# Patient Record
Sex: FEMALE | Race: WHITE | NOT HISPANIC OR LATINO | Employment: FULL TIME | URBAN - METROPOLITAN AREA
[De-identification: names, ages, dates, MRNs, and addresses within clinical notes are randomized per-mention and may not be internally consistent; named-entity substitution may affect disease eponyms.]

---

## 2017-01-03 ENCOUNTER — OFFICE VISIT (OUTPATIENT)
Dept: MEDICAL GROUP | Facility: MEDICAL CENTER | Age: 27
End: 2017-01-03
Payer: COMMERCIAL

## 2017-01-03 VITALS
WEIGHT: 156.31 LBS | DIASTOLIC BLOOD PRESSURE: 60 MMHG | SYSTOLIC BLOOD PRESSURE: 96 MMHG | RESPIRATION RATE: 12 BRPM | OXYGEN SATURATION: 100 % | HEIGHT: 69 IN | TEMPERATURE: 98 F | BODY MASS INDEX: 23.15 KG/M2 | HEART RATE: 97 BPM

## 2017-01-03 DIAGNOSIS — L70.0 ACNE VULGARIS: ICD-10-CM

## 2017-01-03 DIAGNOSIS — F90.9 ATTENTION DEFICIT HYPERACTIVITY DISORDER (ADHD), UNSPECIFIED ADHD TYPE: ICD-10-CM

## 2017-01-03 DIAGNOSIS — G35 MULTIPLE SCLEROSIS (HCC): ICD-10-CM

## 2017-01-03 PROCEDURE — 99214 OFFICE O/P EST MOD 30 MIN: CPT | Performed by: FAMILY MEDICINE

## 2017-01-03 RX ORDER — METHYLPHENIDATE HYDROCHLORIDE 54 MG/1
54 TABLET ORAL DAILY
Qty: 30 TAB | Refills: 0 | Status: SHIPPED | OUTPATIENT
Start: 2017-01-03 | End: 2017-01-03 | Stop reason: SDUPTHER

## 2017-01-03 RX ORDER — METHYLPHENIDATE HYDROCHLORIDE 54 MG/1
54 TABLET ORAL DAILY
Qty: 30 TAB | Refills: 2 | Status: SHIPPED | OUTPATIENT
Start: 2017-01-03 | End: 2017-01-03 | Stop reason: SDUPTHER

## 2017-01-03 RX ORDER — METHYLPHENIDATE HYDROCHLORIDE 54 MG/1
54 TABLET ORAL DAILY
Qty: 30 TAB | Refills: 0 | Status: SHIPPED | OUTPATIENT
Start: 2017-03-01 | End: 2017-01-03 | Stop reason: SDUPTHER

## 2017-01-03 RX ORDER — METHYLPHENIDATE HYDROCHLORIDE 54 MG/1
54 TABLET ORAL DAILY
Qty: 30 TAB | Refills: 0 | Status: SHIPPED | OUTPATIENT
Start: 2017-02-01 | End: 2017-03-17 | Stop reason: SDUPTHER

## 2017-01-03 NOTE — MR AVS SNAPSHOT
"        Rosita Bowles   1/3/2017 3:00 PM   Office Visit   MRN: [id number]    Department:  [place] [address]   Dept Phone:  [phone]    Description:  Female : 1990   Provider:  Manuela Green M.D.           Reason for Visit     Follow-Up MS,ADHA      Allergies as of 1/3/2017     Allergen Noted Reactions    Nkda [No Known Drug Allergy] 2012         You were diagnosed with     Attention deficit hyperactivity disorder (ADHD), unspecified ADHD type   [0234391]       Multiple sclerosis (HCC)   [340.ICD-9-CM]       Acne vulgaris   [362538]         Vital Signs     Blood Pressure Pulse Temperature Respirations Height Weight    96/60 mmHg 97 36.7 °C (98 °F) 12 1.753 m (5' 9.02\") 70.9 kg (156 lb 4.9 oz)    Body Mass Index Oxygen Saturation Smoking Status             23.07 kg/m2 100% Never Smoker          Basic Information     Date Of Birth Sex Race Ethnicity Preferred Language    1990 Female Unknown Unknown English      Your appointments     2017  8:20 AM   New Patient with Manjeet Garrett M.D.   [facility] (--)    [address], [address]  [place] [address] [phone]           Please bring Photo ID, Insurance Cards, All Medication Bottles and copies of any legal documents (such as Living Will, Power of ) If speaking a language besides English please bring an adult . Please arrive 30 minutes prior for check in and registration. You will be receiving a confirmation call a few days before your appointment from our automated call confirmation system.            2017  3:40 PM   Established Patient with Manuela Green M.D.   [facility]    [address]  [address]  [place] [address] [phone]           You will be receiving a confirmation call a few days before your appointment from our automated call confirmation system.              Problem List             " ICD-10-CM Priority Class Noted - Resolved    Depression F32.9   8/7/2012 - Present    ADHD (attention deficit hyperactivity disorder) F90.9   8/7/2012 - Present    Visit for birth control pills maintenance Z30.41   8/7/2012 - Present    Acne L70.9   8/7/2012 - Present    Seasonal allergies J30.2   5/31/2013 - Present    Multiple sclerosis (HCC) G35   11/2/2016 - Present      Health Maintenance        Date Due Completion Dates    PAP SMEAR 12/23/2016 12/23/2013    IMM DTaP/Tdap/Td Vaccine (7 - Td) 5/31/2023 5/31/2013, 3/16/2004, 6/3/1993, 5/28/1992, 4/2/1992, 2/6/1992            Current Immunizations     Dtap Vaccine 6/3/1993, 5/28/1992, 4/2/1992, 2/6/1992    HIB Vaccine (ACTHIB/HIBERIX) 5/28/1992, 4/2/1992, 2/6/1992    HPV Quadrivalent Vaccine (GARDASIL) 5/28/2010, 1/19/2010, 6/17/2009    Hepatitis A Vaccine, Ped/Adol 8/18/2006, 3/16/2004    Hepatitis B Vaccine Non-Recombivax (Ped/Adol) 12/30/1998, 6/25/1998, 5/22/1998    IPV 6/3/1993, 5/28/1992, 4/2/1992, 2/6/1992    Influenza Vaccine Quad Inj (Pf) 10/16/2016    MMR Vaccine 2/13/1997, 3/3/1993    Meningococcal Conjugate Vaccine MCV4 (Menactra) 6/17/2009    Tdap Vaccine 5/31/2013 10:48 AM, 3/16/2004    Varicella Vaccine Live 9/26/2016, 7/28/2016      Below and/or attached are the medications your provider expects you to take. Review all of your home medications and newly ordered medications with your provider and/or pharmacist. Follow medication instructions as directed by your provider and/or pharmacist. Please keep your medication list with you and share with your provider. Update the information when medications are discontinued, doses are changed, or new medications (including over-the-counter products) are added; and carry medication information at all times in the event of emergency situations     Allergies:  NKDA - (reactions not documented)               Medications  Valid as of: January 03, 2017 -  3:23 PM    Generic Name Brand Name Tablet Size Instructions  for use    Adapalene-Benzoyl Peroxide (Gel) Adapalene-Benzoyl Peroxide 0.1-2.5 % 1 Application by Apply externally route every day.        BuPROPion HCl (TABLET SR 24 HR) WELLBUTRIN  MG Take 1 Tab by mouth every morning.        Dimethyl Fumarate (CAPSULE DELAYED RELEASE) Dimethyl Fumarate 240 MG Take 240 mg by mouth 2 Times a Day.        Methylphenidate HCl (Tab CR) CONCERTA 54 MG Take 1 Tab by mouth every day.        Norethindrone-Mestranol (Tab) NECON 1/50 (28) 1-50 MG-MCG Take 1 tab PO qday as directed        .                 Medicines prescribed today were sent to:     [facility]/PHARMACY #[id number] - [place], [place] - [address]    [address] [place] [address]    Phone: [phone] Fax: [phone]    Open 24 Hours?: No      Medication refill instructions:       If your prescription bottle indicates you have medication refills left, it is not necessary to call your provider’s office. Please contact your pharmacy and they will refill your medication.    If your prescription bottle indicates you do not have any refills left, you may request refills at any time through one of the following ways: The online NewCell system (except Urgent Care), by calling your provider’s office, or by asking your pharmacy to contact your provider’s office with a refill request. Medication refills are processed only during regular business hours and may not be available until the next business day. Your provider may request additional information or to have a follow-up visit with you prior to refilling your medication.   *Please Note: Medication refills are assigned a new Rx number when refilled electronically. Your pharmacy may indicate that no refills were authorized even though a new prescription for the same medication is available at the pharmacy. Please request the medicine by name with the pharmacy before contacting your provider for a refill.           NewCell Access Code: Activation code not generated  Current NewCell  Status: Active

## 2017-01-04 NOTE — PROGRESS NOTES
Subjective:   Rosita Bowles is a 26 y.o. female here today for   Chief Complaint   Patient presents with   • Follow-Up     MS,ADHA       1. Attention deficit hyperactivity disorder (ADHD), unspecified ADHD type  This is chronic. Patient has been on Concerta 54 mg daily for the last several years. She denies any side effects other than very nonspecific phenomenon from the Concerta. She denies any weight loss, difficulty sleeping, poor appetite. She uses this medication every day and does not take holidays from it. She is in school and is on winter break currently.    2. Multiple sclerosis (HCC)  This is chronic. Patient is on Tecfidera for this which controls her symptoms. She has an appointment with Dr. Garrett with neurology in 2 weeks. She has not had any flares. She has not needed steroids recently.    3. Acne vulgaris  This is chronic. Patient started using daily moisturizer. She does have Epiduo for acne, however, when she states that her face gets dry. She only uses this as needed. She states that when she does use her face does feel smoother.        Current medicines (including changes today)  Current Outpatient Prescriptions   Medication Sig Dispense Refill   • [START ON 2/1/2017] methylphenidate (CONCERTA) 54 MG CR tablet Take 1 Tab by mouth every day. 30 Tab 0   • NECON 1/50, 28, 1-50 MG-MCG Tab Take 1 tab PO qday as directed 84 Tab 3   • Dimethyl Fumarate (TECFIDERA) 240 MG CAPSULE DELAYED RELEASE Take 240 mg by mouth 2 Times a Day. 60 Cap 3   • Adapalene-Benzoyl Peroxide (EPIDUO) 0.1-2.5 % Gel 1 Application by Apply externally route every day. 1 Tube 5   • buPROPion (WELLBUTRIN XL) 300 MG XL tablet Take 1 Tab by mouth every morning. 90 Tab 3     No current facility-administered medications for this visit.     She  has a past medical history of Depression (8/7/2012); Acne (8/7/2012); and ADHD (attention deficit hyperactivity disorder) (8/7/2012).    ROS   No chest pain, no shortness of breath,  "no abdominal pain       Objective:     Blood pressure 96/60, pulse 97, temperature 36.7 °C (98 °F), resp. rate 12, height 1.753 m (5' 9.02\"), weight 70.9 kg (156 lb 4.9 oz), SpO2 100 %. Body mass index is 23.07 kg/(m^2).   Physical Exam:  Constitutional: Alert, no distress.  Skin: Warm, dry, good turgor, mild acne on the cheeks.  Eye: Equal, round and reactive, conjunctiva clear, lids normal.  ENMT: Lips without lesions, good dentition, oropharynx clear.  Neck: Trachea midline, no masses, no thyromegaly. No cervical or supraclavicular lymphadenopathy  Respiratory: Unlabored respiratory effort, lungs clear to auscultation, no wheezes, no ronchi.  Cardiovascular: Normal S1, S2, no murmur, no edema.  Abdomen: Soft, non-tender, no masses, no hepatosplenomegaly.  Psych: Alert and oriented x3, normal affect and mood.        Assessment and Plan:   The following treatment plan was discussed    1. Attention deficit hyperactivity disorder (ADHD), unspecified ADHD type  This is chronic and stable  Concerta 54 mg daily refilled, 3 months given   aware checked today, patient states that she has filled this monthly, last fill date on the  was 11/1/16  I do not have any concerns for the patient is taking this medication, but I will have her bring in her bottles at the next visit  - methylphenidate (CONCERTA) 54 MG CR tablet; Take 1 Tab by mouth every day.  Dispense: 30 Tab; Refill: 0    2. Multiple sclerosis (HCC)  This is chronic and stable  Follow-up with neurology scheduled in 2 weeks  Continue TecFidera     3. Acne vulgaris  This is chronic and stable  Continue facial care regimen and Epiduo  Patient counseled on dry skin that may occur with Epiduo but generally subsides after chronic use      Followup: Return for PAP, Long.           "

## 2017-01-16 ENCOUNTER — OFFICE VISIT (OUTPATIENT)
Dept: NEUROLOGY | Facility: MEDICAL CENTER | Age: 27
End: 2017-01-16
Payer: COMMERCIAL

## 2017-01-16 VITALS
TEMPERATURE: 97.7 F | BODY MASS INDEX: 22.36 KG/M2 | HEIGHT: 69 IN | HEART RATE: 107 BPM | WEIGHT: 151 LBS | DIASTOLIC BLOOD PRESSURE: 62 MMHG | SYSTOLIC BLOOD PRESSURE: 108 MMHG | OXYGEN SATURATION: 96 %

## 2017-01-16 DIAGNOSIS — G35 MULTIPLE SCLEROSIS (HCC): Primary | ICD-10-CM

## 2017-01-16 PROCEDURE — 99244 OFF/OP CNSLTJ NEW/EST MOD 40: CPT | Performed by: PSYCHIATRY & NEUROLOGY

## 2017-01-16 RX ORDER — DIMETHYL FUMARATE 240 MG/1
1 CAPSULE ORAL 2 TIMES DAILY
Qty: 60 CAP | Refills: 11 | Status: SHIPPED | OUTPATIENT
Start: 2017-01-16 | End: 2017-05-05 | Stop reason: SDUPTHER

## 2017-01-16 ASSESSMENT — ENCOUNTER SYMPTOMS
FOCAL WEAKNESS: 0
BLURRED VISION: 1
DOUBLE VISION: 0
SPEECH CHANGE: 0
NECK PAIN: 0
TINGLING: 0
CONSTIPATION: 0
MEMORY LOSS: 0

## 2017-01-16 NOTE — MR AVS SNAPSHOT
"        Rosita Bowles   2017 8:20 AM   Office Visit   MRN: [id number]    Department:  [facility]   Dept Phone:  [phone]    Description:  Female : 1990   Provider:  Manjeet Garrett M.D.           Reason for Visit     Establish Care MS      Allergies as of 2017     Allergen Noted Reactions    Nkda [No Known Drug Allergy] 2012         You were diagnosed with     Multiple sclerosis (CMS-HCC)   [340.ICD-9-CM]  -  Primary       Vital Signs     Blood Pressure Pulse Temperature Height Weight Body Mass Index    108/62 mmHg 107 36.5 °C (97.7 °F) 1.753 m (5' 9.02\") 68.493 kg (151 lb) 22.29 kg/m2    Oxygen Saturation Smoking Status                96% Never Smoker           Basic Information     Date Of Birth Sex Race Ethnicity Preferred Language    1990 Female White Non- English      Your appointments     2017  3:40 PM   Established Patient with Manuela Green M.D.   [facility])    [address]  [address]  [facility] [address] [phone]           You will be receiving a confirmation call a few days before your appointment from our automated call confirmation system.              Problem List              ICD-10-CM Priority Class Noted - Resolved    Depression F32.9   2012 - Present    ADHD (attention deficit hyperactivity disorder) F90.9   2012 - Present    Visit for birth control pills maintenance Z30.41   2012 - Present    Acne L70.9   2012 - Present    Seasonal allergies J30.2   2013 - Present    Multiple sclerosis (CMS-HCC) G35   2016 - Present      Health Maintenance        Date Due Completion Dates    PAP SMEAR 2016    IMM DTaP/Tdap/Td Vaccine (7 - Td) 2023, 3/16/2004, 6/3/1993, 1992, 1992, 1992            Current Immunizations     Dtap Vaccine 6/3/1993, 1992, 1992, 1992    HIB Vaccine (ACTHIB/HIBERIX) " 5/28/1992, 4/2/1992, 2/6/1992    HPV Quadrivalent Vaccine (GARDASIL) 5/28/2010, 1/19/2010, 6/17/2009    Hepatitis A Vaccine, Ped/Adol 8/18/2006, 3/16/2004    Hepatitis B Vaccine Non-Recombivax (Ped/Adol) 12/30/1998, 6/25/1998, 5/22/1998    IPV 6/3/1993, 5/28/1992, 4/2/1992, 2/6/1992    Influenza Vaccine Quad Inj (Pf) 10/16/2016    MMR Vaccine 2/13/1997, 3/3/1993    Meningococcal Conjugate Vaccine MCV4 (Menactra) 6/17/2009    Tdap Vaccine 5/31/2013 10:48 AM, 3/16/2004    Varicella Vaccine Live 9/26/2016, 7/28/2016      Below and/or attached are the medications your provider expects you to take. Review all of your home medications and newly ordered medications with your provider and/or pharmacist. Follow medication instructions as directed by your provider and/or pharmacist. Please keep your medication list with you and share with your provider. Update the information when medications are discontinued, doses are changed, or new medications (including over-the-counter products) are added; and carry medication information at all times in the event of emergency situations     Allergies:  NKDA - (reactions not documented)               Medications  Valid as of: January 16, 2017 -  9:27 AM    Generic Name Brand Name Tablet Size Instructions for use    Adapalene-Benzoyl Peroxide (Gel) Adapalene-Benzoyl Peroxide 0.1-2.5 % 1 Application by Apply externally route every day.        BuPROPion HCl (TABLET SR 24 HR) WELLBUTRIN  MG Take 1 Tab by mouth every morning.        Dimethyl Fumarate (CAPSULE DELAYED RELEASE) Dimethyl Fumarate 240 MG Take 240 mg by mouth 2 Times a Day.        Methylphenidate HCl (Tab CR) CONCERTA 54 MG Take 1 Tab by mouth every day.        Norethindrone-Mestranol (Tab) NECON 1/50 (28) 1-50 MG-MCG Take 1 tab PO qday as directed        .                 Medicines prescribed today were sent to:     [facility]/PHARMACY #[id number] - [place], [place] - [address]    [address] [place] [address]    Phone:  [phone] Fax: [phone]    Open 24 Hours?: No      Medication refill instructions:       If your prescription bottle indicates you have medication refills left, it is not necessary to call your provider’s office. Please contact your pharmacy and they will refill your medication.    If your prescription bottle indicates you do not have any refills left, you may request refills at any time through one of the following ways: The online LXSN system (except Urgent Care), by calling your provider’s office, or by asking your pharmacy to contact your provider’s office with a refill request. Medication refills are processed only during regular business hours and may not be available until the next business day. Your provider may request additional information or to have a follow-up visit with you prior to refilling your medication.   *Please Note: Medication refills are assigned a new Rx number when refilled electronically. Your pharmacy may indicate that no refills were authorized even though a new prescription for the same medication is available at the pharmacy. Please request the medicine by name with the pharmacy before contacting your provider for a refill.        Your To Do List     Future Labs/Procedures Complete By Expires    CBC WITH DIFFERENTIAL  As directed 1/16/2018    COMP METABOLIC PANEL  As directed 1/16/2018    MR-BRAIN-WITH & W/O  As directed 1/16/2018         Origin Digitalt Access Code: Activation code not generated  Current LXSN Status: Active

## 2017-01-16 NOTE — PROGRESS NOTES
Subjective:      Rosita Bowles is a 26 y.o. female who presents with her mother Larisa, who provides some additional history, from the office of Dr. Fraire, for consultation with a history of MS.    DEYSI Becker is a pleasant 26-year-old right-handed  female whose diagnosis was established back in approximately June of last year, after she suffered from an initial bout of left-sided optic neuritis in May, 2016, and 2 subsequent MRI scan of the brain revealed findings consistent with demyelinating disease. At that point she was treated with IV methylprednisolone for 5 days, but the visual distortions really never improved though they were stable. She suffered from another bout in September, hospitalized at that time, she again received IV methylprednisolone for total of 5 doses. 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 The workup was rather thorough, including CSF study, blood work and repeat imaging of the brain, also of the cervical and thoracic spines. I reviewed all these records. MRI the brain revealed a dynamic process with continuing enhancement of multiple lesions, involvement of the supratentorial white matter, no mention of posterior fossa involvement, and the spinal axis was uninvolved. CSF studies were positive only for 2 oligoclonal bands, but other inflammatory markers were negative, protein, cell count and glucose were all unremarkable as were cytologies. Her MICHELLE viral titers were positive, she has never been able to generate an antibody response to chickenpox, she did receive the immunization, and yet had no detectable antibody titers following that.    With her initial dosing of steroids, it sounds like she had some significant myelopathic symptomatology, the second round simply made her very fatigued. She was placed on Tecfidera, she has had some real problems with facial flushing and some malaise, though ibuprofen seems to  "mitigate the side effects to a degree. Because of insurance issues and her move to the area locally, she has not been on a therapeutic dose until the last month, though she was originally prescribed the medication after her first relapse and diagnosis established in June, 2016. Clinically, she has residual problems with the optic neuritis, there have been some memory problems, she notes she has to make lists, her mental processing speed is a little bit slowed. Multitasking seems to be unimpaired. She has a history of ADHD and depression which predated her diagnosis. She denies any other cranial nerve symptoms. Motor, cerebellar, sensory, bowel and bladder, sexual and fatigue systems have been unaffected.    Her medical and surgical histories are otherwise completely unremarkable. No one in the family has a history of demyelinating disease, stroke, malignancy, diabetes, autoimmune disease or significant psychiatric disease. Her mother has hypothyroidism. She's been on the pill for quite some time, menses occur during the placebo week. She does not smoke or drink. 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 She has a significant other, they're both doing well.    She is on Wellbutrin  mg daily, Concerta 54 mg daily, Tecfidera 240 mg, twice a day, Necon birth control, multivitamins.    Review of Systems   Constitutional: Negative for malaise/fatigue.   Eyes: Positive for blurred vision. Negative for double vision.   Gastrointestinal: Negative for constipation.   Genitourinary: Negative for dysuria and frequency.   Musculoskeletal: Negative for neck pain.   Neurological: Negative for tingling, speech change and focal weakness.   Psychiatric/Behavioral: Negative for memory loss.   All other systems reviewed and are negative.       Objective:     /62 mmHg  Pulse 107  Temp(Src) 36.5 °C (97.7 °F)  Ht 1.753 m (5' 9.02\")  Wt " 68.493 kg (151 lb)  BMI 22.29 kg/m2  SpO2 96%     Physical Exam    She appears in no acute distress. Vital signs are stable. Wrist no malar rash or jaw claudication. The neck is supple, range of motion is full, Lhermitte phenomenon is absent. Cardiac evaluation is unremarkable. There is no evidence of rash, diffuse joint swelling, muscle tenderness, or lower extremity edema.    She is fully oriented, there is no evidence of focal cognitive or language deficits.    PERRLA/EOMI, funduscopic exam does reveal some disc pallor and no evidence of atrophy on the left, funduscopic exam on the right is grossly intact. Visual field testing involves a temporal hemianoptic and altitudinal deficit to finger counting with the left eye only, the right eyes visual fields are full. There is no facial asymmetry or sensory distortion on either side, facial movements are symmetric, jaw jerk is absent, the tongue and uvula are midline. Shoulder shrug is symmetric.    There is no tremor, asterixis, or drift. Tone is normal, strength is 5/5 throughout. Reflexes are brisk but symmetric, both toes are downgoing. There is no ankle clonus.    She walks with normal station, heel, toe, and tandem walking are intact. Arm swing is symmetric. There is no appendicular dystaxia. Repetitive movements with her hands, fingers and feet are intact and symmetric with normal amplitude and frequencies.    Sensory exam is intact to vibration, temperature and pinprick. Romberg is absent.     Assessment/Plan:     1. Multiple sclerosis (CMS-HCC)  The diagnosis is not in question, repeat imaging of the brain with and without contrast as well as follow-up CBC and CMP are required. She is only been on Tecfidera for one month at therapeutic dosing, we will need to maintain her little longer to see its effect on both clinical and subclinical disease. We do have other treatment options available to us, even in light of a positive MICHELLE virus titer. I will repeat this  as well as her VZV titers. I will follow-up with her in a couple of months after imaging and serologies are obtained. Even though she has some flushing, she knows how to use the ibuprofen appropriately. Face face time was spent reviewing all the above.    - MR-BRAIN-WITH & W/O; Future  - CBC WITH DIFFERENTIAL; Future  - COMP METABOLIC PANEL; Future  - MICHELLE/BK VIRUS DNA PCR  - VZV REAL TIME PCR  - Dimethyl Fumarate (TECFIDERA) 240 MG CAPSULE DELAYED RELEASE; Take 240 mg by mouth 2 Times a Day.  Dispense: 60 Cap; Refill: 11    Time: Evaluation of 60 minutes for exam, review, discussion, and education, high complexity  Discussion: As mentioned in the assessment, over 50% of the time spent face-to-face with patient and her mother counseling and coordinating care.

## 2017-01-18 ENCOUNTER — OFFICE VISIT (OUTPATIENT)
Dept: MEDICAL GROUP | Facility: MEDICAL CENTER | Age: 27
End: 2017-01-18
Payer: COMMERCIAL

## 2017-01-18 VITALS
HEIGHT: 69 IN | TEMPERATURE: 98.4 F | OXYGEN SATURATION: 96 % | BODY MASS INDEX: 21.62 KG/M2 | HEART RATE: 98 BPM | DIASTOLIC BLOOD PRESSURE: 70 MMHG | SYSTOLIC BLOOD PRESSURE: 94 MMHG | WEIGHT: 146 LBS

## 2017-01-18 DIAGNOSIS — J06.9 ACUTE URI: ICD-10-CM

## 2017-01-18 PROCEDURE — 99213 OFFICE O/P EST LOW 20 MIN: CPT | Performed by: FAMILY MEDICINE

## 2017-01-18 RX ORDER — NORETHINDRONE 0.35 MG/1
1 TABLET ORAL
Refills: 3 | COMMUNITY
Start: 2016-11-16 | End: 2017-03-20

## 2017-01-18 RX ORDER — CETIRIZINE HYDROCHLORIDE 10 MG/1
10 TABLET ORAL DAILY
Qty: 30 TAB | COMMUNITY
Start: 2017-01-18 | End: 2017-03-20

## 2017-01-18 RX ORDER — FLUTICASONE PROPIONATE 50 MCG
1 SPRAY, SUSPENSION (ML) NASAL DAILY
Qty: 16 G | COMMUNITY
Start: 2017-01-18 | End: 2017-03-20

## 2017-01-18 NOTE — PATIENT INSTRUCTIONS
Tips to help with symptoms of your upper respiratory infection   1) Ibuprofen 600mg by mouth up to every 6 hours as needed as needed for inflammation and fevers.  This will help your body to relax, will help reduce fevers (if any), and will decrease pain and achiness.  Alternatively, feel free to take Acetaminophen (Tylenol), to help with pain and fevers, as well.   2) Please drink plenty of fluids, including chicken soup with garlic and red pepper flakes, you can also drink hot water or hot tea--either way, add in honey and lemon.  I personally prefer this over cough syrups in most cases.   3) I recommend Ricola cough drops with Honey and Echinacea.  This will help soothe your throat.

## 2017-01-18 NOTE — MR AVS SNAPSHOT
"        Rosita Bowles   2017 2:20 PM   Office Visit   MRN: [id number]    Department:  [place] [address]   Dept Phone:  [phone]    Description:  Female : 1990   Provider:  Uche Almaguer M.D.           Reason for Visit     URI dry cough with sore throat x2 days      Allergies as of 2017     Allergen Noted Reactions    Nkda [No Known Drug Allergy] 2012         You were diagnosed with     Acute URI   [234819]         Vital Signs     Blood Pressure Pulse Temperature Height Weight Body Mass Index    94/70 mmHg 98 36.9 °C (98.4 °F) 1.753 m (5' 9.02\") 66.225 kg (146 lb) 21.55 kg/m2    Oxygen Saturation Last Menstrual Period Breastfeeding? Smoking Status          96% 2017 No Never Smoker         Basic Information     Date Of Birth Sex Race Ethnicity Preferred Language    1990 Female White Non- English      Your appointments     2017  3:40 PM   Established Patient with Manuela Green M.D.   [facility])    [address]  [address]  [place] [address] [phone]           You will be receiving a confirmation call a few days before your appointment from our automated call confirmation system.            2017  2:00 PM   MR HEAD 61 with [facility] MRI 1   [facility] - MRI - [facility])    [address]  [place] [id number] [phone]            Mar 24, 2017  4:00 PM   Follow Up Visit with Manjeet Garrett M.D.   [facility] (--)    [address], [address]  [place] [address] [phone]           You will be receiving a confirmation call a few days before your appointment from our automated call confirmation system.              Problem List              ICD-10-CM Priority Class Noted - Resolved    Depression F32.9   2012 - Present    ADHD (attention deficit hyperactivity disorder) F90.9   2012 - Present    Visit for birth control " pills maintenance Z30.41   8/7/2012 - Present    Acne L70.9   8/7/2012 - Present    Seasonal allergies J30.2   5/31/2013 - Present    Multiple sclerosis (CMS-HCC) G35   11/2/2016 - Present    Acute URI J06.9   1/18/2017 - Present      Health Maintenance        Date Due Completion Dates    PAP SMEAR 12/23/2016 12/23/2013    IMM DTaP/Tdap/Td Vaccine (7 - Td) 5/31/2023 5/31/2013, 3/16/2004, 6/3/1993, 5/28/1992, 4/2/1992, 2/6/1992            Current Immunizations     Dtap Vaccine 6/3/1993, 5/28/1992, 4/2/1992, 2/6/1992    HIB Vaccine (ACTHIB/HIBERIX) 5/28/1992, 4/2/1992, 2/6/1992    HPV Quadrivalent Vaccine (GARDASIL) 5/28/2010, 1/19/2010, 6/17/2009    Hepatitis A Vaccine, Ped/Adol 8/18/2006, 3/16/2004    Hepatitis B Vaccine Non-Recombivax (Ped/Adol) 12/30/1998, 6/25/1998, 5/22/1998    IPV 6/3/1993, 5/28/1992, 4/2/1992, 2/6/1992    Influenza Vaccine Quad Inj (Pf) 10/16/2016    MMR Vaccine 2/13/1997, 3/3/1993    Meningococcal Conjugate Vaccine MCV4 (Menactra) 6/17/2009    Tdap Vaccine 5/31/2013 10:48 AM, 3/16/2004    Varicella Vaccine Live 9/26/2016, 7/28/2016      Below and/or attached are the medications your provider expects you to take. Review all of your home medications and newly ordered medications with your provider and/or pharmacist. Follow medication instructions as directed by your provider and/or pharmacist. Please keep your medication list with you and share with your provider. Update the information when medications are discontinued, doses are changed, or new medications (including over-the-counter products) are added; and carry medication information at all times in the event of emergency situations     Allergies:  NKDA - (reactions not documented)               Medications  Valid as of: January 18, 2017 -  2:46 PM    Generic Name Brand Name Tablet Size Instructions for use    Adapalene-Benzoyl Peroxide (Gel) Adapalene-Benzoyl Peroxide 0.1-2.5 % 1 Application by Apply externally route every day.         BuPROPion HCl (TABLET SR 24 HR) WELLBUTRIN  MG Take 1 Tab by mouth every morning.        Cetirizine HCl (Tab) ZYRTEC 10 MG Take 1 Tab by mouth every day.        Dimethyl Fumarate (CAPSULE DELAYED RELEASE) Dimethyl Fumarate 240 MG Take 240 mg by mouth 2 Times a Day.        Fluticasone Propionate (Suspension) FLONASE 50 MCG/ACT Spray 1 Spray in nose every day.        Methylphenidate HCl (Tab CR) CONCERTA 54 MG Take 1 Tab by mouth every day.        Norethindrone (Tab) PAULO 0.35 MG Take 1 Tab by mouth.        Norethindrone-Mestranol (Tab) NECON 1/50 (28) 1-50 MG-MCG Take 1 tab PO qday as directed        .                 Medicines prescribed today were sent to:     [facility]/PHARMACY #[id number] - [place] - [address]    [address] [place] [address]    Phone: [phone] Fax: [phone]    Open 24 Hours?: No      Medication refill instructions:       If your prescription bottle indicates you have medication refills left, it is not necessary to call your provider’s office. Please contact your pharmacy and they will refill your medication.    If your prescription bottle indicates you do not have any refills left, you may request refills at any time through one of the following ways: The online [employer] system (except Urgent Care), by calling your provider’s office, or by asking your pharmacy to contact your provider’s office with a refill request. Medication refills are processed only during regular business hours and may not be available until the next business day. Your provider may request additional information or to have a follow-up visit with you prior to refilling your medication.   *Please Note: Medication refills are assigned a new Rx number when refilled electronically. Your pharmacy may indicate that no refills were authorized even though a new prescription for the same medication is available at the pharmacy. Please request the medicine by name with the pharmacy before contacting your provider  for a refill.        Instructions    Tips to help with symptoms of your upper respiratory infection   1) Ibuprofen 600mg by mouth up to every 6 hours as needed as needed for inflammation and fevers.  This will help your body to relax, will help reduce fevers (if any), and will decrease pain and achiness.  Alternatively, feel free to take Acetaminophen (Tylenol), to help with pain and fevers, as well.   2) Please drink plenty of fluids, including chicken soup with garlic and red pepper flakes, you can also drink hot water or hot tea--either way, add in honey and lemon.  I personally prefer this over cough syrups in most cases.   3) I recommend Ricola cough drops with Honey and Echinacea.  This will help soothe your throat.          [employer] Access Code: Activation code not generated  Current [employer] Status: Active

## 2017-01-19 ENCOUNTER — PATIENT MESSAGE (OUTPATIENT)
Dept: NEUROLOGY | Facility: MEDICAL CENTER | Age: 27
End: 2017-01-19

## 2017-01-19 ENCOUNTER — TELEPHONE (OUTPATIENT)
Dept: NEUROLOGY | Facility: MEDICAL CENTER | Age: 27
End: 2017-01-19

## 2017-01-19 NOTE — TELEPHONE ENCOUNTER
Pt is sending this message via [employer] e-mail. Please advise. Thank you. JOVAN Garrett,   I just experienced some tingling in my quadriceps today both of them a few minutes ago, around 12:40 and I'm not sure what, if anything, it relates to, but I thought I'd let you know..frankly it scares the heck out of me. I'm thinking that perhaps because I'm sick and my white blood cells are active, perhaps lesions are developing more quickly, as the cells recognize multiple threats to the body and are currently in an amplified state. My next appointment is March 24 see you then

## 2017-01-19 NOTE — ASSESSMENT & PLAN NOTE
"Patient with 2day h/o sore throat associated with non-productive cough, bilateral frontal sinus pain/pressure (L>R), \"feeling like I have to sneeze, but can't,\" nausea, GI upset, mild myalgias.  Patient hasn't been taking OTC medications to treat her symptoms.  Patient states that she presented today due to concerns of how an acute illness \"could go down wrong\" in light of her multiple sclerosis.    Of note, patient states that she has sick contacts (mother, brother).    ROS is NEGATIVE for fevers, chills, diarrhea/loose stools, constipation, palpitations, wheezing/rhonchi/rales, chest congestion, dysuria, polyuria, increased frequency of urination.  "

## 2017-01-19 NOTE — PROGRESS NOTES
"Subjective:     Chief Complaint   Patient presents with   • URI     dry cough with sore throat x2 days       History of Present Illness:  Rosita Bowles is a 26 y.o. female established patient who presents today to have evaluation of acute illness:    Acute URI  Patient with 2day h/o sore throat associated with non-productive cough, bilateral frontal sinus pain/pressure (L>R), \"feeling like I have to sneeze, but can't,\" nausea, GI upset, mild myalgias.  Patient hasn't been taking OTC medications to treat her symptoms.  Patient states that she presented today due to concerns of how an acute illness \"could go down wrong\" in light of her multiple sclerosis.    Of note, patient states that she has sick contacts (mother, brother).    ROS is NEGATIVE for fevers, chills, diarrhea/loose stools, constipation, palpitations, wheezing/rhonchi/rales, chest congestion, dysuria, polyuria, increased frequency of urination.      Patient Active Problem List    Diagnosis Date Noted   • Acute URI 01/18/2017   • Multiple sclerosis (CMS-[facility]) 11/02/2016   • Seasonal allergies 05/31/2013   • Depression 08/07/2012   • ADHD (attention deficit hyperactivity disorder) 08/07/2012   • Visit for birth control pills maintenance 08/07/2012   • Acne 08/07/2012       Additional History:   Allergies:    Nkda     Current Medications:     Current Outpatient Prescriptions   Medication Sig Dispense Refill   • fluticasone (FLONASE) 50 MCG/ACT nasal spray Spray 1 Spray in nose every day. 16 g    • cetirizine (ZYRTEC) 10 MG Tab Take 1 Tab by mouth every day. 30 Tab    • PAULO 0.35 MG tablet Take 1 Tab by mouth.  3   • Dimethyl Fumarate (TECFIDERA) 240 MG CAPSULE DELAYED RELEASE Take 240 mg by mouth 2 Times a Day. 60 Cap 11   • [START ON 2/1/2017] methylphenidate (CONCERTA) 54 MG CR tablet Take 1 Tab by mouth every day. 30 Tab 0   • NECON 1/50, 28, 1-50 MG-MCG Tab Take 1 tab PO qday as directed 84 Tab 3   • Adapalene-Benzoyl Peroxide (EPIDUO) " "0.1-2.5 % Gel 1 Application by Apply externally route every day. 1 Tube 5   • buPROPion (WELLBUTRIN XL) 300 MG XL tablet Take 1 Tab by mouth every morning. 90 Tab 3     No current facility-administered medications for this visit.        Social History:     Social History   Substance Use Topics   • Smoking status: Never Smoker    • Smokeless tobacco: Never Used   • Alcohol Use: 0.0 oz/week     0 Standard drinks or equivalent per week      Comment: occasional       ROS:   Please see ROS as in HPI above.     Objective:   Physical Exam:    Vitals: Blood pressure 94/70, pulse 98, temperature 36.9 °C (98.4 °F), height 1.753 m (5' 9.02\"), weight 66.225 kg (146 lb), last menstrual period 01/11/2017, SpO2 96 %, not currently breastfeeding.   BMI: Body mass index is 21.55 kg/(m^2).   General/Constitutional: Vitals as above, Well nourished, well developed female in no acute distress   Head/Eyes: Head is grossly normal & atraumatic, bilateral conjunctivae not injected, bilateral EOMI, bilateral PERRL   ENT: Bilateral external ears grossly normal in appearance, Hearing grossly intact, bilateral external canals without cerumen, bilateral TMs are able to the visualized with mild dullness but no erythema/exudates, External nares normal in appearance and without discharge/bleeding, bilateral frontal/maxillary sinuses are tender to palpation (frontal>maxillary, left>right), posterior oropharynx with minimal erythema but no exudates without airway obstruction   Respiratory: No respiratory distress, bilateral lungs are clear to ausculation in all lung fields (anterior/lateral/posterior), no wheezing/rhonchi/rales   Cardiovascular: Regular rate and rhythm without murmur/gallops/rubs, distal pulses are intact and equal bilaterally (radial, posterior tibial), no bilateral lower extremity edema   MSK: Gait grossly normal & not antalgic   Integumentary: No apparent rashes   Psych: Judgment grossly appropriate, no apparent " depression/anxiety    Assessment and Plan:   1. Acute URI  Acute URI, likely viral in etiology given concurrent gastritis.  OTC medications to address symptoms.  Patient given conservative supportive care instructions.  She verbalizes understanding.   - fluticasone (FLONASE) 50 MCG/ACT nasal spray; Spray 1 Spray in nose every day.  Dispense: 16 g   - cetirizine (ZYRTEC) 10 MG Tab; Take 1 Tab by mouth every day.  Dispense: 30 Tab      PLEASE NOTE: This dictation was created using voice recognition software. I have made every reasonable attempt to correct obvious errors, but I expect that there are errors of grammar and possibly content that I did not discover before finalizing the note.

## 2017-01-20 ENCOUNTER — HOSPITAL ENCOUNTER (OUTPATIENT)
Facility: MEDICAL CENTER | Age: 27
End: 2017-01-20
Attending: FAMILY MEDICINE
Payer: COMMERCIAL

## 2017-01-20 ENCOUNTER — OFFICE VISIT (OUTPATIENT)
Dept: MEDICAL GROUP | Facility: MEDICAL CENTER | Age: 27
End: 2017-01-20
Payer: COMMERCIAL

## 2017-01-20 VITALS
HEIGHT: 69 IN | TEMPERATURE: 98.6 F | DIASTOLIC BLOOD PRESSURE: 72 MMHG | HEART RATE: 90 BPM | WEIGHT: 146 LBS | OXYGEN SATURATION: 96 % | BODY MASS INDEX: 21.62 KG/M2 | SYSTOLIC BLOOD PRESSURE: 100 MMHG | RESPIRATION RATE: 16 BRPM

## 2017-01-20 DIAGNOSIS — Z12.4 SCREENING FOR MALIGNANT NEOPLASM OF CERVIX: ICD-10-CM

## 2017-01-20 DIAGNOSIS — Z11.3 SCREENING FOR STD (SEXUALLY TRANSMITTED DISEASE): ICD-10-CM

## 2017-01-20 DIAGNOSIS — Z01.419 WELL WOMAN EXAM WITH ROUTINE GYNECOLOGICAL EXAM: ICD-10-CM

## 2017-01-20 PROCEDURE — 87591 N.GONORRHOEAE DNA AMP PROB: CPT

## 2017-01-20 PROCEDURE — 87491 CHLMYD TRACH DNA AMP PROBE: CPT

## 2017-01-20 PROCEDURE — 87480 CANDIDA DNA DIR PROBE: CPT

## 2017-01-20 PROCEDURE — 99395 PREV VISIT EST AGE 18-39: CPT | Performed by: FAMILY MEDICINE

## 2017-01-20 PROCEDURE — 87624 HPV HI-RISK TYP POOLED RSLT: CPT

## 2017-01-20 PROCEDURE — 87510 GARDNER VAG DNA DIR PROBE: CPT

## 2017-01-20 PROCEDURE — 87660 TRICHOMONAS VAGIN DIR PROBE: CPT

## 2017-01-20 PROCEDURE — 88175 CYTOPATH C/V AUTO FLUID REDO: CPT

## 2017-01-20 NOTE — MR AVS SNAPSHOT
"        Rosita Bowles   2017 3:40 PM   Office Visit   MRN: [id number]    Department:  [place] [address]   Dept Phone:  [phone]    Description:  Female : 1990   Provider:  Manuela Green M.D.           Reason for Visit     Gynecologic Exam           Allergies as of 2017     Allergen Noted Reactions    Nkda [No Known Drug Allergy] 2012         You were diagnosed with     Well woman exam with routine gynecological exam   [764124]       Screening for malignant neoplasm of cervix   [122886]       Screening for STD (sexually transmitted disease)   [272547]         Vital Signs     Blood Pressure Pulse Temperature Respirations Height Weight    100/72 mmHg 90 37 °C (98.6 °F) 16 1.753 m (5' 9.02\") 66.225 kg (146 lb)    Body Mass Index Oxygen Saturation Last Menstrual Period Breastfeeding? Smoking Status       21.55 kg/m2 96% 2017 No Never Smoker        Basic Information     Date Of Birth Sex Race Ethnicity Preferred Language    1990 Female White Non- English      Your appointments     2017  2:00 PM   MR HEAD 61 with [facility] MRI 1   [facility] - [facility] - [facility]    [address]  [place] [id number] [phone]            Mar 17, 2017  3:40 PM   Established Patient with Manuela Green M.D.   [facility])    [address]  [address]  [place] [address] [phone]           You will be receiving a confirmation call a few days before your appointment from our automated call confirmation system.            Mar 24, 2017  4:00 PM   Follow Up Visit with Manjeet Garrett M.D.   [facility] (--)    [address], [address]  [place] [address] [phone]           You will be receiving a confirmation call a few days before your appointment from our automated call confirmation system.              Problem List              ICD-10-CM Priority Class " Noted - Resolved    Depression F32.9   8/7/2012 - Present    ADHD (attention deficit hyperactivity disorder) F90.9   8/7/2012 - Present    Visit for birth control pills maintenance Z30.41   8/7/2012 - Present    Acne L70.9   8/7/2012 - Present    Seasonal allergies J30.2   5/31/2013 - Present    Multiple sclerosis (CMS-[facility]) G35   11/2/2016 - Present    Acute URI J06.9   1/18/2017 - Present    Screening for STD (sexually transmitted disease) Z11.3   1/20/2017 - Present      Health Maintenance        Date Due Completion Dates    PAP SMEAR 12/23/2016 12/23/2013    IMM DTaP/Tdap/Td Vaccine (7 - Td) 5/31/2023 5/31/2013, 3/16/2004, 6/3/1993, 5/28/1992, 4/2/1992, 2/6/1992            Current Immunizations     Dtap Vaccine 6/3/1993, 5/28/1992, 4/2/1992, 2/6/1992    HIB Vaccine (ACTHIB/HIBERIX) 5/28/1992, 4/2/1992, 2/6/1992    HPV Quadrivalent Vaccine (GARDASIL) 5/28/2010, 1/19/2010, 6/17/2009    Hepatitis A Vaccine, Ped/Adol 8/18/2006, 3/16/2004    Hepatitis B Vaccine Non-Recombivax (Ped/Adol) 12/30/1998, 6/25/1998, 5/22/1998    IPV 6/3/1993, 5/28/1992, 4/2/1992, 2/6/1992    Influenza Vaccine Quad Inj (Pf) 10/16/2016    MMR Vaccine 2/13/1997, 3/3/1993    Meningococcal Conjugate Vaccine MCV4 (Menactra) 6/17/2009    Tdap Vaccine 5/31/2013 10:48 AM, 3/16/2004    Varicella Vaccine Live 9/26/2016, 7/28/2016      Below and/or attached are the medications your provider expects you to take. Review all of your home medications and newly ordered medications with your provider and/or pharmacist. Follow medication instructions as directed by your provider and/or pharmacist. Please keep your medication list with you and share with your provider. Update the information when medications are discontinued, doses are changed, or new medications (including over-the-counter products) are added; and carry medication information at all times in the event of emergency situations     Allergies:  NKDA - (reactions not documented)                  Medications  Valid as of: January 20, 2017 -  4:11 PM    Generic Name Brand Name Tablet Size Instructions for use    Adapalene-Benzoyl Peroxide (Gel) Adapalene-Benzoyl Peroxide 0.1-2.5 % 1 Application by Apply externally route every day.        BuPROPion HCl (TABLET SR 24 HR) WELLBUTRIN  MG Take 1 Tab by mouth every morning.        Cetirizine HCl (Tab) ZYRTEC 10 MG Take 1 Tab by mouth every day.        Dimethyl Fumarate (CAPSULE DELAYED RELEASE) Dimethyl Fumarate 240 MG Take 240 mg by mouth 2 Times a Day.        Fluticasone Propionate (Suspension) FLONASE 50 MCG/ACT Spray 1 Spray in nose every day.        Methylphenidate HCl (Tab CR) CONCERTA 54 MG Take 1 Tab by mouth every day.        Norethindrone (Tab) PAULO 0.35 MG Take 1 Tab by mouth.        Norethindrone-Mestranol (Tab) NECON 1/50 (28) 1-50 MG-MCG Take 1 tab PO qday as directed        .                 Medicines prescribed today were sent to:     [facility]/PHARMACY #[id number] - [place], [place] - [address] [facility]    [address] [facility] [place] [address]    Phone: [phone] Fax: [phone]    Open 24 Hours?: No      Medication refill instructions:       If your prescription bottle indicates you have medication refills left, it is not necessary to call your provider’s office. Please contact your pharmacy and they will refill your medication.    If your prescription bottle indicates you do not have any refills left, you may request refills at any time through one of the following ways: The online [employer] system (except Urgent Care), by calling your provider’s office, or by asking your pharmacy to contact your provider’s office with a refill request. Medication refills are processed only during regular business hours and may not be available until the next business day. Your provider may request additional information or to have a follow-up visit with you prior to refilling your medication.   *Please Note: Medication refills are assigned a new Rx number when refilled  electronically. Your pharmacy may indicate that no refills were authorized even though a new prescription for the same medication is available at the pharmacy. Please request the medicine by name with the pharmacy before contacting your provider for a refill.        Your To Do List     Future Labs/Procedures Complete By Expires    HSV I SPECIFIC IGG AB  As directed 1/21/2018    HSV II SPECIFIC IGG AB  As directed 1/21/2018    THINPREP RFLX HPV ASCUS W/CTNG  As directed 1/21/2018    VAGINAL PATHOGENS DNA PANEL  As directed 1/21/2018         [employer] Access Code: Activation code not generated  Current [employer] Status: Active

## 2017-01-20 NOTE — PROGRESS NOTES
Subjective:     CC:   Chief Complaint   Patient presents with   • Gynecologic Exam       HPI:   Rosita Bowles is a 26 y.o.  female who presents for annual exam    She is having complaints of a right labial mass/burning    Last pap: , no hx of abnormals, no hx of STD  Last Tdap:   Gardiasil: yes,      Menses every month with 5 days moderate bleeding.  Cramping is mild.   No significant bloating/fluid retention, pelvic pain, or dyspareunia. No vaginal discharge   No breast tenderness, mass, nipple discharge, changes in size or contour, or abnormal cyclic discomfort.  She do not perform regular self breast exams.  Regular exercise: yes   Diet: healthy    She  has a past medical history of Depression (2012); Acne (2012); and ADHD (attention deficit hyperactivity disorder) (2012).  She  has past surgical history that includes ankle orif and dental extraction(s).    Family History   Problem Relation Age of Onset   • Cancer Neg Hx    • Diabetes Neg Hx    • Thyroid Mother    • Thyroid Brother        Social History     Social History   • Marital Status: Single     Spouse Name: N/A   • Number of Children: N/A   • Years of Education: N/A     Occupational History   • Not on file.     Social History Main Topics   • Smoking status: Never Smoker    • Smokeless tobacco: Never Used   • Alcohol Use: 0.0 oz/week     0 Standard drinks or equivalent per week      Comment: occasional   • Drug Use: No   • Sexual Activity:     Partners: Male     Birth Control/ Protection: Pill      Comment: OCP     Other Topics Concern   • Not on file     Social History Narrative    : BF in [place]. Attends [employer].    : was living in [place]. Now back in [place].     : working in [employer]. BF broke up with her Sept. No friends in [place].     2015: working 2 jobs. Has BF.        Patient Active Problem List    Diagnosis Date Noted   • Screening for STD (sexually transmitted disease) 2017   • Acute URI  "01/18/2017   • Multiple sclerosis (CMS-[facility]) 11/02/2016   • Seasonal allergies 05/31/2013   • Depression 08/07/2012   • ADHD (attention deficit hyperactivity disorder) 08/07/2012   • Visit for birth control pills maintenance 08/07/2012   • Acne 08/07/2012         Current Outpatient Prescriptions   Medication Sig Dispense Refill   • PAULO 0.35 MG tablet Take 1 Tab by mouth.  3   • fluticasone (FLONASE) 50 MCG/ACT nasal spray Spray 1 Spray in nose every day. 16 g    • cetirizine (ZYRTEC) 10 MG Tab Take 1 Tab by mouth every day. 30 Tab    • Dimethyl Fumarate (TECFIDERA) 240 MG CAPSULE DELAYED RELEASE Take 240 mg by mouth 2 Times a Day. 60 Cap 11   • [START ON 2/1/2017] methylphenidate (CONCERTA) 54 MG CR tablet Take 1 Tab by mouth every day. 30 Tab 0   • NECON 1/50, 28, 1-50 MG-MCG Tab Take 1 tab PO qday as directed 84 Tab 3   • Adapalene-Benzoyl Peroxide (EPIDUO) 0.1-2.5 % Gel 1 Application by Apply externally route every day. 1 Tube 5   • buPROPion (WELLBUTRIN XL) 300 MG XL tablet Take 1 Tab by mouth every morning. 90 Tab 3     No current facility-administered medications for this visit.     Allergies   Allergen Reactions   • Nkda [No Known Drug Allergy]        Review of Systems   Constitutional: Negative for fever, chills and malaise/fatigue.   HENT: Negative for congestion.    Eyes: Negative for pain.   Respiratory: Negative for cough and shortness of breath.    Cardiovascular: Negative for leg swelling.   Gastrointestinal: Negative for nausea, vomiting, abdominal pain and diarrhea.   Genitourinary: Negative for dysuria and hematuria.   Skin: Negative for rash.   Neurological: Negative for dizziness, focal weakness and headaches.   Endo/Heme/Allergies: Does not bruise/bleed easily.   Psychiatric/Behavioral: Negative for depression.  The patient is not nervous/anxious.      Objective:     /72 mmHg  Pulse 90  Temp(Src) 37 °C (98.6 °F)  Resp 16  Ht 1.753 m (5' 9.02\")  Wt 66.225 kg (146 lb)  BMI 21.55 " kg/m2  SpO2 96%  LMP 01/20/2017  Breastfeeding? No  Body mass index is 21.55 kg/(m^2).  Wt Readings from Last 4 Encounters:   01/20/17 66.225 kg (146 lb)   01/18/17 66.225 kg (146 lb)   01/16/17 68.493 kg (151 lb)   01/03/17 70.9 kg (156 lb 4.9 oz)       Physical Exam:  Constitutional: Well-developed and well-nourished. Not diaphoretic. No distress.   Skin: Skin is warm and dry. No rash noted.  Head: Atraumatic without lesions.  Eyes: Conjunctivae and extraocular motions are normal. Pupils are equal, round, and reactive to light. No scleral icterus.   Ears:  External ears unremarkable.  Nose: Nares patent. Septum midline. Turbinates without erythema nor edema. No discharge.   Mouth/Throat: Dentition is good. Tongue normal. Oropharynx is clear and moist. Posterior pharynx without erythema or exudates.  Neck: Supple, trachea midline. Normal range of motion. No thyromegaly present. No lymphadenopathy--cervical or supraclavicular  Cardiovascular: Regular rate and rhythm, S1 and S2 without murmur, rubs, or gallops.    Chest: Effort normal. Clear to auscultation throughout. No adventitious sounds. No CVA tenderness.  Abdomen: Soft, non tender, and without distention. Active bowel sounds in all four quadrants. No rebound, guarding, masses or HSM.  PELVIC:Perineum and external genitalia normal without rash. Vagina with bloody discharge. Cervix without visible lesions or discharge. Bimanual exam without adnexal masses or cervical motion tenderness. Right labia majora with a small abrasion, collected sample  BREAST EXAM: Breasts examined supine. No skin changes, peau d'orange or nipple retraction. No discharge. No axillary or supraclavicular adenopathy. No masses or nodularity palpable.   Extremities: No cyanosis, clubbing, erythema, nor edema. Distal pulses intact and symmetric.   Musculoskeletal: All major joints AROM full in all directions without pain.  Neurological: Alert and oriented x 3. DTRs 2+/3 and symmetric. No  cranial nerve deficit. 5/5 myotomes. Sensation intact. Negative Rhomberg.  Psychiatric:  Behavior, mood, and affect are appropriate.    Assessment and Plan:     1. Well woman exam with routine gynecological exam  THINPREP RFLX HPV ASCUS W/CTNG   2. Screening for malignant neoplasm of cervix  THINPREP RFLX HPV ASCUS W/CTNG   3. Screening for STD (sexually transmitted disease)  PANEL [id number]    HSV I SPECIFIC IGG AB    HSV II SPECIFIC IGG AB    VAGINAL PATHOGENS DNA PANEL    RPR       HCM:  Pap, GC, CT, STD screen   Labs per orders  Immunizations per orders  Pt counseled about skin care, diet, supplements, prenatal vitamins and exercise.  Discussed  STD prevention, use and side effects of OCP's, family planning choices, adequate intake of calcium and vitamin D, diet and exercise     Follow-up: Return in about 2 months (around 3/20/2017) for adhd .

## 2017-01-21 LAB
C TRACH DNA GENITAL QL NAA+PROBE: NEGATIVE
CANDIDA DNA VAG QL PROBE+SIG AMP: NEGATIVE
CYTOLOGY REG CYTOL: NORMAL
G VAGINALIS DNA VAG QL PROBE+SIG AMP: NEGATIVE
N GONORRHOEA DNA GENITAL QL NAA+PROBE: NEGATIVE
SPECIMEN SOURCE: NORMAL
T VAGINALIS DNA VAG QL PROBE+SIG AMP: NEGATIVE

## 2017-01-24 LAB
HPV HR 12 DNA CVX QL NAA+PROBE: NEGATIVE
HPV16 DNA SPEC QL NAA+PROBE: NEGATIVE
HPV18 DNA SPEC QL NAA+PROBE: NEGATIVE
SPECIMEN SOURCE: NORMAL

## 2017-01-24 NOTE — TELEPHONE ENCOUNTER
Please inform the patient that paresthesias that are brief in duration are not likely related to her disease. Have her keep herself as, she can, otherwise she will become her own worst enemy, and elevated levels of anxiety and of themselves will make her symptoms feel worse than what they are as well. DILAN    E-mail sent to loree ALDANA

## 2017-02-04 ENCOUNTER — HOSPITAL ENCOUNTER (OUTPATIENT)
Dept: RADIOLOGY | Facility: MEDICAL CENTER | Age: 27
End: 2017-02-04
Attending: PSYCHIATRY & NEUROLOGY
Payer: COMMERCIAL

## 2017-02-04 DIAGNOSIS — G35 MULTIPLE SCLEROSIS (HCC): ICD-10-CM

## 2017-02-04 PROCEDURE — A9579 GAD-BASE MR CONTRAST NOS,1ML: HCPCS | Performed by: PSYCHIATRY & NEUROLOGY

## 2017-02-04 PROCEDURE — 70553 MRI BRAIN STEM W/O & W/DYE: CPT

## 2017-02-04 PROCEDURE — 700117 HCHG RX CONTRAST REV CODE 255: Performed by: PSYCHIATRY & NEUROLOGY

## 2017-02-04 RX ADMIN — GADODIAMIDE 15 ML: 287 INJECTION INTRAVENOUS at 13:57

## 2017-02-18 ENCOUNTER — PATIENT MESSAGE (OUTPATIENT)
Dept: NEUROLOGY | Facility: MEDICAL CENTER | Age: 27
End: 2017-02-18

## 2017-02-21 ENCOUNTER — TELEPHONE (OUTPATIENT)
Dept: NEUROLOGY | Facility: MEDICAL CENTER | Age: 27
End: 2017-02-21

## 2017-02-21 NOTE — TELEPHONE ENCOUNTER
Pt is sending this message via [employer] e-mail. Please advise. Thank you. JOVAN Garrett,     My MRI results state that I have 18 active lesions... which honestly was a bit scary because they are -active- lesions... does this mean that I should go in pretty soon to get infusions?  Not that I'm super excited about steroids, I'm not a hopeful olympic weight  or anything, but I was just wondering if I should because again, they are -active- and the steroids are supposed to help to shut the activity down.       Best regards,     Rosita Bowles

## 2017-02-28 NOTE — TELEPHONE ENCOUNTER
The patient I'm going to set her up for some IV steroids, this can help the acute phase and inflammation. We can do this as an outpatient, 1 g a day for 5 days, and then an oral titration. This is a standard that can be done, she needs to remember she was just started on Tecfidera only a short time ago. Repeat imaging will need to be done in several months to see if there is resolution of the enhancement/active lesions, if not, we will need to try a different disease modifying treatment.  JS    E-mail was sent to pt. Order faxed to Infusion center. JOVAN

## 2017-03-02 ENCOUNTER — TELEPHONE (OUTPATIENT)
Dept: NEUROLOGY | Facility: MEDICAL CENTER | Age: 27
End: 2017-03-02

## 2017-03-02 DIAGNOSIS — G35 MULTIPLE SCLEROSIS (HCC): ICD-10-CM

## 2017-03-02 RX ORDER — METHYLPREDNISOLONE 4 MG/1
TABLET ORAL
Qty: 1 KIT | Refills: 0 | Status: SHIPPED | OUTPATIENT
Start: 2017-03-02 | End: 2017-03-20

## 2017-03-02 NOTE — TELEPHONE ENCOUNTER
Pt has scheduled for her 5 day out patient infusions for the Solumedrol. Her last infusion day is on 3/10/17. She is calling and asking if you could send the Rx for the oral steroids that she has to take post infusion to her pharmacy so she can pick them up. Please advise. Thank you. JOVAN

## 2017-03-06 ENCOUNTER — OUTPATIENT INFUSION SERVICES (OUTPATIENT)
Dept: ONCOLOGY | Facility: MEDICAL CENTER | Age: 27
End: 2017-03-06
Attending: PSYCHIATRY & NEUROLOGY
Payer: COMMERCIAL

## 2017-03-06 VITALS
HEART RATE: 90 BPM | WEIGHT: 151.01 LBS | BODY MASS INDEX: 21.62 KG/M2 | TEMPERATURE: 98.3 F | HEIGHT: 70 IN | RESPIRATION RATE: 18 BRPM | DIASTOLIC BLOOD PRESSURE: 68 MMHG | OXYGEN SATURATION: 98 % | SYSTOLIC BLOOD PRESSURE: 124 MMHG

## 2017-03-06 PROCEDURE — 96365 THER/PROPH/DIAG IV INF INIT: CPT

## 2017-03-06 PROCEDURE — 700105 HCHG RX REV CODE 258: Performed by: PSYCHIATRY & NEUROLOGY

## 2017-03-06 PROCEDURE — 700111 HCHG RX REV CODE 636 W/ 250 OVERRIDE (IP): Performed by: PSYCHIATRY & NEUROLOGY

## 2017-03-06 RX ADMIN — SODIUM CHLORIDE 1000 MG: 9 INJECTION, SOLUTION INTRAVENOUS at 15:53

## 2017-03-06 ASSESSMENT — PAIN SCALES - GENERAL: PAINLEVEL: 3=SLIGHT PAIN

## 2017-03-07 ENCOUNTER — OUTPATIENT INFUSION SERVICES (OUTPATIENT)
Dept: ONCOLOGY | Facility: MEDICAL CENTER | Age: 27
End: 2017-03-07
Attending: PSYCHIATRY & NEUROLOGY
Payer: COMMERCIAL

## 2017-03-07 VITALS
HEART RATE: 99 BPM | DIASTOLIC BLOOD PRESSURE: 80 MMHG | BODY MASS INDEX: 22.89 KG/M2 | TEMPERATURE: 98.4 F | SYSTOLIC BLOOD PRESSURE: 115 MMHG | WEIGHT: 151.01 LBS | OXYGEN SATURATION: 100 % | RESPIRATION RATE: 18 BRPM | HEIGHT: 68 IN

## 2017-03-07 DIAGNOSIS — G35 MULTIPLE SCLEROSIS (HCC): ICD-10-CM

## 2017-03-07 PROCEDURE — 96365 THER/PROPH/DIAG IV INF INIT: CPT

## 2017-03-07 PROCEDURE — 700102 HCHG RX REV CODE 250 W/ 637 OVERRIDE(OP): Performed by: PSYCHIATRY & NEUROLOGY

## 2017-03-07 PROCEDURE — 700111 HCHG RX REV CODE 636 W/ 250 OVERRIDE (IP): Performed by: PSYCHIATRY & NEUROLOGY

## 2017-03-07 PROCEDURE — 700105 HCHG RX REV CODE 258: Performed by: PSYCHIATRY & NEUROLOGY

## 2017-03-07 PROCEDURE — A9270 NON-COVERED ITEM OR SERVICE: HCPCS | Performed by: PSYCHIATRY & NEUROLOGY

## 2017-03-07 RX ORDER — ACETAMINOPHEN 325 MG/1
650 TABLET ORAL ONCE
Status: COMPLETED | OUTPATIENT
Start: 2017-03-07 | End: 2017-03-07

## 2017-03-07 RX ADMIN — ACETAMINOPHEN 650 MG: 325 TABLET, FILM COATED ORAL at 14:09

## 2017-03-07 RX ADMIN — SODIUM CHLORIDE 1000 MG: 9 INJECTION, SOLUTION INTRAVENOUS at 13:56

## 2017-03-07 NOTE — PROGRESS NOTES
Patient presents for day 1 / 5 Solu-Medrol infusion. Patient very anxious and tearful about IV start. Discussed starting IV and leaving one in for all infusions but patient states that will increase her anxiety. IV started, flushes well with brisk blood return. Solu-Medrol infused over one hour with no new patient complaints, line flushed clear. IV discontinued, catheter intact, compression dressing to site. Patient returns tomorrow and released in no acute distress with her mother.

## 2017-03-07 NOTE — PROGRESS NOTES
Patient presents for day 2 / 5 Solu-Medrol infusion. Reviewed plan of care, patient verbalizes understanding. Patient states she has a headache today. Talked to Dr. Garrett, Tylenol order obtained. Patient states relief with Tylenol. Infusion completed with no new patient complaints, line flushed clear. IV discontinued, per patient request, compression dressing to site. Patient returns tomorrow and released in no acute distress.

## 2017-03-08 ENCOUNTER — OUTPATIENT INFUSION SERVICES (OUTPATIENT)
Dept: ONCOLOGY | Facility: MEDICAL CENTER | Age: 27
End: 2017-03-08
Attending: PSYCHIATRY & NEUROLOGY
Payer: COMMERCIAL

## 2017-03-08 VITALS
SYSTOLIC BLOOD PRESSURE: 114 MMHG | BODY MASS INDEX: 22.82 KG/M2 | HEIGHT: 68 IN | TEMPERATURE: 98.6 F | RESPIRATION RATE: 18 BRPM | OXYGEN SATURATION: 99 % | WEIGHT: 150.57 LBS | DIASTOLIC BLOOD PRESSURE: 83 MMHG | HEART RATE: 85 BPM

## 2017-03-08 DIAGNOSIS — G35 MULTIPLE SCLEROSIS (HCC): ICD-10-CM

## 2017-03-08 DIAGNOSIS — Z11.3 SCREENING FOR STD (SEXUALLY TRANSMITTED DISEASE): ICD-10-CM

## 2017-03-08 LAB
ALBUMIN SERPL BCP-MCNC: 4 G/DL (ref 3.2–4.9)
ALBUMIN/GLOB SERPL: 1.4 G/DL
ALP SERPL-CCNC: 38 U/L (ref 30–99)
ALT SERPL-CCNC: 14 U/L (ref 2–50)
ANION GAP SERPL CALC-SCNC: 7 MMOL/L (ref 0–11.9)
AST SERPL-CCNC: 13 U/L (ref 12–45)
BASOPHILS # BLD AUTO: 0.1 % (ref 0–1.8)
BASOPHILS # BLD: 0.01 K/UL (ref 0–0.12)
BILIRUB SERPL-MCNC: 0.4 MG/DL (ref 0.1–1.5)
BUN SERPL-MCNC: 20 MG/DL (ref 8–22)
CALCIUM SERPL-MCNC: 9.3 MG/DL (ref 8.5–10.5)
CHLORIDE SERPL-SCNC: 105 MMOL/L (ref 96–112)
CO2 SERPL-SCNC: 27 MMOL/L (ref 20–33)
CREAT SERPL-MCNC: 0.85 MG/DL (ref 0.5–1.4)
EOSINOPHIL # BLD AUTO: 0 K/UL (ref 0–0.51)
EOSINOPHIL NFR BLD: 0 % (ref 0–6.9)
ERYTHROCYTE [DISTWIDTH] IN BLOOD BY AUTOMATED COUNT: 45.8 FL (ref 35.9–50)
GFR SERPL CREATININE-BSD FRML MDRD: >60 ML/MIN/1.73 M 2
GLOBULIN SER CALC-MCNC: 2.9 G/DL (ref 1.9–3.5)
GLUCOSE SERPL-MCNC: 85 MG/DL (ref 65–99)
HCT VFR BLD AUTO: 40.7 % (ref 37–47)
HGB BLD-MCNC: 13.4 G/DL (ref 12–16)
IMM GRANULOCYTES # BLD AUTO: 0.07 K/UL (ref 0–0.11)
IMM GRANULOCYTES NFR BLD AUTO: 0.4 % (ref 0–0.9)
LYMPHOCYTES # BLD AUTO: 2.89 K/UL (ref 1–4.8)
LYMPHOCYTES NFR BLD: 17.7 % (ref 22–41)
MCH RBC QN AUTO: 28.3 PG (ref 27–33)
MCHC RBC AUTO-ENTMCNC: 32.9 G/DL (ref 33.6–35)
MCV RBC AUTO: 85.9 FL (ref 81.4–97.8)
MONOCYTES # BLD AUTO: 0.92 K/UL (ref 0–0.85)
MONOCYTES NFR BLD AUTO: 5.6 % (ref 0–13.4)
NEUTROPHILS # BLD AUTO: 12.45 K/UL (ref 2–7.15)
NEUTROPHILS NFR BLD: 76.2 % (ref 44–72)
NRBC # BLD AUTO: 0 K/UL
NRBC BLD AUTO-RTO: 0 /100 WBC
PLATELET # BLD AUTO: 289 K/UL (ref 164–446)
PMV BLD AUTO: 9.6 FL (ref 9–12.9)
POTASSIUM SERPL-SCNC: 3.5 MMOL/L (ref 3.6–5.5)
PROT SERPL-MCNC: 6.9 G/DL (ref 6–8.2)
RBC # BLD AUTO: 4.74 M/UL (ref 4.2–5.4)
SODIUM SERPL-SCNC: 139 MMOL/L (ref 135–145)
WBC # BLD AUTO: 16.3 K/UL (ref 4.8–10.8)

## 2017-03-08 PROCEDURE — 86695 HERPES SIMPLEX TYPE 1 TEST: CPT

## 2017-03-08 PROCEDURE — 80053 COMPREHEN METABOLIC PANEL: CPT

## 2017-03-08 PROCEDURE — 700105 HCHG RX REV CODE 258: Performed by: PSYCHIATRY & NEUROLOGY

## 2017-03-08 PROCEDURE — 86696 HERPES SIMPLEX TYPE 2 TEST: CPT

## 2017-03-08 PROCEDURE — 96365 THER/PROPH/DIAG IV INF INIT: CPT

## 2017-03-08 PROCEDURE — 700111 HCHG RX REV CODE 636 W/ 250 OVERRIDE (IP): Performed by: PSYCHIATRY & NEUROLOGY

## 2017-03-08 PROCEDURE — 85025 COMPLETE CBC W/AUTO DIFF WBC: CPT

## 2017-03-08 RX ADMIN — SODIUM CHLORIDE 1000 MG: 9 INJECTION, SOLUTION INTRAVENOUS at 15:14

## 2017-03-08 ASSESSMENT — PAIN SCALES - GENERAL: PAINLEVEL: NO PAIN

## 2017-03-09 ENCOUNTER — OUTPATIENT INFUSION SERVICES (OUTPATIENT)
Dept: ONCOLOGY | Facility: MEDICAL CENTER | Age: 27
End: 2017-03-09
Attending: PSYCHIATRY & NEUROLOGY
Payer: COMMERCIAL

## 2017-03-09 VITALS
RESPIRATION RATE: 18 BRPM | WEIGHT: 149.03 LBS | BODY MASS INDEX: 21.34 KG/M2 | HEIGHT: 70 IN | TEMPERATURE: 99 F | OXYGEN SATURATION: 100 % | SYSTOLIC BLOOD PRESSURE: 130 MMHG | HEART RATE: 90 BPM | DIASTOLIC BLOOD PRESSURE: 79 MMHG

## 2017-03-09 PROCEDURE — 700105 HCHG RX REV CODE 258: Performed by: PSYCHIATRY & NEUROLOGY

## 2017-03-09 PROCEDURE — 700111 HCHG RX REV CODE 636 W/ 250 OVERRIDE (IP): Performed by: PSYCHIATRY & NEUROLOGY

## 2017-03-09 PROCEDURE — 96365 THER/PROPH/DIAG IV INF INIT: CPT

## 2017-03-09 PROCEDURE — 96375 TX/PRO/DX INJ NEW DRUG ADDON: CPT

## 2017-03-09 RX ORDER — ONDANSETRON 2 MG/ML
4 INJECTION INTRAMUSCULAR; INTRAVENOUS ONCE
Status: COMPLETED | OUTPATIENT
Start: 2017-03-09 | End: 2017-03-09

## 2017-03-09 RX ADMIN — ONDANSETRON 4 MG: 2 INJECTION, SOLUTION INTRAMUSCULAR; INTRAVENOUS at 14:08

## 2017-03-09 RX ADMIN — SODIUM CHLORIDE 1000 MG: 9 INJECTION, SOLUTION INTRAVENOUS at 14:14

## 2017-03-09 ASSESSMENT — PAIN SCALES - GENERAL: PAINLEVEL: 3=SLIGHT PAIN

## 2017-03-09 NOTE — PROGRESS NOTES
Pt arrived ambulatory, here with mother for D3 of 5 Solumedrol for MS. According to previous RN, pt with significant anxiety surrounding IV start. Discussed with pt coping strategies as well as methods to reduce anxiety. Pt has been declining offers to leave IV in for treatment due to severe anxiety. IV access established, L AC per pt request. Pt initially hyperventilated at beginning of IV insertion and crying despite IV insertion being complete. Pt's mother at chairside attempting to console pt - informed pt IV insertion was complete. Emotional support provided. Solumedrol infused over 1 hour. Pt c/o pain at IV site near end of infusion. Both heat and ice offered. Pt stated neither alleviated the pain. Blood return observed. Offered for pt to have new IV site placed and pt declined. Pt also having nausea near end of infusion - no emesis observed, pt retching into emesis bag. Solumedrol completed. Line flushed clear. IV flushed and removed - pt tearful with IV removal as well. Pressure dressing applied. Pt knows to return tomorrow. Discharged home under care of mother in no apparent distress.

## 2017-03-09 NOTE — PROGRESS NOTES
"Pt presents ambulatory with mother for day 4 of 5 Solumedrol. Pt reports sore throat and temperature, 100 F today. Pt reports nausea with previous infusions. RN notified Dr. Ko at 1345 and MD baumann with proceeding with remainder of infusions for order, Zofran order received to given pt prior to infusion. Pt and mother informed of POC, and both verbalized understanding. Pt expressed \"anxiety\" about IV. Pt given emotional support and distraction technique used. Pt tearful during and after IV being established, continued support provided. Pt given IV zofran prior to starting treatment. Pt calm and resting in chair. Pt reported having very \"busy schedule and always feeling stressed\" \"I know it is not good for me and my MS\". RN discussed coping mechanisms and self care ideas with pt, pt verbalized understanding and will implement increased self care interventions. Pt given resource for local stress based reduction course.  Solumedrol infused per orders without complications or adverse reactions. Pt reported relief with receiving zofran today. IV flushed and d/c'd with gauze and coband applied to site. Pt to return tomorrow, appt confirmed with pt. Pt left ambulatory in no distress.   "

## 2017-03-10 ENCOUNTER — OUTPATIENT INFUSION SERVICES (OUTPATIENT)
Dept: ONCOLOGY | Facility: MEDICAL CENTER | Age: 27
End: 2017-03-10
Attending: PSYCHIATRY & NEUROLOGY
Payer: COMMERCIAL

## 2017-03-10 VITALS
DIASTOLIC BLOOD PRESSURE: 82 MMHG | BODY MASS INDEX: 22.01 KG/M2 | HEART RATE: 96 BPM | TEMPERATURE: 99 F | RESPIRATION RATE: 18 BRPM | WEIGHT: 148.59 LBS | OXYGEN SATURATION: 100 % | HEIGHT: 69 IN | SYSTOLIC BLOOD PRESSURE: 118 MMHG

## 2017-03-10 LAB
HSV1 GG IGG SER-ACNC: 24.4 IV
HSV2 GG IGG SER-ACNC: 0.07 IV

## 2017-03-10 PROCEDURE — 700111 HCHG RX REV CODE 636 W/ 250 OVERRIDE (IP): Performed by: PSYCHIATRY & NEUROLOGY

## 2017-03-10 PROCEDURE — 96375 TX/PRO/DX INJ NEW DRUG ADDON: CPT

## 2017-03-10 PROCEDURE — 700105 HCHG RX REV CODE 258: Performed by: PSYCHIATRY & NEUROLOGY

## 2017-03-10 PROCEDURE — 96365 THER/PROPH/DIAG IV INF INIT: CPT

## 2017-03-10 RX ORDER — ONDANSETRON 2 MG/ML
4 INJECTION INTRAMUSCULAR; INTRAVENOUS ONCE
Status: COMPLETED | OUTPATIENT
Start: 2017-03-10 | End: 2017-03-10

## 2017-03-10 RX ADMIN — SODIUM CHLORIDE 1000 MG: 9 INJECTION, SOLUTION INTRAVENOUS at 16:46

## 2017-03-10 RX ADMIN — ONDANSETRON 4 MG: 2 INJECTION, SOLUTION INTRAMUSCULAR; INTRAVENOUS at 16:32

## 2017-03-10 ASSESSMENT — PAIN SCALES - GENERAL: PAINLEVEL: 1=MINIMAL PAIN

## 2017-03-11 NOTE — PROGRESS NOTES
Returns for Solumedrol.  Dry cough remains, also stating having issues with heartburn.  Nausea with relief with zofran.  IV start anxiety and discomfort producing.  Warm pack placed, but pt wants ice.  Discussed principle of heat, but ice placed at pt request.  Tx infused without rx.  MD notified of heartburn, pepcid approved for home use.  Pt to  en route.  DC to care of s.o.

## 2017-03-12 ENCOUNTER — PATIENT MESSAGE (OUTPATIENT)
Dept: NEUROLOGY | Facility: MEDICAL CENTER | Age: 27
End: 2017-03-12

## 2017-03-13 ENCOUNTER — TELEPHONE (OUTPATIENT)
Dept: NEUROLOGY | Facility: MEDICAL CENTER | Age: 27
End: 2017-03-13

## 2017-03-13 DIAGNOSIS — G35 MULTIPLE SCLEROSIS (HCC): ICD-10-CM

## 2017-03-13 NOTE — TELEPHONE ENCOUNTER
Pt is sending this message via [employer] e-mail. Please advise. Thank you. JOVAN Garrett,     Just finished up the infusuon the other day, woohoo! Finishing the steroid pills soom, too! I was wondering if you wanted me to get another MRI prior to our appointment on the 24th so we might be able to see if the steroids worked, and if need be, re-assess the situation.     Hope all is well! I'm looking forward to our appointment!     -Rosita Bowles

## 2017-03-13 NOTE — TELEPHONE ENCOUNTER
Call the patient and tell her MRI and renal function blood tests are set up, she can schedule these prior to her office visit later this month.

## 2017-03-16 ENCOUNTER — HOSPITAL ENCOUNTER (OUTPATIENT)
Dept: RADIOLOGY | Facility: MEDICAL CENTER | Age: 27
End: 2017-03-16
Attending: PSYCHIATRY & NEUROLOGY
Payer: COMMERCIAL

## 2017-03-16 DIAGNOSIS — G35 MULTIPLE SCLEROSIS (HCC): ICD-10-CM

## 2017-03-16 PROCEDURE — 70553 MRI BRAIN STEM W/O & W/DYE: CPT

## 2017-03-16 PROCEDURE — A9579 GAD-BASE MR CONTRAST NOS,1ML: HCPCS | Performed by: PSYCHIATRY & NEUROLOGY

## 2017-03-16 PROCEDURE — 700117 HCHG RX CONTRAST REV CODE 255: Performed by: PSYCHIATRY & NEUROLOGY

## 2017-03-16 RX ADMIN — GADODIAMIDE 15 ML: 287 INJECTION INTRAVENOUS at 15:14

## 2017-03-17 ENCOUNTER — HOSPITAL ENCOUNTER (OUTPATIENT)
Facility: MEDICAL CENTER | Age: 27
End: 2017-03-17
Attending: FAMILY MEDICINE
Payer: COMMERCIAL

## 2017-03-17 ENCOUNTER — OFFICE VISIT (OUTPATIENT)
Dept: MEDICAL GROUP | Facility: LAB | Age: 27
End: 2017-03-17
Payer: COMMERCIAL

## 2017-03-17 ENCOUNTER — TELEPHONE (OUTPATIENT)
Dept: NEUROLOGY | Facility: MEDICAL CENTER | Age: 27
End: 2017-03-17

## 2017-03-17 VITALS
DIASTOLIC BLOOD PRESSURE: 62 MMHG | RESPIRATION RATE: 12 BRPM | SYSTOLIC BLOOD PRESSURE: 114 MMHG | WEIGHT: 148.59 LBS | OXYGEN SATURATION: 98 % | BODY MASS INDEX: 22.01 KG/M2 | HEIGHT: 69 IN | TEMPERATURE: 98.5 F | HEART RATE: 100 BPM

## 2017-03-17 DIAGNOSIS — F90.9 ATTENTION DEFICIT HYPERACTIVITY DISORDER (ADHD), UNSPECIFIED ADHD TYPE: ICD-10-CM

## 2017-03-17 DIAGNOSIS — K62.9 ANAL LESION: ICD-10-CM

## 2017-03-17 DIAGNOSIS — R07.81 RIB PAIN: ICD-10-CM

## 2017-03-17 PROBLEM — J06.9 ACUTE URI: Status: RESOLVED | Noted: 2017-01-18 | Resolved: 2017-03-17

## 2017-03-17 PROCEDURE — 99214 OFFICE O/P EST MOD 30 MIN: CPT | Performed by: FAMILY MEDICINE

## 2017-03-17 PROCEDURE — 87529 HSV DNA AMP PROBE: CPT | Mod: 91

## 2017-03-17 RX ORDER — METHYLPHENIDATE HYDROCHLORIDE 54 MG/1
54 TABLET ORAL DAILY
Qty: 30 TAB | Refills: 0 | Status: SHIPPED | OUTPATIENT
Start: 2017-03-17 | End: 2017-03-17 | Stop reason: SDUPTHER

## 2017-03-17 RX ORDER — METHYLPHENIDATE HYDROCHLORIDE 54 MG/1
54 TABLET ORAL DAILY
Qty: 30 TAB | Refills: 0 | Status: SHIPPED | OUTPATIENT
Start: 2017-05-08 | End: 2017-03-17 | Stop reason: SDUPTHER

## 2017-03-17 RX ORDER — METHYLPHENIDATE HYDROCHLORIDE 54 MG/1
54 TABLET ORAL DAILY
Qty: 30 TAB | Refills: 0 | Status: SHIPPED | OUTPATIENT
Start: 2017-06-06 | End: 2017-06-23 | Stop reason: SDUPTHER

## 2017-03-17 RX ORDER — METHYLPHENIDATE HYDROCHLORIDE 54 MG/1
54 TABLET ORAL DAILY
Qty: 30 TAB | Refills: 0 | Status: SHIPPED | OUTPATIENT
Start: 2017-04-10 | End: 2017-03-17 | Stop reason: SDUPTHER

## 2017-03-17 NOTE — TELEPHONE ENCOUNTER
Please call the patient and tell her the MRI scan of her brain is improved, the number of lesions has reduced by more than 40%, there is less evidence of active disease as well. I'm pleased with this result. I suspect if we were to repeat another scan in a couple of months, they would be further reduction in the amount of activity and number of lesions that are active.  JS    Called and LM for pt to return my call to discuss al information given. KA

## 2017-03-17 NOTE — MR AVS SNAPSHOT
"        Rosita Bowles   3/17/2017 3:40 PM   Office Visit   MRN: [id number]    Department:  [facility]   Dept Phone:  [phone]    Description:  Female : 1990   Provider:  Manuela Green M.D.           Reason for Visit     Follow-Up ADHD      Allergies as of 3/17/2017     Allergen Noted Reactions    Nkda [No Known Drug Allergy] 2012         You were diagnosed with     Anal lesion   [969177]       Attention deficit hyperactivity disorder (ADHD), unspecified ADHD type   [1217519]         Vital Signs     Blood Pressure Pulse Temperature Respirations Height Weight    114/62 mmHg 100 36.9 °C (98.5 °F) 12 1.753 m (5' 9.02\") 67.4 kg (148 lb 9.4 oz)    Body Mass Index Oxygen Saturation Smoking Status             21.93 kg/m2 98% Never Smoker          Basic Information     Date Of Birth Sex Race Ethnicity Preferred Language    1990 Female White Non- English      Your appointments     Mar 24, 2017  4:00 PM   Follow Up Visit with Manjeet Garrett M.D.   [facility] (--)    [address], [address]  [facility] [address] [phone]           You will be receiving a confirmation call a few days before your appointment from our automated call confirmation system.            2017  3:40 PM   Established Patient with Manuela Green M.D.   [facility] (--)    [address] [facility] [address] [place] [address] [phone]           You will be receiving a confirmation call a few days before your appointment from our automated call confirmation system.              Problem List              ICD-10-CM Priority Class Noted - Resolved    Depression F32.9   2012 - Present    ADHD (attention deficit hyperactivity disorder) F90.9   2012 - Present    Visit for birth control pills maintenance Z30.41   2012 - Present    Acne L70.9   2012 - Present    Seasonal allergies J30.2   2013 - Present    Multiple " sclerosis (CMS-HCC) G35   11/2/2016 - Present    Screening for STD (sexually transmitted disease) Z11.3   1/20/2017 - Present      Health Maintenance        Date Due Completion Dates    PAP SMEAR 1/20/2020 1/20/2017, 12/23/2013    IMM DTaP/Tdap/Td Vaccine (7 - Td) 5/31/2023 5/31/2013, 3/16/2004, 6/3/1993, 5/28/1992, 4/2/1992, 2/6/1992            Current Immunizations     Dtap Vaccine 6/3/1993, 5/28/1992, 4/2/1992, 2/6/1992    HIB Vaccine (ACTHIB/HIBERIX) 5/28/1992, 4/2/1992, 2/6/1992    HPV Quadrivalent Vaccine (GARDASIL) 5/28/2010, 1/19/2010, 6/17/2009    Hepatitis A Vaccine, Ped/Adol 8/18/2006, 3/16/2004    Hepatitis B Vaccine Non-Recombivax (Ped/Adol) 12/30/1998, 6/25/1998, 5/22/1998    IPV 6/3/1993, 5/28/1992, 4/2/1992, 2/6/1992    Influenza Vaccine Quad Inj (Pf) 10/16/2016    MMR Vaccine 2/13/1997, 3/3/1993    Meningococcal Conjugate Vaccine MCV4 (Menactra) 6/17/2009    Tdap Vaccine 5/31/2013 10:48 AM, 3/16/2004    Varicella Vaccine Live 9/26/2016, 7/28/2016      Below and/or attached are the medications your provider expects you to take. Review all of your home medications and newly ordered medications with your provider and/or pharmacist. Follow medication instructions as directed by your provider and/or pharmacist. Please keep your medication list with you and share with your provider. Update the information when medications are discontinued, doses are changed, or new medications (including over-the-counter products) are added; and carry medication information at all times in the event of emergency situations     Allergies:  NKDA - (reactions not documented)               Medications  Valid as of: March 17, 2017 -  4:09 PM    Generic Name Brand Name Tablet Size Instructions for use    Adapalene-Benzoyl Peroxide (Gel) Adapalene-Benzoyl Peroxide 0.1-2.5 % 1 Application by Apply externally route every day.        BuPROPion HCl (TABLET SR 24 HR) WELLBUTRIN  MG Take 1 Tab by mouth every morning.         Cetirizine HCl (Tab) ZYRTEC 10 MG Take 1 Tab by mouth every day.        Dimethyl Fumarate (CAPSULE DELAYED RELEASE) Dimethyl Fumarate 240 MG Take 240 mg by mouth 2 Times a Day.        Fluticasone Propionate (Suspension) FLONASE 50 MCG/ACT Spray 1 Spray in nose every day.        Methylphenidate HCl (Tab CR) CONCERTA 54 MG Take 1 Tab by mouth every day.        MethylPREDNISolone (Tablet Therapy Pack) MEDROL DOSEPAK 4 MG Take as directed in kit        Norethindrone (Tab) PAULO 0.35 MG Take 1 Tab by mouth.        Norethindrone-Mestranol (Tab) NECON 1/50 (28) 1-50 MG-MCG Take 1 tab PO qday as directed        .                 Medicines prescribed today were sent to:     [facility][id number] - [place] [facility] [address] [facility] [facility]    [address] [facility] [address] [facility] [address] [address]    Phone: [phone] Fax: [phone]    Open 24 Hours?: No      Medication refill instructions:       If your prescription bottle indicates you have medication refills left, it is not necessary to call your provider’s office. Please contact your pharmacy and they will refill your medication.    If your prescription bottle indicates you do not have any refills left, you may request refills at any time through one of the following ways: The online [employer] system (except Urgent Care), by calling your provider’s office, or by asking your pharmacy to contact your provider’s office with a refill request. Medication refills are processed only during regular business hours and may not be available until the next business day. Your provider may request additional information or to have a follow-up visit with you prior to refilling your medication.   *Please Note: Medication refills are assigned a new Rx number when refilled electronically. Your pharmacy may indicate that no refills were authorized even though a new prescription for the same medication is available at the pharmacy. Please request the medicine by name with the pharmacy before contacting your provider for a  refill.           MyChart Access Code: Activation code not generated  Current [employer] Status: Active

## 2017-03-19 LAB
HSV1+2 DNA SPEC QL NAA+PROBE: ABNORMAL
SIGNIFICANT IND 70042: ABNORMAL
SOURCE SOURCE: ABNORMAL

## 2017-03-20 ENCOUNTER — TELEPHONE (OUTPATIENT)
Dept: MEDICAL GROUP | Facility: LAB | Age: 27
End: 2017-03-20

## 2017-03-20 ENCOUNTER — HOSPITAL ENCOUNTER (EMERGENCY)
Facility: MEDICAL CENTER | Age: 27
End: 2017-03-20
Attending: EMERGENCY MEDICINE
Payer: COMMERCIAL

## 2017-03-20 ENCOUNTER — APPOINTMENT (OUTPATIENT)
Dept: RADIOLOGY | Facility: MEDICAL CENTER | Age: 27
End: 2017-03-20
Attending: EMERGENCY MEDICINE
Payer: COMMERCIAL

## 2017-03-20 VITALS
DIASTOLIC BLOOD PRESSURE: 72 MMHG | HEIGHT: 70 IN | HEART RATE: 85 BPM | OXYGEN SATURATION: 98 % | SYSTOLIC BLOOD PRESSURE: 113 MMHG | WEIGHT: 150.13 LBS | RESPIRATION RATE: 16 BRPM | TEMPERATURE: 99 F | BODY MASS INDEX: 21.49 KG/M2

## 2017-03-20 DIAGNOSIS — M79.671 PAIN OF RIGHT HEEL: ICD-10-CM

## 2017-03-20 DIAGNOSIS — G89.29 CHRONIC HEEL PAIN, RIGHT: ICD-10-CM

## 2017-03-20 DIAGNOSIS — M79.671 CHRONIC HEEL PAIN, RIGHT: ICD-10-CM

## 2017-03-20 PROCEDURE — 73630 X-RAY EXAM OF FOOT: CPT | Mod: RT

## 2017-03-20 PROCEDURE — 99284 EMERGENCY DEPT VISIT MOD MDM: CPT

## 2017-03-20 RX ORDER — IBUPROFEN 600 MG/1
600 TABLET ORAL EVERY 6 HOURS PRN
Qty: 24 TAB | Refills: 1 | Status: SHIPPED | OUTPATIENT
Start: 2017-03-20 | End: 2017-05-03

## 2017-03-20 RX ORDER — HYDROCODONE BITARTRATE AND ACETAMINOPHEN 5; 325 MG/1; MG/1
1 TABLET ORAL EVERY 4 HOURS PRN
Qty: 12 TAB | Refills: 0 | Status: SHIPPED | OUTPATIENT
Start: 2017-03-20 | End: 2017-05-03

## 2017-03-20 ASSESSMENT — PAIN SCALES - GENERAL: PAINLEVEL_OUTOF10: 6

## 2017-03-20 NOTE — TELEPHONE ENCOUNTER
1. Caller Name: Jessica (Main Lab)                                         Call Back Number: [phone]      Patient approves a detailed voicemail message: N\A    Lab called this morning regarding patient's lab results. Positive for HSV Type 1, negative for type 2. Thank you.

## 2017-03-20 NOTE — ED PROVIDER NOTES
ED Provider Note    CHIEF COMPLAINT  Chief Complaint   Patient presents with   • Foot Pain       HPI  Rosita Bowles is a 26 y.o. female who presents to the emergency department for evaluation of pain to her right foot. Over the last 4 days her heel has caused her discomfort. History of MS and ADHD.    Disposition notes history of current pain to this heel. Notes that she stands for 8 hours a day 2 days a week and this aggravates her pain. Pain is within increased over the last 4 days. Pain is isolated to the calcaneal region. No aggravating or relieving maneuvers. No constitutional symptoms. No history of trauma.    REVIEW OF SYSTEMS  See HPI for further details. All other systems are negative.     PAST MEDICAL HISTORY  Past Medical History   Diagnosis Date   • Depression 8/7/2012   • Acne 8/7/2012   • ADHD (attention deficit hyperactivity disorder) 8/7/2012   • [place]        FAMILY HISTORY  No significant family history    SOCIAL HISTORY  Social History     Social History   • Marital Status: Single     Spouse Name: N/A   • Number of Children: N/A   • Years of Education: N/A     Social History Main Topics   • Smoking status: Never Smoker    • Smokeless tobacco: Never Used   • Alcohol Use: 0.0 oz/week     0 Standard drinks or equivalent per week      Comment: occasional   • Drug Use: No   • Sexual Activity:     Partners: Male     Birth Control/ Protection: Pill      Comment: OCP     Other Topics Concern   • Not on file     Social History Narrative    2013: BF in [place]. Attends [employer].    2014: was living in [place]. Now back in [place].     2014: working in [employer]. BF broke up with her Sept. No friends in [employer].     2015: working 2 jobs. Has BF.        SURGICAL HISTORY  Past Surgical History   Procedure Laterality Date   • Ankle orif     • Dental extraction(s)         CURRENT MEDICATIONS  Home Medications     **Home medications have not yet been reviewed for this encounter**     "      ALLERGIES  Allergies   Allergen Reactions   • Nkda [No Known Drug Allergy]        PHYSICAL EXAM  VITAL SIGNS: /72 mmHg  Pulse 92  Temp(Src) 37.2 °C (99 °F)  Resp 16  Ht 1.778 m (5' 10\")  Wt 68.1 kg (150 lb 2.1 oz)  BMI 21.54 kg/m2  SpO2 98%  LMP 03/20/2017  Constitutional: Well developed, Well nourished, No acute distress, Non-toxic appearance.   HENT: Normocephalic, Atraumatic, Bilateral external ears normal, Oropharynx moist, no evidence of dehydration, No oral exudates, Nose normal.   Eyes: PERRLA, EOMI, Conjunctiva normal, No discharge.   Neck: Normal range of motion, No tenderness, Supple, No stridor. No masses. No evidence of meningitis or meningismus.   Lymphatic: No lymphadenopathy noted. .   Thorax & Lungs:  Nonlabored respirations   Extremities:  No edema, No tenderness, No cyanosis, No clubbing.   Musculoskeletal: Good range of motion in all major joints. No major deformities noted. Moderate tenderness no external evidence of trauma or ecchymosis no erythema. No pain about the joint. Pain isolated to the calcaneal area and volar foot.   Neurologic: Alert & oriented x 3, Normal motor function, No focal deficits noted.   Psychiatric: Affect normal, mood normal.                        RADIOLOGY/PROCEDURES  DX-FOOT-COMPLETE 3+ RIGHT   Final Result      Negative RIGHT foot series.            COURSE & MEDICAL DECISION MAKING  Pertinent Labs & Imaging studies reviewed. (See chart for details)    Reviewed radiology results with patient. Likely patient will benefit from podiatry follow-up for possible orthotics. No evidence of fracture.    Patient discharged with ibuprofen and Vicodin. Instruction sheet on musculoskeletal pain.    FINAL IMPRESSION  1. Pain of right heel    2. Chronic heel pain, right        Electronically signed by: Everett Nevarez, 3/20/2017 4:47 PM      "

## 2017-03-20 NOTE — ED AVS SNAPSHOT
Home Care Instructions                                                                                                                Rosita Bowles   MRN: [id number]    Department:  [facility], Emergency Dept   Date of Visit:  3/20/2017            [facility], Emergency Dept    [address]    [place] [address]    Phone:  [phone]      You were seen by     Everett Nevarez M.D.      Your Diagnosis Was     Pain of right heel     M79.671       Medication Information     Review all of your home medications and newly ordered medications with your primary doctor and/or pharmacist as soon as possible. Follow medication instructions as directed by your doctor and/or pharmacist.     Please keep your complete medication list with you and share with your physician. Update the information when medications are discontinued, doses are changed, or new medications (including over-the-counter products) are added; and carry medication information at all times in the event of emergency situations.               Medication List      START taking these medications        Instructions    Morning Afternoon Evening Bedtime    hydrocodone-acetaminophen 5-325 MG Tabs per tablet   Commonly known as:  NORCO        Take 1 Tab by mouth every four hours as needed.   Dose:  1 Tab                        ibuprofen 600 MG Tabs   Commonly known as:  MOTRIN        Take 1 Tab by mouth every 6 hours as needed.   Dose:  600 mg                          ASK your doctor about these medications        Instructions    Morning Afternoon Evening Bedtime    Adapalene-Benzoyl Peroxide 0.1-2.5 % Gel   Commonly known as:  EPIDUO        1 Application by Apply externally route every day.   Dose:  1 Application                        buPROPion 300 MG XL tablet   Commonly known as:  WELLBUTRIN XL        Take 1 Tab by mouth every morning.   Dose:  300 mg                        Dimethyl Fumarate 240  MG Cpdr   Commonly known as:  TECFIDERA        Take 240 mg by mouth 2 Times a Day.   Dose:  1 Capsule                        methylphenidate 54 MG CR tablet   Start taking on:  6/6/2017   Commonly known as:  CONCERTA        Doctor's comments:  Ok to fill on or after 6/6/17   Take 1 Tab by mouth every day.   Dose:  54 mg                        NECON 1/50 (28) 1-50 MG-MCG Tabs   Generic drug:  Norethindrone-Mestranol        Doctor's comments:  Necon brand 1/50, 28 per patient's request. Thank you.   Take 1 tab PO qday as directed                             Where to Get Your Medications      These medications were sent to [id number] - [place] - [address]  [address] [place] [address]     Phone:  [phone]    - ibuprofen 600 MG Tabs      You can get these medications from any pharmacy     Bring a paper prescription for each of these medications    - hydrocodone-acetaminophen 5-325 MG Tabs per tablet            Procedures and tests performed during your visit     DX-FOOT-COMPLETE 3+ RIGHT        Discharge Instructions       Pain Medicine Instructions  HOW CAN PAIN MEDICINE AFFECT ME?  You were prescribed pain medicine. This medicine may:  · Make you tired or sleepy.  · Affect how well you can:  ¨ Drive  ¨ Do certain activities.  Pain medicine may not make all of your pain go away. You should be comfortable enough to:  · Move.  · Breathe.  · Take care of yourself.  HOW OFTEN SHOULD I TAKE PAIN MEDICINE AND HOW MUCH SHOULD I TAKE?  · Take pain medicine only as told by your doctor and only as needed for pain.  · You do not need to take pain medicine if you are not having pain, unless your doctor tells you to do that.  · You can take less than the prescribed dose if you find that less medicine helps your pain.  WHAT SHOULD I AVOID WHILE I AM TAKING PAIN MEDICINE?  Follow these instructions after you start taking pain medicine, while you are taking the medicine, and for 8 hours after you stop  taking the medicine:  · Do not drive.  · Do not use machinery.  · Do not use power tools.  · Do not sign legal documents.  · Do not drink alcohol.  · Do not take sleeping pills.  · Do not take care of children by yourself.  · Do not do any activities that involve climbing or being in high places.  · Do not go into any body of water unless there is an adult nearby who can watch and help you. This includes:  ¨ Lakes.  ¨ Rivers.  ¨ Oceans.  ¨ Spas.  ¨ Swimming pools.  HOW CAN I KEEP OTHERS SAFE WHILE I AM TAKING PAIN MEDICINE?  · Store your pain medicine as told by your doctor. Make sure that you keep it where children and pets cannot reach it.  · Do not share your pain medicine with anyone.  · Do not save any leftover pills. If you have any leftover pain medicine, get rid of it or destroy it as told by your doctor.  WHAT ELSE DO I NEED TO KNOW ABOUT TAKING PAIN MEDICINE?  · Use a poop (stool) softener if you have trouble pooping (constipation) because of your pain medicine. Eating more fruits and vegetables also helps with constipation.  · Write down the times when you take your pain medicine. Look at the times before you take your next dose of medicine.  · If your pain is very bad, do not take more pills than told by your doctor. Call your doctor for help.  · Your pain medicine might have acetaminophen in it. Do not take any other acetaminophen while you are taking this medicine. An overdose of acetaminophen can do very bad damage to your liver. If you are taking any medicines in addition to your pain medicine, check the active ingredients on those medicines to see if acetaminophen is listed.  WHEN SHOULD I CALL MY DOCTOR?  · Your medicine is not helping the pain.  · You do either of these soon after you take the medicine:  ¨ Throw up (vomit).  ¨ Have watery poop (diarrhea).  · You have new pain in areas that did not hurt before.  · You have an allergic reaction to your medicine. This may include:  ¨ Feeling  itchy.  ¨ Swelling.  ¨ Feeling dizzy.  ¨ Getting a new rash.  WHEN SHOULD I CALL 911 OR GO TO THE EMERGENCY ROOM?  · You feel dizzy or you faint.  · You feel very confused.  · You throw up again and again.  · Your skin or lips turn pale or bluish in color.  · You are:  ¨ Short of breath.  ¨ Breathing much more slowly than usual.  · You have a very bad allergic reaction to your medicine. This includes:  ¨ Developing a swollen tongue.  ¨ Having trouble breathing.     This information is not intended to replace advice given to you by your health care provider. Make sure you discuss any questions you have with your health care provider.     Document Released: 06/05/2009 Document Revised: 05/03/2016 Document Reviewed: 10/22/2015  [employer] Interactive Patient Education ©2016 [employer]    Musculoskeletal Pain  Musculoskeletal pain is muscle and kavon aches and pains. These pains can occur in any part of the body. Your caregiver may treat you without knowing the cause of the pain. They may treat you if blood or urine tests, X-rays, and other tests were normal.   CAUSES  There is often not a definite cause or reason for these pains. These pains may be caused by a type of germ (virus). The discomfort may also come from overuse. Overuse includes working out too hard when your body is not fit. Kavon aches also come from weather changes. Bone is sensitive to atmospheric pressure changes.  HOME CARE INSTRUCTIONS   · Ask when your test results will be ready. Make sure you get your test results.  · Only take over-the-counter or prescription medicines for pain, discomfort, or fever as directed by your caregiver. If you were given medications for your condition, do not drive, operate machinery or power tools, or sign legal documents for 24 hours. Do not drink alcohol. Do not take sleeping pills or other medications that may interfere with treatment.  · Continue all activities unless the activities cause more pain. When the pain  lessens, slowly resume normal activities. Gradually increase the intensity and duration of the activities or exercise.  · During periods of severe pain, bed rest may be helpful. Lay or sit in any position that is comfortable.  · Putting ice on the injured area.  ¨ Put ice in a bag.  ¨ Place a towel between your skin and the bag.  ¨ Leave the ice on for 15 to 20 minutes, 3 to 4 times a day.  · Follow up with your caregiver for continued problems and no reason can be found for the pain. If the pain becomes worse or does not go away, it may be necessary to repeat tests or do additional testing. Your caregiver may need to look further for a possible cause.  SEEK IMMEDIATE MEDICAL CARE IF:  · You have pain that is getting worse and is not relieved by medications.  · You develop chest pain that is associated with shortness or breath, sweating, feeling sick to your stomach (nauseous), or throw up (vomit).  · Your pain becomes localized to the abdomen.  · You develop any new symptoms that seem different or that concern you.  MAKE SURE YOU:   · Understand these instructions.  · Will watch your condition.  · Will get help right away if you are not doing well or get worse.     This information is not intended to replace advice given to you by your health care provider. Make sure you discuss any questions you have with your health care provider.    Follow-up with a podiatrist. Call for appointment time in the morning.     Document Released: 12/18/2006 Document Revised: 03/11/2013 Document Reviewed: 08/22/2014  Elsevier Interactive Patient Education ©2016 [employer] Inc.            Patient Information     Patient Information    Following emergency treatment: all patient requiring follow-up care must return either to a private physician or a clinic if your condition worsens before you are able to obtain further medical attention, please return to the emergency room.     Billing Information    At [facility], we work to make the  billing process streamlined for our patients.  Our Representatives are here to answer any questions you may have regarding your hospital bill.  If you have insurance coverage and have supplied your insurance information to us, we will submit a claim to your insurer on your behalf.  Should you have any questions regarding your bill, we can be reached online or by phone as follows:  Online: You are able pay your bills online or live chat with our representatives about any billing questions you may have. We are here to help Monday - Friday from 8:00am to 7:30pm and 9:00am - 12:00pm on Saturdays.  Please visit [email or web address]  for more information.   Phone:  [phone] or [phone]    Please note that your emergency physician, surgeon, pathologist, radiologist, anesthesiologist, and other specialists are not employed by [facility] and will therefore bill separately for their services.  Please contact them directly for any questions concerning their bills at the numbers below:     Emergency Physician Services:  [phone]  [facility]:  [phone]  [facility]:  [phone]  [facility]:  [phone]    1. Your final bill may vary from the amount quoted upon discharge if all procedures are not complete at that time, or if your doctor has additional procedures of which we are not aware. You will receive an additional bill if you return to the Emergency Department at [facility] for suture removal regardless of the facility of which the sutures were placed.     2. Please arrange for settlement of this account at the emergency registration.    3. All self-pay accounts are due in full at the time of treatment.  If you are unable to meet this obligation then payment is expected within 4-5 days.     4. If you have had radiology studies (CT, X-ray, Ultrasound, MRI), you have received a preliminary result during your emergency  department visit. Please contact the radiology department [phone] to receive a copy of your final result. Please discuss the Final result with your primary physician or with the follow up physician provided.     Crisis Hotline:  [place] Crisis Hotline:  8-896-IWYPZNB or 1-461.615.2786  [place] Crisis Hotline:    1-244.630.6624 or 569-289-3626         ED Discharge Follow Up Questions    1. In order to provide you with very good care, we would like to follow up with a phone call in the next few days.  May we have your permission to contact you?     YES /  NO    2. What is the best phone number to call you? (       )_____-__________    3. What is the best time to call you?      Morning  /  Afternoon  /  Evening                   Patient Signature:  ____________________________________________________________    Date:  ____________________________________________________________      Your appointments     Mar 24, 2017  4:00 PM   Follow Up Visit with Manjeet Garrett M.D.   [facility] (--)    [address], [address]  [place] [address] [phone]           You will be receiving a confirmation call a few days before your appointment from our automated call confirmation system.            Jun 23, 2017  3:40 PM   Established Patient with Manuela Green M.D.   [facility] (--)    [address]  [place] [address] [phone]           You will be receiving a confirmation call a few days before your appointment from our automated call confirmation system.

## 2017-03-20 NOTE — ED AVS SNAPSHOT
3/20/2017          Rosita Bowles  [address]  [place] [address]    Dear Rosita:    [facility] wants to ensure your discharge home is safe and you or your loved ones have had all your questions answered regarding your care after you leave the hospital.    You may receive a telephone call within two days of your discharge.  This call is to make certain you understand your discharge instructions as well as ensure we provided you with the best care possible during your stay with us.     The call will only last approximately 3-5 minutes and will be done by a nurse.    Once again, we want to ensure your discharge home is safe and that you have a clear understanding of any next steps in your care.  If you have any questions or concerns, please do not hesitate to contact us, we are here for you.  Thank you for choosing [facility] for your healthcare needs.    Sincerely,    Tommy Vernon    [facility]

## 2017-03-20 NOTE — ED AVS SNAPSHOT
Balanced Access Code: Activation code not generated  Current Balanced Status: Active    [employer] Gluehart  A secure, online tool to manage your health information     [employer]’s Balanced® is a secure, online tool that connects you to your personalized health information from the privacy of your home -- day or night - making it very easy for you to manage your healthcare. Once the activation process is completed, you can even access your medical information using the Balanced era, which is available for free in the Apple Era store or Google Play store.     Balanced provides the following levels of access (as shown below):   My Chart Features   [facility] Primary Care Doctor [facility]  Specialists [facility]  Urgent  Care Non-[facility]  Primary Care  Doctor   Email your healthcare team securely and privately 24/7 X X X X   Manage appointments: schedule your next appointment; view details of past/upcoming appointments X      Request prescription refills. X      View recent personal medical records, including lab and immunizations X X X X   View health record, including health history, allergies, medications X X X X   Read reports about your outpatient visits, procedures, consult and ER notes X X X X   See your discharge summary, which is a recap of your hospital and/or ER visit that includes your diagnosis, lab results, and care plan. X X       How to register for Balanced:  1. Go to  [email or web address].  2. Click on the Sign Up Now box, which takes you to the New Member Sign Up page. You will need to provide the following information:  a. Enter your Balanced Access Code exactly as it appears at the top of this page. (You will not need to use this code after you’ve completed the sign-up process. If you do not sign up before the expiration date, you must request a new code.)   b. Enter your date of birth.   c. Enter your home email address.   d. Click Submit, and follow the next screen’s instructions.  3. Create a Balanced ID. This will  be your [employer] login ID and cannot be changed, so think of one that is secure and easy to remember.  4. Create a [employer] password. You can change your password at any time.  5. Enter your Password Reset Question and Answer. This can be used at a later time if you forget your password.   6. Enter your e-mail address. This allows you to receive e-mail notifications when new information is available in [employer].  7. Click Sign Up. You can now view your health information.    For assistance activating your [employer] account, call [phone]

## 2017-03-21 ENCOUNTER — TELEPHONE (OUTPATIENT)
Dept: MEDICAL GROUP | Facility: LAB | Age: 27
End: 2017-03-21

## 2017-03-21 RX ORDER — VALACYCLOVIR HYDROCHLORIDE 1 G/1
1000 TABLET, FILM COATED ORAL 2 TIMES DAILY
Qty: 20 TAB | Refills: 0 | Status: SHIPPED | OUTPATIENT
Start: 2017-03-21 | End: 2017-08-08 | Stop reason: SDUPTHER

## 2017-03-21 RX ORDER — CLINDAMYCIN PHOSPHATE 10 MG/G
GEL TOPICAL
Qty: 2 TUBE | Refills: 5 | Status: SHIPPED | OUTPATIENT
Start: 2017-03-21 | End: 2017-10-04

## 2017-03-21 NOTE — ED NOTES
Pt rounding-vs as charted, friend at bedside. Results noted-placed up for re eval. Continues w/o distress, denies needs

## 2017-03-21 NOTE — DISCHARGE INSTRUCTIONS
Pain Medicine Instructions  HOW CAN PAIN MEDICINE AFFECT ME?  You were prescribed pain medicine. This medicine may:  · Make you tired or sleepy.  · Affect how well you can:  ¨ Drive  ¨ Do certain activities.  Pain medicine may not make all of your pain go away. You should be comfortable enough to:  · Move.  · Breathe.  · Take care of yourself.  HOW OFTEN SHOULD I TAKE PAIN MEDICINE AND HOW MUCH SHOULD I TAKE?  · Take pain medicine only as told by your doctor and only as needed for pain.  · You do not need to take pain medicine if you are not having pain, unless your doctor tells you to do that.  · You can take less than the prescribed dose if you find that less medicine helps your pain.  WHAT SHOULD I AVOID WHILE I AM TAKING PAIN MEDICINE?  Follow these instructions after you start taking pain medicine, while you are taking the medicine, and for 8 hours after you stop taking the medicine:  · Do not drive.  · Do not use machinery.  · Do not use power tools.  · Do not sign legal documents.  · Do not drink alcohol.  · Do not take sleeping pills.  · Do not take care of children by yourself.  · Do not do any activities that involve climbing or being in high places.  · Do not go into any body of water unless there is an adult nearby who can watch and help you. This includes:  ¨ Lakes.  ¨ Rivers.  ¨ Oceans.  ¨ Spas.  ¨ Swimming pools.  HOW CAN I KEEP OTHERS SAFE WHILE I AM TAKING PAIN MEDICINE?  · Store your pain medicine as told by your doctor. Make sure that you keep it where children and pets cannot reach it.  · Do not share your pain medicine with anyone.  · Do not save any leftover pills. If you have any leftover pain medicine, get rid of it or destroy it as told by your doctor.  WHAT ELSE DO I NEED TO KNOW ABOUT TAKING PAIN MEDICINE?  · Use a poop (stool) softener if you have trouble pooping (constipation) because of your pain medicine. Eating more fruits and vegetables also helps with constipation.  · Write down the  times when you take your pain medicine. Look at the times before you take your next dose of medicine.  · If your pain is very bad, do not take more pills than told by your doctor. Call your doctor for help.  · Your pain medicine might have acetaminophen in it. Do not take any other acetaminophen while you are taking this medicine. An overdose of acetaminophen can do very bad damage to your liver. If you are taking any medicines in addition to your pain medicine, check the active ingredients on those medicines to see if acetaminophen is listed.  WHEN SHOULD I CALL MY DOCTOR?  · Your medicine is not helping the pain.  · You do either of these soon after you take the medicine:  ¨ Throw up (vomit).  ¨ Have watery poop (diarrhea).  · You have new pain in areas that did not hurt before.  · You have an allergic reaction to your medicine. This may include:  ¨ Feeling itchy.  ¨ Swelling.  ¨ Feeling dizzy.  ¨ Getting a new rash.  WHEN SHOULD I CALL 911 OR GO TO THE EMERGENCY ROOM?  · You feel dizzy or you faint.  · You feel very confused.  · You throw up again and again.  · Your skin or lips turn pale or bluish in color.  · You are:  ¨ Short of breath.  ¨ Breathing much more slowly than usual.  · You have a very bad allergic reaction to your medicine. This includes:  ¨ Developing a swollen tongue.  ¨ Having trouble breathing.     This information is not intended to replace advice given to you by your health care provider. Make sure you discuss any questions you have with your health care provider.     Document Released: 06/05/2009 Document Revised: 05/03/2016 Document Reviewed: 10/22/2015  [employer] Interactive Patient Education ©2016 [employer]    Musculoskeletal Pain  Musculoskeletal pain is muscle and maurice aches and pains. These pains can occur in any part of the body. Your caregiver may treat you without knowing the cause of the pain. They may treat you if blood or urine tests, X-rays, and other tests were normal.    CAUSES  There is often not a definite cause or reason for these pains. These pains may be caused by a type of germ (virus). The discomfort may also come from overuse. Overuse includes working out too hard when your body is not fit. Kavon aches also come from weather changes. Bone is sensitive to atmospheric pressure changes.  HOME CARE INSTRUCTIONS   · Ask when your test results will be ready. Make sure you get your test results.  · Only take over-the-counter or prescription medicines for pain, discomfort, or fever as directed by your caregiver. If you were given medications for your condition, do not drive, operate machinery or power tools, or sign legal documents for 24 hours. Do not drink alcohol. Do not take sleeping pills or other medications that may interfere with treatment.  · Continue all activities unless the activities cause more pain. When the pain lessens, slowly resume normal activities. Gradually increase the intensity and duration of the activities or exercise.  · During periods of severe pain, bed rest may be helpful. Lay or sit in any position that is comfortable.  · Putting ice on the injured area.  ¨ Put ice in a bag.  ¨ Place a towel between your skin and the bag.  ¨ Leave the ice on for 15 to 20 minutes, 3 to 4 times a day.  · Follow up with your caregiver for continued problems and no reason can be found for the pain. If the pain becomes worse or does not go away, it may be necessary to repeat tests or do additional testing. Your caregiver may need to look further for a possible cause.  SEEK IMMEDIATE MEDICAL CARE IF:  · You have pain that is getting worse and is not relieved by medications.  · You develop chest pain that is associated with shortness or breath, sweating, feeling sick to your stomach (nauseous), or throw up (vomit).  · Your pain becomes localized to the abdomen.  · You develop any new symptoms that seem different or that concern you.  MAKE SURE YOU:   · Understand these  instructions.  · Will watch your condition.  · Will get help right away if you are not doing well or get worse.     This information is not intended to replace advice given to you by your health care provider. Make sure you discuss any questions you have with your health care provider.    Follow-up with a podiatrist. Call for appointment time in the morning.     Document Released: 12/18/2006 Document Revised: 03/11/2013 Document Reviewed: 08/22/2014  [employer] Interactive Patient Education ©2016 [employer]

## 2017-03-21 NOTE — TELEPHONE ENCOUNTER
Please let her know that I have sent in a prescription for another gel use that will hopefully be cheaper. She should use this with benzoyl peroxide twice a day.     Thanks!  Manuela Green M.D.

## 2017-03-21 NOTE — ED NOTES
Pt rounding, aware of pending x-ray to foot. In no apparent distress, call light in reach, denies needs

## 2017-03-21 NOTE — TELEPHONE ENCOUNTER
1. Caller Name: Rosita                                         Call Back Number: [phone]      Patient approves a detailed voicemail message: yes    Our office received a prior authorization request for Epiduo.  I called her insurance company and they advised me that this is considered an excluded medication and even with an approval the patient would have a copay of $406.    I notified Rosita of this and she is not sure if there is an alternative to this medication since this has been the only thing to help her clean up her acne.  She states she has not had clear since since before puberty and she does not want to start breaking out again.   Please advise.

## 2017-03-24 ENCOUNTER — OFFICE VISIT (OUTPATIENT)
Dept: NEUROLOGY | Facility: MEDICAL CENTER | Age: 27
End: 2017-03-24
Payer: COMMERCIAL

## 2017-03-24 ENCOUNTER — HOSPITAL ENCOUNTER (OUTPATIENT)
Dept: LAB | Facility: MEDICAL CENTER | Age: 27
End: 2017-03-24
Attending: PSYCHIATRY & NEUROLOGY
Payer: COMMERCIAL

## 2017-03-24 VITALS
WEIGHT: 147 LBS | SYSTOLIC BLOOD PRESSURE: 114 MMHG | OXYGEN SATURATION: 99 % | DIASTOLIC BLOOD PRESSURE: 70 MMHG | HEIGHT: 69 IN | BODY MASS INDEX: 21.77 KG/M2 | TEMPERATURE: 98.4 F | HEART RATE: 93 BPM

## 2017-03-24 DIAGNOSIS — G35 MULTIPLE SCLEROSIS (HCC): ICD-10-CM

## 2017-03-24 DIAGNOSIS — G35 MULTIPLE SCLEROSIS (HCC): Primary | ICD-10-CM

## 2017-03-24 LAB
ALBUMIN SERPL BCP-MCNC: 4.2 G/DL (ref 3.2–4.9)
BUN SERPL-MCNC: 14 MG/DL (ref 8–22)
CALCIUM SERPL-MCNC: 9.5 MG/DL (ref 8.5–10.5)
CHLORIDE SERPL-SCNC: 104 MMOL/L (ref 96–112)
CO2 SERPL-SCNC: 28 MMOL/L (ref 20–33)
CREAT SERPL-MCNC: 0.85 MG/DL (ref 0.5–1.4)
GLUCOSE SERPL-MCNC: 101 MG/DL (ref 65–99)
PHOSPHATE SERPL-MCNC: 3.5 MG/DL (ref 2.5–4.5)
POTASSIUM SERPL-SCNC: 4 MMOL/L (ref 3.6–5.5)
SODIUM SERPL-SCNC: 137 MMOL/L (ref 135–145)

## 2017-03-24 PROCEDURE — 87798 DETECT AGENT NOS DNA AMP: CPT

## 2017-03-24 PROCEDURE — 99214 OFFICE O/P EST MOD 30 MIN: CPT | Performed by: PSYCHIATRY & NEUROLOGY

## 2017-03-24 PROCEDURE — 36415 COLL VENOUS BLD VENIPUNCTURE: CPT

## 2017-03-24 PROCEDURE — 80069 RENAL FUNCTION PANEL: CPT

## 2017-03-24 ASSESSMENT — ENCOUNTER SYMPTOMS
SENSORY CHANGE: 0
HEADACHES: 0
DEPRESSION: 0
BLURRED VISION: 1
FOCAL WEAKNESS: 0
TREMORS: 0
INSOMNIA: 0
TINGLING: 0
MEMORY LOSS: 0
DOUBLE VISION: 1

## 2017-03-24 NOTE — PROGRESS NOTES
"Subjective:      Rosita Bowles is a 26 y.o. female who presents for follow-up with her boyfriend, Semaj, and her father, with a history of MS.    HPI    Seen 2 months ago for the first time, we did repeat MRI scan of the brain which revealed an atypical, almost pure localization of left hemispheric lesions, though she had upwards of 18 that were enhancing. Placed on IV methylprednisolone, 1 g/day for 5 days followed by prednisone titration, she had significant insomnia, but follow-up MRI scan one month later did show a reduction of 50% in the number of lesions. Clinically throughout this time, she has had no symptoms. She has the chronic issues with mild JULIO CÉSAR and visual distortions of the left eye. She remains on Tecfidera, the flushing sensations actually improved spontaneously, unfortunately, she did not get a MICHELLE virus and varicella-zoster PCR titer checked as I had requested. CBC checked during her steroid infusions showed an elevated WBC and a left shift with a neutrophil predominance. Otherwise she is doing well.    Medical, surgical and family histories are reviewed, there are no known drug allergies. She is on Tecfidera 240 mg, twice a day, Concerta 54 mg daily, Valtrex 1 g tablets as needed, Motrin 600 mg and Vicodin 5/325, the latter 2 being used for a right heel fasciitis.    Review of Systems   Constitutional: Negative for malaise/fatigue.   Eyes: Positive for blurred vision and double vision.   Neurological: Negative for tingling, tremors, sensory change, focal weakness and headaches.   Psychiatric/Behavioral: Negative for depression and memory loss. The patient does not have insomnia.    All other systems reviewed and are negative.       Objective:     /70 mmHg  Pulse 93  Temp(Src) 36.9 °C (98.4 °F)  Ht 1.753 m (5' 9.02\")  Wt 66.679 kg (147 lb)  BMI 21.70 kg/m2  SpO2 99%  LMP 03/20/2017     Physical Exam    She appears in no acute distress. Vital signs are stable. There is no " malar rash. The neck is supple. Lhermitte phenomena is absent. Cardiac evaluation is unremarkable. Neurologic exam was deferred for today, though by observation, visual fields were not assessed, there is still some mild sluggish movement with the left eye, but no facial asymmetry or bulbar dysfunction. She moved all extremities symmetrically, there was no appendicular dystaxia, she walks with normal station, arm swing is symmetric. Fine motor control with the hands and feet were also intact.     Assessment/Plan:     1. Multiple sclerosis (CMS-HCC)  We will continue her present treatment regimen, I reviewed the imaging studies myself, discussed these results as it manifests a product of her steroids and over the long-term hopefully Tecfidera maintaining good control with reduction in inflammation and active disease. I urged her, in fact got into her face about, getting the Varicella and MICHELLE viral PCR titers drawn. We talked about the risk of PML in patients on Tecfidera, much lower than that seen with Tysabri, but still possible. I also talked with her father about where to obtain reliable information about the disease, and where not to go on the web especially. She was told that her disease is still fairly mild, that she does have remitting relapsing disease, but her prognosis still can be quite good. She was also reassured we have other medications to use were Tecfidera to prove ineffective. Repeat MRI does need to be done about 3 months from now to make sure we are still seeing good disease suppression, by that time we should also begin to see the effect of the Tecfidera itself.    - MR-BRAIN-WITH & W/O; Future  - MICHELLE VIRUS PCR; Future  - VZV REAL TIME PCR  - RENAL FUNCTION PANEL; Future    Time: Evaluation of 25 minutes for exam come review, discussion, and education, high complexity  Discussion: As mentioned in the assessment, over 70% of the time was spent face-to-face counseling and coordinating care.

## 2017-03-24 NOTE — MR AVS SNAPSHOT
"        Rosita Bowles   3/24/2017 4:00 PM   Office Visit   MRN: [id number]    Department:  [facility]   Dept Phone:  [phone]    Description:  Female : 1990   Provider:  Manjeet Garrett M.D.           Reason for Visit     Follow-Up Multiple sclerosis      Allergies as of 3/24/2017     Allergen Noted Reactions    Nkda [No Known Drug Allergy] 2012         You were diagnosed with     Multiple sclerosis (CMS-HCC)   [340.ICD-9-CM]  -  Primary       Vital Signs     Blood Pressure Pulse Temperature Height Weight Body Mass Index    114/70 mmHg 93 36.9 °C (98.4 °F) 1.753 m (5' 9.02\") 66.679 kg (147 lb) 21.70 kg/m2    Oxygen Saturation Last Menstrual Period Smoking Status             99% 2017 Never Smoker          Basic Information     Date Of Birth Sex Race Ethnicity Preferred Language    1990 Female White Non- English      Your appointments     2017  3:40 PM   Established Patient with Manuela Green M.D.   [facility] (--)    [address] [address] [id number] [phone]           You will be receiving a confirmation call a few days before your appointment from our automated call confirmation system.              Problem List              ICD-10-CM Priority Class Noted - Resolved    Depression F32.9   2012 - Present    ADHD (attention deficit hyperactivity disorder) F90.9   2012 - Present    Visit for birth control pills maintenance Z30.41   2012 - Present    Acne L70.9   2012 - Present    Seasonal allergies J30.2   2013 - Present    Multiple sclerosis (CMS-[facility]) G35   2016 - Present    Screening for STD (sexually transmitted disease) Z11.3   2017 - Present      Health Maintenance        Date Due Completion Dates    PAP SMEAR 2020, 2013    IMM DTaP/Tdap/Td Vaccine (7 - Td) 2023, 3/16/2004, 6/3/1993, 1992, 1992, 1992            "   Current Immunizations     Dtap Vaccine 6/3/1993, 5/28/1992, 4/2/1992, 2/6/1992    HIB Vaccine (ACTHIB/HIBERIX) 5/28/1992, 4/2/1992, 2/6/1992    HPV Quadrivalent Vaccine (GARDASIL) 5/28/2010, 1/19/2010, 6/17/2009    Hepatitis A Vaccine, Ped/Adol 8/18/2006, 3/16/2004    Hepatitis B Vaccine Non-Recombivax (Ped/Adol) 12/30/1998, 6/25/1998, 5/22/1998    IPV 6/3/1993, 5/28/1992, 4/2/1992, 2/6/1992    Influenza Vaccine Quad Inj (Pf) 10/16/2016    MMR Vaccine 2/13/1997, 3/3/1993    Meningococcal Conjugate Vaccine MCV4 (Menactra) 6/17/2009    Tdap Vaccine 5/31/2013 10:48 AM, 3/16/2004    Varicella Vaccine Live 9/26/2016, 7/28/2016      Below and/or attached are the medications your provider expects you to take. Review all of your home medications and newly ordered medications with your provider and/or pharmacist. Follow medication instructions as directed by your provider and/or pharmacist. Please keep your medication list with you and share with your provider. Update the information when medications are discontinued, doses are changed, or new medications (including over-the-counter products) are added; and carry medication information at all times in the event of emergency situations     Allergies:  NKDA - (reactions not documented)               Medications  Valid as of: March 24, 2017 -  4:34 PM    Generic Name Brand Name Tablet Size Instructions for use    Adapalene-Benzoyl Peroxide (Gel) Adapalene-Benzoyl Peroxide 0.1-2.5 % 1 Application by Apply externally route every day.        BuPROPion HCl (TABLET SR 24 HR) WELLBUTRIN  MG Take 1 Tab by mouth every morning.        Clindamycin Phosphate (Gel) CLEOCIN T 1 % Apply to acne BID        Dimethyl Fumarate (CAPSULE DELAYED RELEASE) Dimethyl Fumarate 240 MG Take 240 mg by mouth 2 Times a Day.        Hydrocodone-Acetaminophen (Tab) NORCO 5-325 MG Take 1 Tab by mouth every four hours as needed.        Ibuprofen (Tab) MOTRIN 600 MG Take 1 Tab by mouth every 6 hours as  needed.        Methylphenidate HCl (Tab CR) CONCERTA 54 MG Take 1 Tab by mouth every day.        Norethindrone-Mestranol (Tab) NECON 1/50 (28) 1-50 MG-MCG Take 1 tab PO qday as directed        ValACYclovir HCl (Tab) VALTREX 1 GM Take 1 Tab by mouth 2 times a day for 10 days.        .                 Medicines prescribed today were sent to:     [id number] - [place], [place] - [address]    [address] [place] [address]    Phone: [phone] Fax: [phone]    Open 24 Hours?: No      Medication refill instructions:       If your prescription bottle indicates you have medication refills left, it is not necessary to call your provider’s office. Please contact your pharmacy and they will refill your medication.    If your prescription bottle indicates you do not have any refills left, you may request refills at any time through one of the following ways: The online Ravenflow system (except Urgent Care), by calling your provider’s office, or by asking your pharmacy to contact your provider’s office with a refill request. Medication refills are processed only during regular business hours and may not be available until the next business day. Your provider may request additional information or to have a follow-up visit with you prior to refilling your medication.   *Please Note: Medication refills are assigned a new Rx number when refilled electronically. Your pharmacy may indicate that no refills were authorized even though a new prescription for the same medication is available at the pharmacy. Please request the medicine by name with the pharmacy before contacting your provider for a refill.        Your To Do List     Future Labs/Procedures Complete By Expires    MR-BRAIN-WITH & W/O  6/24/2017 (Approximate) 3/24/2018    RENAL FUNCTION PANEL  6/24/2017 (Approximate) 3/24/2018    MICHELLE VIRUS PCR  As directed 3/24/2018         Ravenflow Access Code: Activation code not generated  Current Ravenflow Status: Active

## 2017-03-27 LAB — MISCELLANEOUS LAB RESULT MISCLAB: NORMAL

## 2017-03-28 LAB
JC VIRUS SOURCE  Q4278: NORMAL
JCPYV DNA SERPL QL NAA+PROBE: NOT DETECTED

## 2017-04-24 ENCOUNTER — TELEPHONE (OUTPATIENT)
Dept: NEUROLOGY | Facility: MEDICAL CENTER | Age: 27
End: 2017-04-24

## 2017-04-24 NOTE — TELEPHONE ENCOUNTER
Pt is sending this message via [employer] e-mail. Please advise. Thank you.    Non-Urgent Medical Question  Message [id number]     From   Rosita Bowles    To   Manjeet Garrett M.D.    Sent   4/20/2017  2:46 PM        Hi Dr. Garrett,     I have been experiencing some pretty intense vertigo for the last 18+ hours; I have been sitting up while I've been awake and not moving a whole lot, and I took some anti motion-sickness medication about 6 hours ago, however it has not helped.  Do you have any advice on what I should do?     Also, this is embarrassing, but I have been drooling... it is awkward and I don't realize I'm doing it. Is this just dumb, or...? I can't figure it out :(   Best regards,   Rosita Bowles

## 2017-04-26 NOTE — TELEPHONE ENCOUNTER
Please tell the patient she should first talk with her PCP about the vertigo, it does not necessarily imply her MS is a cause; more often than not, it is a peripheral labyrinthine or inner ear issue. I'm not sure about her drooling as well.  DILAN    E-mail sent to loree ALDANA

## 2017-04-27 DIAGNOSIS — N93.9 ABNORMAL UTERINE BLEEDING: ICD-10-CM

## 2017-05-03 ENCOUNTER — OFFICE VISIT (OUTPATIENT)
Dept: MEDICAL GROUP | Facility: MEDICAL CENTER | Age: 27
End: 2017-05-03
Payer: COMMERCIAL

## 2017-05-03 VITALS
TEMPERATURE: 98.2 F | WEIGHT: 147 LBS | BODY MASS INDEX: 21.77 KG/M2 | SYSTOLIC BLOOD PRESSURE: 110 MMHG | RESPIRATION RATE: 14 BRPM | HEART RATE: 100 BPM | DIASTOLIC BLOOD PRESSURE: 70 MMHG | HEIGHT: 69 IN | OXYGEN SATURATION: 98 %

## 2017-05-03 DIAGNOSIS — J34.89 SINUS PRESSURE: ICD-10-CM

## 2017-05-03 PROCEDURE — 99214 OFFICE O/P EST MOD 30 MIN: CPT | Performed by: PHYSICIAN ASSISTANT

## 2017-05-03 RX ORDER — PSEUDOEPHEDRINE HCL 30 MG
60 TABLET ORAL EVERY 4 HOURS PRN
Qty: 120 TAB | Refills: 6 | Status: SHIPPED | OUTPATIENT
Start: 2017-05-03 | End: 2017-06-23

## 2017-05-03 RX ORDER — LORATADINE 10 MG/1
10 TABLET ORAL DAILY
Qty: 30 TAB | Refills: 2 | Status: SHIPPED | OUTPATIENT
Start: 2017-05-03 | End: 2017-10-04

## 2017-05-03 RX ORDER — CODEINE PHOSPHATE AND GUAIFENESIN 10; 100 MG/5ML; MG/5ML
5 SOLUTION ORAL EVERY 4 HOURS PRN
Qty: 240 ML | Refills: 0 | Status: SHIPPED | OUTPATIENT
Start: 2017-05-03 | End: 2017-06-23

## 2017-05-03 RX ORDER — FLUTICASONE PROPIONATE 50 MCG
1 SPRAY, SUSPENSION (ML) NASAL 2 TIMES DAILY
Qty: 16 G | Refills: 2 | Status: SHIPPED | OUTPATIENT
Start: 2017-05-03 | End: 2017-10-04

## 2017-05-03 NOTE — PROGRESS NOTES
Chief Complaint   Patient presents with   • Sinus Problem       HISTORY OF PRESENT ILLNESS: Patient is a 26 y.o. female established patient who presents today to the sinus pressure    Sinus pressure  Patient states that she's had sinus pressure both in the frontal and maxillary region since Sunday, April 30, 2017. Patient states that she's also had nasal congestion, sore throat, coughing with green/yellow sputum production. Denies fever or chills. Patient tried over-the-counter cough/sore throat medication as well as decongestant.       Patient Active Problem List    Diagnosis Date Noted   • Sinus pressure 05/03/2017   • Abnormal uterine bleeding 04/27/2017   • Screening for STD (sexually transmitted disease) 01/20/2017   • Multiple sclerosis (CMS-[facility]) 11/02/2016   • Seasonal allergies 05/31/2013   • Depression 08/07/2012   • ADHD (attention deficit hyperactivity disorder) 08/07/2012   • Visit for birth control pills maintenance 08/07/2012   • Acne 08/07/2012       Allergies:Nkda    Current Outpatient Prescriptions   Medication Sig Dispense Refill   • fluticasone (FLONASE) 50 MCG/ACT nasal spray Spray 1 Spray in nose 2 times a day. 16 g 2   • loratadine (CLARITIN) 10 MG Tab Take 1 Tab by mouth every day. 30 Tab 2   • pseudoephedrine (SUDAFED) 30 MG Tab Take 2 Tabs by mouth every four hours as needed for Congestion. 120 Tab 6   • guaifenesin-codeine (ROBITUSSIN AC) Solution oral solution Take 5 mL by mouth every four hours as needed for Cough. 240 mL 0   • clindamycin (CLEOCIN T) 1 % Gel Apply to acne BID 2 Tube 5   • [START ON 6/6/2017] methylphenidate (CONCERTA) 54 MG CR tablet Take 1 Tab by mouth every day. 30 Tab 0   • Dimethyl Fumarate (TECFIDERA) 240 MG CAPSULE DELAYED RELEASE Take 240 mg by mouth 2 Times a Day. 60 Cap 11   • NECON 1/50, 28, 1-50 MG-MCG Tab Take 1 tab PO qday as directed 84 Tab 3   • buPROPion (WELLBUTRIN XL) 300 MG XL tablet Take 1 Tab by mouth every morning. 90 Tab 3     No current  "facility-administered medications for this visit.       Social History   Substance Use Topics   • Smoking status: Never Smoker    • Smokeless tobacco: Never Used   • Alcohol Use: 0.0 oz/week     0 Standard drinks or equivalent per week      Comment: occasional       Family Status   Relation Status Death Age   • Mother Alive    • Father Alive    • Brother Alive      Family History   Problem Relation Age of Onset   • Cancer Neg Hx    • Diabetes Neg Hx    • Thyroid Mother    • Thyroid Brother        Review of Systems:   Constitutional: Negative for fever, chills, weight loss and malaise/fatigue.   HENT: Negative for ear pain, nosebleeds.   Positive nasal congestion.  Negative sore throat and neck pain.    Eyes: Negative for blurred vision.   Respiratory: Positive for cough, sputum production, shortness of breath and wheezing.    Cardiovascular: Negative for chest pain, palpitations, orthopnea and leg swelling.   Gastrointestinal: Negative for heartburn, nausea, vomiting and abdominal pain.   Genitourinary: Negative for dysuria, urgency and frequency.   Musculoskeletal: Negative for myalgias, back pain and joint pain.   Skin: Negative for rash and itching.   Neurological: Negative for dizziness, tingling, tremors, sensory change, focal weakness and positive headaches.   Endo/Heme/Allergies: Does not bruise/bleed easily.   Psychiatric/Behavioral: Negative for depression, suicidal ideas and memory loss.  The patient is not nervous/anxious and does not have insomnia.    All other systems reviewed and are negative except as in HPI.    Exam:  Blood pressure 110/70, pulse 100, temperature 36.8 °C (98.2 °F), resp. rate 14, height 1.753 m (5' 9.02\"), weight 66.679 kg (147 lb), SpO2 98 %, not currently breastfeeding.  General:  Well nourished, well developed female in NAD  Head: is grossly normal.  HEENT: Eyes conjunctiva is clear, lids without ptosis, pupils equal round and reactive to light and accommodation.  Ears normal " shape and contour, canals are clear bilaterally, TMs with good light reflex and appear normal.  Nasal mucosa hypertrophic, boggy and edematous with positive rhinorrhea.  Oropharynx positive postnasal drip.  Sinuses (frontal and maxillary) mild tenderness to palpation.   Neck: Supple without JVD or bruit. Thyroid is not enlarged.  Pulmonary: Clear to ausculation. Normal effort. No rales, ronchi, or wheezing.  Cardiovascular: Regular rate and rhythm without murmur. Carotid and radial pulses are intact and equal bilaterally.  Extremities: no clubbing, cyanosis, or edema.    Medical decision-making and discussion: I do not believe it to be bacterial given the fact the patient is afebrile and at that she's only had symptoms since Sunday. At such time we're to treat symptoms, and continue to observe.    Please note that this dictation was created using voice recognition software. I have made every reasonable attempt to correct obvious errors, but I expect that there are errors of grammar and possibly content that I did not discover before finalizing the note.    Assessment/Plan:  1. Sinus pressure  fluticasone (FLONASE) 50 MCG/ACT nasal spray    loratadine (CLARITIN) 10 MG Tab    pseudoephedrine (SUDAFED) 30 MG Tab    guaifenesin-codeine (ROBITUSSIN AC) Solution oral solution

## 2017-05-03 NOTE — Clinical Note
May 3, 2017         Patient: Rosita Bowles   YOB: 1990   Date of Visit: 5/3/2017           To Whom it May Concern:    Rosita Bowles was seen in my clinic on 5/3/2017. She may return to school and work on May 6, 2017.    If you have any questions or concerns, please don't hesitate to call.        Sincerely,           Vipul Renae PA-C  Electronically Signed

## 2017-05-05 DIAGNOSIS — G35 MULTIPLE SCLEROSIS (HCC): ICD-10-CM

## 2017-05-08 ENCOUNTER — HOSPITAL ENCOUNTER (OUTPATIENT)
Dept: LAB | Facility: MEDICAL CENTER | Age: 27
End: 2017-05-08
Attending: NURSE PRACTITIONER
Payer: COMMERCIAL

## 2017-05-08 DIAGNOSIS — Z11.1 SCREENING FOR TUBERCULOSIS: ICD-10-CM

## 2017-05-08 PROCEDURE — 86480 TB TEST CELL IMMUN MEASURE: CPT

## 2017-05-08 PROCEDURE — 36415 COLL VENOUS BLD VENIPUNCTURE: CPT

## 2017-05-09 RX ORDER — DIMETHYL FUMARATE 240 MG/1
1 CAPSULE ORAL 2 TIMES DAILY
Qty: 60 CAP | Refills: 11 | Status: SHIPPED | OUTPATIENT
Start: 2017-05-09 | End: 2017-10-04

## 2017-05-10 LAB
M TB TUBERC IFN-G BLD QL: NEGATIVE
M TB TUBERC IFN-G/MITOGEN IGNF BLD: -0.01
M TB TUBERC IGNF/MITOGEN IGNF CONTROL: 63.02 [IU]/ML
MITOGEN IGNF BCKGRD COR BLD-ACNC: 0.03 [IU]/ML

## 2017-05-20 ENCOUNTER — HOSPITAL ENCOUNTER (OUTPATIENT)
Dept: RADIOLOGY | Facility: MEDICAL CENTER | Age: 27
End: 2017-05-20
Attending: PSYCHIATRY & NEUROLOGY
Payer: COMMERCIAL

## 2017-05-20 DIAGNOSIS — G35 MULTIPLE SCLEROSIS (HCC): ICD-10-CM

## 2017-05-20 PROCEDURE — A9579 GAD-BASE MR CONTRAST NOS,1ML: HCPCS | Performed by: PSYCHIATRY & NEUROLOGY

## 2017-05-20 PROCEDURE — 70553 MRI BRAIN STEM W/O & W/DYE: CPT

## 2017-05-20 PROCEDURE — 700117 HCHG RX CONTRAST REV CODE 255: Performed by: PSYCHIATRY & NEUROLOGY

## 2017-05-20 RX ADMIN — GADODIAMIDE 15 ML: 287 INJECTION INTRAVENOUS at 10:15

## 2017-05-25 ENCOUNTER — HOSPITAL ENCOUNTER (OUTPATIENT)
Dept: RADIOLOGY | Facility: MEDICAL CENTER | Age: 27
End: 2017-05-25
Attending: FAMILY MEDICINE
Payer: COMMERCIAL

## 2017-05-25 DIAGNOSIS — N93.9 ABNORMAL UTERINE BLEEDING: ICD-10-CM

## 2017-05-25 PROCEDURE — 76830 TRANSVAGINAL US NON-OB: CPT

## 2017-06-22 ENCOUNTER — TELEPHONE (OUTPATIENT)
Dept: MEDICAL GROUP | Facility: LAB | Age: 27
End: 2017-06-22

## 2017-06-22 NOTE — TELEPHONE ENCOUNTER
ESTABLISHED PATIENT PRE-VISIT PLANNING     Note: Patient will not be contacted if there is no indication to call.     1.  Reviewed notes from the last few office visits within the medical group: Yes.  Last seen here on 3/17/17 but was at [facility] c/o sinus issues on 5/3/17.       2.  If any orders were placed at last visit or intended to be done for this visit (i.e. 6 mos follow-up), do we have Results/Consult Notes?        •  Labs - Labs ordered, completed and results are in chart.  Labs done by Dr. Garrett on 3/24/17       •  Imaging - Imaging ordered, completed and results are in chart.  In addition to the pelvic US you ordered on 5/25/17 she had an MRI of the brain on 5/20/17 with Dr. Garrett       •  Referrals - Referral ordered, patient was seen and consult notes are in chart. Care Teams updated  YES.  Dr. Bloch referred patient for outpatient infusion-still pending authorization    3. Is this appointment scheduled as a Hospital Follow-Up? No    4.  Immunizations were updated in [employer] using WebIZ?: Yes       •  Web Iz Recommendations: Patient is up to date on all vaccines    5.  Patient is due for the following Health Maintenance Topics:   There are no preventive care reminders to display for this patient.    - Patient is up-to-date on all Health Maintenance topics. No records have been requested at this time.    6.  Patient was NOT informed to arrive 15 min prior to their scheduled appointment and bring in their medication bottles.

## 2017-06-23 ENCOUNTER — OFFICE VISIT (OUTPATIENT)
Dept: MEDICAL GROUP | Facility: LAB | Age: 27
End: 2017-06-23
Payer: COMMERCIAL

## 2017-06-23 VITALS
BODY MASS INDEX: 22.79 KG/M2 | HEART RATE: 91 BPM | RESPIRATION RATE: 12 BRPM | TEMPERATURE: 98.1 F | SYSTOLIC BLOOD PRESSURE: 106 MMHG | WEIGHT: 153.88 LBS | OXYGEN SATURATION: 98 % | HEIGHT: 69 IN | DIASTOLIC BLOOD PRESSURE: 70 MMHG

## 2017-06-23 DIAGNOSIS — L70.0 ACNE VULGARIS: ICD-10-CM

## 2017-06-23 DIAGNOSIS — F90.9 ATTENTION DEFICIT HYPERACTIVITY DISORDER (ADHD), UNSPECIFIED ADHD TYPE: ICD-10-CM

## 2017-06-23 DIAGNOSIS — Z30.09 ENCOUNTER FOR OTHER GENERAL COUNSELING OR ADVICE ON CONTRACEPTION: ICD-10-CM

## 2017-06-23 PROBLEM — Z11.3 SCREENING FOR STD (SEXUALLY TRANSMITTED DISEASE): Status: RESOLVED | Noted: 2017-01-20 | Resolved: 2017-06-23

## 2017-06-23 PROBLEM — B00.9 HSV-1 (HERPES SIMPLEX VIRUS 1) INFECTION: Status: ACTIVE | Noted: 2017-06-23

## 2017-06-23 PROCEDURE — 99214 OFFICE O/P EST MOD 30 MIN: CPT | Performed by: FAMILY MEDICINE

## 2017-06-23 RX ORDER — METHYLPHENIDATE HYDROCHLORIDE 54 MG/1
54 TABLET ORAL DAILY
Qty: 30 TAB | Refills: 0 | Status: SHIPPED | OUTPATIENT
Start: 2017-08-08 | End: 2017-06-23 | Stop reason: SDUPTHER

## 2017-06-23 RX ORDER — METHYLPHENIDATE HYDROCHLORIDE 54 MG/1
54 TABLET ORAL DAILY
Qty: 30 TAB | Refills: 0 | Status: SHIPPED | OUTPATIENT
Start: 2017-07-10 | End: 2017-06-23 | Stop reason: SDUPTHER

## 2017-06-23 RX ORDER — METHYLPHENIDATE HYDROCHLORIDE 54 MG/1
54 TABLET ORAL DAILY
Qty: 30 TAB | Refills: 0 | Status: SHIPPED | OUTPATIENT
Start: 2017-09-06 | End: 2017-10-04 | Stop reason: SDUPTHER

## 2017-06-23 NOTE — MR AVS SNAPSHOT
"        Rosita Bowles   2017 3:40 PM   Office Visit   MRN: [id number]    Department:  [facility]   Dept Phone:  [phone]    Description:  Female : 1990   Provider:  Manuela Green M.D.           Reason for Visit     Follow-Up meds       Allergies as of 2017     Allergen Noted Reactions    Nkda [No Known Drug Allergy] 2012         You were diagnosed with     Encounter for other general counseling or advice on contraception   [0140089]       Acne vulgaris   [495340]       Attention deficit hyperactivity disorder (ADHD), unspecified ADHD type   [8539711]         Vital Signs     Blood Pressure Pulse Temperature Respirations Height Weight    106/70 mmHg 91 36.7 °C (98.1 °F) 12 1.753 m (5' 9.02\") 69.8 kg (153 lb 14.1 oz)    Body Mass Index Oxygen Saturation Smoking Status             22.71 kg/m2 98% Never Smoker          Basic Information     Date Of Birth Sex Race Ethnicity Preferred Language    1990 Female White Non- English      Your appointments     2017  3:40 PM   Established Patient with Manuela Green M.D.   [facility] (--)    [address]  [facility] [address] [phone]           You will be receiving a confirmation call a few days before your appointment from our automated call confirmation system.            Aug 14, 2017 10:40 AM   Follow Up Visit with Manjeet Garrett M.D.   [facility] (--)    [address] [place] [address] [address]  [place] [address] [phone]           You will be receiving a confirmation call a few days before your appointment from our automated call confirmation system.              Problem List              ICD-10-CM Priority Class Noted - Resolved    Depression F32.9   2012 - Present    ADHD (attention deficit hyperactivity disorder) F90.9   2012 - Present    Visit for birth control pills maintenance Z30.41   2012 - Present    Acne " L70.9   8/7/2012 - Present    Seasonal allergies J30.2   5/31/2013 - Present    Multiple sclerosis (CMS-[facility]) G35   11/2/2016 - Present    Abnormal uterine bleeding N93.9   4/27/2017 - Present    Sinus pressure J34.89   5/3/2017 - Present    HSV-1 (herpes simplex virus 1) infection B00.9   6/23/2017 - Present    Acne vulgaris L70.0   6/23/2017 - Present      Health Maintenance        Date Due Completion Dates    PAP SMEAR 1/20/2020 1/20/2017, 12/23/2013    IMM DTaP/Tdap/Td Vaccine (7 - Td) 5/31/2023 5/31/2013, 3/16/2004, 6/3/1993, 5/28/1992, 4/2/1992, 2/6/1992            Current Immunizations     Dtap Vaccine 6/3/1993, 5/28/1992, 4/2/1992, 2/6/1992    HIB Vaccine (ACTHIB/HIBERIX) 5/28/1992, 4/2/1992, 2/6/1992    HPV Quadrivalent Vaccine (GARDASIL) 5/28/2010, 1/19/2010, 6/17/2009    Hepatitis A Vaccine, Ped/Adol 8/18/2006, 3/16/2004    Hepatitis B Vaccine Non-Recombivax (Ped/Adol) 12/30/1998, 6/25/1998, 5/22/1998    IPV 6/3/1993, 5/28/1992, 4/2/1992, 2/6/1992    Influenza Vaccine Quad Inj (Pf) 10/16/2016    MMR Vaccine 2/13/1997, 3/3/1993    Meningococcal Conjugate Vaccine MCV4 (Menactra) 6/17/2009    Tdap Vaccine 5/31/2013 10:48 AM, 3/16/2004    Varicella Vaccine Live 9/26/2016, 7/28/2016      Below and/or attached are the medications your provider expects you to take. Review all of your home medications and newly ordered medications with your provider and/or pharmacist. Follow medication instructions as directed by your provider and/or pharmacist. Please keep your medication list with you and share with your provider. Update the information when medications are discontinued, doses are changed, or new medications (including over-the-counter products) are added; and carry medication information at all times in the event of emergency situations     Allergies:  NKDA - (reactions not documented)               Medications  Valid as of: June 23, 2017 -  4:14 PM    Generic Name Brand Name Tablet Size Instructions for use     BuPROPion HCl (TABLET SR 24 HR) WELLBUTRIN  MG Take 1 Tab by mouth every morning.        Clindamycin Phosphate (Gel) CLEOCIN T 1 % Apply to acne BID        Dimethyl Fumarate (CAPSULE DELAYED RELEASE) Dimethyl Fumarate 240 MG Take 240 mg by mouth 2 Times a Day.        Fluticasone Propionate (Suspension) FLONASE 50 MCG/ACT Spray 1 Spray in nose 2 times a day.        Loratadine (Tab) CLARITIN 10 MG Take 1 Tab by mouth every day.        Methylphenidate HCl (Tab CR) CONCERTA 54 MG Take 1 Tab by mouth every day.        Norethindrone-Mestranol (Tab) NECON 1/50 (28) 1-50 MG-MCG Take 1 tab PO qday as directed        .                 Medicines prescribed today were sent to:     [facility]ACY #[id number] - [place] - [address]    [address] [place] [address]    Phone: [phone] Fax: [phone]    Open 24 Hours?: No    [facility] - [address] [address]    Phone: [phone] Fax: [phone]    Open 24 Hours?: No      Medication refill instructions:       If your prescription bottle indicates you have medication refills left, it is not necessary to call your provider’s office. Please contact your pharmacy and they will refill your medication.    If your prescription bottle indicates you do not have any refills left, you may request refills at any time through one of the following ways: The online [employer] system (except Urgent Care), by calling your provider’s office, or by asking your pharmacy to contact your provider’s office with a refill request. Medication refills are processed only during regular business hours and may not be available until the next business day. Your provider may request additional information or to have a follow-up visit with you prior to refilling your medication.   *Please Note: Medication refills are assigned a new Rx number when refilled electronically. Your pharmacy may indicate that no refills were  authorized even though a new prescription for the same medication is available at the pharmacy. Please request the medicine by name with the pharmacy before contacting your provider for a refill.           [employer] Access Code: Activation code not generated  Current [employer] Status: Active

## 2017-06-23 NOTE — PROGRESS NOTES
Subjective:   Rosita Bowles is a 26 y.o. female here today for   Chief Complaint   Patient presents with   • Follow-Up     meds        1. Encounter for other general counseling or advice on contraception  Patient is currently on cOCPs Necon. She thinks this is causing worsening cramping in her uterus and worsening acne. She is interested in IUD. She is not missing doses. LMP 2 weeks ago     2. Acne vulgaris  This is chronic. Patient was on Epiduo which worked very well for her but her insurance is no longer cover this.. She is using clindamycin gel now which does not seem to work as well. She has been on oral antibiotics in the past this was when she was in. She has never been on tretinoin due to insurance. He does seem to be worse around her periods.    3. Attention deficit hyperactivity disorder (ADHD), unspecified ADHD type  Chronic, stable on Concerta 54 mg daily. She does take this every day. She never misses a dose and she has never had any abnormal refills or requests. She states that this helps her significantly in school. She got mostly A's this semester. She like to stay on this medication. She denies any weight loss, insomnia, anorexia.      Current medicines (including changes today)  Current Outpatient Prescriptions   Medication Sig Dispense Refill   • [START ON 9/6/2017] methylphenidate (CONCERTA) 54 MG CR tablet Take 1 Tab by mouth every day. 30 Tab 0   • Dimethyl Fumarate (TECFIDERA) 240 MG CAPSULE DELAYED RELEASE Take 240 mg by mouth 2 Times a Day. 60 Cap 11   • fluticasone (FLONASE) 50 MCG/ACT nasal spray Spray 1 Spray in nose 2 times a day. 16 g 2   • loratadine (CLARITIN) 10 MG Tab Take 1 Tab by mouth every day. 30 Tab 2   • pseudoephedrine (SUDAFED) 30 MG Tab Take 2 Tabs by mouth every four hours as needed for Congestion. 120 Tab 6   • guaifenesin-codeine (ROBITUSSIN AC) Solution oral solution Take 5 mL by mouth every four hours as needed for Cough. 240 mL 0   • clindamycin (CLEOCIN  "T) 1 % Gel Apply to acne BID 2 Tube 5   • NECON 1/50, 28, 1-50 MG-MCG Tab Take 1 tab PO qday as directed 84 Tab 3   • buPROPion (WELLBUTRIN XL) 300 MG XL tablet Take 1 Tab by mouth every morning. 90 Tab 3     No current facility-administered medications for this visit.     She  has a past medical history of Depression (8/7/2012); Acne (8/7/2012); ADHD (attention deficit hyperactivity disorder) (8/7/2012); MS (multiple sclerosis) (CMS-HCC); and Acne vulgaris (6/23/2017).    ROS   No fevers  No bowel changes  No LE edema       Objective:     Blood pressure 106/70, pulse 91, temperature 36.7 °C (98.1 °F), resp. rate 12, height 1.753 m (5' 9.02\"), weight 69.8 kg (153 lb 14.1 oz), SpO2 98 %. Body mass index is 22.71 kg/(m^2).   Physical Exam:  General: No acute distress, WN/WD  HEENT: NC/AT, lids normal without lesions, good dentition  Neck: Trachea midline  Cardiovascular: Regular Rate  Pulmonary: No respiratory distress  Skin: No rashes noted  Neurologic: CN II-XII grossly intact, normal gait  Psych: Alert and oriented x 3, normal insight and judgement        Assessment and Plan:   The following treatment plan was discussed    1. Encounter for other general counseling or advice on contraception  Patient will like an IUD, we will get her set up for this while she is on her menstrual cycle    2. Acne vulgaris  Chronic, stable, I have urged the patient to go on to [employer] website and see if there is a discount prescription card to help with cost    3. Attention deficit hyperactivity disorder (ADHD), unspecified ADHD type  Chronic, stable, continue Concerta. I have given her 3 refills of 30 tabs starting on 7/12/17 based on her last refill, secondary felt to be done on 8/10/17, last refilled be done on 9/8/17  - methylphenidate (CONCERTA) 54 MG CR tablet; Take 1 Tab by mouth every day.  Dispense: 30 Tab; Refill: 0      Followup: Return in about 2 weeks (around 7/7/2017) for IUD .       This note was created using voice " recognition software. I have made every reasonable attempt to correct errors, however, I do anticipate some grammatical errors.

## 2017-07-07 ENCOUNTER — OFFICE VISIT (OUTPATIENT)
Dept: MEDICAL GROUP | Facility: LAB | Age: 27
End: 2017-07-07
Payer: COMMERCIAL

## 2017-07-07 ENCOUNTER — HOSPITAL ENCOUNTER (OUTPATIENT)
Facility: MEDICAL CENTER | Age: 27
End: 2017-07-07
Attending: FAMILY MEDICINE
Payer: COMMERCIAL

## 2017-07-07 VITALS
WEIGHT: 153 LBS | BODY MASS INDEX: 22.66 KG/M2 | SYSTOLIC BLOOD PRESSURE: 116 MMHG | HEART RATE: 98 BPM | RESPIRATION RATE: 16 BRPM | DIASTOLIC BLOOD PRESSURE: 68 MMHG | TEMPERATURE: 98.3 F | HEIGHT: 69 IN | OXYGEN SATURATION: 98 %

## 2017-07-07 DIAGNOSIS — L70.0 ACNE VULGARIS: ICD-10-CM

## 2017-07-07 DIAGNOSIS — Z30.430 ENCOUNTER FOR IUD INSERTION: ICD-10-CM

## 2017-07-07 DIAGNOSIS — N93.9 ABNORMAL UTERINE BLEEDING: ICD-10-CM

## 2017-07-07 PROCEDURE — 99213 OFFICE O/P EST LOW 20 MIN: CPT | Mod: 25 | Performed by: FAMILY MEDICINE

## 2017-07-07 PROCEDURE — 87591 N.GONORRHOEAE DNA AMP PROB: CPT

## 2017-07-07 PROCEDURE — 58300 INSERT INTRAUTERINE DEVICE: CPT | Performed by: FAMILY MEDICINE

## 2017-07-07 PROCEDURE — 87491 CHLMYD TRACH DNA AMP PROBE: CPT

## 2017-07-07 RX ORDER — ADAPALENE AND BENZOYL PEROXIDE GEL, 0.1%/2.5% 1; 25 MG/G; MG/G
1 GEL TOPICAL DAILY
Qty: 45 G | Refills: 5 | Status: SHIPPED | OUTPATIENT
Start: 2017-07-07 | End: 2017-10-06

## 2017-07-07 NOTE — MR AVS SNAPSHOT
"        Rosita Bowles   2017 3:40 PM   Office Visit   MRN: [id number]    Department:  [facility]   Dept Phone:  [phone]    Description:  Female : 1990   Provider:  Manuela Green M.D.           Reason for Visit     Contraception           Allergies as of 2017     Allergen Noted Reactions    Nkda [No Known Drug Allergy] 2012         You were diagnosed with     Encounter for IUD insertion   [[id number]]         Vital Signs     Blood Pressure Pulse Temperature Respirations Height Weight    116/68 mmHg 98 36.8 °C (98.3 °F) 16 1.753 m (5' 9\") 69.4 kg (153 lb)    Body Mass Index Oxygen Saturation Smoking Status             22.58 kg/m2 98% Never Smoker          Basic Information     Date Of Birth Sex Race Ethnicity Preferred Language    1990 Female White Non- English      Your appointments     Aug 14, 2017 10:40 AM   Follow Up Visit with Manjeet Garrett M.D.   [facility] (--)    [address] [place] [address] [address]  [facility] [address] [phone]           You will be receiving a confirmation call a few days before your appointment from our automated call confirmation system.            Aug 18, 2017  3:40 PM   Established Patient with Manuela Green M.D.   [facility] (--)    [id number] [place] [address] [phone]           You will be receiving a confirmation call a few days before your appointment from our automated call confirmation system.              Problem List              ICD-10-CM Priority Class Noted - Resolved    Depression F32.9   2012 - Present    ADHD (attention deficit hyperactivity disorder) F90.9   2012 - Present    Visit for birth control pills maintenance Z30.41   2012 - Present    Acne L70.9   2012 - Present    Seasonal allergies J30.2   2013 - Present    Multiple sclerosis (CMS-HCC) G35   2016 - Present    Abnormal uterine bleeding N93.9   " 4/27/2017 - Present    Sinus pressure J34.89   5/3/2017 - Present    HSV-1 (herpes simplex virus 1) infection B00.9   6/23/2017 - Present    Acne vulgaris L70.0   6/23/2017 - Present      Health Maintenance        Date Due Completion Dates    IMM INFLUENZA (1) 9/1/2017 10/16/2016    PAP SMEAR 1/20/2020 1/20/2017, 12/23/2013    IMM DTaP/Tdap/Td Vaccine (7 - Td) 5/31/2023 5/31/2013, 3/16/2004, 6/3/1993, 5/28/1992, 4/2/1992, 2/6/1992            Current Immunizations     Dtap Vaccine 6/3/1993, 5/28/1992, 4/2/1992, 2/6/1992    HIB Vaccine (ACTHIB/HIBERIX) 5/28/1992, 4/2/1992, 2/6/1992    HPV Quadrivalent Vaccine (GARDASIL) 5/28/2010, 1/19/2010, 6/17/2009    Hepatitis A Vaccine, Ped/Adol 8/18/2006, 3/16/2004    Hepatitis B Vaccine Non-Recombivax (Ped/Adol) 12/30/1998, 6/25/1998, 5/22/1998    IPV 6/3/1993, 5/28/1992, 4/2/1992, 2/6/1992    Influenza Vaccine Quad Inj (Pf) 10/16/2016    MMR Vaccine 2/13/1997, 3/3/1993    Meningococcal Conjugate Vaccine MCV4 (Menactra) 6/17/2009    Tdap Vaccine 5/31/2013 10:48 AM, 3/16/2004    Varicella Vaccine Live 9/26/2016, 7/28/2016      Below and/or attached are the medications your provider expects you to take. Review all of your home medications and newly ordered medications with your provider and/or pharmacist. Follow medication instructions as directed by your provider and/or pharmacist. Please keep your medication list with you and share with your provider. Update the information when medications are discontinued, doses are changed, or new medications (including over-the-counter products) are added; and carry medication information at all times in the event of emergency situations     Allergies:  NKDA - (reactions not documented)               Medications  Valid as of: July 07, 2017 -  4:21 PM    Generic Name Brand Name Tablet Size Instructions for use    BuPROPion HCl (TABLET SR 24 HR) WELLBUTRIN  MG Take 1 Tab by mouth every morning.        Clindamycin Phosphate (Gel) CLEOCIN  T 1 % Apply to acne BID        Dimethyl Fumarate (CAPSULE DELAYED RELEASE) Dimethyl Fumarate 240 MG Take 240 mg by mouth 2 Times a Day.        Fluticasone Propionate (Suspension) FLONASE 50 MCG/ACT Spray 1 Spray in nose 2 times a day.        Loratadine (Tab) CLARITIN 10 MG Take 1 Tab by mouth every day.        Methylphenidate HCl (Tab CR) CONCERTA 54 MG Take 1 Tab by mouth every day.        Norethindrone-Mestranol (Tab) NECON 1/50 (28) 1-50 MG-MCG Take 1 tab PO qday as directed        .                 Medicines prescribed today were sent to:     [facility]ARMACY #[id number] - [place], [place] - [address]    [address] [place] [address]    Phone: [phone] Fax: [phone]    Open 24 Hours?: No    [facility] - [address]    [address] [facility] [address]    Phone: [phone] Fax: [phone]    Open 24 Hours?: No      Medication refill instructions:       If your prescription bottle indicates you have medication refills left, it is not necessary to call your provider’s office. Please contact your pharmacy and they will refill your medication.    If your prescription bottle indicates you do not have any refills left, you may request refills at any time through one of the following ways: The online [employer] system (except Urgent Care), by calling your provider’s office, or by asking your pharmacy to contact your provider’s office with a refill request. Medication refills are processed only during regular business hours and may not be available until the next business day. Your provider may request additional information or to have a follow-up visit with you prior to refilling your medication.   *Please Note: Medication refills are assigned a new Rx number when refilled electronically. Your pharmacy may indicate that no refills were authorized even though a new prescription for the same medication is available at the pharmacy. Please request the medicine by name  with the pharmacy before contacting your provider for a refill.        Your To Do List     Future Labs/Procedures Complete By Expires    CHLAMYDIA/GC PCR URINE OR SWAB  As directed 7/8/2018         [employer] Access Code: Activation code not generated  Current [employer] Status: Active

## 2017-07-08 DIAGNOSIS — Z30.430 ENCOUNTER FOR IUD INSERTION: ICD-10-CM

## 2017-07-08 NOTE — PROGRESS NOTES
Subjective:   Rosiat Bowles is a 26 y.o. female here today for   Chief Complaint   Patient presents with   • Contraception       1. Encounter for IUD insertion  Patient is here for IUD insertion. See procedure note below    2. Acne vulgaris  This is chronic. Patient has had worsening acne over the last year. She has been on Epiduo in the past which worked well for her but unfortunately her insurance stopped covering this. We have started clindamycin gel which keeps her acne under control but it is not under as good control as the Epiduo. She has found prescription coupons for this and would like any prescription if possible today. Acne is located on her face and back and chest.    3. Abnormal uterine bleeding  This is chronic. Patient had a transvaginal ultrasound on 5/25/17 which was normal and reviewed with her today. She does have irregular menses and very heavy menses. She would like IUD to help with this.    Current medicines (including changes today)  Current Outpatient Prescriptions   Medication Sig Dispense Refill   • Adapalene-Benzoyl Peroxide 0.1-2.5 % Gel 1 Application by Apply externally route every day. 45 g 5   • [START ON 9/6/2017] methylphenidate (CONCERTA) 54 MG CR tablet Take 1 Tab by mouth every day. 30 Tab 0   • Dimethyl Fumarate (TECFIDERA) 240 MG CAPSULE DELAYED RELEASE Take 240 mg by mouth 2 Times a Day. 60 Cap 11   • clindamycin (CLEOCIN T) 1 % Gel Apply to acne BID 2 Tube 5   • NECON 1/50, 28, 1-50 MG-MCG Tab Take 1 tab PO qday as directed 84 Tab 3   • buPROPion (WELLBUTRIN XL) 300 MG XL tablet Take 1 Tab by mouth every morning. 90 Tab 3   • fluticasone (FLONASE) 50 MCG/ACT nasal spray Spray 1 Spray in nose 2 times a day. 16 g 2   • loratadine (CLARITIN) 10 MG Tab Take 1 Tab by mouth every day. 30 Tab 2     No current facility-administered medications for this visit.     She  has a past medical history of Depression (8/7/2012); Acne (8/7/2012); ADHD (attention deficit  "hyperactivity disorder) (8/7/2012); MS (multiple sclerosis) (CMS-[facility]); and Acne vulgaris (6/23/2017).    ROS   No fevers  No bowel changes  No LE edema       Objective:     Blood pressure 116/68, pulse 98, temperature 36.8 °C (98.3 °F), resp. rate 16, height 1.753 m (5' 9\"), weight 69.4 kg (153 lb), SpO2 98 %. Body mass index is 22.58 kg/(m^2).   Physical Exam:  Constitutional: Alert, no distress.  Skin: Warm, dry, good turgor, no rashes in visible areas.  Eye: Equal, round and reactive, conjunctiva clear, lids normal.  ENMT: Lips without lesions, good dentition, oropharynx clear.  Neck: Trachea midline, no masses, no thyromegaly. No cervical or supraclavicular lymphadenopathy  Respiratory: Unlabored respiratory effort, lungs clear to auscultation, no wheezes, no ronchi.  Cardiovascular: Normal S1, S2, RRR, no murmur, no edema.  Abdomen: Soft, non-tender, no masses, no hepatosplenomegaly.  Psych: Alert and oriented x3, normal affect and mood.  pelvic exam: normal external genitalia, vulva, vagina, cervix, uterus and adnexa. There is scant vaginal bleeding from the cervical os      Assessment and Plan:   The following treatment plan was discussed    1. Encounter for IUD insertion    SUBJECTIVE:    Rosita Bowles is a  26 y.o. female who presents with the complaint of AUB, would like Kyleena    Consent:Counseled regarding use, risks, and benefits of IUD., Patient read and understands the consent booklet., Procedure explained., Signed informed consent obtained.    OBJECTIVE:    Objective results:  GC/CT collected  Currently on menses      Cervix cleansed with Betadine, Uterus sounded to 7, IUD inserted without difficulty, String visible and trimmed., Patient tolerated procedure,   Kyleena 19.5mg IUD supplied by our office   Lot # [id number], Expiration date 10/2018  NDC 78770-663-23  - CHLAMYDIA/GC PCR URINE OR SWAB; Future    2. Acne vulgaris  Chronic, currently unstable  Prescription for Epiduo sent to the " pharmacy, she will use prescription discount card for this    3. Abnormal uterine bleeding  Chronic, stable, ultrasound results reviewed with the patient. We are inserting IUD today which will hopefully help with her bleeding      Followup: Return in about 6 weeks (around 8/18/2017) for string check.       This note was created using voice recognition software. I have made every reasonable attempt to correct errors, however, I do anticipate some grammatical errors.

## 2017-07-10 LAB
C TRACH DNA SPEC QL NAA+PROBE: NEGATIVE
N GONORRHOEA DNA SPEC QL NAA+PROBE: NEGATIVE
SPECIMEN SOURCE: NORMAL

## 2017-07-24 ENCOUNTER — TELEPHONE (OUTPATIENT)
Dept: NEUROLOGY | Facility: MEDICAL CENTER | Age: 27
End: 2017-07-24

## 2017-07-24 NOTE — TELEPHONE ENCOUNTER
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.         Tell the patient that I would like to avoid using IV methylprednisolone if possible, if the visual symptoms are getting better, I would rather observe. Ask her if she has been under an undue amount of stress, or she did recently suffer from a viral-like upper respiratory infection or gastroenteritis. These can often make symptoms worse, the symptoms improving as the viral illness resolves.        Hi Dr. Garrett,     I was having issues with my left eye 2 days ago; it felt like I could see slightly less, but it is doing a bit better now.  I was wondering if I should do anything about this, or if we should wait for my appointment on August 14th to discuss it.  I guess my question is essentially whether or not I should get another MRI before our appointment.       Best regards,   Rosita Bowles

## 2017-08-08 RX ORDER — VALACYCLOVIR HYDROCHLORIDE 1 G/1
500 TABLET, FILM COATED ORAL 2 TIMES DAILY PRN
Qty: 60 TAB | Refills: 2 | Status: SHIPPED | OUTPATIENT
Start: 2017-08-08 | End: 2017-11-14

## 2017-08-08 NOTE — TELEPHONE ENCOUNTER
Was the patient seen in the last year in this department? Yes     Does patient have an active prescription for medications requested? No     Received Request Via: Pharmacy     Last visit:7/7/17

## 2017-08-14 ENCOUNTER — OFFICE VISIT (OUTPATIENT)
Dept: NEUROLOGY | Facility: MEDICAL CENTER | Age: 27
End: 2017-08-14
Payer: COMMERCIAL

## 2017-08-14 VITALS
SYSTOLIC BLOOD PRESSURE: 108 MMHG | TEMPERATURE: 97.6 F | RESPIRATION RATE: 16 BRPM | HEART RATE: 98 BPM | HEIGHT: 70 IN | WEIGHT: 152.5 LBS | DIASTOLIC BLOOD PRESSURE: 68 MMHG | BODY MASS INDEX: 21.83 KG/M2 | OXYGEN SATURATION: 99 %

## 2017-08-14 DIAGNOSIS — H46.9 OPTIC NEURITIS, LEFT: ICD-10-CM

## 2017-08-14 DIAGNOSIS — G35 MULTIPLE SCLEROSIS (HCC): Primary | ICD-10-CM

## 2017-08-14 PROCEDURE — 99214 OFFICE O/P EST MOD 30 MIN: CPT | Performed by: PSYCHIATRY & NEUROLOGY

## 2017-08-14 ASSESSMENT — ENCOUNTER SYMPTOMS
SPEECH CHANGE: 0
CONSTIPATION: 0
BLURRED VISION: 1
MEMORY LOSS: 0
DOUBLE VISION: 0
DEPRESSION: 0

## 2017-08-14 ASSESSMENT — PAIN SCALES - GENERAL: PAINLEVEL: NO PAIN

## 2017-08-14 NOTE — MR AVS SNAPSHOT
"        Rosita Bowles   2017 10:40 AM   Office Visit   MRN: [id number]    Department:  [facility]   Dept Phone:  [phone]    Description:  Female : 1990   Provider:  Manjeet Garrett M.D.           Reason for Visit     Follow-Up MS      Allergies as of 2017     Allergen Noted Reactions    Nkda [No Known Drug Allergy] 2012         You were diagnosed with     Multiple sclerosis (CMS-HCC)   [340.ICD-9-CM]         Vital Signs     Blood Pressure Pulse Temperature Respirations Height Weight    108/68 mmHg 98 36.4 °C (97.6 °F) 16 1.765 m (5' 9.5\") 69.174 kg (152 lb 8 oz)    Body Mass Index Oxygen Saturation Smoking Status             22.21 kg/m2 99% Never Smoker          Basic Information     Date Of Birth Sex Race Ethnicity Preferred Language    1990 Female White Non- English      Your appointments     Aug 18, 2017  3:40 PM   Established Patient with Manuela Green M.D.   [facility] (--)    [address]  [facility] [address] [phone]           You will be receiving a confirmation call a few days before your appointment from our automated call confirmation system.            2018 10:40 AM   Follow Up Visit with Manjeet Garrett M.D.   [facility] (--)    [address] [place] [address], [address]  [place] [address] [phone]           You will be receiving a confirmation call a few days before your appointment from our automated call confirmation system.              Problem List              ICD-10-CM Priority Class Noted - Resolved    Depression F32.9   2012 - Present    ADHD (attention deficit hyperactivity disorder) F90.9   2012 - Present    Visit for birth control pills maintenance Z30.41   2012 - Present    Seasonal allergies J30.2   2013 - Present    Multiple sclerosis (CMS-HCC) G35   2016 - Present    Abnormal uterine bleeding N93.9   2017 - Present   " Sinus pressure J34.89   5/3/2017 - Present    HSV-1 (herpes simplex virus 1) infection B00.9   6/23/2017 - Present    Acne vulgaris L70.0   6/23/2017 - Present      Health Maintenance        Date Due Completion Dates    IMM INFLUENZA (1) 9/1/2017 10/16/2016    PAP SMEAR 1/20/2020 1/20/2017, 12/23/2013    IMM DTaP/Tdap/Td Vaccine (7 - Td) 5/31/2023 5/31/2013, 3/16/2004, 6/3/1993, 5/28/1992, 4/2/1992, 2/6/1992            Current Immunizations     Dtap Vaccine 6/3/1993, 5/28/1992, 4/2/1992, 2/6/1992    HIB Vaccine (ACTHIB/HIBERIX) 5/28/1992, 4/2/1992, 2/6/1992    HPV Quadrivalent Vaccine (GARDASIL) 5/28/2010, 1/19/2010, 6/17/2009    Hepatitis A Vaccine, Ped/Adol 8/18/2006, 3/16/2004    Hepatitis B Vaccine Non-Recombivax (Ped/Adol) 12/30/1998, 6/25/1998, 5/22/1998    IPV 6/3/1993, 5/28/1992, 4/2/1992, 2/6/1992    Influenza Vaccine Quad Inj (Pf) 10/16/2016    MMR Vaccine 2/13/1997, 3/3/1993    Meningococcal Conjugate Vaccine MCV4 (Menactra) 6/17/2009    Tdap Vaccine 5/31/2013 10:48 AM, 3/16/2004    Varicella Vaccine Live 9/26/2016, 7/28/2016      Below and/or attached are the medications your provider expects you to take. Review all of your home medications and newly ordered medications with your provider and/or pharmacist. Follow medication instructions as directed by your provider and/or pharmacist. Please keep your medication list with you and share with your provider. Update the information when medications are discontinued, doses are changed, or new medications (including over-the-counter products) are added; and carry medication information at all times in the event of emergency situations     Allergies:  NKDA - (reactions not documented)               Medications  Valid as of: August 14, 2017 - 11:27 AM    Generic Name Brand Name Tablet Size Instructions for use    Adapalene-Benzoyl Peroxide (Gel) Adapalene-Benzoyl Peroxide 0.1-2.5 % 1 Application by Apply externally route every day.        BuPROPion HCl (TABLET  SR 24 HR) WELLBUTRIN  MG Take 1 Tab by mouth every morning.        Clindamycin Phosphate (Gel) CLEOCIN T 1 % Apply to acne BID        Dimethyl Fumarate (CAPSULE DELAYED RELEASE) Dimethyl Fumarate 240 MG Take 240 mg by mouth 2 Times a Day.        Fluticasone Propionate (Suspension) FLONASE 50 MCG/ACT Spray 1 Spray in nose 2 times a day.        Levonorgestrel (IUD) Levonorgestrel 19.5 MG by Intrauterine route.        Loratadine (Tab) CLARITIN 10 MG Take 1 Tab by mouth every day.        Methylphenidate HCl (Tab CR) CONCERTA 54 MG Take 1 Tab by mouth every day.        Norethindrone-Mestranol (Tab) NECON 1/50 (28) 1-50 MG-MCG Take 1 tab PO qday as directed        ValACYclovir HCl (Tab) VALTREX 1 GM Take 0.5 Tabs by mouth 2 times a day as needed (rash. Use for a total of 3 days).        .                 Medicines prescribed today were sent to:     [facility]/PHARMACY #[id number] - [place], [place] - [address] [facility]    [address] [facility] [address]    Phone: [phone] Fax: [phone]    Open 24 Hours?: No    [facility] - [address] [address]    Phone: [phone] Fax: [phone]    Open 24 Hours?: No      Medication refill instructions:       If your prescription bottle indicates you have medication refills left, it is not necessary to call your provider’s office. Please contact your pharmacy and they will refill your medication.    If your prescription bottle indicates you do not have any refills left, you may request refills at any time through one of the following ways: The online RentersQ system (except Urgent Care), by calling your provider’s office, or by asking your pharmacy to contact your provider’s office with a refill request. Medication refills are processed only during regular business hours and may not be available until the next business day. Your provider may request additional information or to have a follow-up visit with you  prior to refilling your medication.   *Please Note: Medication refills are assigned a new Rx number when refilled electronically. Your pharmacy may indicate that no refills were authorized even though a new prescription for the same medication is available at the pharmacy. Please request the medicine by name with the pharmacy before contacting your provider for a refill.           Intelligent Fingerprintinghart Access Code: Activation code not generated  Current Re5ult Status: Active

## 2017-08-15 NOTE — PROGRESS NOTES
Subjective:      Rosita Bowles is a 26 y.o. female who presents for follow-up with a history of MS.    HPI    Since last seen, Rosita states that the visual distortions involving the left eye where she had her bout of optic neuritis seemed to be a little bit worse. They're only seems to be a superior nasal quadrant residual, the temporal hemianoptic field is more noticeably absent. Vision with the right is for the most part spared. She denies headache. She describes bright lights and scintillations at times over the left eye. There is no headache. Symptoms otherwise of the most part stable. She denied Lhermitte phenomena, constrictive sensations across the chest or abdomen, bowel and bladder function remain stable. There is some fatigue only. Cognition is otherwise intact. She denies focal motor or sensory distortions.    MRI scan of the brain done in May, again revealed 2 lesions with enhancement, reduced from 10 lesions documented 3 months before, and more than 18 several months before that. The overall burden of disease was stable. She tolerates her Tecfidera without issue.    Socially, things seem to be going south. 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 She is trying to find and make new friends locally since she is new to the area. 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 Medical, surgical and family histories are reviewed, there are no new drug allergies. She remains on Concerta, birth control, Wellbutrin XL, Valtrex, the rest as per the electronic health record, noncontributory from my standpoint.    Review of Systems   Constitutional: Positive for malaise/fatigue.   Eyes: Positive for blurred vision. Negative for double vision.   Gastrointestinal: Negative for constipation.   Genitourinary: Negative for frequency.   Neurological: Negative for  "speech change.   Psychiatric/Behavioral: Negative for depression and memory loss.   All other systems reviewed and are negative.       Objective:     /68 mmHg  Pulse 98  Temp(Src) 36.4 °C (97.6 °F)  Resp 16  Ht 1.765 m (5' 9.5\")  Wt 69.174 kg (152 lb 8 oz)  BMI 22.21 kg/m2  SpO2 99%     Physical Exam    She appears in some distress talking about the difficulty she is having in her personal life, but otherwise she is quite cooperative and is in very good spirits. Vital signs are stable. There is no malar rash. The neck is supple, range of motion is full, Lhermitte phenomena is absent. Cardiac evaluation is unremarkable. She is fully oriented, there is no aphasia or inattention. PERRLA/EOMI, visual fields assessment to finger counting is intact on the right, on the left only the superior nasal quadrant seems to be intact to finger counting. There is no facial asymmetry or sensory loss, the tongue and uvular midline, a jaw jerk is absent. Shoulder shrug is symmetric. Motor, reflex, coordination and sensory evaluations are otherwise without clear deficit.     Assessment/Plan:     1. Multiple sclerosis (CMS-HCC)  I am not sure whether this is a disease flare/relapse, or simply some progression involving the optic nerve on the left. MRI scan of the orbits with and without contrast will be obtained now, follow-up scan of the brain in another several months otherwise is set up. I will also be forwarding her to Dr. Powell, our local neuro-ophthalmologist. I will try to talk with him ahead of time. In the meantime Tecfidera will be continued, CBC and liver profiles checked.    - REFERRAL TO NEUROOPTHAMOLOGY  - MR-ORBITS,FACE,NECK-WITH&W/O & SEQUENCES; Future  - MR-BRAIN-WITH & W/O; Future  - RENAL FUNCTION PANEL; Future    Time: Evaluation of 25 minutes for exam come review, discussion, and education  Discussion: As mentioned in the assessment, over 70% of the time spent face-to-face counseling and coordinating " care

## 2017-08-23 ENCOUNTER — OFFICE VISIT (OUTPATIENT)
Dept: MEDICAL GROUP | Facility: LAB | Age: 27
End: 2017-08-23
Payer: COMMERCIAL

## 2017-08-23 VITALS
WEIGHT: 147 LBS | OXYGEN SATURATION: 99 % | BODY MASS INDEX: 21.77 KG/M2 | TEMPERATURE: 98.6 F | SYSTOLIC BLOOD PRESSURE: 104 MMHG | RESPIRATION RATE: 16 BRPM | DIASTOLIC BLOOD PRESSURE: 72 MMHG | HEART RATE: 108 BPM | HEIGHT: 69 IN

## 2017-08-23 DIAGNOSIS — Z30.431 IUD CHECK UP: ICD-10-CM

## 2017-08-23 DIAGNOSIS — B00.9 HSV-1 (HERPES SIMPLEX VIRUS 1) INFECTION: ICD-10-CM

## 2017-08-23 DIAGNOSIS — G35 MULTIPLE SCLEROSIS (HCC): ICD-10-CM

## 2017-08-23 PROCEDURE — 99214 OFFICE O/P EST MOD 30 MIN: CPT | Performed by: FAMILY MEDICINE

## 2017-08-23 NOTE — MR AVS SNAPSHOT
"        Rosita Bowles   2017 2:20 PM   Office Visit   MRN: [id number]    Department:  [facility]   Dept Phone:  [phone]    Description:  Female : 1990   Provider:  Manuela Green M.D.           Reason for Visit     Follow-Up IUD string check      Allergies as of 2017     Allergen Noted Reactions    Nkda [No Known Drug Allergy] 2012         You were diagnosed with     IUD check up   [223664]       HSV-1 (herpes simplex virus 1) infection   [130294]       Multiple sclerosis (CMS-HCC)   [340.ICD-9-CM]         Vital Signs     Blood Pressure Pulse Temperature Respirations Height Weight    104/72 mmHg 108 37 °C (98.6 °F) 16 1.753 m (5' 9.02\") 66.679 kg (147 lb)    Body Mass Index Oxygen Saturation Smoking Status             21.70 kg/m2 99% Never Smoker          Basic Information     Date Of Birth Sex Race Ethnicity Preferred Language    1990 Female White Non- English      Your appointments     Aug 26, 2017  1:30 PM   MR ORBITS WITH/WITHOUT (60) with [facility] MRI 1   [facility] [place]    [address]  [place] [id number] [phone]            Aug 26, 2017  2:30 PM   MR HEAD 30 with [facility] MRI 1   [facility] [place]    [address]  [place] [address]   [phone]            Oct 04, 2017  2:20 PM   Established Patient with Manuela Green M.D.   [facility] (--)    [address] [facility] [address]  [place] [address] [phone]           You will be receiving a confirmation call a few days before your appointment from our automated call confirmation system.            2018 10:40 AM   Follow Up Visit with Manjeet Garrett M.D.   [facility] (--)    [address], [address]  [place] [address] [phone]           You will be receiving a confirmation call a few days before your appointment from our automated " call confirmation system.              Problem List              ICD-10-CM Priority Class Noted - Resolved    Depression F32.9   8/7/2012 - Present    ADHD (attention deficit hyperactivity disorder) F90.9   8/7/2012 - Present    Seasonal allergies J30.2   5/31/2013 - Present    Multiple sclerosis (CMS-HCC) G35   11/2/2016 - Present    Abnormal uterine bleeding N93.9   4/27/2017 - Present    Sinus pressure J34.89   5/3/2017 - Present    HSV-1 (herpes simplex virus 1) infection B00.9   6/23/2017 - Present    Acne vulgaris L70.0   6/23/2017 - Present      Health Maintenance        Date Due Completion Dates    IMM INFLUENZA (1) 9/1/2017 10/16/2016    PAP SMEAR 1/20/2020 1/20/2017, 12/23/2013    IMM DTaP/Tdap/Td Vaccine (7 - Td) 5/31/2023 5/31/2013, 3/16/2004, 6/3/1993, 5/28/1992, 4/2/1992, 2/6/1992            Current Immunizations     Dtap Vaccine 6/3/1993, 5/28/1992, 4/2/1992, 2/6/1992    HIB Vaccine (ACTHIB/HIBERIX) 5/28/1992, 4/2/1992, 2/6/1992    HPV Quadrivalent Vaccine (GARDASIL) 5/28/2010, 1/19/2010, 6/17/2009    Hepatitis A Vaccine, Ped/Adol 8/18/2006, 3/16/2004    Hepatitis B Vaccine Non-Recombivax (Ped/Adol) 12/30/1998, 6/25/1998, 5/22/1998    IPV 6/3/1993, 5/28/1992, 4/2/1992, 2/6/1992    Influenza Vaccine Quad Inj (Pf) 10/16/2016    MMR Vaccine 2/13/1997, 3/3/1993    Meningococcal Conjugate Vaccine MCV4 (Menactra) 6/17/2009    Tdap Vaccine 5/31/2013 10:48 AM, 3/16/2004    Varicella Vaccine Live 9/26/2016, 7/28/2016      Below and/or attached are the medications your provider expects you to take. Review all of your home medications and newly ordered medications with your provider and/or pharmacist. Follow medication instructions as directed by your provider and/or pharmacist. Please keep your medication list with you and share with your provider. Update the information when medications are discontinued, doses are changed, or new medications (including over-the-counter products) are added; and carry medication  information at all times in the event of emergency situations     Allergies:  NKDA - (reactions not documented)               Medications  Valid as of: August 23, 2017 -  2:43 PM    Generic Name Brand Name Tablet Size Instructions for use    Adapalene-Benzoyl Peroxide (Gel) Adapalene-Benzoyl Peroxide 0.1-2.5 % 1 Application by Apply externally route every day.        BuPROPion HCl (TABLET SR 24 HR) WELLBUTRIN  MG Take 1 Tab by mouth every morning.        Clindamycin Phosphate (Gel) CLEOCIN T 1 % Apply to acne BID        Dimethyl Fumarate (CAPSULE DELAYED RELEASE) Dimethyl Fumarate 240 MG Take 240 mg by mouth 2 Times a Day.        Fluticasone Propionate (Suspension) FLONASE 50 MCG/ACT Spray 1 Spray in nose 2 times a day.        Levonorgestrel (IUD) Levonorgestrel 19.5 MG by Intrauterine route.        Loratadine (Tab) CLARITIN 10 MG Take 1 Tab by mouth every day.        Methylphenidate HCl (Tab CR) CONCERTA 54 MG Take 1 Tab by mouth every day.        Norethindrone-Mestranol (Tab) NECON 1/50 (28) 1-50 MG-MCG Take 1 tab PO qday as directed        ValACYclovir HCl (Tab) VALTREX 1 GM Take 0.5 Tabs by mouth 2 times a day as needed (rash. Use for a total of 3 days).        .                 Medicines prescribed today were sent to:     [facility]/PHARMACY #[id number] - [place] - [address] [facility]    [address] [facility] [place] [address]    Phone: [phone] Fax: [phone]    Open 24 Hours?: No    [facility] - [place], [place] - [address] [place] [address]    [address] [facility] [address]    Phone: [phone] Fax: [phone]    Open 24 Hours?: No      Medication refill instructions:       If your prescription bottle indicates you have medication refills left, it is not necessary to call your provider’s office. Please contact your pharmacy and they will refill your medication.    If your prescription bottle indicates you do not have any refills left, you may request refills at any time through one of the  following ways: The online Novalact system (except Urgent Care), by calling your provider’s office, or by asking your pharmacy to contact your provider’s office with a refill request. Medication refills are processed only during regular business hours and may not be available until the next business day. Your provider may request additional information or to have a follow-up visit with you prior to refilling your medication.   *Please Note: Medication refills are assigned a new Rx number when refilled electronically. Your pharmacy may indicate that no refills were authorized even though a new prescription for the same medication is available at the pharmacy. Please request the medicine by name with the pharmacy before contacting your provider for a refill.           Novalact Access Code: Activation code not generated  Current Novalact Status: Active

## 2017-08-25 NOTE — PROGRESS NOTES
Subjective:   Rosita Bowles is a 26 y.o. female here today for   Chief Complaint   Patient presents with   • Follow-Up     IUD string check       1. IUD check up  IUD was placed 6 weeks ago. Patient reports very light vaginal bleeding daily since its insertion. She did have 2 weeks of uterine cramping. No syncope or presyncope. Pain was controlled with ibuprofen. She has had intercourse. She has felt the strings manually. They do not seem to be bothering her or her partner.    2. HSV-1 (herpes simplex virus 1) infection  Chronic. Patient did have initial outbreak on her buttock. She does have valacyclovir on hand in case of any further outbreaks. She has not had any lesions since her initial outbreak.    3. Multiple sclerosis (CMS-HCC)  This is chronic. Following closely with neurology. She has had some vision loss which was new for her. She has an MRI scheduled of her brain and orbit next week.      Current medicines (including changes today)  Current Outpatient Prescriptions   Medication Sig Dispense Refill   • Levonorgestrel (KYLEENA) 19.5 MG IUD by Intrauterine route.     • [START ON 9/6/2017] methylphenidate (CONCERTA) 54 MG CR tablet Take 1 Tab by mouth every day. 30 Tab 0   • Dimethyl Fumarate (TECFIDERA) 240 MG CAPSULE DELAYED RELEASE Take 240 mg by mouth 2 Times a Day. 60 Cap 11   • buPROPion (WELLBUTRIN XL) 300 MG XL tablet Take 1 Tab by mouth every morning. 90 Tab 3   • valacyclovir (VALTREX) 1 GM Tab Take 0.5 Tabs by mouth 2 times a day as needed (rash. Use for a total of 3 days). 60 Tab 2   • Adapalene-Benzoyl Peroxide 0.1-2.5 % Gel 1 Application by Apply externally route every day. 45 g 5   • fluticasone (FLONASE) 50 MCG/ACT nasal spray Spray 1 Spray in nose 2 times a day. 16 g 2   • loratadine (CLARITIN) 10 MG Tab Take 1 Tab by mouth every day. 30 Tab 2   • clindamycin (CLEOCIN T) 1 % Gel Apply to acne BID 2 Tube 5   • NECON 1/50, 28, 1-50 MG-MCG Tab Take 1 tab PO qday as directed 84 Tab 3  "    No current facility-administered medications for this visit.     She  has a past medical history of Depression (8/7/2012); Acne (8/7/2012); ADHD (attention deficit hyperactivity disorder) (8/7/2012); MS (multiple sclerosis) (CMS-HCC); Acne vulgaris (6/23/2017); and Muscle disorder.    ROS   No fevers  No bowel changes  No LE edema       Objective:     Blood pressure 104/72, pulse 108, temperature 37 °C (98.6 °F), resp. rate 16, height 1.753 m (5' 9.02\"), weight 66.679 kg (147 lb), SpO2 99 %. Body mass index is 21.7 kg/(m^2).   Physical Exam:  General: No acute distress, WN/WD  HEENT: NC/AT, lids normal without lesions, good dentition  Neck: Trachea midline  Cardiovascular: Regular Rate  Pulmonary: No respiratory distress  Skin: No rashes noted  Neurologic: CN II-XII grossly intact, normal gait  Psych: Alert and oriented x 3, normal insight and judgement  : Speculum exam exam shows normal vaginal mucosa with dark red discharge from the cervix. IUD strings are visualized at the cervix and are approximately 1 inch in length. No adjustments to the strings were made today. Bimanual exam without any tenderness or enlargement of the uterus    Assessment and Plan:   The following treatment plan was discussed    1. IUD check up  Kyleena in place, physical exam without concerns  Discussed that her vaginal bleeding should start to minimize over the next 3 months    2. HSV-1 (herpes simplex virus 1) infection  Chronic, stable, has Valtrex on hand in case of outbreak    3. Multiple sclerosis (CMS-[facility])  Chronic, new symptoms with vision loss  MRI scheduled by neurology  Continue her chronic medication, Tecfidera    Followup: Return in about 6 weeks (around 10/4/2017) for ADHD.       This note was created using voice recognition software. I have made every reasonable attempt to correct errors, however, I do anticipate some grammatical errors.     "

## 2017-08-26 ENCOUNTER — HOSPITAL ENCOUNTER (OUTPATIENT)
Dept: RADIOLOGY | Facility: MEDICAL CENTER | Age: 27
End: 2017-08-26
Attending: PSYCHIATRY & NEUROLOGY
Payer: COMMERCIAL

## 2017-08-26 DIAGNOSIS — G35 MULTIPLE SCLEROSIS (HCC): ICD-10-CM

## 2017-08-26 DIAGNOSIS — H46.9 OPTIC NEURITIS, LEFT: ICD-10-CM

## 2017-08-26 PROCEDURE — 70543 MRI ORBT/FAC/NCK W/O &W/DYE: CPT

## 2017-08-26 PROCEDURE — 70553 MRI BRAIN STEM W/O & W/DYE: CPT

## 2017-08-26 PROCEDURE — 700117 HCHG RX CONTRAST REV CODE 255: Performed by: PSYCHIATRY & NEUROLOGY

## 2017-08-26 PROCEDURE — A9579 GAD-BASE MR CONTRAST NOS,1ML: HCPCS | Performed by: PSYCHIATRY & NEUROLOGY

## 2017-08-26 RX ADMIN — GADODIAMIDE 15 ML: 287 INJECTION INTRAVENOUS at 14:45

## 2017-09-01 ENCOUNTER — TELEPHONE (OUTPATIENT)
Dept: NEUROLOGY | Facility: MEDICAL CENTER | Age: 27
End: 2017-09-01

## 2017-09-05 ENCOUNTER — TELEPHONE (OUTPATIENT)
Dept: NEUROLOGY | Facility: MEDICAL CENTER | Age: 27
End: 2017-09-05

## 2017-09-05 NOTE — TELEPHONE ENCOUNTER
----- Message from Manas Minor, Med Ass't sent at 9/1/2017  4:43 PM PDT -----  Regarding: FW: Non-Urgent Medical Question  Contact: [phone]      ----- Message -----  From: Rosita Bowles  Sent: 9/1/2017   4:38 PM  To: [facility]  Subject: Non-Urgent Medical Question                      Hi Dr. Garrett,    I had one other question - when exactly should I get my Flu vaccine?  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    Best regards,  Rosita Bowles

## 2017-09-05 NOTE — TELEPHONE ENCOUNTER
I checked the EHR, back in March of this year, she already got the blood work drawn. She can stop the Tecfidera at any time.

## 2017-09-05 NOTE — TELEPHONE ENCOUNTER
"----- Message from Manas Minor, Med Ass't sent at 9/1/2017  4:41 PM PDT -----  Regarding: FW: Prescription Question  Contact: [phone]      ----- Message -----  From: Rosita Bowles  Sent: 9/1/2017   4:10 PM  To: Neurology [facility]  Subject: Prescription Question                            Hi Dr. Garrett,    I'm looking forward to starting the new drug, getting infusions done, etc, to get this MS to stop being such a pain in the butt!  Should I go in tomorrow to get the blood tests done?  I spoke with Manas and she got me scheduled for the IV steroids (5 days).  I'm just wondering when I should stop taking the Tecfidera, and for that matter, which step I should do first in terms of the IV steroids, etc.      I hope your procedure went well and you are \"up and running\" faster than ever!    Have a fantastic weekend!    -Rosita Bowles  "

## 2017-09-06 ENCOUNTER — OUTPATIENT INFUSION SERVICES (OUTPATIENT)
Dept: ONCOLOGY | Facility: MEDICAL CENTER | Age: 27
End: 2017-09-06
Attending: PSYCHIATRY & NEUROLOGY
Payer: COMMERCIAL

## 2017-09-06 ENCOUNTER — TELEPHONE (OUTPATIENT)
Dept: NEUROLOGY | Facility: MEDICAL CENTER | Age: 27
End: 2017-09-06

## 2017-09-06 VITALS
WEIGHT: 148.15 LBS | DIASTOLIC BLOOD PRESSURE: 71 MMHG | HEIGHT: 69 IN | SYSTOLIC BLOOD PRESSURE: 125 MMHG | OXYGEN SATURATION: 96 % | RESPIRATION RATE: 20 BRPM | TEMPERATURE: 97.3 F | BODY MASS INDEX: 21.94 KG/M2

## 2017-09-06 DIAGNOSIS — G35 MULTIPLE SCLEROSIS (HCC): ICD-10-CM

## 2017-09-06 PROCEDURE — 96365 THER/PROPH/DIAG IV INF INIT: CPT

## 2017-09-06 PROCEDURE — 700105 HCHG RX REV CODE 258: Performed by: PSYCHIATRY & NEUROLOGY

## 2017-09-06 PROCEDURE — 700111 HCHG RX REV CODE 636 W/ 250 OVERRIDE (IP): Performed by: PSYCHIATRY & NEUROLOGY

## 2017-09-06 RX ORDER — ALPRAZOLAM 0.25 MG/1
TABLET ORAL
Qty: 10 TAB | Refills: 0 | Status: ON HOLD | OUTPATIENT
Start: 2017-09-06 | End: 2017-10-10

## 2017-09-06 RX ADMIN — SODIUM CHLORIDE 1000 MG: 9 INJECTION, SOLUTION INTRAVENOUS at 18:01

## 2017-09-06 ASSESSMENT — PAIN SCALES - GENERAL: PAINLEVEL: NO PAIN

## 2017-09-07 ENCOUNTER — OUTPATIENT INFUSION SERVICES (OUTPATIENT)
Dept: ONCOLOGY | Facility: MEDICAL CENTER | Age: 27
End: 2017-09-07
Attending: PSYCHIATRY & NEUROLOGY
Payer: COMMERCIAL

## 2017-09-07 VITALS
TEMPERATURE: 98.2 F | DIASTOLIC BLOOD PRESSURE: 62 MMHG | RESPIRATION RATE: 18 BRPM | OXYGEN SATURATION: 99 % | WEIGHT: 149.91 LBS | HEART RATE: 103 BPM | HEIGHT: 69 IN | BODY MASS INDEX: 22.2 KG/M2 | SYSTOLIC BLOOD PRESSURE: 116 MMHG

## 2017-09-07 PROCEDURE — 700111 HCHG RX REV CODE 636 W/ 250 OVERRIDE (IP): Performed by: PSYCHIATRY & NEUROLOGY

## 2017-09-07 PROCEDURE — 96365 THER/PROPH/DIAG IV INF INIT: CPT

## 2017-09-07 PROCEDURE — 700105 HCHG RX REV CODE 258: Performed by: PSYCHIATRY & NEUROLOGY

## 2017-09-07 RX ORDER — DIAZEPAM 2 MG/1
0.25 TABLET ORAL PRN
COMMUNITY
End: 2017-10-04

## 2017-09-07 RX ADMIN — SODIUM CHLORIDE 1000 MG: 9 INJECTION, SOLUTION INTRAVENOUS at 16:32

## 2017-09-07 ASSESSMENT — PAIN SCALES - GENERAL: PAINLEVEL: NO PAIN

## 2017-09-07 NOTE — PROGRESS NOTES
Pt arrived to [facility] ambulatory, escorted by friend and father, here for D1/5 Solumedrol for MS. Pt with severe phobia of needles. Emotional support provided. IV access established with no difficulty, LAC. Pt crying despite assurance that IV was already in place. Solumedrol infused over one hour as prescribed. Pt norma well. Line flushed clear. IV flushed and removed per pt request. Pt to return tomorrow for D2. Discharged home under care of family in no apparent distress.

## 2017-09-07 NOTE — TELEPHONE ENCOUNTER
----- Message from Manas Minor, Med Ass't sent at 9/6/2017  4:08 PM PDT -----  Regarding: anti-anxiety  Patient has HUGE phobia of needles and has a very difficult time when getting IV.. anything or labs.... Can you prescribe some kind of sedative for her? She is scheduled today for solumedrol and said she would be fine for today, but for the future?     Thank you :)

## 2017-09-08 ENCOUNTER — OUTPATIENT INFUSION SERVICES (OUTPATIENT)
Dept: ONCOLOGY | Facility: MEDICAL CENTER | Age: 27
End: 2017-09-08
Attending: PSYCHIATRY & NEUROLOGY
Payer: COMMERCIAL

## 2017-09-08 VITALS
HEIGHT: 69 IN | TEMPERATURE: 98 F | HEART RATE: 98 BPM | SYSTOLIC BLOOD PRESSURE: 120 MMHG | OXYGEN SATURATION: 100 % | BODY MASS INDEX: 21.84 KG/M2 | WEIGHT: 147.49 LBS | DIASTOLIC BLOOD PRESSURE: 74 MMHG | RESPIRATION RATE: 18 BRPM

## 2017-09-08 PROCEDURE — 700105 HCHG RX REV CODE 258

## 2017-09-08 PROCEDURE — 700111 HCHG RX REV CODE 636 W/ 250 OVERRIDE (IP)

## 2017-09-08 PROCEDURE — 96366 THER/PROPH/DIAG IV INF ADDON: CPT

## 2017-09-08 PROCEDURE — 96365 THER/PROPH/DIAG IV INF INIT: CPT

## 2017-09-08 RX ADMIN — SODIUM CHLORIDE 1000 MG: 9 INJECTION, SOLUTION INTRAVENOUS at 16:14

## 2017-09-08 ASSESSMENT — PAIN SCALES - GENERAL: PAINLEVEL: NO PAIN

## 2017-09-08 NOTE — PROGRESS NOTES
Patient seen today for day 2 of IV Solumedrol, treatment of MS.  Plan of care discussed.  In good spirits. Known phobia of needles and PIV starts.  Prescribed Xanax taken.  PIV established quickly and easily with good blood return and patient settles after PIV established.  Solumedrol infused. Tolerated well and without complaint.  Patient discharged after completion of treatment in good condition, ambulatory, with her mother. Returns tomorrow, appointment confirmed.

## 2017-09-09 ENCOUNTER — OUTPATIENT INFUSION SERVICES (OUTPATIENT)
Dept: ONCOLOGY | Facility: MEDICAL CENTER | Age: 27
End: 2017-09-09
Attending: PSYCHIATRY & NEUROLOGY
Payer: COMMERCIAL

## 2017-09-09 VITALS
DIASTOLIC BLOOD PRESSURE: 76 MMHG | RESPIRATION RATE: 18 BRPM | TEMPERATURE: 97.8 F | HEART RATE: 92 BPM | BODY MASS INDEX: 21.81 KG/M2 | OXYGEN SATURATION: 99 % | HEIGHT: 69 IN | SYSTOLIC BLOOD PRESSURE: 114 MMHG | WEIGHT: 147.27 LBS

## 2017-09-09 PROCEDURE — 700105 HCHG RX REV CODE 258: Performed by: PSYCHIATRY & NEUROLOGY

## 2017-09-09 PROCEDURE — 700111 HCHG RX REV CODE 636 W/ 250 OVERRIDE (IP): Performed by: PSYCHIATRY & NEUROLOGY

## 2017-09-09 PROCEDURE — 96365 THER/PROPH/DIAG IV INF INIT: CPT

## 2017-09-09 RX ADMIN — SODIUM CHLORIDE 1000 MG: 9 INJECTION, SOLUTION INTRAVENOUS at 15:00

## 2017-09-09 ASSESSMENT — PAIN SCALES - GENERAL: PAINLEVEL: NO PAIN

## 2017-09-09 NOTE — PROGRESS NOTES
Pt ambulated into department with friend for Day 3/5 of Solumedrol for a MS flare. Pt reported that she has started to get headaches since starting her Solu-medrol infusions. RN educated Pt about the importance of staying hydrated and how this will help with her headaches. PIV started, had + blood return, flushed briskly. Solumedrol given at a slightly slower rate. Pt tolerated infusion well. IV discontinued after, bleeding controlled with gauze and coban. Future appointments confirmed with Pt prior to leaving, left department with friend appearing in good spirits and NAD.

## 2017-09-10 ENCOUNTER — OUTPATIENT INFUSION SERVICES (OUTPATIENT)
Dept: ONCOLOGY | Facility: MEDICAL CENTER | Age: 27
End: 2017-09-10
Attending: PSYCHIATRY & NEUROLOGY
Payer: COMMERCIAL

## 2017-09-10 VITALS
DIASTOLIC BLOOD PRESSURE: 74 MMHG | TEMPERATURE: 98.9 F | OXYGEN SATURATION: 98 % | HEIGHT: 69 IN | SYSTOLIC BLOOD PRESSURE: 115 MMHG | RESPIRATION RATE: 18 BRPM | WEIGHT: 146.61 LBS | HEART RATE: 82 BPM | BODY MASS INDEX: 21.71 KG/M2

## 2017-09-10 PROCEDURE — 700111 HCHG RX REV CODE 636 W/ 250 OVERRIDE (IP): Performed by: PSYCHIATRY & NEUROLOGY

## 2017-09-10 PROCEDURE — 700105 HCHG RX REV CODE 258: Performed by: PSYCHIATRY & NEUROLOGY

## 2017-09-10 PROCEDURE — 96365 THER/PROPH/DIAG IV INF INIT: CPT

## 2017-09-10 RX ADMIN — SODIUM CHLORIDE 1000 MG: 9 INJECTION, SOLUTION INTRAVENOUS at 14:35

## 2017-09-10 ASSESSMENT — PAIN SCALES - GENERAL: PAINLEVEL: NO PAIN

## 2017-09-10 NOTE — PROGRESS NOTES
"Returns for Solumedrol to tx MS exacerbation.  Initially stated vision changes brought her in, but then states that was on initial diagnosis.  \"wobbly legs\" are reported for tx.  Walking better today.  Tearful with IV start and shaking/bouncing type activity with fear of IVs.  Explained and showed example of port a cath.  Pt not inclined to consider at this time.  Tx infused without rx.  DC to care of mother.  "

## 2017-09-10 NOTE — PROGRESS NOTES
"Pt is here for her day #3 (of 3) Solumedrol to treat MS. Very anxious/fearful about PIV starts. Emotional support provided. Mother present. Pt took a Xanax PO prior arrival, but states \"I will cry no matter what.\" PIV started  - pt does cry and is tearful about 3-4 minutes after procedure - OK through the rest of the infusion. Treatment completed without any further incident.   "

## 2017-09-13 ENCOUNTER — TELEPHONE (OUTPATIENT)
Dept: NEUROLOGY | Facility: MEDICAL CENTER | Age: 27
End: 2017-09-13

## 2017-09-13 ENCOUNTER — PATIENT MESSAGE (OUTPATIENT)
Dept: NEUROLOGY | Facility: MEDICAL CENTER | Age: 27
End: 2017-09-13

## 2017-09-13 NOTE — LETTER
2017        Rosita Bowles  : December 3, 1990    To Whom It May Concern:    Ms. Bowles suffers from multiple sclerosis. This is a lifelong illness. If there are any questions in regards to her diagnosis, my office can be contacted. This letter being written at the request of the patient herself.    Sincerely:          MEGAN Montoya.

## 2017-09-14 NOTE — TELEPHONE ENCOUNTER
Please inform the patient a letter is dictated simply indicating her diagnosis. She can pick this up at the office at any time.

## 2017-09-14 NOTE — TELEPHONE ENCOUNTER
Tell the patient we can get her a handicap placard. She will need to  the application here in the office to bring it into the DMV. Fill out the top portions of the forearm, I signed on the bottom and indicate the reasons for this.

## 2017-09-14 NOTE — TELEPHONE ENCOUNTER
----- Message from Manas Minor, Med Ass't sent at 9/13/2017  6:20 PM PDT -----  Regarding: FW: Non-Urgent Medical Question  Contact: [phone]      ----- Message -----  From: Kennedi Rincon, Med Ass't  Sent: 9/13/2017   1:10 PM  To: Manas Minor, Med Ass't  Subject: FW: Non-Urgent Medical Question                      ----- Message -----  From: Rosita Bowles  Sent: 9/13/2017  12:52 PM  To: Neurology Mas  Subject: Non-Urgent Medical Question                      Hi Dr. Garrett,    One more quick request - I was wondering how I would be able to obtain a doctor's note verifying my diagnosis of multiple sclerosis - I am applying for something through [employer] and they require the note.    Best regards,  Rosita Bowles

## 2017-09-14 NOTE — TELEPHONE ENCOUNTER
----- Message from Manas CAROLYN Minor, Med Ass't sent at 9/13/2017  9:57 AM PDT -----  Regarding: FW: Non-Urgent Medical Question  Contact: [phone]      ----- Message -----  From: Kennedi Rincon, Med Ass't  Sent: 9/13/2017   7:47 AM  To: Manas Minor, Med Ass't  Subject: FW: Non-Urgent Medical Question                      ----- Message -----  From: Rosita Bowles  Sent: 9/12/2017   6:52 PM  To: Neurology Mas  Subject: Non-Urgent Medical Question                      Hi Dr. Garrett,    I have been having a really hard time moving recently and I was wondering if a handicap placard would be possible.  Moving from the parking lot to classes takes a lot longer now, and it is frustrating and a bit embarrassing to be the 26 year old struggling with stairs and steps everywhere.  I know I would have to get a form from the DMV, but I was just wondering if it would be possible.  Also, I was wondering if I should get another MRI since the steroids are now done.  Unfortunately I have a cold, so hopefully no lesions from this... This round of steroids has been destroying me, but we must trek on!    Hope you've had a great week!  Best regards,  Rosita Bowles

## 2017-09-22 ENCOUNTER — TELEPHONE (OUTPATIENT)
Dept: NEUROLOGY | Facility: MEDICAL CENTER | Age: 27
End: 2017-09-22

## 2017-09-22 NOTE — TELEPHONE ENCOUNTER
Let the patient know that in all likelihood the infusion she received infiltrated into the soft tissues of her arm, and though the steroids themselves do not cause pain, the formulation of the drug itself and the other compounds it is mixed with can do so; these are drugs that should be introduced directly into the bloodstream, not the soft tissues themselves. Thus, the residual pain and discomfort. Usually the body gets rid of the offending agent itself rather quickly, the problem is the inflammatory response in the tissues can last longer. Sometimes heat and hot soaks can help rid the swelling more quickly. As for follow-up MRI scan, this needs to wait.

## 2017-09-22 NOTE — TELEPHONE ENCOUNTER
----- Message from Manas CAROLYN Minor, Med Ass't sent at 9/22/2017  8:37 AM PDT -----  Regarding: FW: Procedure Question  Contact: [phone]      ----- Message -----  From: Kennedi Rincon, Med Ass't  Sent: 9/22/2017   7:25 AM  To: Manas Minor, Med Ass't  Subject: FW: Procedure Question                               ----- Message -----  From: Rosita Bowles  Sent: 9/21/2017   5:46 PM  To: Neurology Mas  Subject: Procedure Question                               Hi Dr. Garrett,   My left arm is still really sore after the last infusion on the 6th, and it is still a bit swollen. I think something went wrong, honestly... it shouldn't hurt still, and it never hurts for this long after I've had infusions. Just thought I'd pass that on to you, I'm not sure if there's a bunch of steroids in my tissue or something.   Also, should I get another MRI, or should it wait a while?    Best regards,  Rosita Bowles

## 2017-09-25 ENCOUNTER — PATIENT MESSAGE (OUTPATIENT)
Dept: NEUROLOGY | Facility: MEDICAL CENTER | Age: 27
End: 2017-09-25

## 2017-09-26 NOTE — TELEPHONE ENCOUNTER
----- Message from Manas Minor, Med Ass't sent at 9/25/2017  4:22 PM PDT -----  Regarding: FW: Prescription Question  Contact: [phone]      ----- Message -----  From: Rosita Bowles  Sent: 9/25/2017   4:15 PM  To: Neurology Mas  Subject: Prescription Question                            Hi Dr. Garrett,    I spoke with [place] [employer] today and got everything set up. I did have one question - the rep said that the herpes virus can cause meningitis or another brain disease. 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 Also, the rep said that there were 711 cases of PML from [facility]  - is that 711 since January 1, 2017, or 711 total? They couldn't/wouldn't tell me.    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

## 2017-09-26 NOTE — TELEPHONE ENCOUNTER
Tell Rosita that it was not herpes simplex but rather Varicella zoster, better known as chickenpox, back causing encephalopathy or meningitis. Chickenpox is a type of herpes virus, but it is not herpes simplex. Having cold sores do not count as contraindication to using Tysabri. The total number of PML cases is over 700 or thereabouts, not since January 1 of this year, rather since the drug was first FDA approved years ago, most of these occurring earlier, right after the drug was FDA approved, before we had a picture of how to track risk in given patients.

## 2017-10-04 ENCOUNTER — OFFICE VISIT (OUTPATIENT)
Dept: MEDICAL GROUP | Facility: LAB | Age: 27
End: 2017-10-04
Payer: COMMERCIAL

## 2017-10-04 VITALS
HEART RATE: 100 BPM | RESPIRATION RATE: 16 BRPM | OXYGEN SATURATION: 96 % | TEMPERATURE: 98.3 F | WEIGHT: 149 LBS | HEIGHT: 69 IN | SYSTOLIC BLOOD PRESSURE: 108 MMHG | DIASTOLIC BLOOD PRESSURE: 68 MMHG | BODY MASS INDEX: 22.07 KG/M2

## 2017-10-04 DIAGNOSIS — G35 MULTIPLE SCLEROSIS (HCC): ICD-10-CM

## 2017-10-04 DIAGNOSIS — F90.9 ATTENTION DEFICIT HYPERACTIVITY DISORDER (ADHD), UNSPECIFIED ADHD TYPE: ICD-10-CM

## 2017-10-04 DIAGNOSIS — F33.42 RECURRENT MAJOR DEPRESSIVE DISORDER, IN FULL REMISSION (HCC): ICD-10-CM

## 2017-10-04 PROBLEM — N93.9 ABNORMAL UTERINE BLEEDING: Status: RESOLVED | Noted: 2017-04-27 | Resolved: 2017-10-04

## 2017-10-04 PROCEDURE — 99214 OFFICE O/P EST MOD 30 MIN: CPT | Performed by: FAMILY MEDICINE

## 2017-10-04 RX ORDER — LANOLIN ALCOHOL/MO/W.PET/CERES
1000 CREAM (GRAM) TOPICAL DAILY
COMMUNITY

## 2017-10-04 RX ORDER — METHYLPHENIDATE HYDROCHLORIDE 54 MG/1
54 TABLET ORAL DAILY
Qty: 30 TAB | Refills: 0 | Status: SHIPPED | OUTPATIENT
Start: 2017-12-07 | End: 2017-12-28 | Stop reason: SDUPTHER

## 2017-10-04 RX ORDER — BUPROPION HYDROCHLORIDE 300 MG/1
300 TABLET ORAL EVERY MORNING
Qty: 90 TAB | Refills: 3 | Status: SHIPPED | OUTPATIENT
Start: 2017-10-04 | End: 2018-10-28 | Stop reason: SDUPTHER

## 2017-10-04 RX ORDER — METHYLPHENIDATE HYDROCHLORIDE 54 MG/1
54 TABLET ORAL DAILY
Qty: 30 TAB | Refills: 0 | Status: SHIPPED | OUTPATIENT
Start: 2017-11-09 | End: 2017-10-04 | Stop reason: SDUPTHER

## 2017-10-04 RX ORDER — METHYLPHENIDATE HYDROCHLORIDE 54 MG/1
54 TABLET ORAL DAILY
Qty: 30 TAB | Refills: 0 | Status: SHIPPED | OUTPATIENT
Start: 2017-10-11 | End: 2017-10-04 | Stop reason: SDUPTHER

## 2017-10-04 ASSESSMENT — PATIENT HEALTH QUESTIONNAIRE - PHQ9: CLINICAL INTERPRETATION OF PHQ2 SCORE: 0

## 2017-10-04 NOTE — PROGRESS NOTES
Subjective:   Rosita Bowles is a 26 y.o. female here today for   Chief Complaint   Patient presents with   • ADHD       1. Attention deficit hyperactivity disorder (ADHD), unspecified ADHD type  Chronic, stable on Concerta 54 mg daily. She does take this every day. She never misses a dose and she has never had any abnormal refills or requests. She states that this helps her significantly in school. She got mostly A's this semester. She like to stay on this medication. She denies any weight loss, insomnia, anorexia.    2. Recurrent major depressive disorder, in full remission (CMS-HCC)  Chronic  Stable on Wellbutrin XL 300mg  Has been on this for several year  Needs a refill today    3. Multiple sclerosis (CMS-[facility])  Chronic   Follows with Dr. Pascal   Has recently stopped Tecfidera and will be starting Tysabri infusions   Does have some knee weakness over the last few days not on medications  She has sent a [employer] message to her neurologist  Worsening L optic nerve involvement, causing the change in medication    Current medicines (including changes today)  No current facility-administered medications for this visit.      Current Outpatient Prescriptions   Medication Sig Dispense Refill   • natalizumab (TYSABRI) 300 MG/15ML Conc by Intravenous route.     • Cyanocobalamin (VITAMIN B-12) 1000 MCG Tab Take 1,000 mcg by mouth every day.     • Cholecalciferol (VITAMIN D3) 5000 units Tab Take 5,000 Units by mouth every day.     • [START ON 12/7/2017] methylphenidate (CONCERTA) 54 MG CR tablet Take 1 Tab by mouth every day. 30 Tab 0   • buPROPion (WELLBUTRIN XL) 300 MG XL tablet Take 1 Tab by mouth every morning. 90 Tab 3   • Mouthwashes (BIOTENE DRY MOUTH MT) Edison 1 Application in mouth/throat 2 times a day as needed.     • alprazolam (XANAX) 0.25 MG Tab 1 tab on call to infusions, repeat as needed 10 Tab 0   • Levonorgestrel (KYLEENA) 19.5 MG IUD by Intrauterine route.     • valacyclovir (VALTREX) 1 GM Tab  "Take 0.5 Tabs by mouth 2 times a day as needed (rash. Use for a total of 3 days). 60 Tab 2     Facility-Administered Medications Ordered in Other Visits   Medication Dose Route Frequency Provider Last Rate Last Dose   • methylPREDNISolone sod succ (SOLU-MEDROL) 1,000 mg in  mL IVPB  1,000 mg Intravenous DAILY Manjeet Garrett M.D.   Stopped at 10/06/17 2117     She  has a past medical history of Acne (8/7/2012); Acne vulgaris (6/23/2017); ADHD (attention deficit hyperactivity disorder) (8/7/2012); Depression (8/7/2012); MS (multiple sclerosis) (CMS-HCC); and Muscle disorder.    ROS   No SOB or palpitations  No bowel changes     Objective:     Blood pressure 108/68, pulse 100, temperature 36.8 °C (98.3 °F), resp. rate 16, height 1.753 m (5' 9\"), weight 67.6 kg (149 lb), SpO2 96 %. Body mass index is 22 kg/m².   Physical Exam:  Constitutional: Alert, no distress.  Skin: Warm, dry, good turgor, no rashes in visible areas.  Eye: Equal, round and reactive, conjunctiva clear, lids normal.  ENMT: Lips without lesions, good dentition, oropharynx clear.  Neck: Trachea midline, no masses, no thyromegaly. No cervical or supraclavicular lymphadenopathy  Respiratory: Unlabored respiratory effort, lungs clear to auscultation, no wheezes, no ronchi.  Cardiovascular: Normal S1, S2, RRR, no murmur, no edema.  Abdomen: Soft, non-tender, no masses, no hepatosplenomegaly.  Psych: Alert and oriented x3, normal affect and mood.      Assessment and Plan:   The following treatment plan was discussed    1. Attention deficit hyperactivity disorder (ADHD), unspecified ADHD type  Chronic, stable on concerta 54mg   Refills given for 3 months, 30 tabs each   checked and not concerning  Return every 3 months  - methylphenidate (CONCERTA) 54 MG CR tablet; Take 1 Tab by mouth every day.  Dispense: 30 Tab; Refill: 0    2. Recurrent major depressive disorder, in full remission (CMS-HCC)  Chronic, stable on wellbutrin  Refill given  - " buPROPion (WELLBUTRIN XL) 300 MG XL tablet; Take 1 Tab by mouth every morning.  Dispense: 90 Tab; Refill: 3    3. Multiple sclerosis (CMS-HCC)  Chronic, unstable  Muscle weakness today  Patient has contacted neurology. She may need possible steroid infusions      Followup: Return in about 15 weeks (around 1/17/2018) for ADHD.       This note was created using voice recognition software. I have made every reasonable attempt to correct errors, however, I do anticipate some grammatical errors.

## 2017-10-06 ENCOUNTER — RESOLUTE PROFESSIONAL BILLING HOSPITAL PROF FEE (OUTPATIENT)
Dept: MEDSURG UNIT | Facility: MEDICAL CENTER | Age: 27
End: 2017-10-06
Payer: COMMERCIAL

## 2017-10-06 ENCOUNTER — HOSPITAL ENCOUNTER (INPATIENT)
Facility: MEDICAL CENTER | Age: 27
LOS: 4 days | DRG: 060 | End: 2017-10-10
Attending: EMERGENCY MEDICINE | Admitting: HOSPITALIST
Payer: COMMERCIAL

## 2017-10-06 ENCOUNTER — APPOINTMENT (OUTPATIENT)
Dept: RADIOLOGY | Facility: MEDICAL CENTER | Age: 27
DRG: 060 | End: 2017-10-06
Attending: PSYCHIATRY & NEUROLOGY
Payer: COMMERCIAL

## 2017-10-06 ENCOUNTER — APPOINTMENT (OUTPATIENT)
Dept: RADIOLOGY | Facility: MEDICAL CENTER | Age: 27
DRG: 060 | End: 2017-10-06
Attending: EMERGENCY MEDICINE
Payer: COMMERCIAL

## 2017-10-06 ENCOUNTER — OUTPATIENT INFUSION SERVICES (OUTPATIENT)
Dept: ONCOLOGY | Facility: MEDICAL CENTER | Age: 27
End: 2017-10-06
Attending: PSYCHIATRY & NEUROLOGY
Payer: COMMERCIAL

## 2017-10-06 VITALS
BODY MASS INDEX: 22.53 KG/M2 | WEIGHT: 152.12 LBS | RESPIRATION RATE: 18 BRPM | TEMPERATURE: 99 F | OXYGEN SATURATION: 98 % | DIASTOLIC BLOOD PRESSURE: 71 MMHG | HEART RATE: 83 BPM | SYSTOLIC BLOOD PRESSURE: 109 MMHG | HEIGHT: 69 IN

## 2017-10-06 DIAGNOSIS — G35 MULTIPLE SCLEROSIS EXACERBATION (HCC): ICD-10-CM

## 2017-10-06 DIAGNOSIS — R29.898 HAND WEAKNESS: ICD-10-CM

## 2017-10-06 LAB
ALBUMIN SERPL BCP-MCNC: 3.7 G/DL (ref 3.2–4.9)
ALBUMIN/GLOB SERPL: 1.7 G/DL
ALP SERPL-CCNC: 70 U/L (ref 30–99)
ALT SERPL-CCNC: 18 U/L (ref 2–50)
ANION GAP SERPL CALC-SCNC: 6 MMOL/L (ref 0–11.9)
APPEARANCE UR: CLEAR
APTT PPP: 31.3 SEC (ref 24.7–36)
AST SERPL-CCNC: 18 U/L (ref 12–45)
BASOPHILS # BLD AUTO: 1 % (ref 0–1.8)
BASOPHILS # BLD: 0.05 K/UL (ref 0–0.12)
BILIRUB SERPL-MCNC: 0.3 MG/DL (ref 0.1–1.5)
BILIRUB UR QL STRIP.AUTO: NEGATIVE
BUN SERPL-MCNC: 11 MG/DL (ref 8–22)
CALCIUM SERPL-MCNC: 8.8 MG/DL (ref 8.5–10.5)
CHLORIDE SERPL-SCNC: 110 MMOL/L (ref 96–112)
CO2 SERPL-SCNC: 23 MMOL/L (ref 20–33)
COLOR UR: YELLOW
CREAT SERPL-MCNC: 0.85 MG/DL (ref 0.5–1.4)
CULTURE IF INDICATED INDCX: NO UA CULTURE
EOSINOPHIL # BLD AUTO: 0.21 K/UL (ref 0–0.51)
EOSINOPHIL NFR BLD: 4.1 % (ref 0–6.9)
ERYTHROCYTE [DISTWIDTH] IN BLOOD BY AUTOMATED COUNT: 44.1 FL (ref 35.9–50)
GFR SERPL CREATININE-BSD FRML MDRD: >60 ML/MIN/1.73 M 2
GLOBULIN SER CALC-MCNC: 2.2 G/DL (ref 1.9–3.5)
GLUCOSE SERPL-MCNC: 84 MG/DL (ref 65–99)
GLUCOSE UR STRIP.AUTO-MCNC: NEGATIVE MG/DL
HCT VFR BLD AUTO: 38.7 % (ref 37–47)
HGB BLD-MCNC: 12.9 G/DL (ref 12–16)
IMM GRANULOCYTES # BLD AUTO: 0.01 K/UL (ref 0–0.11)
IMM GRANULOCYTES NFR BLD AUTO: 0.2 % (ref 0–0.9)
INR PPP: 1.1 (ref 0.87–1.13)
KETONES UR STRIP.AUTO-MCNC: NEGATIVE MG/DL
LEUKOCYTE ESTERASE UR QL STRIP.AUTO: NEGATIVE
LYMPHOCYTES # BLD AUTO: 2.13 K/UL (ref 1–4.8)
LYMPHOCYTES NFR BLD: 41.2 % (ref 22–41)
MCH RBC QN AUTO: 28.8 PG (ref 27–33)
MCHC RBC AUTO-ENTMCNC: 33.3 G/DL (ref 33.6–35)
MCV RBC AUTO: 86.4 FL (ref 81.4–97.8)
MICRO URNS: NORMAL
MONOCYTES # BLD AUTO: 0.54 K/UL (ref 0–0.85)
MONOCYTES NFR BLD AUTO: 10.4 % (ref 0–13.4)
NEUTROPHILS # BLD AUTO: 2.23 K/UL (ref 2–7.15)
NEUTROPHILS NFR BLD: 43.1 % (ref 44–72)
NITRITE UR QL STRIP.AUTO: NEGATIVE
NRBC # BLD AUTO: 0 K/UL
NRBC BLD AUTO-RTO: 0 /100 WBC
PH UR STRIP.AUTO: 6.5 [PH]
PLATELET # BLD AUTO: 281 K/UL (ref 164–446)
PMV BLD AUTO: 9.3 FL (ref 9–12.9)
POTASSIUM SERPL-SCNC: 3.6 MMOL/L (ref 3.6–5.5)
PROT SERPL-MCNC: 5.9 G/DL (ref 6–8.2)
PROT UR QL STRIP: NEGATIVE MG/DL
PROTHROMBIN TIME: 14.6 SEC (ref 12–14.6)
RBC # BLD AUTO: 4.48 M/UL (ref 4.2–5.4)
RBC UR QL AUTO: NEGATIVE
SODIUM SERPL-SCNC: 139 MMOL/L (ref 135–145)
SP GR UR STRIP.AUTO: 1.01
UROBILINOGEN UR STRIP.AUTO-MCNC: 0.2 MG/DL
WBC # BLD AUTO: 5.2 K/UL (ref 4.8–10.8)

## 2017-10-06 PROCEDURE — 700111 HCHG RX REV CODE 636 W/ 250 OVERRIDE (IP): Performed by: PSYCHIATRY & NEUROLOGY

## 2017-10-06 PROCEDURE — 85730 THROMBOPLASTIN TIME PARTIAL: CPT

## 2017-10-06 PROCEDURE — 81003 URINALYSIS AUTO W/O SCOPE: CPT

## 2017-10-06 PROCEDURE — 80053 COMPREHEN METABOLIC PANEL: CPT

## 2017-10-06 PROCEDURE — 306780 HCHG STAT FOR TRANSFUSION PER CASE

## 2017-10-06 PROCEDURE — A9270 NON-COVERED ITEM OR SERVICE: HCPCS | Performed by: STUDENT IN AN ORGANIZED HEALTH CARE EDUCATION/TRAINING PROGRAM

## 2017-10-06 PROCEDURE — 85610 PROTHROMBIN TIME: CPT

## 2017-10-06 PROCEDURE — 99285 EMERGENCY DEPT VISIT HI MDM: CPT

## 2017-10-06 PROCEDURE — 700105 HCHG RX REV CODE 258: Performed by: PSYCHIATRY & NEUROLOGY

## 2017-10-06 PROCEDURE — 96365 THER/PROPH/DIAG IV INF INIT: CPT

## 2017-10-06 PROCEDURE — 85025 COMPLETE CBC W/AUTO DIFF WBC: CPT

## 2017-10-06 PROCEDURE — 96413 CHEMO IV INFUSION 1 HR: CPT

## 2017-10-06 PROCEDURE — 71020 DX-CHEST-2 VIEWS: CPT

## 2017-10-06 PROCEDURE — 700102 HCHG RX REV CODE 250 W/ 637 OVERRIDE(OP): Performed by: STUDENT IN AN ORGANIZED HEALTH CARE EDUCATION/TRAINING PROGRAM

## 2017-10-06 PROCEDURE — 770006 HCHG ROOM/CARE - MED/SURG/GYN SEMI*

## 2017-10-06 PROCEDURE — 36415 COLL VENOUS BLD VENIPUNCTURE: CPT

## 2017-10-06 RX ORDER — BISACODYL 10 MG
10 SUPPOSITORY, RECTAL RECTAL
Status: DISCONTINUED | OUTPATIENT
Start: 2017-10-06 | End: 2017-10-10 | Stop reason: HOSPADM

## 2017-10-06 RX ORDER — ALPRAZOLAM 0.25 MG/1
0.25 TABLET ORAL
Status: DISCONTINUED | OUTPATIENT
Start: 2017-10-06 | End: 2017-10-06

## 2017-10-06 RX ORDER — POLYETHYLENE GLYCOL 3350 17 G/17G
1 POWDER, FOR SOLUTION ORAL
Status: DISCONTINUED | OUTPATIENT
Start: 2017-10-06 | End: 2017-10-10 | Stop reason: HOSPADM

## 2017-10-06 RX ORDER — AMOXICILLIN 250 MG
2 CAPSULE ORAL 2 TIMES DAILY
Status: DISCONTINUED | OUTPATIENT
Start: 2017-10-06 | End: 2017-10-10 | Stop reason: HOSPADM

## 2017-10-06 RX ORDER — TEMAZEPAM 15 MG/1
15 CAPSULE ORAL ONCE
Status: COMPLETED | OUTPATIENT
Start: 2017-10-06 | End: 2017-10-06

## 2017-10-06 RX ADMIN — NATALIZUMAB 300 MG: 300 INJECTION INTRAVENOUS at 14:23

## 2017-10-06 RX ADMIN — SODIUM CHLORIDE 1000 MG: 9 INJECTION, SOLUTION INTRAVENOUS at 20:17

## 2017-10-06 RX ADMIN — TEMAZEPAM 15 MG: 15 CAPSULE ORAL at 23:50

## 2017-10-06 ASSESSMENT — LIFESTYLE VARIABLES
CONSUMPTION TOTAL: POSITIVE
TOTAL SCORE: 0
TOTAL SCORE: 0
HAVE PEOPLE ANNOYED YOU BY CRITICIZING YOUR DRINKING: NO
EVER_SMOKED: NEVER
EVER FELT BAD OR GUILTY ABOUT YOUR DRINKING: NO
HAVE YOU EVER FELT YOU SHOULD CUT DOWN ON YOUR DRINKING: NO
ALCOHOL_USE: YES
AVERAGE NUMBER OF DAYS PER WEEK YOU HAVE A DRINK CONTAINING ALCOHOL: 1
ON A TYPICAL DAY WHEN YOU DRINK ALCOHOL HOW MANY DRINKS DO YOU HAVE: 2
EVER HAD A DRINK FIRST THING IN THE MORNING TO STEADY YOUR NERVES TO GET RID OF A HANGOVER: NO
TOTAL SCORE: 0
HOW MANY TIMES IN THE PAST YEAR HAVE YOU HAD 5 OR MORE DRINKS IN A DAY: 3

## 2017-10-06 ASSESSMENT — COGNITIVE AND FUNCTIONAL STATUS - GENERAL
MOBILITY SCORE: 23
CLIMB 3 TO 5 STEPS WITH RAILING: A LITTLE
DAILY ACTIVITIY SCORE: 24
SUGGESTED CMS G CODE MODIFIER DAILY ACTIVITY: CH
SUGGESTED CMS G CODE MODIFIER MOBILITY: CI

## 2017-10-06 ASSESSMENT — PAIN SCALES - GENERAL
PAINLEVEL_OUTOF10: 0
PAINLEVEL_OUTOF10: 0

## 2017-10-06 NOTE — PROGRESS NOTES
"Patient presents ambulatory for first tysabri infusion.  Patient reports feeling well and denies any s/s of infection at this time.  Patient is very anxious about IV.  PIV established without complication, brisk blood return noted, and flushed.  Tysabri infused per MD order with no complications or adverse reactions.  10 minutes into the one hour post infusion observation, patient reported feeling \"flushed in my face and down my legs\", no visible redness.  Vital signs taken and are stable.  Phone call to MD, Dr. Garrett to report symptoms.  No orders received, per MD continue one hour observation and report any changes.  One hour later patient reports that she cannot move her right hand.  Patient cannot make a fist or move right wrist, patient had full movement on arrival to [facility].  R hand is cool to the touch, patient states she can feel.  Equal strength to BLE, no facial droop, patient denies headache or vision changes.  Phone call to MD at 1630, per MD if patient status does not change by 1700, then patient to be transported to ED for evaluation.  "

## 2017-10-07 ENCOUNTER — APPOINTMENT (OUTPATIENT)
Dept: RADIOLOGY | Facility: MEDICAL CENTER | Age: 27
DRG: 060 | End: 2017-10-07
Attending: PSYCHIATRY & NEUROLOGY
Payer: COMMERCIAL

## 2017-10-07 LAB
HCG UR QL: NEGATIVE
SP GR UR REFRACTOMETRY: 1.01

## 2017-10-07 PROCEDURE — 70553 MRI BRAIN STEM W/O & W/DYE: CPT

## 2017-10-07 PROCEDURE — 72156 MRI NECK SPINE W/O & W/DYE: CPT

## 2017-10-07 PROCEDURE — 81025 URINE PREGNANCY TEST: CPT

## 2017-10-07 PROCEDURE — 770006 HCHG ROOM/CARE - MED/SURG/GYN SEMI*

## 2017-10-07 PROCEDURE — 700117 HCHG RX CONTRAST REV CODE 255: Performed by: STUDENT IN AN ORGANIZED HEALTH CARE EDUCATION/TRAINING PROGRAM

## 2017-10-07 PROCEDURE — 700105 HCHG RX REV CODE 258: Performed by: PSYCHIATRY & NEUROLOGY

## 2017-10-07 PROCEDURE — 700102 HCHG RX REV CODE 250 W/ 637 OVERRIDE(OP): Performed by: STUDENT IN AN ORGANIZED HEALTH CARE EDUCATION/TRAINING PROGRAM

## 2017-10-07 PROCEDURE — 94760 N-INVAS EAR/PLS OXIMETRY 1: CPT

## 2017-10-07 PROCEDURE — 99223 1ST HOSP IP/OBS HIGH 75: CPT | Mod: GC | Performed by: INTERNAL MEDICINE

## 2017-10-07 PROCEDURE — A9270 NON-COVERED ITEM OR SERVICE: HCPCS | Performed by: STUDENT IN AN ORGANIZED HEALTH CARE EDUCATION/TRAINING PROGRAM

## 2017-10-07 PROCEDURE — 700111 HCHG RX REV CODE 636 W/ 250 OVERRIDE (IP): Performed by: PSYCHIATRY & NEUROLOGY

## 2017-10-07 PROCEDURE — A9579 GAD-BASE MR CONTRAST NOS,1ML: HCPCS | Performed by: STUDENT IN AN ORGANIZED HEALTH CARE EDUCATION/TRAINING PROGRAM

## 2017-10-07 RX ORDER — TEMAZEPAM 15 MG/1
15 CAPSULE ORAL
Status: COMPLETED | OUTPATIENT
Start: 2017-10-07 | End: 2017-10-07

## 2017-10-07 RX ORDER — METHYLPHENIDATE HYDROCHLORIDE 5 MG/1
15 TABLET ORAL
Status: DISCONTINUED | OUTPATIENT
Start: 2017-10-08 | End: 2017-10-08

## 2017-10-07 RX ORDER — BUPROPION HYDROCHLORIDE 150 MG/1
300 TABLET, EXTENDED RELEASE ORAL EVERY MORNING
Status: DISCONTINUED | OUTPATIENT
Start: 2017-10-07 | End: 2017-10-10 | Stop reason: HOSPADM

## 2017-10-07 RX ORDER — CHOLECALCIFEROL (VITAMIN D3) 125 MCG
1000 CAPSULE ORAL DAILY
Status: DISCONTINUED | OUTPATIENT
Start: 2017-10-07 | End: 2017-10-10 | Stop reason: HOSPADM

## 2017-10-07 RX ORDER — METHYLPHENIDATE HYDROCHLORIDE 54 MG/1
54 TABLET ORAL DAILY
Status: DISCONTINUED | OUTPATIENT
Start: 2017-10-07 | End: 2017-10-07

## 2017-10-07 RX ORDER — VALACYCLOVIR HYDROCHLORIDE 500 MG/1
500 TABLET, FILM COATED ORAL 2 TIMES DAILY PRN
Status: DISCONTINUED | OUTPATIENT
Start: 2017-10-07 | End: 2017-10-10 | Stop reason: HOSPADM

## 2017-10-07 RX ADMIN — BUPROPION HYDROCHLORIDE 300 MG: 150 TABLET, EXTENDED RELEASE ORAL at 08:33

## 2017-10-07 RX ADMIN — VALACYCLOVIR 500 MG: 500 TABLET, FILM COATED ORAL at 13:18

## 2017-10-07 RX ADMIN — CYANOCOBALAMIN TAB 500 MCG 1000 MCG: 500 TAB at 11:46

## 2017-10-07 RX ADMIN — VITAMIN D, TAB 1000IU (100/BT) 5000 UNITS: 25 TAB at 11:46

## 2017-10-07 RX ADMIN — TEMAZEPAM 15 MG: 15 CAPSULE ORAL at 21:39

## 2017-10-07 RX ADMIN — GADODIAMIDE 15 ML: 287 INJECTION INTRAVENOUS at 12:17

## 2017-10-07 RX ADMIN — SODIUM CHLORIDE 1000 MG: 9 INJECTION, SOLUTION INTRAVENOUS at 09:58

## 2017-10-07 ASSESSMENT — ENCOUNTER SYMPTOMS
ABDOMINAL PAIN: 0
EYE PAIN: 0
DOUBLE VISION: 0
NAUSEA: 0
SHORTNESS OF BREATH: 0
TINGLING: 0
BRUISES/BLEEDS EASILY: 0
CHILLS: 0
WEAKNESS: 1
BLURRED VISION: 0
DEPRESSION: 0
FOCAL WEAKNESS: 1
COUGH: 0
TREMORS: 0
WHEEZING: 0
PALPITATIONS: 0
TINGLING: 1
SENSORY CHANGE: 1
HALLUCINATIONS: 0
SPUTUM PRODUCTION: 0
SENSORY CHANGE: 0
SORE THROAT: 0
BACK PAIN: 0
DIARRHEA: 0
VOMITING: 0
DEPRESSION: 1
MYALGIAS: 0
FEVER: 0
PHOTOPHOBIA: 0
NECK PAIN: 0
SPEECH CHANGE: 0
POLYDIPSIA: 0
DIZZINESS: 0
HEADACHES: 0
BLURRED VISION: 1

## 2017-10-07 ASSESSMENT — PAIN SCALES - GENERAL
PAINLEVEL_OUTOF10: 0

## 2017-10-07 ASSESSMENT — LIFESTYLE VARIABLES
SUBSTANCE_ABUSE: 0
EVER_SMOKED: NEVER

## 2017-10-07 ASSESSMENT — COPD QUESTIONNAIRES
DO YOU EVER COUGH UP ANY MUCUS OR PHLEGM?: NO/ONLY WITH OCCASIONAL COLDS OR INFECTIONS
HAVE YOU SMOKED AT LEAST 100 CIGARETTES IN YOUR ENTIRE LIFE: NO/DON'T KNOW
DURING THE PAST 4 WEEKS HOW MUCH DID YOU FEEL SHORT OF BREATH: NONE/LITTLE OF THE TIME
COPD SCREENING SCORE: 0

## 2017-10-07 NOTE — SENIOR ADMIT NOTE
Senior Admit Note    Rosita Shwetha Bowles 26 y.o. female with PMHx of MS who presented to the ED [facility] complaining of right arm weakness that started around 3 PM today. She has been getting infusion for Tysabri for her MS and she started feeling weakness on her upper right extremity. She also reported that she has been having tingling in her both lower extremities. Dr. Garrett recommended her to go to the ED and ordered MRI and recommended steroid. Patient was trated with steroid on 09/10/17 last time. She reported that she has not been any symptoms of limb weakness before. She was diangosed with MS last year. Only symptoms she got in past was left eye visual field defect. Patient symptoms has improved since this afternoon. She expressed that this is the 6th time she has been getting methylprednisolone. She reported that she developed hot flushes and decreased in her appetite when she takes methylprednisolone.      Physical Exam:    General: Not in acute distress  HEENT: NCAT  Resp: Clear to auscultation B/L with no wheeze  CVS: Regular rate and rhythm   Abdomen: Soft non tender +BS  Neuro: Decreased strength in  on her right hand. No other focal deficit. CN II-XII grossly intact      Vitals:    10/06/17 1830 10/06/17 1930 10/06/17 2030 10/06/17 2100   BP:       Pulse: 94 91 81 80   SpO2: 100% 97% 94% 97%   Weight:       Height:           DX-CHEST-2 VIEWS   Final Result      No active disease.      MR-BRAIN-WITH & W/O    (Results Pending)   MR-CERVICAL SPINE-WITH & W/O    (Results Pending)       Assessment and plan:    Patient is here for possible MS flare. She already received one dose of Methlprednisolone 1 gm in ED. Will continue it for 3-5 days for her flare up as per neurology recommendations.   MR brain and cervical spine are pending.    Continue her home medications for chronic medical conditions.     Code status: Full    DVT Prophyalxis: Lovenox

## 2017-10-07 NOTE — ED NOTES
Pt c/o pain at PIV site on left arm. PIV patent and blood present with aspiration. Pt given ice pack for pain relief.

## 2017-10-07 NOTE — PROGRESS NOTES
Assumed care of pt from ED RN. Pt in bed with strip alarm in place. Admit profile completed. Pt AAOx4, VSS.  Needs met at this time. All questions answered.  Will monitor, assist and medicate per MAR for duration of shift.  Hourly rounding implemented.

## 2017-10-07 NOTE — ED PROVIDER NOTES
ER Provider Note     Scribed for Gaston Parker M.D. by Jarrod Tomlinson. 10/6/2017, 5:43 PM.    Primary Care Provider: Manuela Green M.D.  Means of Arrival: Wheel Chair   History obtained from: Patient  History limited by: None     CHIEF COMPLAINT  Chief Complaint   Patient presents with   • Medication Reaction   • Arm Injury     Right upper arm to distal hand coldness     [facility]  Rosita Bowles is a 26 y.o. female with a history of MS who presents to the Emergency Department complaining of worsening right hand weakness, onset prior to arrival in the ED. The patient reports that she had undergone her first infusion of Tysabri, for her MS, and 10 minutes after her symptoms began. She states that with this event she became flushed, warm, and then acutely weak. Patient is unable to grasp anything with her right hand. She denies associated new numbness, tingling, fever, cough, facial droop, vision changes, or chills. The patient was diagnosed June 28th with MS. She notes that her MS symptoms consist of loss of let eye peripheral vision, numbness and tingling to the knees, and memory loss. The patient reports that she has not had steroid treatment since September 10th. Dr. Garrett is her Neurologist. Patient had no improvement of her symptoms with heat pads to her neck. Associated symptoms include hand coldness.     REVIEW OF SYSTEMS  See HPI for further details.   All other systems are negative.   C.    PAST MEDICAL HISTORY   has a past medical history of Acne (8/7/2012); Acne vulgaris (6/23/2017); ADHD (attention deficit hyperactivity disorder) (8/7/2012); Depression (8/7/2012); MS (multiple sclerosis) (CMS-HCC); and Muscle disorder.    SURGICAL HISTORY   has a past surgical history that includes ankle orif and dental extraction(s).    SOCIAL HISTORY  Social History   Substance Use Topics   • Smoking status: Never Smoker   • Smokeless tobacco: Never Used   • Alcohol use 0.0 oz/week      Comment:  "occasional      History   Drug Use No     FAMILY HISTORY  Family History   Problem Relation Age of Onset   • Thyroid Mother    • Thyroid Brother    • Cancer Neg Hx    • Diabetes Neg Hx      CURRENT MEDICATIONS  Home Medications     Reviewed by Bruna Kulkarni, Student (Student Nurse) on 10/06/17 at 1741  Med List Status: Complete   Medication Last Dose Status   Adapalene-Benzoyl Peroxide 0.1-2.5 % Gel Unknown Active   alprazolam (XANAX) 0.25 MG Tab prn Active   buPROPion (WELLBUTRIN XL) 300 MG XL tablet  Active   Cholecalciferol (VITAMIN D3) 5000 units Tab  Active   Cyanocobalamin (VITAMIN B-12) 1000 MCG Tab  Active   Levonorgestrel (KYLEENA) 19.5 MG IUD using Active   methylphenidate (CONCERTA) 54 MG CR tablet  Active   natalizumab (TYSABRI) 300 MG/15ML Conc 10/4/2017 Active   valacyclovir (VALTREX) 1 GM Tab prn Active              ALLERGIES  Allergies   Allergen Reactions   • Nkda [No Known Drug Allergy]      PHYSICAL EXAM  VITAL SIGNS: /80   Pulse 94   Ht 1.765 m (5' 9.5\")   Wt 69 kg (152 lb 1.9 oz)   BMI 22.14 kg/m²      Constitutional: Alert in no apparent distress.  HENT: No signs of trauma, Bilateral external ears normal, Nose normal.   Eyes: Pupils are equal and reactive, Conjunctiva normal, Non-icteric.   Neck: Normal range of motion, No tenderness, Supple, No stridor.   Lymphatic: No lymphadenopathy noted.   Cardiovascular: Regular rate and rhythm, no murmurs.   Thorax & Lungs: Normal breath sounds, No respiratory distress, No wheezing, No chest tenderness.   Abdomen: Bowel sounds normal, Soft, No tenderness, No masses, No pulsatile masses. No peritoneal signs.  Skin: Warm, Dry, Right hand is cool to touch.  Back: No bony tenderness, No CVA tenderness.   Extremities: Intact distal pulses, No edema, No tenderness, No cyanosis. Cool to touch. Brisk capillary refill, strong radial pulse palpated.   Musculoskeletal: No tenderness to palpation or major deformities noted. Decreased flexion and " extension of the right hand and all portions of the hand distally. Otherwise BUE and BLE have normal ROM.   Neurologic: Alert, Cranial Nerves III-XII intact. Visual field cut on the lateral aspect of the left eye. Able to lift legs without difficulty, no dysarthria, full strength in elbows, shoulders and  on BUE and BLE. Patient is able to very faintly flex and extend the right hand a couple degrees, otherwise the patient is unable to move anything from her wrist distally   Psychiatric: Affect normal, Judgment normal, Mood normal.    DIAGNOSTIC STUDIES / PROCEDURES    LABS  Results for orders placed or performed during the hospital encounter of 10/06/17   CBC WITH DIFFERENTIAL   Result Value Ref Range    WBC 5.2 4.8 - 10.8 K/uL    RBC 4.48 4.20 - 5.40 M/uL    Hemoglobin 12.9 12.0 - 16.0 g/dL    Hematocrit 38.7 37.0 - 47.0 %    MCV 86.4 81.4 - 97.8 fL    MCH 28.8 27.0 - 33.0 pg    MCHC 33.3 (L) 33.6 - 35.0 g/dL    RDW 44.1 35.9 - 50.0 fL    Platelet Count 281 164 - 446 K/uL    MPV 9.3 9.0 - 12.9 fL    Neutrophils-Polys 43.10 (L) 44.00 - 72.00 %    Lymphocytes 41.20 (H) 22.00 - 41.00 %    Monocytes 10.40 0.00 - 13.40 %    Eosinophils 4.10 0.00 - 6.90 %    Basophils 1.00 0.00 - 1.80 %    Immature Granulocytes 0.20 0.00 - 0.90 %    Nucleated RBC 0.00 /100 WBC    Neutrophils (Absolute) 2.23 2.00 - 7.15 K/uL    Lymphs (Absolute) 2.13 1.00 - 4.80 K/uL    Monos (Absolute) 0.54 0.00 - 0.85 K/uL    Eos (Absolute) 0.21 0.00 - 0.51 K/uL    Baso (Absolute) 0.05 0.00 - 0.12 K/uL    Immature Granulocytes (abs) 0.01 0.00 - 0.11 K/uL    NRBC (Absolute) 0.00 K/uL   URINALYSIS CULTURE, IF INDICATED   Result Value Ref Range    Color Yellow     Character Clear     Specific Gravity 1.009 <1.035    Ph 6.5 5.0 - 8.0    Glucose Negative Negative mg/dL    Ketones Negative Negative mg/dL    Protein Negative Negative mg/dL    Bilirubin Negative Negative    Urobilinogen, Urine 0.2 Negative    Nitrite Negative Negative    Leukocyte Esterase  Negative Negative    Occult Blood Negative Negative    Micro Urine Req see below     Culture Indicated No UA Culture   COMP METABOLIC PANEL   Result Value Ref Range    Sodium 139 135 - 145 mmol/L    Potassium 3.6 3.6 - 5.5 mmol/L    Chloride 110 96 - 112 mmol/L    Co2 23 20 - 33 mmol/L    Anion Gap 6.0 0.0 - 11.9    Glucose 84 65 - 99 mg/dL    Bun 11 8 - 22 mg/dL    Creatinine 0.85 0.50 - 1.40 mg/dL    Calcium 8.8 8.5 - 10.5 mg/dL    AST(SGOT) 18 12 - 45 U/L    ALT(SGPT) 18 2 - 50 U/L    Alkaline Phosphatase 70 30 - 99 U/L    Total Bilirubin 0.3 0.1 - 1.5 mg/dL    Albumin 3.7 3.2 - 4.9 g/dL    Total Protein 5.9 (L) 6.0 - 8.2 g/dL    Globulin 2.2 1.9 - 3.5 g/dL    A-G Ratio 1.7 g/dL   ESTIMATED GFR   Result Value Ref Range    GFR If African American >60 >60 mL/min/1.73 m 2    GFR If Non African American >60 >60 mL/min/1.73 m 2      All labs reviewed by me.    RADIOLOGY  DX-CHEST-2 VIEWS    (Results Pending)      The radiologist's interpretation of all radiological studies have been reviewed by me.    COURSE & MEDICAL DECISION MAKING  Pertinent Labs & Imaging studies reviewed. (See chart for details)    This is a 26 y.o. female that presents Weakness in her right hand as well as complaint of some intermittent worsening in her left visual field in her left arm as well as tingling in her legs with a history of MS. Given her exam and that she is well-perfused in her right upper extremity and do not believe this is vascular in origin. I do not believe this is related to the infusion given the fact that there is no side effect profile on this medication to line up with her symptom I'll G. I do believe this could be related to multiple sclerosis flare. I will send off basic labs as well as looking for an infection in her urine in her chest. Also consult her neurologist..     5:43 PM - Patient seen and examined at bedside. Ordered CBC, BMP, MR brain, MR cervical spine, chest x ray, prothrombin time, APTT, and urinalysis.      5:54 PM - I discussed the case with Dr. Garrett Neurology. He is aware of the patient and agrees to consult. The neurologist recommended steroids as well as an MRI of the patient's head as well as C-spine. He also recommended admission to the hospital.    6:40 PM - I paged R Internal Medicine.    Plan at this time is to admit the patient.  Patient has no leukocytosis and no electronic arrangements. She has no anemia. She has no evidence of infection in her urine and chest x-ray is pending at this time.    FINAL IMPRESSION  1. Multiple sclerosis exacerbation (CMS-[facility])    2. Hand weakness          Jarrod GARCÍA (Scribe), am scribing for, and in the presence of, Gaston Parker M.D..    Electronically signed by: Jarrod Tomlinson (Nancy), 10/6/2017    IGaston M.D. personally performed the services described in this documentation, as scribed by Jarrod Tomlinson in my presence, and it is both accurate and complete.     The note accurately reflects work and decisions made by me.  Gaston Parker  10/6/2017  7:58 PM

## 2017-10-07 NOTE — CONSULTS
DATE OF SERVICE:  10/06/2017    NEUROLOGICAL CONSULTATION    CHIEF COMPLAINT:  Neurologic consultation was requested on this young lady by   Dr. Parker in the emergency room with a history of MS, who had a sudden onset   of right hand weakness while receiving her first Tysabri infusion.    HISTORY OF PRESENT ILLNESS:  The patient is well known to me, pleasant   26-year-old right-handed  female with a history of rather active MS,   scheduled for her first infusion of Tysabri today.  She received the infusion,   was doing well, but then noted a sudden onset of weakness involving the   entire right hand.  She was no longer able to flex or dorsiflex the wrist,   wiggle the fingers, etc.  There was no pain.  Biceps, triceps and shoulder   abduction were not affected.  The left arm was not similarly affected.    She does relate several days prior some increasing paresthesias in both feet.    Balance was not curtailed, the legs in their entirety simply felt heavier   than usual.  She began to notice some worsening in her fatigue, and the left   eye deficit she had from her previous bout of optic neuritis also seemed to be   getting worse.  There was no preceding flu-like illness with all of this.    She denied being more stressed than usual.    The electronic health record was reviewed, her last MRI scan from August 2017   revealed a stable burden of disease, more left hemispheric than right   hemispheric involvement, but there were also 2 new active lesions in the left   hemisphere.  MRI of the cervical spine was not done at that time.  She   otherwise had been in her usual state of health.    REVIEW OF SYSTEMS:  Other than the above, as per AMA and CMS guidelines, the   systems review is essentially negative.    PAST MEDICAL HISTORY:  1.  MS.  2.  ADHD.  3.  Depression.    PAST SURGICAL HISTORY:  None from my standpoint.    MEDICATIONS:  She just received her first dose of Tysabri 300 mg IV infusion   earlier  today.  She is also on Wellbutrin, Concerta, Xanax, birth control, and   Valtrex as needed.    ALLERGIES:  None known.    FAMILY HISTORY:  Noncontributory from my standpoint, though there are   immediate family members with thyroid disease.  No one in the family has a   history of MS.    SOCIAL HISTORY:  She does not smoke, she will occasionally drink alcohol.    PHYSICAL EXAMINATION:  CONSTITUTIONAL:  The patient is lying in bed, in some mild distress more   related to the IV in the right cubital fossa, well developed and well   nourished, she is cooperative.  VITAL SIGNS:  Stable, blood pressure 111/80, pulse is 94 and regular,   respiratory rate is 16.  HEENT:  Negative for malar rash, jaw or temporal tenderness, jaw claudication,   or allodynia.  NECK:  Supple, without meningismus, range of motion is full, Lhermitte's   phenomenon is absent.  Compression maneuvers are negative.  COR:  Remarkable for a regular rhythm.  MUSCULOSKELETAL:  There is no tenderness at the wrist on the right side, there   is some tenderness around the IV site in the right cubital fossa.  The hand   is not swollen.  There is no change in skin texture, color or turgor.  Passive   range of motion does not elicit pain.  VASCULAR:  Distal pulses are intact in both upper extremities.    NEUROLOGIC EXAMINATION:  Mental status exam reveals her to be fully oriented.  There is no aphasia,   apraxia, or inattention.    Cranial nerve exam reveals PERRLA/EOMI, visual fields to finger counting are   intact with the right, on the left there remains a superior nasal quadrantic   deficit, now more of an inferior altitudinal deficit as well.  I cannot   visualize the fundus.  There is no facial asymmetry, sensory exam is intact to   pinprick and temperature on both sides, the tongue and uvula are midline, jaw   jerk is absent, there is no dysarthria, shoulder shrug and head rotation are   intact.    Motor exam again is limited with the right arm because  of pain, but strength   is intact in the left upper and both lower extremities.  The right hand shows   profound weakness, she wiggles her fingers only slightly, tone seems to be   increased notably.  Reflexes are brisk throughout, I could not check the right   upper extremity, the left is easily elicited without anything being dropped.    Both toes are downgoing.  There is some nonsustained ankle clonus   bilaterally.    Coordination cannot be assessed with the upper extremity on the right,   obviously, fine motor control is near absent, coordination with the left upper   and both lower extremities is intact as is fine motor control.    Sensory exam is intact to vibration and temperature in all 4 extremities.    DIAGNOSTICS:  Pending as of this dictation.    IMPRESSION:  This lady has suffered from what I suspect is part of a disease   relapse which actually started several days before including increasing   paresthesias in both legs, increasing fatigue, maybe some heaviness with the   legs as well as worsening vision on the left and now a more acute onset of   right hand weakness.  This is not related to the Tysabri itself, the infusion   site, or what I suspect could have also been mechanical problems related to   cervical radiculopathy.  The hand seems to be involved in its entirety, which   would suggest multiple peripheral nerve roots or several nerve roots   proximally.  More likely is a central nervous system issue.  Face to face time   was spent reviewing all the above with the patient, her significant other was   at bedside as well as with the ER staff.    I would recommend ruling out infectious processes which can often be the cause   of a disease relapse, MRI imaging of the brain but also the cervical spine   are indicated.  The right hand is involved, most of the lesions have always   involved the left hemisphere, so this may be more or less the supratentorial.    In any case, IV steroids will be used  for the next 3 days, possibly 5 days   depending on need.  This will also allow us to get some physical therapy   involved.    PLAN:  1.  Admit for IV methylprednisolone, 1 gram daily for at least 3 days,   possibly 5.  2.  MRI scan with and without contrast of the brain and neck.  3.  Physical and occupational therapies.  4.  Rule out infectious processes as part of the admission labs and treated   for appropriate.  5.  I will follow the patient with you.    Thank you very much for this consultation in this very complex case.           ____________________________________     MD ROOSEVELT JONES / JOSE    DD:  10/06/2017 18:44:05  DT:  10/06/2017 22:19:52    D#:  [id number]  Job#:  [id number]

## 2017-10-07 NOTE — PROGRESS NOTES
Caroline Nuñez Fall Risk Assessment:     Last Known Fall: No falls  Mobility: Dizziness/generalized weakness  Medications: Cardiovascular or central nervous system meds  Mental Status/LOC/Awareness: Awake, alert, and oriented to date, place, and person  Toileting Needs: No needs  Volume/Electrolyte Status: No problems  Communication/Sensory: Visual (Glasses)/hearing deficit  Behavior: Appropriate behavior  Caroline Nuñez Fall Risk Total: 4  Fall Risk Level: NO RISK    Universal Fall Precautions:  call light/belongings in reach, bed in low position and locked, wheelchairs and assistive devices out of sight, siderails up x 2, use non-slip footwear, adequate lighting, clutter free and spill free environment, educate on level of risk, educate to call for assistance    Fall Risk Level Interventions:          Patient Specific Interventions:     Medication: review medications with patient and family and limit combination of prn medications  Mental Status/LOC/Awareness: reorient patient, reinforce falls education, check on patient hourly, utilize bed/chair fall alarm, reinforce the use of call light and provide activity  Toileting: provide frquent toileting, instruct patient/family on the use of grab bars, instruct patient/family on the need to call for assistance when toileting and do not leave patient unattended in bathroom/refer to toileting scripting  Volume/Electrolyte Status: ensure patient remains hydrated, advance diet as tolerated, administer medications as ordered for nausea and vomiting and monitor abnormal lab values  Communication/Sensory: update plan of care on whiteboard, ensure proper positioning when transferrng/ambulating, ensure patient has glasses/contacts and hearing aids/dentures and for visually impaired patients orient to their room surrounding and do not change their surroundings  Behavioral: encourage patient to voice feelings, engage patient in daily activities, administer medication as ordered,  instruct/reinforce fall program rationale and encourage family to stay with impulsive patients  Mobility: provide comfort measures during transport, dangle prior to standing, utilize bed/chair fall alarm, ensure bed is locked and in lowest position, provide appropriate assistive device and instruct patient to exit bed on their strongest side

## 2017-10-07 NOTE — PROGRESS NOTES
Caroline Nuñez Fall Risk Assessment:     Last Known Fall: No falls  Mobility: Dizziness/generalized weakness  Medications: No meds  Mental Status/LOC/Awareness: Awake, alert, and oriented to date, place, and person  Toileting Needs: No needs  Volume/Electrolyte Status: No problems  Communication/Sensory: No deficits  Behavior: Appropriate behavior  Caroline Nuñez Fall Risk Total: 2  Fall Risk Level: NO RISK    Universal Fall Precautions:  call light/belongings in reach, wheelchairs and assistive devices out of sight, bed in low position and locked, siderails up x 2, use non-slip footwear, adequate lighting, clutter free and spill free environment, educate to call for assistance, educate on level of risk    Fall Risk Level Interventions:          Patient Specific Interventions:     Medication: review medications with patient and family  Mental Status/LOC/Awareness: not applicable  Toileting: not applicable  Volume/Electrolyte Status: not applicable  Communication/Sensory: update plan of care on whiteboard  Behavioral: not applicable  Mobility: ensure bed is locked and in lowest position

## 2017-10-07 NOTE — CARE PLAN
Problem: Safety  Goal: Will remain free from injury  Outcome: PROGRESSING AS EXPECTED  Alert and oriented, calling appropriately for assistance when required, steady to ambulate.

## 2017-10-07 NOTE — ASSESSMENT & PLAN NOTE
diagnosed in June 2016  MRI brain and C/T/L spine showed no acute lesions  Symptoms improved with 5 days steroid therapy  Stable for discharge on steroid taper and o/p PT with neurology and PCP f/u

## 2017-10-07 NOTE — CARE PLAN
Problem: Venous Thromboembolism (VTW)/Deep Vein Thrombosis (DVT) Prevention:  Goal: Patient will participate in Venous Thrombosis (VTE)/Deep Vein Thrombosis (DVT)Prevention Measures  Outcome: PROGRESSING SLOWER THAN EXPECTED  Pt refusing SCD's and lovenox, ambulation encouraged - pt up to ambulate frequently.

## 2017-10-07 NOTE — ED NOTES
"Assist RN: Pt up to BS with standby assist and steady gait. Pt states, \"I've been having issues with my feet.\" Pt sits in stool, with steady gait. Pt's mother then pushes pt to restroom without incident.  "

## 2017-10-07 NOTE — ED NOTES
"Chief Complaint   Patient presents with   • Medication Reaction   • Arm Injury     Right upper arm to distal hand coldness     [facility] infusion center with medication reaction to Tysabri infusion today.  Pt reports inability to move right arm with PIV in place after infusion. Right arm is cold and cap refill >3 seconds. +CMS radial pulse, hx of Raynaud's. Blood pressure 111/80, pulse 94, height 1.765 m (5' 9.5\"), weight 69 kg (152 lb 1.9 oz).      "

## 2017-10-07 NOTE — H&P
Internal Medicine Admitting History and Physical    Note Author: Herbert Youssef M.D.       Name Rosita Bowles 1990   Age/Sex 26 y.o. female   MRN [id number]   Code Status Full code     After 5PM or if no immediate response to page, please call for cross-coverage  Attending/Team: Dr. Mesa/ Maximo See Patient List for primary contact information  Call [phone] to page    1st Call - Day Intern (R1):   Dr. Vigil 2nd Call - Day Sr. Resident (R2/R3):   Dr. Navarro       Chief Complaint:  Right hand weakness    HPI:  Patient is a 26 year old female with multiple sclerosis who presents with new onset right arm weakness. Patient received her first infusion of tysabri ( natalizumab) today. Patient reports feeling warm and flushed about 10 minutes following the infusion. About an hour later she realized that she was having right hand weakness when the call button fell out of her hand. She reported being unable to make a fist / anything with her hand or move her hand. She denies any new numbness, vision changes, weakness in her other extremities, headache or eye pain.     Patient was diagnosed with multiple sclerosis in 2016. She regularly follows up with Dr. Garrett. She was taking tecfidera (dimethyl fumarate) till early September when her disease was found to be more aggressive and she was recommended tysabri infusions. Patient's symptoms from MS include loss of vision in the periphery of her left eye and blurry vision. She reports worsening of vision problems since she stopped tecfidera. She also reports tingling and intermittent bilateral weakness in her legs since 1 week. Patient has had 5 steroid treatments since her diagnosis. She last received steroids in September ( -09/10).      Review of Systems   Constitutional: Negative for chills, fever and malaise/fatigue.   HENT: Negative for congestion, hearing loss and sore throat.    Eyes: Positive for blurred vision. Negative  for double vision, photophobia and pain.   Respiratory: Negative for cough, sputum production and shortness of breath.    Cardiovascular: Negative for chest pain, palpitations and leg swelling.   Gastrointestinal: Negative for abdominal pain, diarrhea, nausea and vomiting.   Genitourinary: Negative for dysuria and urgency.   Musculoskeletal: Negative for back pain, myalgias and neck pain.   Skin: Negative for itching and rash.   Neurological: Positive for sensory change, focal weakness and weakness. Negative for dizziness, tingling, speech change and headaches.   Endo/Heme/Allergies: Negative for environmental allergies. Does not bruise/bleed easily.   Psychiatric/Behavioral: Positive for depression. Negative for hallucinations, substance abuse and suicidal ideas.             Past Medical History:   Past Medical History:   Diagnosis Date   • Acne vulgaris 6/23/2017   • Depression 8/7/2012   • Acne 8/7/2012   • ADHD (attention deficit hyperactivity disorder) 8/7/2012   • MS (multiple sclerosis) (CMS-[facility])    • Muscle disorder        Past Surgical History:  Past Surgical History:   Procedure Laterality Date   • ANKLE ORIF     • DENTAL EXTRACTION(S)         Current Outpatient Medications:  Home Medications     Reviewed by Enrique Jose (Pharmacy Tech) on 10/06/17 at 2026  Med List Status: Complete   Medication Last Dose Status   alprazolam (XANAX) 0.25 MG Tab 10/6/2017 Active   buPROPion (WELLBUTRIN XL) 300 MG XL tablet 10/6/2017 Active   Cholecalciferol (VITAMIN D3) 5000 units Tab 10/6/2017 Active   Cyanocobalamin (VITAMIN B-12) 1000 MCG Tab 10/6/2017 Active   Levonorgestrel (KYLEENA) 19.5 MG IUD using Active   methylphenidate (CONCERTA) 54 MG CR tablet 10/6/2017 Active   Mouthwashes (BIOTENE DRY MOUTH MT) 10/6/2017 Active   natalizumab (TYSABRI) 300 MG/15ML Conc 10/6/2017 Active   valacyclovir (VALTREX) 1 GM Tab 10/6/2017 Active                Medication Allergy/Sensitivities:  Allergies   Allergen Reactions   •  "Nkda [No Known Drug Allergy]          Family History:  Family History   Problem Relation Age of Onset   • Thyroid Mother    • Thyroid Brother    • Cancer Neg Hx    • Diabetes Neg Hx        Social History:  Social History     Social History   • Marital status: Single     Spouse name: N/A   • Number of children: N/A   • Years of education: N/A     Occupational History   • Not on file.     Social History Main Topics   • Smoking status: Never Smoker   • Smokeless tobacco: Never Used   • Alcohol use 0.0 oz/week      Comment: occasional   • Drug use: No   • Sexual activity: Yes     Partners: Male     Birth control/ protection: Pill      Comment: OCP     Other Topics Concern   • Not on file     Social History Narrative    2013: BF in [place]. Attends [employer].    2014: was living in [place]. Now back in [place].     2014: working in [employer]. BF broke up with her Sept. No friends in [employer]     2015: working 2 jobs. Has BF.      Living situation: Lives by herself  PCP : Manuela Green M.D.        Physical Exam     Vitals:    10/06/17 1930 10/06/17 2030 10/06/17 2100 10/06/17 2200   BP:       Pulse: 91 81 80 91   SpO2: 97% 94% 97% 98%   Weight:       Height:         Body mass index is 22.14 kg/m².  /80   Pulse 91   Ht 1.765 m (5' 9.5\")   Wt 69 kg (152 lb 1.9 oz)   SpO2 98%   BMI 22.14 kg/m²   O2 therapy: Pulse Oximetry: 98 %, O2 Delivery: None (Room Air)    Physical Exam   Constitutional: She is oriented to person, place, and time and well-developed, well-nourished, and in no distress.   HENT:   Head: Normocephalic and atraumatic.   Mouth/Throat: No oropharyngeal exudate.   Eyes: EOM are normal. Pupils are equal, round, and reactive to light.   Neck: Normal range of motion. Neck supple.   Cardiovascular: Normal rate, regular rhythm and normal heart sounds.    No murmur heard.  Pulmonary/Chest: Effort normal and breath sounds normal. No respiratory distress. She has no wheezes. She has no rales.   Abdominal: " Soft. Bowel sounds are normal. She exhibits no distension. There is no tenderness.   Musculoskeletal: She exhibits no edema or deformity.   Lymphadenopathy:     She has no cervical adenopathy.   Neurological: She is alert and oriented to person, place, and time. She has normal reflexes. A cranial nerve deficit is present. Coordination normal.   Optic nerve- left - superior nasal quadrant defect noted. Right- normal  All other cranial nerves are grossly intact    Strength:   Right hand- Reduced  strength   Left upper and lower extremity- 5/5 strength   Skin: No rash noted. No erythema.   Psychiatric: Mood, memory, affect and judgment normal.             Data Review       Old Records Request:   Completed  Current Records review and summary: Completed    Lab Data Review:  Recent Results (from the past 24 hour(s))   CBC WITH DIFFERENTIAL    Collection Time: 10/06/17  6:32 PM   Result Value Ref Range    WBC 5.2 4.8 - 10.8 K/uL    RBC 4.48 4.20 - 5.40 M/uL    Hemoglobin 12.9 12.0 - 16.0 g/dL    Hematocrit 38.7 37.0 - 47.0 %    MCV 86.4 81.4 - 97.8 fL    MCH 28.8 27.0 - 33.0 pg    MCHC 33.3 (L) 33.6 - 35.0 g/dL    RDW 44.1 35.9 - 50.0 fL    Platelet Count 281 164 - 446 K/uL    MPV 9.3 9.0 - 12.9 fL    Neutrophils-Polys 43.10 (L) 44.00 - 72.00 %    Lymphocytes 41.20 (H) 22.00 - 41.00 %    Monocytes 10.40 0.00 - 13.40 %    Eosinophils 4.10 0.00 - 6.90 %    Basophils 1.00 0.00 - 1.80 %    Immature Granulocytes 0.20 0.00 - 0.90 %    Nucleated RBC 0.00 /100 WBC    Neutrophils (Absolute) 2.23 2.00 - 7.15 K/uL    Lymphs (Absolute) 2.13 1.00 - 4.80 K/uL    Monos (Absolute) 0.54 0.00 - 0.85 K/uL    Eos (Absolute) 0.21 0.00 - 0.51 K/uL    Baso (Absolute) 0.05 0.00 - 0.12 K/uL    Immature Granulocytes (abs) 0.01 0.00 - 0.11 K/uL    NRBC (Absolute) 0.00 K/uL   COMP METABOLIC PANEL    Collection Time: 10/06/17  6:32 PM   Result Value Ref Range    Sodium 139 135 - 145 mmol/L    Potassium 3.6 3.6 - 5.5 mmol/L    Chloride 110 96 - 112  mmol/L    Co2 23 20 - 33 mmol/L    Anion Gap 6.0 0.0 - 11.9    Glucose 84 65 - 99 mg/dL    Bun 11 8 - 22 mg/dL    Creatinine 0.85 0.50 - 1.40 mg/dL    Calcium 8.8 8.5 - 10.5 mg/dL    AST(SGOT) 18 12 - 45 U/L    ALT(SGPT) 18 2 - 50 U/L    Alkaline Phosphatase 70 30 - 99 U/L    Total Bilirubin 0.3 0.1 - 1.5 mg/dL    Albumin 3.7 3.2 - 4.9 g/dL    Total Protein 5.9 (L) 6.0 - 8.2 g/dL    Globulin 2.2 1.9 - 3.5 g/dL    A-G Ratio 1.7 g/dL   ESTIMATED GFR    Collection Time: 10/06/17  6:32 PM   Result Value Ref Range    GFR If African American >60 >60 mL/min/1.73 m 2    GFR If Non African American >60 >60 mL/min/1.73 m 2   URINALYSIS CULTURE, IF INDICATED    Collection Time: 10/06/17  7:42 PM   Result Value Ref Range    Color Yellow     Character Clear     Specific Gravity 1.009 <1.035    Ph 6.5 5.0 - 8.0    Glucose Negative Negative mg/dL    Ketones Negative Negative mg/dL    Protein Negative Negative mg/dL    Bilirubin Negative Negative    Urobilinogen, Urine 0.2 Negative    Nitrite Negative Negative    Leukocyte Esterase Negative Negative    Occult Blood Negative Negative    Micro Urine Req see below     Culture Indicated No UA Culture   APTT    Collection Time: 10/06/17  8:20 PM   Result Value Ref Range    APTT 31.3 24.7 - 36.0 sec   PROTHROMBIN TIME    Collection Time: 10/06/17  8:20 PM   Result Value Ref Range    PT 14.6 12.0 - 14.6 sec    INR 1.10 0.87 - 1.13       Imaging/Procedures Review:    ndependant Imaging Review: Completed  DX-CHEST-2 VIEWS   Final Result      No active disease.      MR-BRAIN-WITH & W/O    (Results Pending)   MR-CERVICAL SPINE-WITH & W/O    (Results Pending)            EKG:   EKG Independant Review: Deferred  No indication for EKG     Records reviewed and summarized in current documentation             Assessment/Plan     * Multiple sclerosis exacerbation (CMS-HCC)   Assessment & Plan    - History of multiple sclerosis diagnosed in June 2016 with recent change of medication for active  demyelination findings on MRI. She just received her 1st infusion of natalizumab today.  - Patient's symptoms of right hand weakness is unlikely to be an adverse effect of the new medication, she has been having new weakness and tingling of lower extremities, she is most likely having an exacerbation of MS  - Admission labs and CXR WNL. Patient does not have any symptoms or signs of infection at this time  - As per Dr. Garrett (Neurology), started patient on methylprednisone 1g daily to be continued for at least 3 days, possibly 5  -  F/u   MRI brain and neck.  - Physical and occupational therapies.            Depression- (present on admission)   Assessment & Plan    - Resumed welbutrin            Anticipated Hospital stay:  >2 midnights        Quality Measures    Reviewed items::  Labs reviewed, Medications reviewed and Radiology images reviewed  Sandoval catheter::  No Sandoval  DVT prophylaxis pharmacological::  Enoxaparin (Lovenox)  Ulcer Prophylaxis::  Not indicated

## 2017-10-08 PROBLEM — F51.01 PRIMARY INSOMNIA: Status: ACTIVE | Noted: 2017-10-08

## 2017-10-08 LAB
ANION GAP SERPL CALC-SCNC: 7 MMOL/L (ref 0–11.9)
BASOPHILS # BLD AUTO: 0.2 % (ref 0–1.8)
BASOPHILS # BLD: 0.04 K/UL (ref 0–0.12)
BUN SERPL-MCNC: 16 MG/DL (ref 8–22)
CALCIUM SERPL-MCNC: 9.6 MG/DL (ref 8.5–10.5)
CHLORIDE SERPL-SCNC: 107 MMOL/L (ref 96–112)
CO2 SERPL-SCNC: 27 MMOL/L (ref 20–33)
CREAT SERPL-MCNC: 0.83 MG/DL (ref 0.5–1.4)
EOSINOPHIL # BLD AUTO: 0 K/UL (ref 0–0.51)
EOSINOPHIL NFR BLD: 0 % (ref 0–6.9)
ERYTHROCYTE [DISTWIDTH] IN BLOOD BY AUTOMATED COUNT: 45.6 FL (ref 35.9–50)
GFR SERPL CREATININE-BSD FRML MDRD: >60 ML/MIN/1.73 M 2
GLUCOSE SERPL-MCNC: 97 MG/DL (ref 65–99)
HCT VFR BLD AUTO: 38.4 % (ref 37–47)
HGB BLD-MCNC: 12.8 G/DL (ref 12–16)
IMM GRANULOCYTES # BLD AUTO: 0.12 K/UL (ref 0–0.11)
IMM GRANULOCYTES NFR BLD AUTO: 0.6 % (ref 0–0.9)
LYMPHOCYTES # BLD AUTO: 3.11 K/UL (ref 1–4.8)
LYMPHOCYTES NFR BLD: 14.4 % (ref 22–41)
MCH RBC QN AUTO: 28.7 PG (ref 27–33)
MCHC RBC AUTO-ENTMCNC: 33.3 G/DL (ref 33.6–35)
MCV RBC AUTO: 86.1 FL (ref 81.4–97.8)
MONOCYTES # BLD AUTO: 1.35 K/UL (ref 0–0.85)
MONOCYTES NFR BLD AUTO: 6.2 % (ref 0–13.4)
NEUTROPHILS # BLD AUTO: 17.03 K/UL (ref 2–7.15)
NEUTROPHILS NFR BLD: 78.6 % (ref 44–72)
NRBC # BLD AUTO: 0.07 K/UL
NRBC BLD AUTO-RTO: 0.3 /100 WBC
PLATELET # BLD AUTO: 330 K/UL (ref 164–446)
PMV BLD AUTO: 9.4 FL (ref 9–12.9)
POTASSIUM SERPL-SCNC: 3.6 MMOL/L (ref 3.6–5.5)
RBC # BLD AUTO: 4.46 M/UL (ref 4.2–5.4)
SODIUM SERPL-SCNC: 141 MMOL/L (ref 135–145)
WBC # BLD AUTO: 21.7 K/UL (ref 4.8–10.8)

## 2017-10-08 PROCEDURE — 770006 HCHG ROOM/CARE - MED/SURG/GYN SEMI*

## 2017-10-08 PROCEDURE — 700102 HCHG RX REV CODE 250 W/ 637 OVERRIDE(OP): Performed by: STUDENT IN AN ORGANIZED HEALTH CARE EDUCATION/TRAINING PROGRAM

## 2017-10-08 PROCEDURE — 700111 HCHG RX REV CODE 636 W/ 250 OVERRIDE (IP): Performed by: PSYCHIATRY & NEUROLOGY

## 2017-10-08 PROCEDURE — A9270 NON-COVERED ITEM OR SERVICE: HCPCS | Performed by: STUDENT IN AN ORGANIZED HEALTH CARE EDUCATION/TRAINING PROGRAM

## 2017-10-08 PROCEDURE — G8978 MOBILITY CURRENT STATUS: HCPCS | Mod: CJ

## 2017-10-08 PROCEDURE — 700105 HCHG RX REV CODE 258: Performed by: PSYCHIATRY & NEUROLOGY

## 2017-10-08 PROCEDURE — G8979 MOBILITY GOAL STATUS: HCPCS | Mod: CI

## 2017-10-08 PROCEDURE — 80048 BASIC METABOLIC PNL TOTAL CA: CPT

## 2017-10-08 PROCEDURE — 97161 PT EVAL LOW COMPLEX 20 MIN: CPT

## 2017-10-08 PROCEDURE — 99233 SBSQ HOSP IP/OBS HIGH 50: CPT | Mod: GC | Performed by: INTERNAL MEDICINE

## 2017-10-08 PROCEDURE — 700102 HCHG RX REV CODE 250 W/ 637 OVERRIDE(OP): Performed by: INTERNAL MEDICINE

## 2017-10-08 PROCEDURE — A9270 NON-COVERED ITEM OR SERVICE: HCPCS | Performed by: INTERNAL MEDICINE

## 2017-10-08 PROCEDURE — 85025 COMPLETE CBC W/AUTO DIFF WBC: CPT

## 2017-10-08 RX ORDER — METHYLPHENIDATE HYDROCHLORIDE 5 MG/1
15 TABLET ORAL 2 TIMES DAILY
Status: DISCONTINUED | OUTPATIENT
Start: 2017-10-08 | End: 2017-10-10 | Stop reason: HOSPADM

## 2017-10-08 RX ORDER — TRAZODONE HYDROCHLORIDE 50 MG/1
50 TABLET ORAL NIGHTLY PRN
Status: DISCONTINUED | OUTPATIENT
Start: 2017-10-08 | End: 2017-10-10 | Stop reason: HOSPADM

## 2017-10-08 RX ORDER — METHYLPHENIDATE HYDROCHLORIDE 5 MG/1
15 TABLET ORAL
Status: DISCONTINUED | OUTPATIENT
Start: 2017-10-08 | End: 2017-10-08

## 2017-10-08 RX ADMIN — METHYLPHENIDATE HYDROCHLORIDE 15 MG: 5 TABLET ORAL at 10:04

## 2017-10-08 RX ADMIN — CYANOCOBALAMIN TAB 500 MCG 1000 MCG: 500 TAB at 08:04

## 2017-10-08 RX ADMIN — STANDARDIZED SENNA CONCENTRATE AND DOCUSATE SODIUM 2 TABLET: 8.6; 5 TABLET, FILM COATED ORAL at 08:04

## 2017-10-08 RX ADMIN — METHYLPHENIDATE HYDROCHLORIDE 15 MG: 5 TABLET ORAL at 14:46

## 2017-10-08 RX ADMIN — TRAZODONE HYDROCHLORIDE 50 MG: 50 TABLET ORAL at 22:57

## 2017-10-08 RX ADMIN — BUPROPION HYDROCHLORIDE 300 MG: 150 TABLET, EXTENDED RELEASE ORAL at 08:04

## 2017-10-08 RX ADMIN — VALACYCLOVIR 500 MG: 500 TABLET, FILM COATED ORAL at 08:04

## 2017-10-08 RX ADMIN — VITAMIN D, TAB 1000IU (100/BT) 5000 UNITS: 25 TAB at 08:04

## 2017-10-08 RX ADMIN — SODIUM CHLORIDE 1000 MG: 9 INJECTION, SOLUTION INTRAVENOUS at 10:00

## 2017-10-08 ASSESSMENT — COGNITIVE AND FUNCTIONAL STATUS - GENERAL
DAILY ACTIVITIY SCORE: 24
CLIMB 3 TO 5 STEPS WITH RAILING: A LITTLE
SUGGESTED CMS G CODE MODIFIER DAILY ACTIVITY: CH
SUGGESTED CMS G CODE MODIFIER MOBILITY: CJ
SUGGESTED CMS G CODE MODIFIER MOBILITY: CJ
CLIMB 3 TO 5 STEPS WITH RAILING: A LITTLE
SUGGESTED CMS G CODE MODIFIER MOBILITY: CJ
WALKING IN HOSPITAL ROOM: A LITTLE
MOBILITY SCORE: 22
DAILY ACTIVITIY SCORE: 24
MOBILITY SCORE: 22
WALKING IN HOSPITAL ROOM: A LITTLE
SUGGESTED CMS G CODE MODIFIER DAILY ACTIVITY: CH
WALKING IN HOSPITAL ROOM: A LITTLE
MOBILITY SCORE: 22
CLIMB 3 TO 5 STEPS WITH RAILING: A LITTLE

## 2017-10-08 ASSESSMENT — ENCOUNTER SYMPTOMS
DEPRESSION: 0
TREMORS: 0
SORE THROAT: 0
SENSORY CHANGE: 0
HEADACHES: 0
FOCAL WEAKNESS: 1
PHOTOPHOBIA: 0
FEVER: 0
POLYDIPSIA: 0
VOMITING: 0
DOUBLE VISION: 0
NAUSEA: 0
DIARRHEA: 0
EYE PAIN: 0
MYALGIAS: 0
CHILLS: 0
COUGH: 0
SHORTNESS OF BREATH: 0
SPEECH CHANGE: 0
ABDOMINAL PAIN: 0
BLURRED VISION: 0
PALPITATIONS: 0
BACK PAIN: 0
TINGLING: 1
WHEEZING: 0

## 2017-10-08 ASSESSMENT — GAIT ASSESSMENTS
GAIT LEVEL OF ASSIST: STAND BY ASSIST
DISTANCE (FEET): 25
ASSISTIVE DEVICE: FRONT WHEEL WALKER

## 2017-10-08 ASSESSMENT — PAIN SCALES - GENERAL
PAINLEVEL_OUTOF10: 0
PAINLEVEL_OUTOF10: 0

## 2017-10-08 NOTE — PROGRESS NOTES
VSS, afebrile. No c/o pain or nausea. Up to ambulate frequently. IV steroid dose given. MRI completed. Pt's mother to the bedside. Neuro checks unchanged. Pt assisted back to bed after having sudden numbness in BLE from knees down while ambulating at approx 1800. After resting, pt reports symptoms improving.  Bedside report given to ASHLI Guerrero

## 2017-10-08 NOTE — CARE PLAN
Problem: Safety  Goal: Will remain free from injury  Outcome: PROGRESSING AS EXPECTED  Pt alert and oriented, calling appropriately for assistance, steady to ambulate with walker

## 2017-10-08 NOTE — PROGRESS NOTES
"       Internal Medicine Interval Note  Note Author: Patricia Vigil M.D.     Name Rosita Bowles 1990   Age/Sex 26 y.o. female   MRN [id number]   Code Status FULL.     After 5PM or if no immediate response to page, please call for cross-coverage  Attending/Team: Dr. Noe/Maximo. See Patient List for primary contact information  Call [phone] to page    1st Call - Day Intern (R1):   Musa 2nd Call - Day Sr. Resident (R2/R3):   Ramon         Reason for interval visit  (Principal Problem)   Multiple sclerosis exacerbation (CMS-[facility])    Interval Problem Daily Status Update  (24 hours)   No acute events overnight.  This morning patient states right hand  strength/function is mildly improved from yesterday.  Reports continued tingling in her knees, which she attributes to MS.  She states she feels off balance (\"jelly legs\") when she attempts to walk, which is similar in course to her MS flare ups.  Denies changes in vision or weakness elsewhere.      Patient with MRI of brain and cervical spine today.  Results pending.      Review of Systems   Constitutional: Negative for chills and fever.   HENT: Negative for sore throat.    Eyes: Negative for blurred vision, double vision, photophobia and pain.   Respiratory: Negative for cough, shortness of breath and wheezing.    Cardiovascular: Negative for chest pain, palpitations and leg swelling.   Gastrointestinal: Negative for abdominal pain, diarrhea, nausea and vomiting.   Genitourinary: Negative for dysuria and urgency.   Musculoskeletal: Negative for back pain and myalgias.   Skin: Negative for itching and rash.   Neurological: Positive for tingling (bilateral knee joints) and focal weakness (right  strength). Negative for tremors, sensory change, speech change and headaches.   Endo/Heme/Allergies: Negative for environmental allergies and polydipsia.   Psychiatric/Behavioral: Negative for depression and suicidal ideas. "       Consultants/Specialty  Dr. Garrett, Neurology.      Disposition  Inpatient for IV methylprednisolone treatment for MS exacerbation, per Neurology.      Quality Measures    Reviewed items::  Labs reviewed, Medications reviewed and Radiology images reviewed  Sandoval catheter::  No Sandoval  DVT prophylaxis pharmacological::  Enoxaparin (Lovenox)          Physical Exam       Vitals:    10/07/17 0400 10/07/17 0700 10/07/17 1145 10/07/17 1530   BP: 112/65 107/66 113/74 115/75   Pulse: 88 92 75 98   Resp: 16 16 16 16   Temp: 36.3 °C (97.3 °F) 36.4 °C (97.5 °F) 36.8 °C (98.2 °F) 36.7 °C (98.1 °F)   SpO2: 98% 96% 97% 98%   Weight:       Height:         Body mass index is 22.14 kg/m². Weight: 69 kg (152 lb 1.9 oz)  Oxygen Therapy:  Pulse Oximetry: 98 %, O2 (LPM): 0, O2 Delivery: None (Room Air)    Physical Exam   Constitutional: She is oriented to person, place, and time.   Lying in bed, in no apparent distress.    HENT:   Mouth/Throat: Oropharynx is clear and moist. No oropharyngeal exudate.   Eyes: EOM are normal. Pupils are equal, round, and reactive to light. No scleral icterus.   Neck: No JVD present.   Cardiovascular: Normal rate, regular rhythm, normal heart sounds and intact distal pulses.    No murmur heard.  Pulmonary/Chest: Effort normal and breath sounds normal. No respiratory distress. She has no wheezes. She has no rales.   Abdominal: Soft. Bowel sounds are normal. She exhibits no distension. There is no tenderness. There is no rebound.   Musculoskeletal: She exhibits no edema or tenderness.   Lymphadenopathy:     She has no cervical adenopathy.   Neurological: She is alert and oriented to person, place, and time.   Optic nerve:   - Left side: superior nasal quadrant deficit present.    - Right side: normal.   All other cranial nerves grossly intact.   Right hand strength: reduced  strength.   Remainder of right upper and lower extremity strength: +5/5.    Left upper and lower extremity strength:  +5/5.  Sensation intact throughout.   Skin: Skin is warm and dry. No rash noted. She is not diaphoretic. No erythema.   Psychiatric: Mood, memory, affect and judgment normal.   Nursing note and vitals reviewed.        Lab Data Review:         10/7/2017  5:04 PM    Recent Labs      10/06/17   1832   SODIUM  139   POTASSIUM  3.6   CHLORIDE  110   CO2  23   BUN  11   CREATININE  0.85   CALCIUM  8.8       Recent Labs      10/06/17   1832   ALTSGPT  18   ASTSGOT  18   ALKPHOSPHAT  70   TBILIRUBIN  0.3   GLUCOSE  84       Recent Labs      10/06/17   1832  10/06/17   2020   RBC  4.48   --    HEMOGLOBIN  12.9   --    HEMATOCRIT  38.7   --    PLATELETCT  281   --    PROTHROMBTM   --   14.6   APTT   --   31.3   INR   --   1.10       Recent Labs      10/06/17   1832   WBC  5.2   NEUTSPOLYS  43.10*   LYMPHOCYTES  41.20*   MONOCYTES  10.40   EOSINOPHILS  4.10   BASOPHILS  1.00   ASTSGOT  18   ALTSGPT  18   ALKPHOSPHAT  70   TBILIRUBIN  0.3           Assessment/Plan     * Multiple sclerosis exacerbation (CMS-[facility])   Assessment & Plan    - History of multiple sclerosis diagnosed in June 2016 with recent change of medication for active demyelination findings on MRI.  She just received her first infusion of natalizumab 10/6.  - Patient's symptoms of right hand weakness is unlikely to be an adverse effect of the new medication.  She has been having new weakness and tingling of lower extremities.  Most likely having an exacerbation of MS.  - Admission labs and CXR WNL.  Patient does not have any signs or symptoms of infection at this time.   - Per Dr. Garrett, Neurology, patient started on IV methylprednisone 1g daily to be continued for at least 3 days, possibly 5.   -  F/u  MRI brain and cervical spine.   - Continue physical and occupational therapies.        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    - Started patient's home  valacyclovir 500 mg twice daily PRN.        ADHD- (present on admission)   Assessment & Plan    - Patient on methylphenidate 15 mg twice a day on Sun-Fri.          Depression- (present on admission)   Assessment & Plan    - Resumed home Wellbutrin.            Patricia Vigil MD, PGY-1  [phone]

## 2017-10-08 NOTE — PROGRESS NOTES
Caroline Nuñez Fall Risk Assessment:     Last Known Fall: No falls  Mobility: Dizziness/generalized weakness  Medications: No meds  Mental Status/LOC/Awareness: Awake, alert, and oriented to date, place, and person  Toileting Needs: No needs  Volume/Electrolyte Status: No problems  Communication/Sensory: No deficits  Behavior: Appropriate behavior  Caroline Nuñez Fall Risk Total: 2  Fall Risk Level: NO RISK    Universal Fall Precautions:  call light/belongings in reach, bed in low position and locked, wheelchairs and assistive devices out of sight, siderails up x 2, use non-slip footwear, adequate lighting, clutter free and spill free environment, educate on level of risk, educate to call for assistance    Fall Risk Level Interventions:          Patient Specific Interventions:     Medication: review medications with patient and family  Mental Status/LOC/Awareness: reinforce falls education, encourage family to stay with patient, check on patient hourly, utilize bed/chair fall alarm and reinforce the use of call light  Toileting: provide frquent toileting and monitor intake and output/use of appropriate interventions  Volume/Electrolyte Status: ensure patient remains hydrated  Communication/Sensory: update plan of care on whiteboard  Behavioral: encourage patient to voice feelings, administer medication as ordered and instruct/reinforce fall program rationale  Mobility: utilize bed/chair fall alarm, ensure bed is locked and in lowest position and provide appropriate assistive device

## 2017-10-08 NOTE — PROGRESS NOTES
Caroline Nuñez Fall Risk Assessment:     Last Known Fall: No falls  Mobility: Use of assistive device/requires assist of two people  Medications: No meds  Mental Status/LOC/Awareness: Awake, alert, and oriented to date, place, and person  Toileting Needs: No needs  Volume/Electrolyte Status: No problems  Communication/Sensory: No deficits  Behavior: Appropriate behavior  Caroline Nuñez Fall Risk Total: 4  Fall Risk Level: NO RISK    Universal Fall Precautions:  call light/belongings in reach, bed in low position and locked, wheelchairs and assistive devices out of sight, siderails up x 2, use non-slip footwear, adequate lighting, clutter free and spill free environment, educate on level of risk, educate to call for assistance    Fall Risk Level Interventions:          Patient Specific Interventions:     Medication: review medications with patient and family  Mental Status/LOC/Awareness: not applicable  Toileting: instruct patient/family on the use of grab bars and instruct patient/family on the need to call for assistance when toileting  Volume/Electrolyte Status: not applicable  Communication/Sensory: update plan of care on whiteboard  Behavioral: not applicable  Mobility: ensure bed is locked and in lowest position and provide appropriate assistive device

## 2017-10-08 NOTE — ASSESSMENT & PLAN NOTE
- Patient feels prodromal symptoms of cold sores.    - Started patient's home valacyclovir 500 mg twice daily PRN.

## 2017-10-08 NOTE — CARE PLAN
Problem: Discharge Barriers/Planning  Goal: Patient's continuum of care needs will be met  Outcome: PROGRESSING AS EXPECTED  Pt agreeable to IVPB steroid plan

## 2017-10-09 ENCOUNTER — APPOINTMENT (OUTPATIENT)
Dept: RADIOLOGY | Facility: MEDICAL CENTER | Age: 27
DRG: 060 | End: 2017-10-09
Attending: STUDENT IN AN ORGANIZED HEALTH CARE EDUCATION/TRAINING PROGRAM
Payer: COMMERCIAL

## 2017-10-09 LAB — EKG IMPRESSION: NORMAL

## 2017-10-09 PROCEDURE — 72158 MRI LUMBAR SPINE W/O & W/DYE: CPT

## 2017-10-09 PROCEDURE — 93005 ELECTROCARDIOGRAM TRACING: CPT | Performed by: INTERNAL MEDICINE

## 2017-10-09 PROCEDURE — 700105 HCHG RX REV CODE 258: Performed by: PSYCHIATRY & NEUROLOGY

## 2017-10-09 PROCEDURE — 700117 HCHG RX CONTRAST REV CODE 255: Performed by: STUDENT IN AN ORGANIZED HEALTH CARE EDUCATION/TRAINING PROGRAM

## 2017-10-09 PROCEDURE — 93010 ELECTROCARDIOGRAM REPORT: CPT | Performed by: INTERNAL MEDICINE

## 2017-10-09 PROCEDURE — 700102 HCHG RX REV CODE 250 W/ 637 OVERRIDE(OP): Performed by: STUDENT IN AN ORGANIZED HEALTH CARE EDUCATION/TRAINING PROGRAM

## 2017-10-09 PROCEDURE — 770006 HCHG ROOM/CARE - MED/SURG/GYN SEMI*

## 2017-10-09 PROCEDURE — G8988 SELF CARE GOAL STATUS: HCPCS | Mod: CI

## 2017-10-09 PROCEDURE — 99233 SBSQ HOSP IP/OBS HIGH 50: CPT | Performed by: INTERNAL MEDICINE

## 2017-10-09 PROCEDURE — A9270 NON-COVERED ITEM OR SERVICE: HCPCS | Performed by: INTERNAL MEDICINE

## 2017-10-09 PROCEDURE — 700102 HCHG RX REV CODE 250 W/ 637 OVERRIDE(OP): Performed by: INTERNAL MEDICINE

## 2017-10-09 PROCEDURE — A9270 NON-COVERED ITEM OR SERVICE: HCPCS | Performed by: STUDENT IN AN ORGANIZED HEALTH CARE EDUCATION/TRAINING PROGRAM

## 2017-10-09 PROCEDURE — A9579 GAD-BASE MR CONTRAST NOS,1ML: HCPCS | Performed by: STUDENT IN AN ORGANIZED HEALTH CARE EDUCATION/TRAINING PROGRAM

## 2017-10-09 PROCEDURE — 700111 HCHG RX REV CODE 636 W/ 250 OVERRIDE (IP)

## 2017-10-09 PROCEDURE — 700111 HCHG RX REV CODE 636 W/ 250 OVERRIDE (IP): Performed by: PSYCHIATRY & NEUROLOGY

## 2017-10-09 PROCEDURE — 97166 OT EVAL MOD COMPLEX 45 MIN: CPT

## 2017-10-09 PROCEDURE — 72157 MRI CHEST SPINE W/O & W/DYE: CPT

## 2017-10-09 PROCEDURE — G8987 SELF CARE CURRENT STATUS: HCPCS | Mod: CJ

## 2017-10-09 RX ORDER — ONDANSETRON 2 MG/ML
INJECTION INTRAMUSCULAR; INTRAVENOUS
Status: COMPLETED
Start: 2017-10-09 | End: 2017-10-09

## 2017-10-09 RX ORDER — ONDANSETRON 2 MG/ML
4 INJECTION INTRAMUSCULAR; INTRAVENOUS EVERY 4 HOURS PRN
Status: DISCONTINUED | OUTPATIENT
Start: 2017-10-09 | End: 2017-10-10 | Stop reason: HOSPADM

## 2017-10-09 RX ADMIN — ONDANSETRON 4 MG: 2 INJECTION INTRAMUSCULAR; INTRAVENOUS at 13:57

## 2017-10-09 RX ADMIN — SODIUM CHLORIDE 1000 MG: 9 INJECTION, SOLUTION INTRAVENOUS at 09:25

## 2017-10-09 RX ADMIN — BUPROPION HYDROCHLORIDE 300 MG: 150 TABLET, EXTENDED RELEASE ORAL at 09:25

## 2017-10-09 RX ADMIN — METHYLPHENIDATE HYDROCHLORIDE 15 MG: 5 TABLET ORAL at 14:01

## 2017-10-09 RX ADMIN — CYANOCOBALAMIN TAB 500 MCG 1000 MCG: 500 TAB at 09:25

## 2017-10-09 RX ADMIN — TRAZODONE HYDROCHLORIDE 50 MG: 50 TABLET ORAL at 23:33

## 2017-10-09 RX ADMIN — GADODIAMIDE 15 ML: 287 INJECTION INTRAVENOUS at 15:08

## 2017-10-09 RX ADMIN — VITAMIN D, TAB 1000IU (100/BT) 5000 UNITS: 25 TAB at 09:25

## 2017-10-09 RX ADMIN — METHYLPHENIDATE HYDROCHLORIDE 15 MG: 5 TABLET ORAL at 09:25

## 2017-10-09 ASSESSMENT — COGNITIVE AND FUNCTIONAL STATUS - GENERAL
DAILY ACTIVITIY SCORE: 21
MOBILITY SCORE: 22
SUGGESTED CMS G CODE MODIFIER DAILY ACTIVITY: CJ
SUGGESTED CMS G CODE MODIFIER MOBILITY: CJ
DRESSING REGULAR LOWER BODY CLOTHING: A LITTLE
DAILY ACTIVITIY SCORE: 24
TOILETING: A LITTLE
HELP NEEDED FOR BATHING: A LITTLE
SUGGESTED CMS G CODE MODIFIER DAILY ACTIVITY: CH
WALKING IN HOSPITAL ROOM: A LITTLE
CLIMB 3 TO 5 STEPS WITH RAILING: A LITTLE

## 2017-10-09 ASSESSMENT — ENCOUNTER SYMPTOMS
DEPRESSION: 0
TINGLING: 0
SPUTUM PRODUCTION: 0
FALLS: 0
INSOMNIA: 1
DOUBLE VISION: 0
PALPITATIONS: 0
SHORTNESS OF BREATH: 0
FLANK PAIN: 0
MYALGIAS: 0
ABDOMINAL PAIN: 0
NAUSEA: 1
SENSORY CHANGE: 0
PHOTOPHOBIA: 0
FOCAL WEAKNESS: 1
DIZZINESS: 0
DIAPHORESIS: 0
VOMITING: 0
EYE PAIN: 0
SORE THROAT: 0
TREMORS: 0
HEADACHES: 0
DIARRHEA: 0
WHEEZING: 0
FEVER: 0
COUGH: 0
POLYDIPSIA: 0
CHILLS: 0

## 2017-10-09 ASSESSMENT — PAIN SCALES - GENERAL
PAINLEVEL_OUTOF10: 0
PAINLEVEL_OUTOF10: 0

## 2017-10-09 ASSESSMENT — LIFESTYLE VARIABLES: SUBSTANCE_ABUSE: 0

## 2017-10-09 ASSESSMENT — ACTIVITIES OF DAILY LIVING (ADL): TOILETING: INDEPENDENT

## 2017-10-09 NOTE — THERAPY
"Occupational Therapy Evaluation completed.   Functional Status:  SBA supine>sit EOB, CGA sit>Stand walking w/fww and CGA c/o BLE feeling \"heavy and weak\" RUE w/continued gross motor delay and weakness, however pt reports it is improving, txf to chair after walking in room w/fww w/close SBA. Provided education on energy conservation and monitoring fatigue related to MS management and exacerbation. 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   Plan of Care: Will benefit from Occupational Therapy 5 times per week  Discharge Recommendations:  Equipment: Front-Wheel Walker, Wheelchair and Will Continue to Assess for Equipment Needs. Post-acute therapy Discharge to home with outpatient or home health for additional skilled therapy services.    26 yr old female admitted for R arm weakness after receiving Tysabri infusion, pt has a hx of MS, pt reports her previous exacerbations have consisted of vision changes, but has never previously has weakness or incoordination. At present pt presents w/generalized weakness sandhya in BLE and RUE, delay w/gross motor coordination w/pts R-dominant UE. Pt is currently receiving IV steroids and does report steady improvement in her symptoms. Pt is very motivated, and did express frequently \"over doing it\" and that she is a \"pusher\" Initiated educated re: MS exacerbations and the concept of energy conservation will need reinforcement. At present pt reports she can d/c home w/family and recommend HH as long as mobility and ADL participation continues to progress.     See \"Rehab Therapy-Acute\" Patient Summary Report for complete documentation.    "

## 2017-10-09 NOTE — PROGRESS NOTES
Internal Medicine Interval Note  Note Author: Patricia Vigil M.D.     Name Rosita Bowles 1990   Age/Sex 26 y.o. female   MRN [id number]   Code Status FULL.     After 5PM or if no immediate response to page, please call for cross-coverage  Attending/Team: Dr. Noe/Maximo. See Patient List for primary contact information  Call [phone] to page    1st Call - Day Intern (R1):   Musa 2nd Call - Day Sr. Resident (R2/R3):   Ramon         Reason for interval visit  (Principal Problem)   Multiple sclerosis exacerbation (CMS-[facility])    Interval Problem Daily Status Update  (24 hours)   No acute events overnight.  Patient's right hand  strength is significantly improved this morning, nearly at baseline.  Reports continued tingling/weakness in bilateral lower extremities, though improved.  Walking with PT.  Denies changes in vision or weakness elsewhere.        Review of Systems   Constitutional: Negative for chills and fever.   HENT: Negative for sore throat.    Eyes: Negative for blurred vision, double vision, photophobia and pain.   Respiratory: Negative for cough, shortness of breath and wheezing.    Cardiovascular: Negative for chest pain, palpitations and leg swelling.   Gastrointestinal: Negative for abdominal pain, diarrhea, nausea and vomiting.   Genitourinary: Negative for dysuria and urgency.   Musculoskeletal: Negative for back pain and myalgias.   Skin: Negative for itching and rash.   Neurological: Positive for tingling (bilateral knee joints, improved) and focal weakness (right  strength, significantly improved from yesterday; nearly at baseline). Negative for tremors, sensory change, speech change and headaches.        Reports bilateral lower extremity weakness.    Endo/Heme/Allergies: Negative for environmental allergies and polydipsia.   Psychiatric/Behavioral: Negative for depression and suicidal ideas.       Consultants/Specialty  Dr. Garrett, Neurology.       Disposition  Inpatient for IV methylprednisolone treatment for MS exacerbation, per Neurology.      Quality Measures    Reviewed items::  Labs reviewed, Medications reviewed and Radiology images reviewed  Sandoval catheter::  No Sandoval  DVT prophylaxis pharmacological::  Enoxaparin (Lovenox)          Physical Exam       Vitals:    10/07/17 2000 10/08/17 0400 10/08/17 0800 10/08/17 1600   BP: 122/63 (!) 95/59 105/59 108/72   Pulse: 94 69 74 (!) 104   Resp: 16 16 16 12   Temp: 36.8 °C (98.2 °F) 36.6 °C (97.9 °F) 36.4 °C (97.6 °F) 36.5 °C (97.7 °F)   SpO2: 96% 97% 99% 96%   Weight:       Height:         Body mass index is 22.14 kg/m².    Oxygen Therapy:  Pulse Oximetry: 96 %, O2 (LPM): 0, O2 Delivery: None (Room Air)    Physical Exam   Constitutional: She is oriented to person, place, and time.   Lying in bed, in no apparent distress.    HENT:   Mouth/Throat: Oropharynx is clear and moist. No oropharyngeal exudate.   Eyes: EOM are normal. Pupils are equal, round, and reactive to light. No scleral icterus.   Neck: No JVD present.   Cardiovascular: Normal rate, regular rhythm, normal heart sounds and intact distal pulses.    No murmur heard.  Pulmonary/Chest: Effort normal and breath sounds normal. No respiratory distress. She has no wheezes. She has no rales.   Abdominal: Soft. Bowel sounds are normal. She exhibits no distension. There is no tenderness. There is no rebound.   Musculoskeletal: She exhibits no edema or tenderness.   Lymphadenopathy:     She has no cervical adenopathy.   Neurological: She is alert and oriented to person, place, and time.   Optic nerve:   - Left side: superior nasal quadrant deficit present; unchanged from yesterday.    - Right side: normal.   All other cranial nerves grossly intact.   Right hand strength:  strength 4+/5; significantly improved from yesterday - nearly at baseline.   Remainder of right upper and lower extremity strength: +5/5; unchanged.   Left upper and lower extremity  strength: +5/5; unchanged.  Sensation intact throughout; unchanged.    Skin: Skin is warm and dry. No rash noted. She is not diaphoretic. No erythema.   Psychiatric: Mood, memory, affect and judgment normal.   Nursing note and vitals reviewed.        Lab Data Review:         10/7/2017  5:04 PM    Recent Labs      10/06/17   1832  10/08/17   1130   SODIUM  139  141   POTASSIUM  3.6  3.6   CHLORIDE  110  107   CO2  23  27   BUN  11  16   CREATININE  0.85  0.83   CALCIUM  8.8  9.6       Recent Labs      10/06/17   1832  10/08/17   1130   ALTSGPT  18   --    ASTSGOT  18   --    ALKPHOSPHAT  70   --    TBILIRUBIN  0.3   --    GLUCOSE  84  97       Recent Labs      10/06/17   1832  10/06/17   2020  10/08/17   1145   RBC  4.48   --   4.46   HEMOGLOBIN  12.9   --   12.8   HEMATOCRIT  38.7   --   38.4   PLATELETCT  281   --   330   PROTHROMBTM   --   14.6   --    APTT   --   31.3   --    INR   --   1.10   --        Recent Labs      10/06/17   1832  10/08/17   1145   WBC  5.2  21.7*   NEUTSPOLYS  43.10*  78.60*   LYMPHOCYTES  41.20*  14.40*   MONOCYTES  10.40  6.20   EOSINOPHILS  4.10  0.00   BASOPHILS  1.00  0.20   ASTSGOT  18   --    ALTSGPT  18   --    ALKPHOSPHAT  70   --    TBILIRUBIN  0.3   --      MR-CERVICAL SPINE-WITH & W/O   Final Result      1.  Degenerative disc disease most notable for C6-C7 disc/osteophyte complex with mild central stenosis.   2.  No myelopathic cord signal abnormality at any cervical level. No appreciable demyelinating lesions.      MR-BRAIN-WITH & W/O   Final Result      1.  Multiple white matter lesions primarily in the left cerebral hemisphere. Findings would be compatible with demyelinating disease, however, the findings of volume loss with ventricular dilatation and asymmetric preference for the left hemisphere are    peculiar and entities mimicking demyelinating disease/multiple sclerosis such as autoimmune vasculopathy, Lyme disease, neuromyelitis optica spectrum disorder, or even Susac  disease might be considered.   2.  Interval resolution of enhancement of 2 small lesions in the left anterior frontal deep white matter and left posterior frontal deep white matter respectively. No new lesions or new enhancing lesions.   3.  Concerning therapy with natalizumab, there is no evidence of progressive multifocal leukoencephalopathy (PML) at this time.      DX-CHEST-2 VIEWS   Final Result      No active disease.      MR-LUMBAR SPINE-WITH & W/O    (Results Pending)   MR-THORACIC SPINE-WITH & W/O    (Results Pending)           Assessment/Plan     * Multiple sclerosis exacerbation (CMS-[facility])- (present on admission)   Assessment & Plan    - History of multiple sclerosis diagnosed in June 2016 with recent change of medication for active demyelination findings on MRI.  She just received her first infusion of natalizumab 10/6.  - Patient's symptoms of right hand weakness is unlikely to be an adverse effect of the new medication.  She has been having new weakness and tingling of lower extremities.  Most likely having an exacerbation of MS.  - Admission labs and CXR WNL.  Patient does not have any signs or symptoms of infection at this time.   - Per Dr. Garrett, Neurology, patient started on IV methylprednisone 1g daily to be continued for at least 3 days, possibly 5.   - 10/7 MRI brain and cervical spine done.    -  F/u  MRI lumbar and thoracic spine.   - Continue physical and occupational therapies.        Primary insomnia- (present on admission)   Assessment & Plan    - On trazodone 50 mg nightly PRN.         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    - Started patient's home valacyclovir 500 mg twice daily PRN.         ADHD- (present on admission)   Assessment & Plan    - Patient on methylphenidate IR 15 mg twice a day.          Depression- (present on admission)   Assessment & Plan    - Resumed home Wellbutrin.              Patricia Vigil MD, PGY-1  [phone]

## 2017-10-09 NOTE — PROGRESS NOTES
[facility] INTERNAL MEDICINE ATTENDING NOTE:      Date & Time note created:   10/9/2017   3:31 PM     Visit Time:   Attending/resident bedside rounds 9-11:30 AM     The patient was evaluated with the resident staff. I reviewed the resident's note and agree with the resident's findings and plan as documented in the resident's note except as documented in the attending note. Please reference resident daily note for complete information.The chart was reviewed and summarized.  Available labs, imaging, O2 sats, EKGs were reviewed. Available nursing, consultant, and resident notes were reviewed. I am actively involved in the patient's care.                                                                BRIEF DISCUSSION:                                                         26 y/f presenting with monoparesis. She has h/o MS. Is on Steroid and has started to feel better. MRI brain and C spine no active or new lesion. Suspect Pseudo flare. No evidence of infection os far. ? Herpes recurrence was  the precipitator. Don't see any herpetic lesion at this time so unclear on the etiology.     Plan: continue to follow neuro recs.     Active Hospital Problems    Diagnosis   • Multiple sclerosis exacerbation (CMS-HCC) [G35]     Priority: High   • Primary insomnia [F51.01]   • HSV-1 (herpes simplex virus 1) infection [B00.9]   • Depression [F32.9]   • ADHD [F90.9]       Vitals:    10/08/17 2000 10/09/17 0400 10/09/17 0800 10/09/17 0953   BP: 121/62 101/50 105/54 119/66   Pulse: 95 62 72 74   Resp: 18 18 16 18   Temp: 36.8 °C (98.2 °F) 36.3 °C (97.4 °F) 36.8 °C (98.3 °F) 36.8 °C (98.3 °F)   SpO2: 100% 97% 97% 100%   Weight:       Height:         Weight/BMI: Body mass index is 22.14 kg/m².  Pulse Oximetry: 100 %, O2 (LPM): 0, O2 Delivery: None (Room Air)  No intake or output data in the 24 hours ending 10/09/17 0541    Catalina Sanderson MD   [employer]ist

## 2017-10-09 NOTE — THERAPY
"25 y/o female adm for RUE dx: MS alonso. Pt reports she was ambulating the halls using a fww  yesterday with her mother and there came a time that she couldn't walk anymore. Pt education regarding activity progression and  fatigue prevention.  Pt not stair trained today due to reports of fatigue drom yesterrday's activity. Acute PT to address funcitonal mobility deficits for pt to DC home safely.    Physical Therapy Evaluation completed.   Bed Mobility:  Supine to Sit: Independent  Transfers: Sit to Stand: Supervised  Gait: Level Of Assist: Stand by Assist with Front-Wheel Walker       Plan of Care: Will benefit from Physical Therapy 3 times per week  Discharge Recommendations: Equipment: Front-Wheel Walker. Post-acute therapy Discharge to home with outpatient or home health for additional skilled therapy services.    See \"Rehab Therapy-Acute\" Patient Summary Report for complete documentation.     "

## 2017-10-09 NOTE — PROGRESS NOTES
Caroline Nuñez Fall Risk Assessment:     Last Known Fall: No falls  Mobility: Use of assistive device/requires assist of two people  Medications: No meds  Mental Status/LOC/Awareness: Awake, alert, and oriented to date, place, and person  Toileting Needs: No needs  Volume/Electrolyte Status: No problems  Communication/Sensory: No deficits  Behavior: Appropriate behavior  Caroline Nuñez Fall Risk Total: 4  Fall Risk Level: NO RISK    Universal Fall Precautions:  bed in low position and locked, call light/belongings in reach, wheelchairs and assistive devices out of sight, siderails up x 2, use non-slip footwear, adequate lighting, clutter free and spill free environment, educate on level of risk, educate to call for assistance    Fall Risk Level Interventions:          Patient Specific Interventions:     Medication: review medications with patient and family  Mental Status/LOC/Awareness: reinforce falls education, check on patient hourly, utilize bed/chair fall alarm and reinforce the use of call light  Toileting: provide frquent toileting and monitor intake and output/use of appropriate interventions  Volume/Electrolyte Status: ensure patient remains hydrated  Communication/Sensory: update plan of care on whiteboard  Behavioral: encourage patient to voice feelings, administer medication as ordered and instruct/reinforce fall program rationale  Mobility: dangle prior to standing, utilize bed/chair fall alarm and ensure bed is locked and in lowest position

## 2017-10-09 NOTE — DISCHARGE SUMMARY
[facility] Internal Medicine Discharge Summary      Admit Date:  10/6/2017       Discharge Date:   10/10/17    Service:   [facility] Internal Medicine Red Team  Attending Physician(s):   Dr. Sanderson       Senior Resident(s):    CARTER Bennett  Titus Resident(s):   Dr. Vigil    Diagnoses:                Principal Problem:    Multiple sclerosis exacerbation (CMS-HCC) POA: Yes  Active Problems:    Depression POA: Yes    ADHD POA: Yes    HSV-1 (herpes simplex virus 1) infection POA: Yes    Primary insomnia POA: Yes  Resolved Problems:    * No resolved hospital problems. *      Hospital Summary (Brief Narrative):       Ms. Bowles is a 25 y/o female with PMHx of MS, HSV-1, depression, insomnia, and ADHD who was sent to the ER by her neurologist after reporting symptoms of RUE weakness about an hour after her first infusion of natalizumab.  Imaging on admission revealed no new MS lesions in her C-spine or brain, but did show persistence of old demyelination.  Patient was given 5 days of steroid therapy, and showed improvement in RUE weakness, but did subsequently note b/l LE heaviness.  She was evaluated by PT/OT and was deemed safe for discharge home with home health PT.  Per neurology recommendations she is being discharged on a steroid taper and has neuro f/u in 1 week.      Patient /Hospital Summary (Details -- Problem Oriented) :          * Multiple sclerosis exacerbation (CMS-HCC)   Assessment & Plan    diagnosed in June 2016  MRI brain and C/T/L spine showed no acute lesions  Symptoms improved with 5 days steroid therapy  Stable for discharge on steroid taper and o/p PT with neurology and PCP f/u        Primary insomnia   Assessment & Plan    Continue prn trazodone        HSV-1 (herpes simplex virus 1) infection   Assessment & Plan    No lesions at present.        ADHD   Assessment & Plan    Continue home methylphenidate         Depression   Assessment & Plan    Continue home Wellbutrin.                 Consultants:     Dr. Garrett - Neurology    Procedures:        none    Imaging/ Testing:      MR-THORACIC SPINE-WITH & W/O   Final Result      1.  Minor Schmorl's node endplate irregularities. No clinical significance.   2.  No significant disc bulge or protrusion, central stenosis, or foraminal stenosis at any thoracic level. No myelopathic cord signal or abnormal cord enhancement at any thoracic level. No evidence of demyelinating lesion.      MR-LUMBAR SPINE-WITH & W/O   Final Result      1.  L4-5 minimal central disc bulging. Mild facet arthropathy. Borderline bilateral foraminal stenosis.   2.  L5-S1 very small central disc protrusion superimposed on minimal disc bulging. Minor facet arthropathy. No central stenosis or significant foraminal stenosis.   3.  No evidence of demyelinating lesion in the conus or distal thoracic cord within the field of view.      MR-CERVICAL SPINE-WITH & W/O   Final Result      1.  Degenerative disc disease most notable for C6-C7 disc/osteophyte complex with mild central stenosis.   2.  No myelopathic cord signal abnormality at any cervical level. No appreciable demyelinating lesions.      MR-BRAIN-WITH & W/O   Final Result      1.  Multiple white matter lesions primarily in the left cerebral hemisphere. Findings would be compatible with demyelinating disease, however, the findings of volume loss with ventricular dilatation and asymmetric preference for the left hemisphere are    peculiar and entities mimicking demyelinating disease/multiple sclerosis such as autoimmune vasculopathy, Lyme disease, neuromyelitis optica spectrum disorder, or even Susac disease might be considered.   2.  Interval resolution of enhancement of 2 small lesions in the left anterior frontal deep white matter and left posterior frontal deep white matter respectively. No new lesions or new enhancing lesions.   3.  Concerning therapy with natalizumab, there is no evidence of progressive multifocal  leukoencephalopathy (PML) at this time.      DX-CHEST-2 VIEWS   Final Result      No active disease.          Discharge Medications:         Medication Reconciliation: Reviewed <y>     Medication List      START taking these medications      Instructions   MethylPREDNISolone 4 MG Tbpk  Commonly known as:  MEDROL DOSEPAK   7 day taper.        CONTINUE taking these medications      Instructions   BIOTENE DRY MOUTH MT   Hamilton 1 Application in mouth/throat 2 times a day as needed.  Dose:  1 Application     buPROPion 300 MG XL tablet  Commonly known as:  WELLBUTRIN XL   Take 1 Tab by mouth every morning.  Dose:  300 mg     KYLEENA 19.5 MG Iud  Generic drug:  Levonorgestrel   by Intrauterine route.     methylphenidate 54 MG CR tablet  Start taking on:  12/7/2017  Commonly known as:  CONCERTA   Doctor's comments:  Ok to fill on or after 12/7/17  Take 1 Tab by mouth every day.  Dose:  54 mg     valacyclovir 1 GM Tabs  Commonly known as:  VALTREX   Take 0.5 Tabs by mouth 2 times a day as needed (rash. Use for a total of 3 days).  Dose:  500 mg     vitamin B-12 1000 MCG Tabs   Take 1,000 mcg by mouth every day.  Dose:  1000 mcg     Vitamin D3 5000 units Tabs   Take 5,000 Units by mouth every day.  Dose:  5000 Units        STOP taking these medications    alprazolam 0.25 MG Tabs  Commonly known as:  XANAX        ASK your doctor about these medications      Instructions   TYSABRI 300 MG/15ML Conc  Generic drug:  natalizumab   by Intravenous route.              Disposition:   Patient is ambulating well and tolerating oral intake.  She is hemodynamically stable and fit for discharge.    Diet:   regular    Activity:   As tolerated with use of assistive devices     Instructions:      The patient was instructed to return to the ER in the event of worsening symptoms. I have counseled the patient on the importance of compliance and the patient has agreed to proceed with all medical recommendations and follow up plan indicated above.    The patient understands that all medications come with benefits and risks. Risks may include permanent injury or death and these risks can be minimized with close reassessment and monitoring.        Primary Care Provider:    Dr. Powell  Discharge summary faxed to primary care provider:  <y>  Copy of discharge summary given to the patient: <n>    Follow up appointment details :      F/u with neurology as scheduled.  F/u in PCP in 7-10 days    Pending Studies:        none    Time spent on discharge day patient visit, preparing discharge paperwork and arranging for patient follow up.    Discharge Time (Minutes) :    55 min    ______________________________________________________________________    Interval history/exam for day of discharge:     patient reports significant improvement in symptoms.  Is agreeable to plan of discharge today.    Vitals:    10/09/17 1600 10/09/17 2100 10/10/17 0500 10/10/17 0800   BP: 125/72 104/65 105/65 109/63   Pulse: 76 68 66 82   Resp: 18 16 16 12   Temp: 36.8 °C (98.3 °F) 36.1 °C (97 °F) 36.6 °C (97.9 °F) 36.6 °C (97.9 °F)   SpO2: 98% 96% 100% 97%   Weight:       Height:         Weight/BMI: Body mass index is 22.14 kg/m².  Pulse Oximetry: 97 %, O2 (LPM): 0, O2 Delivery: None (Room Air)    General: no acute distress  CVS: RRR, no m/r/g  PULM: CTABL, no rales/ronchi  CNS: improved RUE weakness    Most Recent Labs:    Lab Results   Component Value Date/Time    WBC 21.7 (H) 10/08/2017 11:45 AM    RBC 4.46 10/08/2017 11:45 AM    HEMOGLOBIN 12.8 10/08/2017 11:45 AM    HEMATOCRIT 38.4 10/08/2017 11:45 AM    MCV 86.1 10/08/2017 11:45 AM    MCH 28.7 10/08/2017 11:45 AM    MCHC 33.3 (L) 10/08/2017 11:45 AM    MPV 9.4 10/08/2017 11:45 AM    NEUTSPOLYS 78.60 (H) 10/08/2017 11:45 AM    LYMPHOCYTES 14.40 (L) 10/08/2017 11:45 AM    MONOCYTES 6.20 10/08/2017 11:45 AM    EOSINOPHILS 0.00 10/08/2017 11:45 AM    BASOPHILS 0.20 10/08/2017 11:45 AM      Lab Results   Component Value Date/Time    SODIUM  141 10/08/2017 11:30 AM    POTASSIUM 3.6 10/08/2017 11:30 AM    CHLORIDE 107 10/08/2017 11:30 AM    CO2 27 10/08/2017 11:30 AM    GLUCOSE 97 10/08/2017 11:30 AM    BUN 16 10/08/2017 11:30 AM    CREATININE 0.83 10/08/2017 11:30 AM      Lab Results   Component Value Date/Time    ALTSGPT 18 10/06/2017 06:32 PM    ASTSGOT 18 10/06/2017 06:32 PM    ALKPHOSPHAT 70 10/06/2017 06:32 PM    TBILIRUBIN 0.3 10/06/2017 06:32 PM    ALBUMIN 3.7 10/06/2017 06:32 PM    GLOBULIN 2.2 10/06/2017 06:32 PM    INR 1.10 10/06/2017 08:20 PM     Lab Results   Component Value Date/Time    PROTHROMBTM 14.6 10/06/2017 08:20 PM    INR 1.10 10/06/2017 08:20 PM

## 2017-10-09 NOTE — PROGRESS NOTES
VSS, afebrile. No c/o pain or nausea. Neuro checks unchanged. IV steroid given. Calling appropriately for assistance, steady to ambulate - PT recommends walker - pt using correctly, plans to d/c with walker. Up to ambulate frequently. Pt's mother at the bedside much of the afternoon. PRN sleep medication added to MAR at pt's request. No significant events or changes. Bedside report given to ASHLI Guerrero

## 2017-10-10 VITALS
HEART RATE: 82 BPM | OXYGEN SATURATION: 97 % | RESPIRATION RATE: 12 BRPM | BODY MASS INDEX: 21.78 KG/M2 | WEIGHT: 152.12 LBS | TEMPERATURE: 97.9 F | SYSTOLIC BLOOD PRESSURE: 109 MMHG | DIASTOLIC BLOOD PRESSURE: 63 MMHG | HEIGHT: 70 IN

## 2017-10-10 LAB — CK SERPL-CCNC: 13 U/L (ref 0–154)

## 2017-10-10 PROCEDURE — 700102 HCHG RX REV CODE 250 W/ 637 OVERRIDE(OP): Performed by: STUDENT IN AN ORGANIZED HEALTH CARE EDUCATION/TRAINING PROGRAM

## 2017-10-10 PROCEDURE — A9270 NON-COVERED ITEM OR SERVICE: HCPCS | Performed by: STUDENT IN AN ORGANIZED HEALTH CARE EDUCATION/TRAINING PROGRAM

## 2017-10-10 PROCEDURE — A9270 NON-COVERED ITEM OR SERVICE: HCPCS | Performed by: INTERNAL MEDICINE

## 2017-10-10 PROCEDURE — 36415 COLL VENOUS BLD VENIPUNCTURE: CPT

## 2017-10-10 PROCEDURE — 700105 HCHG RX REV CODE 258: Performed by: PSYCHIATRY & NEUROLOGY

## 2017-10-10 PROCEDURE — 82550 ASSAY OF CK (CPK): CPT

## 2017-10-10 PROCEDURE — 99239 HOSP IP/OBS DSCHRG MGMT >30: CPT | Mod: GC | Performed by: INTERNAL MEDICINE

## 2017-10-10 PROCEDURE — 97116 GAIT TRAINING THERAPY: CPT

## 2017-10-10 PROCEDURE — 700102 HCHG RX REV CODE 250 W/ 637 OVERRIDE(OP): Performed by: INTERNAL MEDICINE

## 2017-10-10 PROCEDURE — 97530 THERAPEUTIC ACTIVITIES: CPT

## 2017-10-10 PROCEDURE — 700111 HCHG RX REV CODE 636 W/ 250 OVERRIDE (IP): Performed by: PSYCHIATRY & NEUROLOGY

## 2017-10-10 PROCEDURE — 700111 HCHG RX REV CODE 636 W/ 250 OVERRIDE (IP): Performed by: STUDENT IN AN ORGANIZED HEALTH CARE EDUCATION/TRAINING PROGRAM

## 2017-10-10 RX ORDER — METHYLPREDNISOLONE 4 MG/1
TABLET ORAL
Qty: 1 KIT | Refills: 0 | Status: SHIPPED | OUTPATIENT
Start: 2017-10-10 | End: 2017-11-14

## 2017-10-10 RX ADMIN — ONDANSETRON 4 MG: 2 INJECTION INTRAMUSCULAR; INTRAVENOUS at 10:21

## 2017-10-10 RX ADMIN — SODIUM CHLORIDE 1000 MG: 9 INJECTION, SOLUTION INTRAVENOUS at 08:39

## 2017-10-10 RX ADMIN — BUPROPION HYDROCHLORIDE 300 MG: 150 TABLET, EXTENDED RELEASE ORAL at 10:22

## 2017-10-10 RX ADMIN — METHYLPHENIDATE HYDROCHLORIDE 15 MG: 5 TABLET ORAL at 08:38

## 2017-10-10 RX ADMIN — CYANOCOBALAMIN TAB 500 MCG 1000 MCG: 500 TAB at 08:39

## 2017-10-10 RX ADMIN — VITAMIN D, TAB 1000IU (100/BT) 5000 UNITS: 25 TAB at 08:38

## 2017-10-10 ASSESSMENT — COGNITIVE AND FUNCTIONAL STATUS - GENERAL
SUGGESTED CMS G CODE MODIFIER MOBILITY: CJ
DAILY ACTIVITIY SCORE: 24
CLIMB 3 TO 5 STEPS WITH RAILING: A LITTLE
SUGGESTED CMS G CODE MODIFIER DAILY ACTIVITY: CH
SUGGESTED CMS G CODE MODIFIER MOBILITY: CI
MOBILITY SCORE: 23
CLIMB 3 TO 5 STEPS WITH RAILING: A LITTLE
WALKING IN HOSPITAL ROOM: A LITTLE
MOBILITY SCORE: 22

## 2017-10-10 ASSESSMENT — GAIT ASSESSMENTS
DISTANCE (FEET): 500
ASSISTIVE DEVICE: FRONT WHEEL WALKER
DEVIATION: BRADYKINETIC
GAIT LEVEL OF ASSIST: SUPERVISED

## 2017-10-10 ASSESSMENT — PAIN SCALES - GENERAL
PAINLEVEL_OUTOF10: 0
PAINLEVEL_OUTOF10: 0

## 2017-10-10 NOTE — THERAPY
"Physical Therapy Treatment completed.   Bed Mobility:  Supine to Sit: Modified Independent  Transfers: Sit to Stand: Supervised  Gait: Level Of Assist: Supervised with Front-Wheel Walker       Plan of Care: Will benefit from Physical Therapy 3 times per week  Discharge Recommendations: Equipment: No Equipment Needed. FWW already ordered by RN.    See \"Rehab Therapy-Acute\" Patient Summary Report for complete documentation.     Pt presenting w/ increased functional mobility. Pt requiring extra time for functional activities for safety. Pt able to ambulate stairs utilizing a step to pattern and one HR. Pt c/o of mild dizziness at top of stairs that subsided quickly. Pt receptive to education on energy conservation and not overdoing it when feeling better.  "

## 2017-10-10 NOTE — CARE PLAN
Problem: Pain Management  Goal: Pain level will decrease to patient's comfort goal  Outcome: PROGRESSING AS EXPECTED  No c/o pain

## 2017-10-10 NOTE — DISCHARGE PLANNING
Medical Social Work     RADHA spoke with pt at bedside and received HH choice. HH choice is faxed to the [facility].  Pt first choice is [facility] and second choice is [facility]. RADHA also called traction for pt walker. RADHA spoke with [facility] and he stated to please have the RN put in the taction order. RADHA called and advised the RN to put in the traction order.

## 2017-10-10 NOTE — PROGRESS NOTES
"       Internal Medicine Interval Note  Note Author: Patricia Vigil M.D.     Name Rosita Bowles 1990   Age/Sex 26 y.o. female   MRN [id number]   Code Status FULL.     After 5PM or if no immediate response to page, please call for cross-coverage  Attending/Team: Dr. Sanderson/Maximo. See Patient List for primary contact information  Call [phone] to page    1st Call - Day Intern (R1):   Musa 2nd Call - Day Sr. Resident (R2/R3):   Sabrina         Reason for interval visit  (Principal Problem)   Multiple sclerosis exacerbation (CMS-[facility])    Interval Problem Daily Status Update  (24 hours)   No acute events overnight.  Patient's hand  remains improved, nearly at baseline.  Also reports improved ability to walk, though still feels like her lower extremities are \"lead\"-like when ambulating.  Continues to deny changes in vision or weakness.  Did report nausea after IV methylprednisolone infusion this morning.     Review of Systems   Constitutional: Negative for chills, diaphoresis and fever.   HENT: Negative for hearing loss and sore throat.    Eyes: Negative for double vision, photophobia and pain.   Respiratory: Negative for cough, sputum production, shortness of breath and wheezing.    Cardiovascular: Negative for chest pain, palpitations and leg swelling.   Gastrointestinal: Positive for nausea. Negative for abdominal pain, diarrhea and vomiting.   Genitourinary: Negative for dysuria, flank pain, hematuria and urgency.   Musculoskeletal: Negative for falls and myalgias.   Skin: Negative for itching and rash.   Neurological: Positive for focal weakness. Negative for dizziness, tingling, tremors, sensory change and headaches.   Endo/Heme/Allergies: Negative for environmental allergies and polydipsia.   Psychiatric/Behavioral: Negative for depression and substance abuse. The patient has insomnia.           Consultants/Specialty  Dr. Garrett, Neurology.     Disposition  Inpatient until " steroid course is complete.  May discharge tomorrow after completion, pending Neurology reccs.    Quality Measures    Reviewed items::  Labs reviewed, Medications reviewed, Radiology images reviewed and EKG reviewed  Sandoval catheter::  No Sandoval  DVT prophylaxis pharmacological::  Enoxaparin (Lovenox)          Physical Exam       Vitals:    10/09/17 0800 10/09/17 0953 10/09/17 1600 10/09/17 2100   BP: 105/54 119/66 125/72 104/65   Pulse: 72 74 76 68   Resp: 16 18 18 16   Temp: 36.8 °C (98.3 °F) 36.8 °C (98.3 °F) 36.8 °C (98.3 °F) 36.1 °C (97 °F)   SpO2: 97% 100% 98% 96%   Weight:       Height:         Body mass index is 22.14 kg/m².    Oxygen Therapy:  Pulse Oximetry: 96 %, O2 (LPM): 0, O2 Delivery: None (Room Air)    Physical Exam   Constitutional: She is oriented to person, place, and time.   Lying in bed, in no apparent distress.   HENT:   Head: Normocephalic.   Mouth/Throat: Oropharynx is clear and moist. No oropharyngeal exudate.   Eyes: EOM are normal. Pupils are equal, round, and reactive to light. No scleral icterus.   Neck: No JVD present.   Cardiovascular: Normal rate, regular rhythm, normal heart sounds and intact distal pulses.    No murmur heard.  Pulmonary/Chest: Effort normal and breath sounds normal. No respiratory distress. She has no wheezes. She has no rales.   Abdominal: Soft. Bowel sounds are normal. She exhibits no distension. There is no tenderness. There is no rebound.   Musculoskeletal: Normal range of motion. She exhibits no edema or tenderness.   Lymphadenopathy:     She has no cervical adenopathy.   Neurological: She is alert and oriented to person, place, and time. No cranial nerve deficit.   Unchanged:   - Optic nerve - left side superior nasal quadrant deficit present.  All other cranial nerves grossly intact.   - Right hand strength:  strength 4+/5.  Remainder of right upper and lower extremity +5/5.    - Left lower and upper extremity strength +5/5.    - Sensation intact  throughout.     Skin: Skin is warm and dry. No rash noted. She is not diaphoretic. No erythema.   Psychiatric: Mood and affect normal.         Lab Data Review:         10/9/2017  10:06 PM    Recent Labs      10/08/17   1130   SODIUM  141   POTASSIUM  3.6   CHLORIDE  107   CO2  27   BUN  16   CREATININE  0.83   CALCIUM  9.6       Recent Labs      10/08/17   1130   GLUCOSE  97       Recent Labs      10/08/17   1145   RBC  4.46   HEMOGLOBIN  12.8   HEMATOCRIT  38.4   PLATELETCT  330       Recent Labs      10/08/17   1145   WBC  21.7*   NEUTSPOLYS  78.60*   LYMPHOCYTES  14.40*   MONOCYTES  6.20   EOSINOPHILS  0.00   BASOPHILS  0.20         Assessment/Plan     * Multiple sclerosis exacerbation (CMS-[facility])- (present on admission)   Assessment & Plan    - History of multiple sclerosis diagnosed in June 2016 with recent change of medication for active demyelination findings on MRI.  She just received her first infusion of natalizumab 10/6.  - 10/7 MRI brain and cervical spine with no active or new lesion.  ?pseudo flare secondary to herpes recurrence vs. stress.   -  F/u  MRI lumbar and thoracic spine.   - Continue physical and occupational therapies.        Primary insomnia- (present on admission)   Assessment & Plan    - On trazodone 50 mg nightly PRN.          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    - Continue home valacyclovir 500 mg twice daily PRN.         ADHD- (present on admission)   Assessment & Plan    - Patient on methylphenidate IR 15 mg twice a day.           Depression- (present on admission)   Assessment & Plan    - Resumed home Wellbutrin.

## 2017-10-10 NOTE — PROGRESS NOTES
Caroline Nuñez Fall Risk Assessment:     Last Known Fall: No falls  Mobility: Dizziness/generalized weakness  Medications: No meds  Mental Status/LOC/Awareness: Awake, alert, and oriented to date, place, and person  Toileting Needs: No needs  Volume/Electrolyte Status: No problems  Communication/Sensory: Visual (Glasses)/hearing deficit  Behavior: Appropriate behavior  Caroline Nuñez Fall Risk Total: 3  Fall Risk Level: NO RISK    Universal Fall Precautions:  call light/belongings in reach, bed in low position and locked, wheelchairs and assistive devices out of sight, siderails up x 2, use non-slip footwear, adequate lighting, clutter free and spill free environment, educate on level of risk, educate to call for assistance    Fall Risk Level Interventions:          Patient Specific Interventions:     Medication: review medications with patient and family and limit combination of prn medications  Mental Status/LOC/Awareness: reinforce falls education and check on patient hourly  Toileting: provide frquent toileting and instruct patient/family on the need to call for assistance when toileting  Volume/Electrolyte Status: ensure patient remains hydrated and monitor abnormal lab values  Communication/Sensory: update plan of care on whiteboard and ensure patient has glasses/contacts and hearing aids/dentures  Behavioral: encourage patient to voice feelings, engage patient in daily activities and administer medication as ordered  Mobility: ensure bed is locked and in lowest position

## 2017-10-10 NOTE — PROGRESS NOTES
Caroline Nuñez Fall Risk Assessment:     Last Known Fall: No falls  Mobility: Dizziness/generalized weakness  Medications: No meds  Mental Status/LOC/Awareness: Awake, alert, and oriented to date, place, and person  Toileting Needs: No needs  Volume/Electrolyte Status: No problems  Communication/Sensory: Visual (Glasses)/hearing deficit  Behavior: Appropriate behavior  Caroline Nuñez Fall Risk Total: 3  Fall Risk Level: NO RISK    Universal Fall Precautions:  call light/belongings in reach, bed in low position and locked, wheelchairs and assistive devices out of sight, siderails up x 2, use non-slip footwear, adequate lighting, clutter free and spill free environment, educate on level of risk, educate to call for assistance    Fall Risk Level Interventions:          Patient Specific Interventions:     Medication: review medications with patient and family  Mental Status/LOC/Awareness: not applicable  Toileting: instruct patient/family on the use of grab bars and instruct patient/family on the need to call for assistance when toileting  Volume/Electrolyte Status: ensure patient remains hydrated  Communication/Sensory: update plan of care on whiteboard  Behavioral: not applicable  Mobility: ensure bed is locked and in lowest position and provide appropriate assistive device

## 2017-10-10 NOTE — DISCHARGE INSTRUCTIONS
Discharge Instructions    Discharged to home by car with relative. Discharged via wheelchair, hospital escort: Yes.  Special equipment needed: Walker    Be sure to schedule a follow-up appointment with your primary care doctor or any specialists as instructed.     Discharge Plan:   Diet Plan: Discussed  Activity Level: Discussed  Confirmed Follow up Appointment: Appointment Scheduled  Confirmed Symptoms Management: Discussed  Medication Reconciliation Updated: Yes  Influenza Vaccine Indication: Not indicated: Previously immunized this influenza season and > 8 years of age    I understand that a diet low in cholesterol, fat, and sodium is recommended for good health. Unless I have been given specific instructions below for another diet, I accept this instruction as my diet prescription.   Other diet: Regular    Special Instructions: None    · Is patient discharged on Warfarin / Coumadin?   No     · Is patient Post Blood Transfusion?  No    Depression / Suicide Risk    As you are discharged from this [facility] facility, it is important to learn how to keep safe from harming yourself.    Recognize the warning signs:  · Abrupt changes in personality, positive or negative- including increase in energy   · Giving away possessions  · Change in eating patterns- significant weight changes-  positive or negative  · Change in sleeping patterns- unable to sleep or sleeping all the time   · Unwillingness or inability to communicate  · Depression  · Unusual sadness, discouragement and loneliness  · Talk of wanting to die  · Neglect of personal appearance   · Rebelliousness- reckless behavior  · Withdrawal from people/activities they love  · Confusion- inability to concentrate     If you or a loved one observes any of these behaviors or has concerns about self-harm, here's what you can do:  · Talk about it- your feelings and reasons for harming yourself  · Remove any means that you might use to hurt yourself (examples: pills,  rope, extension cords, firearm)  · Get professional help from the community (Mental Health, Substance Abuse, psychological counseling)  · Do not be alone:Call your Safe Contact- someone whom you trust who will be there for you.  · Call your local CRISIS HOTLINE 080-2986 or 715-036-3469  · Call your local Children's Mobile Crisis Response Team [place] (423) 854-5928 or www.[employer].com  · Call the toll free National Suicide Prevention Hotlines   · National Suicide Prevention Lifeline 650-118-RUYZ (3124)  · National Hope Line Network 800-SUICIDE (567-5465)

## 2017-10-10 NOTE — PROGRESS NOTES
VSS, afebrile. No c/o pain. PRN nausea meds give this AM. IVPB steroid infusion given. Neuro checks unchanged. Calling appropriately, steady to ambulate. Neuro cleared for discharge. PT eval pt, rec's received. Walker ordered. Discharge orders received. PIV removed without complication. Discharge instructions and follow-up care reviewed with pt and pt's mother. No questions, handout given. Home medication picked up from central pharmacy. Pt assisted off the floor via wheelchair by RN.

## 2017-10-10 NOTE — CARE PLAN
Problem: Communication  Goal: The ability to communicate needs accurately and effectively will improve  Outcome: PROGRESSING AS EXPECTED  Pt. calling appropriately for all needs/concerns    Problem: Pain Management  Goal: Pain level will decrease to patient's comfort goal  Outcome: PROGRESSING AS EXPECTED  Pt. denies any complaints of pain at this time.

## 2017-10-10 NOTE — PROGRESS NOTES
Pt. Alert and oriented x 4 w/o any complaints of pain. Pt. up self and ambulating hallways, gait steady. Pt. Educated about the plan of care and all questions/concerns addressed at this time. Call light in reach. Hourly rounding in place.

## 2017-10-10 NOTE — FACE TO FACE
Face to Face Note  -  Durable Medical Equipment    Patricia Vigil M.D. - NPI: [id number]  I certify that this patient is under my care and that they have had a durable medical equipment(DME)face to face encounter by myself that meets the physician DME face-to-face encounter requirements with this patient on:    Date of encounter:   Patient:                    MRN:                       YOB: 2016  Rosita Bowles  [id number]  1990     The encounter with the patient was in whole, or in part, for the following medical condition, which is the primary reason for durable medical equipment:  Other - Multiple Sclerosis    I certify that, based on my findings, the following durable medical equipment is medically necessary:  Walkers.    HOME O2 Saturation Measurements:(Values must be present for Home Oxygen orders)         ,     ,         My Clinical findings support the need for the above equipment due to:  Abnormal Gait    Supporting Symptoms: weakness, incoordination     ------------------------------------------------------------------------------------------------------------------    Face to Face Supporting Documentation - Home Health    The encounter with this patient was in whole or in part the primary reason for home health admission.    Date of encounter:   Patient:                    MRN:                       YOB: 2016  Rosita Bowles  [id number]  1990     Home health to see patient for:  Physical Therapy evaluation and treatment and Occupational therapy evaluation and treatment    Skilled need for:  Exacerbation of Chronic Disease State Multiple Sclerosis    Homebound evidenced status by:  Needs the assistance of another person in order to leave the home. Leaving home must require a considerable and taxing effort. There must exist a normal inability to leave the home.    Community Physician to provide follow up care: Manuela HODGE  CECE Green     Optional Interventions    Wound information & treatment:    Home Infusion Therapy orders:    Line/Drain/Airway:    I certify the face to face encounter for this home care referral meets the CMS requirements and the encounter/clinical assessment with the patient was, in whole, or in part, for the medical condition(s) listed above, which is the primary reason for home health care. Based on my clinical findings: the service(s) are medically necessary, support the need for home health care, and the homebound criteria are met.  I certify that this patient has had a face to face encounter by myself.  Patricia Vigil M.D. - NPI: [id number]    *Debility, frailty and advanced age in the absence of an acute deterioration or exacerbation of a condition do not qualify a patient for home health.

## 2017-10-10 NOTE — DISCHARGE PLANNING
Received choice form from [place]). Referral sent to [facility].

## 2017-10-10 NOTE — PROGRESS NOTES
VSS, afebrile. No c/o pain. PRN zofran x1 this afternoon after EKG. IV steroid given. MRI completed this afternoon. Up to ambulate with walker, steady, calls appropriately for assistance. Worked with OT today. Neuro checks unchanged. No significant changes or events. Family at the bedside most of the shift. Bedside report given to ASHLI Lundberg

## 2017-10-10 NOTE — CARE PLAN
Problem: Infection  Goal: Will remain free from infection  Outcome: PROGRESSING AS EXPECTED  No s/s of infection

## 2017-10-11 ENCOUNTER — HOME HEALTH ADMISSION (OUTPATIENT)
Dept: HOME HEALTH SERVICES | Facility: HOME HEALTHCARE | Age: 27
End: 2017-10-11
Payer: COMMERCIAL

## 2017-10-12 ENCOUNTER — OFFICE VISIT (OUTPATIENT)
Dept: NEUROLOGY | Facility: MEDICAL CENTER | Age: 27
End: 2017-10-12
Payer: COMMERCIAL

## 2017-10-12 VITALS
TEMPERATURE: 97.7 F | WEIGHT: 151.2 LBS | RESPIRATION RATE: 14 BRPM | SYSTOLIC BLOOD PRESSURE: 102 MMHG | DIASTOLIC BLOOD PRESSURE: 58 MMHG | BODY MASS INDEX: 22.39 KG/M2 | OXYGEN SATURATION: 98 % | HEART RATE: 102 BPM | HEIGHT: 69 IN

## 2017-10-12 DIAGNOSIS — G35 MULTIPLE SCLEROSIS (HCC): Primary | ICD-10-CM

## 2017-10-12 PROCEDURE — 99215 OFFICE O/P EST HI 40 MIN: CPT | Performed by: PSYCHIATRY & NEUROLOGY

## 2017-10-12 ASSESSMENT — ENCOUNTER SYMPTOMS
FALLS: 0
HEADACHES: 0
DEPRESSION: 0
TREMORS: 0
SENSORY CHANGE: 1
CONSTIPATION: 0
MEMORY LOSS: 0
TINGLING: 1

## 2017-10-12 ASSESSMENT — PAIN SCALES - GENERAL: PAINLEVEL: NO PAIN

## 2017-10-12 NOTE — PROGRESS NOTES
Subjective:      Rosita Bowles is a 26 y.o. female who presents with her father Donte, for follow-up, recently admitted for a relapse of her disease immediately following her first Tysabri infusion.    HPI    I had last seen her in the hospital 7 days ago, for a relapse that involved increasing paresthesias and gait ataxia, worsening of vision with the left eye and fatigue, the symptoms predating the infusion for 3 days. With the infusion completed, she suddenly developed right upper extremity weakness which was quite profound. She was treated with IV methylprednisolone daily for 5 days and is now starting the oral titration. With the titration she had significant sweating, heat intolerance, irritability, etc. Her symptoms have slowly and steadily improved, she states she is almost back to baseline with the right hand, it is mostly an unsteadiness with walking that is distracting. She is still using her walker, is scheduled for physical therapy to begin. She tolerated the Tysabri infusion without issue.    While hospitalized we did repeat MRI imaging of the entire spinal axis and brain. There is no disease in the spine, and importantly, a steady burden of disease in the brain without evidence of active disease, a clear improvement from the brain study done just 3 months prior. Electronic health record was reviewed at length.    Medical, surgical and family histories reviewed in the electronic health record, there are no new drug allergies. There are no new medical diagnoses established. With MS, she does not require symptomatic relief. Remains on Concerta, Wellbutrin, birth control, and vitamin supplements.    Review of Systems   Constitutional: Positive for malaise/fatigue.   Gastrointestinal: Negative for constipation.   Genitourinary: Positive for urgency. Negative for frequency.   Musculoskeletal: Negative for falls.   Neurological: Positive for tingling and sensory change. Negative for tremors and  "headaches.   Psychiatric/Behavioral: Negative for depression and memory loss.        Objective:     /58   Pulse (!) 102   Temp 36.5 °C (97.7 °F)   Resp 14   Ht 1.753 m (5' 9\")   Wt 68.6 kg (151 lb 3.2 oz)   LMP 10/04/2017   SpO2 98%   BMI 22.33 kg/m²      Physical Exam    She appears in no acute distress. Vital signs are stable. There is no malar rash. The neck is supple, range of motion is full, Lhermitte's phenomena is absent. Cardiac evaluation is unremarkable.    Fully oriented, there is no cognitive deficit. PERRLA/EOMI, visual fields are full with the right eye, on the left there is still the altitudinal deficit inferiorly, unchanged. There is no facial asymmetry or sensory loss across the midline, the tongue and uvular midline, there is no bulbar dysfunction. There is no tremor or drift, strength is the most part intact throughout. There are no pathologic reflexes. Repetitive movements with the feet and coordination with the lower extremities actually are all intact. These were never curtailed with the upper extremities. She is still unsteady when she walks, though she can with some assistance even heel and toe walk without issue. Station is normal. Romberg is absent.     Assessment/Plan:     1. Multiple sclerosis (CMS-[facility])  Most of this appointment was spent talking about the nature of her disease as well as the relapse she had just suffered from. We also talked about all of this in conjunction with stabilization of her imaging studies, etc. I think her prognosis is still probably good, hopefully radiographically things can remain stable as well as clinically. It still will take several months to know if Ariadna is going to maintain this picture. Repeat imaging in another 3 months is thus indicated. We talked about about PML and how this will differ if it were to occur. Blood counts are always check to make sure that lymphocyte values are normal. She will continue physical therapy, I would " anticipate that she will have near full recovery over time. She was encouraged to remain active, exercises such as Pilates, joseph chi, etc. are very effective for core musculature and balance. She now has an appointment with a neuro-ophthalmologist in December.    We spent some time talking about the difference between a true relapse of disease and a pseudo-relapse, how these can be differentiated, etc. For the future, I will probably need to use a longer titration using prednisone after IV steroids for better tolerability. We will follow-up in another 3 or 4 months after her repeat imaging has been completed. Phone contact in the interim if needed.    Time: Evaluation of 40 minutes for exam come review, discussion, and education  Discussion: As mentioned in the assessment, over 60% of the time was spent face-to-face counseling and coordinating care.  - MR-BRAIN-WITH & W/O; Future  - RENAL FUNCTION PANEL; Future  - natalizumab (TYSABRI) 300 MG/15ML Conc; 15 mL by Intravenous route Q 4 Weeks.  Dispense: 14.84 mL; Refill: 11

## 2017-10-13 ENCOUNTER — HOME CARE VISIT (OUTPATIENT)
Dept: HOME HEALTH SERVICES | Facility: HOME HEALTHCARE | Age: 27
End: 2017-10-13

## 2017-10-13 ENCOUNTER — TELEPHONE (OUTPATIENT)
Dept: MEDICAL GROUP | Facility: LAB | Age: 27
End: 2017-10-13

## 2017-10-13 ENCOUNTER — PATIENT OUTREACH (OUTPATIENT)
Dept: HEALTH INFORMATION MANAGEMENT | Facility: OTHER | Age: 27
End: 2017-10-13

## 2017-10-13 ENCOUNTER — TELEPHONE (OUTPATIENT)
Dept: HEALTH INFORMATION MANAGEMENT | Facility: OTHER | Age: 27
End: 2017-10-13

## 2017-10-13 VITALS
HEIGHT: 70 IN | DIASTOLIC BLOOD PRESSURE: 60 MMHG | WEIGHT: 146.2 LBS | OXYGEN SATURATION: 99 % | HEART RATE: 87 BPM | TEMPERATURE: 98.2 F | BODY MASS INDEX: 20.93 KG/M2 | RESPIRATION RATE: 16 BRPM | SYSTOLIC BLOOD PRESSURE: 110 MMHG

## 2017-10-13 DIAGNOSIS — G35 MULTIPLE SCLEROSIS (HCC): ICD-10-CM

## 2017-10-13 PROCEDURE — G0162 HHC RN E&M PLAN SVS, 15 MIN: HCPCS

## 2017-10-13 SDOH — ECONOMIC STABILITY: HOUSING INSECURITY: UNSAFE COOKING RANGE AREA: 0

## 2017-10-13 SDOH — ECONOMIC STABILITY: HOUSING INSECURITY: UNSAFE APPLIANCES: 0

## 2017-10-13 ASSESSMENT — ENCOUNTER SYMPTOMS
VOMITING: DENIES
ADDITIONAL INFORMATION: 0-2 PAIN RANGE

## 2017-10-13 ASSESSMENT — ACTIVITIES OF DAILY LIVING (ADL)
TRANSPORTATION COMMENTS: FATIGUES EASILY
OASIS_M1830: 02
HOME_HEALTH_OASIS: 01

## 2017-10-13 ASSESSMENT — PATIENT HEALTH QUESTIONNAIRE - PHQ9
1. LITTLE INTEREST OR PLEASURE IN DOING THINGS: 00
2. FEELING DOWN, DEPRESSED, IRRITABLE, OR HOPELESS: 00

## 2017-10-13 NOTE — TELEPHONE ENCOUNTER
1. Caller Name: Lidai                                         Call Back Number: [phone]      Patient approves a detailed voicemail message: N\A    Lidia wanted Dr. Green to be aware that Rosita does not qualify for home care.  She goes to school Monday thru Thursday and does not want to stop.    Lidia believes Rosita would benefit from outpatient physical therapy.

## 2017-10-13 NOTE — PROGRESS NOTES
Received referral from [facility] for med rec. Med rec completed. No errors identified.     Shabana Mullen, PharmD

## 2017-10-31 RX ORDER — MECLIZINE HYDROCHLORIDE 25 MG/1
25 TABLET ORAL 3 TIMES DAILY PRN
Qty: 90 TAB | Refills: 0 | Status: SHIPPED | OUTPATIENT
Start: 2017-10-31 | End: 2017-11-29 | Stop reason: SDUPTHER

## 2017-11-03 ENCOUNTER — OUTPATIENT INFUSION SERVICES (OUTPATIENT)
Dept: ONCOLOGY | Facility: MEDICAL CENTER | Age: 27
End: 2017-11-03
Attending: PSYCHIATRY & NEUROLOGY
Payer: COMMERCIAL

## 2017-11-03 VITALS
HEIGHT: 69 IN | BODY MASS INDEX: 21.55 KG/M2 | RESPIRATION RATE: 18 BRPM | DIASTOLIC BLOOD PRESSURE: 69 MMHG | TEMPERATURE: 98.7 F | HEART RATE: 74 BPM | WEIGHT: 145.5 LBS | OXYGEN SATURATION: 99 % | SYSTOLIC BLOOD PRESSURE: 109 MMHG

## 2017-11-03 DIAGNOSIS — G35 MULTIPLE SCLEROSIS (HCC): ICD-10-CM

## 2017-11-03 LAB
ALBUMIN SERPL BCP-MCNC: 4.3 G/DL (ref 3.2–4.9)
ALBUMIN/GLOB SERPL: 1.9 G/DL
ALP SERPL-CCNC: 65 U/L (ref 30–99)
ALT SERPL-CCNC: 13 U/L (ref 2–50)
ANION GAP SERPL CALC-SCNC: 6 MMOL/L (ref 0–11.9)
APPEARANCE UR: CLEAR
AST SERPL-CCNC: 12 U/L (ref 12–45)
BASOPHILS # BLD AUTO: 0.5 % (ref 0–1.8)
BASOPHILS # BLD: 0.03 K/UL (ref 0–0.12)
BILIRUB SERPL-MCNC: 0.7 MG/DL (ref 0.1–1.5)
BILIRUB UR QL STRIP.AUTO: NEGATIVE
BUN SERPL-MCNC: 10 MG/DL (ref 8–22)
CALCIUM SERPL-MCNC: 9.4 MG/DL (ref 8.5–10.5)
CHLORIDE SERPL-SCNC: 107 MMOL/L (ref 96–112)
CO2 SERPL-SCNC: 24 MMOL/L (ref 20–33)
COLOR UR: YELLOW
CREAT SERPL-MCNC: 0.92 MG/DL (ref 0.5–1.4)
EOSINOPHIL # BLD AUTO: 0.13 K/UL (ref 0–0.51)
EOSINOPHIL NFR BLD: 2 % (ref 0–6.9)
ERYTHROCYTE [DISTWIDTH] IN BLOOD BY AUTOMATED COUNT: 44.1 FL (ref 35.9–50)
GFR SERPL CREATININE-BSD FRML MDRD: >60 ML/MIN/1.73 M 2
GLOBULIN SER CALC-MCNC: 2.3 G/DL (ref 1.9–3.5)
GLUCOSE SERPL-MCNC: 138 MG/DL (ref 65–99)
GLUCOSE UR STRIP.AUTO-MCNC: NEGATIVE MG/DL
HCT VFR BLD AUTO: 39.3 % (ref 37–47)
HGB BLD-MCNC: 13.4 G/DL (ref 12–16)
IMM GRANULOCYTES # BLD AUTO: 0.01 K/UL (ref 0–0.11)
IMM GRANULOCYTES NFR BLD AUTO: 0.2 % (ref 0–0.9)
KETONES UR STRIP.AUTO-MCNC: NEGATIVE MG/DL
LEUKOCYTE ESTERASE UR QL STRIP.AUTO: NEGATIVE
LYMPHOCYTES # BLD AUTO: 2.64 K/UL (ref 1–4.8)
LYMPHOCYTES NFR BLD: 39.7 % (ref 22–41)
MCH RBC QN AUTO: 29.4 PG (ref 27–33)
MCHC RBC AUTO-ENTMCNC: 34.1 G/DL (ref 33.6–35)
MCV RBC AUTO: 86.2 FL (ref 81.4–97.8)
MICRO URNS: NORMAL
MONOCYTES # BLD AUTO: 0.57 K/UL (ref 0–0.85)
MONOCYTES NFR BLD AUTO: 8.6 % (ref 0–13.4)
NEUTROPHILS # BLD AUTO: 3.27 K/UL (ref 2–7.15)
NEUTROPHILS NFR BLD: 49 % (ref 44–72)
NITRITE UR QL STRIP.AUTO: NEGATIVE
NRBC # BLD AUTO: 0.03 K/UL
NRBC BLD AUTO-RTO: 0.5 /100 WBC
PH UR STRIP.AUTO: 5.5 [PH]
PLATELET # BLD AUTO: 278 K/UL (ref 164–446)
PMV BLD AUTO: 9.7 FL (ref 9–12.9)
POTASSIUM SERPL-SCNC: 3.7 MMOL/L (ref 3.6–5.5)
PROT SERPL-MCNC: 6.6 G/DL (ref 6–8.2)
PROT UR QL STRIP: NEGATIVE MG/DL
RBC # BLD AUTO: 4.56 M/UL (ref 4.2–5.4)
RBC UR QL AUTO: NEGATIVE
SODIUM SERPL-SCNC: 137 MMOL/L (ref 135–145)
SP GR UR STRIP.AUTO: 1
UROBILINOGEN UR STRIP.AUTO-MCNC: 0.2 MG/DL
WBC # BLD AUTO: 6.7 K/UL (ref 4.8–10.8)

## 2017-11-03 PROCEDURE — 85025 COMPLETE CBC W/AUTO DIFF WBC: CPT

## 2017-11-03 PROCEDURE — 96413 CHEMO IV INFUSION 1 HR: CPT

## 2017-11-03 PROCEDURE — 700111 HCHG RX REV CODE 636 W/ 250 OVERRIDE (IP): Performed by: PSYCHIATRY & NEUROLOGY

## 2017-11-03 PROCEDURE — 80053 COMPREHEN METABOLIC PANEL: CPT

## 2017-11-03 PROCEDURE — 700105 HCHG RX REV CODE 258: Performed by: PSYCHIATRY & NEUROLOGY

## 2017-11-03 PROCEDURE — 81003 URINALYSIS AUTO W/O SCOPE: CPT

## 2017-11-03 PROCEDURE — 306780 HCHG STAT FOR TRANSFUSION PER CASE

## 2017-11-03 RX ADMIN — NATALIZUMAB 300 MG: 300 INJECTION INTRAVENOUS at 14:31

## 2017-11-03 ASSESSMENT — PAIN SCALES - GENERAL: PAINLEVEL: NO PAIN

## 2017-11-05 ENCOUNTER — OUTPATIENT INFUSION SERVICES (OUTPATIENT)
Dept: ONCOLOGY | Facility: MEDICAL CENTER | Age: 27
End: 2017-11-05
Attending: PSYCHIATRY & NEUROLOGY
Payer: COMMERCIAL

## 2017-11-05 VITALS
SYSTOLIC BLOOD PRESSURE: 106 MMHG | HEART RATE: 61 BPM | WEIGHT: 143.3 LBS | DIASTOLIC BLOOD PRESSURE: 69 MMHG | OXYGEN SATURATION: 98 % | TEMPERATURE: 97.3 F | HEIGHT: 69 IN | BODY MASS INDEX: 21.22 KG/M2 | RESPIRATION RATE: 18 BRPM

## 2017-11-05 PROCEDURE — 700105 HCHG RX REV CODE 258: Performed by: PSYCHIATRY & NEUROLOGY

## 2017-11-05 PROCEDURE — 96365 THER/PROPH/DIAG IV INF INIT: CPT

## 2017-11-05 PROCEDURE — 700111 HCHG RX REV CODE 636 W/ 250 OVERRIDE (IP): Performed by: PSYCHIATRY & NEUROLOGY

## 2017-11-05 RX ADMIN — SODIUM CHLORIDE 1000 MG: 9 INJECTION, SOLUTION INTRAVENOUS at 14:24

## 2017-11-05 ASSESSMENT — PAIN SCALES - GENERAL: PAINLEVEL: NO PAIN

## 2017-11-06 ENCOUNTER — PATIENT MESSAGE (OUTPATIENT)
Dept: NEUROLOGY | Facility: MEDICAL CENTER | Age: 27
End: 2017-11-06

## 2017-11-06 ENCOUNTER — OUTPATIENT INFUSION SERVICES (OUTPATIENT)
Dept: ONCOLOGY | Facility: MEDICAL CENTER | Age: 27
End: 2017-11-06
Attending: PSYCHIATRY & NEUROLOGY
Payer: COMMERCIAL

## 2017-11-06 VITALS
DIASTOLIC BLOOD PRESSURE: 60 MMHG | OXYGEN SATURATION: 97 % | HEIGHT: 69 IN | SYSTOLIC BLOOD PRESSURE: 106 MMHG | TEMPERATURE: 98.8 F | RESPIRATION RATE: 18 BRPM | WEIGHT: 145.94 LBS | BODY MASS INDEX: 21.62 KG/M2 | HEART RATE: 94 BPM

## 2017-11-06 DIAGNOSIS — G35 MULTIPLE SCLEROSIS (HCC): ICD-10-CM

## 2017-11-06 PROCEDURE — 700105 HCHG RX REV CODE 258

## 2017-11-06 PROCEDURE — 96365 THER/PROPH/DIAG IV INF INIT: CPT

## 2017-11-06 PROCEDURE — 700111 HCHG RX REV CODE 636 W/ 250 OVERRIDE (IP)

## 2017-11-06 RX ORDER — ONDANSETRON 4 MG/1
4 TABLET, FILM COATED ORAL EVERY 4 HOURS PRN
Qty: 30 TAB | Refills: 5 | Status: SHIPPED | OUTPATIENT
Start: 2017-11-06 | End: 2017-12-01

## 2017-11-06 RX ADMIN — SODIUM CHLORIDE 1000 MG: 9 INJECTION, SOLUTION INTRAVENOUS at 16:11

## 2017-11-06 ASSESSMENT — PAIN SCALES - GENERAL: PAINLEVEL: NO PAIN

## 2017-11-06 NOTE — PROGRESS NOTES
"Pt arrives for day 1 of 5 Solumedrol therapy, friends at side. Reports she is feeling anxious regarding PIV start \"I have a phobia of needles\". Emotional support and POC reviewed. Assessment complete. POC reviewed. Pt finds it helpful to listen to music and perform deep breathing exercises during PIV start. PIV established in L FA; brisk blood return observed. Solumedrol administered over 1 hr per pt request, during infusion pt reported feeling nauseated. Gingerale and saltine crackers provided while call to on-call MD, Dr. Ceron was made. Discussed pt's nausea w/ Dr. Ceron and per MD he would like pt to call MD office tomorrow to discuss anti-nausea medication as he is not willing to provide order for antiemetic today. Pt informed and discussed the reaction potential Wellbutrin has w/ many antinausea medications and instructed pt to remind provider she is taking Wellbutrin, understanding is verbalized. Infusion completed and pt reports feeling okay for discharge. PIV flushed per policy, new swab caps applied, & secured for tomorrow's tx. Pt understands if she is d/c'd with PIV she is not to remove the IV herself at home. Pt d/c'd in great spirits to care of her friends no distress observed.   "

## 2017-11-06 NOTE — TELEPHONE ENCOUNTER
----- Message from Manas Minor, Med Ass't sent at 11/6/2017  9:02 AM PST -----  Regarding: FW: Prescription Question  Contact: [phone]      ----- Message -----  From: Kennedi Rincon, Med Ass't  Sent: 11/6/2017   7:33 AM  To: Manas Minor, Med Ass't  Subject: FW: Prescription Question                            ----- Message -----  From: Rosita Bowles  Sent: 11/6/2017   6:20 AM  To: Neurology Mas  Subject: Prescription Question                            Hi Dr. Garrett,  One last question - would it be possible to get a prescription for nausea? I get it quite often with the steroids.    Also, I honestly forget if I included it in my last message, but it was in essence a request for a note for my professors. Sorry!! It has been a long night :/  Best regards,  Rosita Bowles

## 2017-11-06 NOTE — TELEPHONE ENCOUNTER
Please find out from the patient what exactly needs to be set in her note to her professors. Also let her know I will call and Zofran for her nausea.

## 2017-11-06 NOTE — TELEPHONE ENCOUNTER
----- Message from Manas LEON Iván, Med Ass't sent at 11/6/2017  9:02 AM PST -----  Regarding: FW: Non-Urgent Medical Question  Contact: [phone]      ----- Message -----  From: Kennedi Rincon, Med Ass't  Sent: 11/6/2017   7:33 AM  To: Manas Minor, Med Ass't  Subject: FW: Non-Urgent Medical Question                      ----- Message -----  From: Rosita Bowles  Sent: 11/6/2017   6:10 AM  To: Neurology [facility]  Subject: Non-Urgent Medical Question                      Hi Dr. Garrett,  The steroids are hitting me like a Mac truck this week. I am finding that they are harder to handle with the less I weigh, and I haven't really found my appetite since prior to my discharge from the hospital in October. I can barely walk right now and I have very bad vertigo. I was given a prescription for the vertigo, but it also essentially acts as a sleeping pill (which I could have used last night, haha).  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 I was wondering if you could write a note for school... I have missed so much due to this damn disease :(   Trying to stay positive through all of this..  Attitude feels like half of the vasquez.  Best regards,  Rosita Bowles

## 2017-11-06 NOTE — LETTER
2017        Rosita Bowles  : December 3, 1990    To Whom It May Concern:    Ms. Bowles suffers from a chronic neurologic condition that will mandate she be out of work/school from -10, 2017 due to a relapse of her illness.    Sincerely:        Manjeet Garrett M.D.

## 2017-11-07 ENCOUNTER — OUTPATIENT INFUSION SERVICES (OUTPATIENT)
Dept: ONCOLOGY | Facility: MEDICAL CENTER | Age: 27
End: 2017-11-07
Attending: PSYCHIATRY & NEUROLOGY
Payer: COMMERCIAL

## 2017-11-07 ENCOUNTER — TELEPHONE (OUTPATIENT)
Dept: NEUROLOGY | Facility: MEDICAL CENTER | Age: 27
End: 2017-11-07

## 2017-11-07 VITALS
RESPIRATION RATE: 18 BRPM | BODY MASS INDEX: 21.36 KG/M2 | DIASTOLIC BLOOD PRESSURE: 63 MMHG | TEMPERATURE: 99.4 F | OXYGEN SATURATION: 96 % | HEIGHT: 69 IN | SYSTOLIC BLOOD PRESSURE: 107 MMHG | HEART RATE: 78 BPM | WEIGHT: 144.18 LBS

## 2017-11-07 DIAGNOSIS — G35 MULTIPLE SCLEROSIS (HCC): ICD-10-CM

## 2017-11-07 PROCEDURE — 96365 THER/PROPH/DIAG IV INF INIT: CPT

## 2017-11-07 PROCEDURE — 700105 HCHG RX REV CODE 258

## 2017-11-07 PROCEDURE — 700111 HCHG RX REV CODE 636 W/ 250 OVERRIDE (IP)

## 2017-11-07 RX ADMIN — SODIUM CHLORIDE 1000 MG: 9 INJECTION, SOLUTION INTRAVENOUS at 13:46

## 2017-11-07 ASSESSMENT — PAIN SCALES - GENERAL: PAINLEVEL: NO PAIN

## 2017-11-07 NOTE — PROGRESS NOTES
Patient presents for day 2 of 5 Solumedrol. Reviewed plan of care, patient verbalizes understanding. IV started 11/5/2017 flushes well with brisk blood return. Infusion completed with no new patient complaints, line flushed clear. IV flushed and left in place for infusion tomorrow. Patient released in no acute distress with her mother.

## 2017-11-07 NOTE — PROGRESS NOTES
Patient presents for day 3 of 5 Solumedrol infusion. Reviewed plan of care, patient verbalizes understanding. Patient states she slept better last night. Solumedrol infused as ordered, line flushed clear. PIV flushed and left in place for infusion tomorrow. Patient returns tomorrow and released in no acute distress with her father.

## 2017-11-07 NOTE — TELEPHONE ENCOUNTER
Patient called for Dr. Garrett and is wondering if he can prescribe oral prednisone for when she finishes up her IVMP as she had a very difficult time after her last round of IV steroids. She is hoping it will help her with the side effects if she can wean off a bit more slowly? Please advise.

## 2017-11-08 ENCOUNTER — OUTPATIENT INFUSION SERVICES (OUTPATIENT)
Dept: ONCOLOGY | Facility: MEDICAL CENTER | Age: 27
End: 2017-11-08
Attending: PSYCHIATRY & NEUROLOGY
Payer: COMMERCIAL

## 2017-11-08 VITALS
DIASTOLIC BLOOD PRESSURE: 76 MMHG | TEMPERATURE: 98.4 F | HEIGHT: 69 IN | OXYGEN SATURATION: 98 % | WEIGHT: 145.94 LBS | HEART RATE: 110 BPM | RESPIRATION RATE: 18 BRPM | BODY MASS INDEX: 21.62 KG/M2 | SYSTOLIC BLOOD PRESSURE: 115 MMHG

## 2017-11-08 PROCEDURE — 700111 HCHG RX REV CODE 636 W/ 250 OVERRIDE (IP)

## 2017-11-08 PROCEDURE — 96366 THER/PROPH/DIAG IV INF ADDON: CPT

## 2017-11-08 PROCEDURE — 700105 HCHG RX REV CODE 258

## 2017-11-08 PROCEDURE — 96365 THER/PROPH/DIAG IV INF INIT: CPT

## 2017-11-08 RX ORDER — PREDNISONE 10 MG/1
TABLET ORAL
Qty: 45 TAB | Refills: 0 | Status: SHIPPED | OUTPATIENT
Start: 2017-11-08 | End: 2017-11-14

## 2017-11-08 RX ADMIN — SODIUM CHLORIDE 1000 MG: 9 INJECTION, SOLUTION INTRAVENOUS at 16:11

## 2017-11-08 ASSESSMENT — PAIN SCALES - GENERAL: PAINLEVEL: 4=SLIGHT-MODERATE PAIN

## 2017-11-09 ENCOUNTER — OUTPATIENT INFUSION SERVICES (OUTPATIENT)
Dept: ONCOLOGY | Facility: MEDICAL CENTER | Age: 27
End: 2017-11-09
Attending: PSYCHIATRY & NEUROLOGY
Payer: COMMERCIAL

## 2017-11-09 VITALS
RESPIRATION RATE: 18 BRPM | SYSTOLIC BLOOD PRESSURE: 107 MMHG | OXYGEN SATURATION: 97 % | HEART RATE: 90 BPM | TEMPERATURE: 98.5 F | BODY MASS INDEX: 21.58 KG/M2 | WEIGHT: 145.72 LBS | HEIGHT: 69 IN | DIASTOLIC BLOOD PRESSURE: 81 MMHG

## 2017-11-09 PROCEDURE — 700105 HCHG RX REV CODE 258: Performed by: PSYCHIATRY & NEUROLOGY

## 2017-11-09 PROCEDURE — 700111 HCHG RX REV CODE 636 W/ 250 OVERRIDE (IP): Performed by: PSYCHIATRY & NEUROLOGY

## 2017-11-09 PROCEDURE — 96366 THER/PROPH/DIAG IV INF ADDON: CPT

## 2017-11-09 PROCEDURE — 96365 THER/PROPH/DIAG IV INF INIT: CPT

## 2017-11-09 RX ADMIN — SODIUM CHLORIDE 1000 MG: 9 INJECTION, SOLUTION INTRAVENOUS at 16:56

## 2017-11-09 ASSESSMENT — PAIN SCALES - GENERAL: PAINLEVEL: 5=MODERATE PAIN

## 2017-11-09 NOTE — PROGRESS NOTES
Pt here for day 4/5 Soumedrol.  PIV remains patent.  Pt requesting a slower infusion time d/t side-effects.  Medication infused over 1.5 hours.  PIV flushed and remains in place for tomorrow. Pr reports some nohemy-orbital swelling from treatment.  Pt left IC, no s/s of distress.  Tomorrow's apt confirmed.

## 2017-11-10 NOTE — PROGRESS NOTES
Pt arrived to IS, ambulatory, for day 5 solumedrol. Pt voices no complaints. PIV (placed 11/5) flushed per policy, positive blood return noted. Soulmedrol infusion started. Pt c/o burning at IV site 20 minutes into infusion, no blood return noted. New PIV placed, remainder of solumedrol infused with no s/sx of adverse reaction. PIV flushed and removed. Pt left IS with no s/sx of distress. Follow up appointment confirmed.

## 2017-11-14 ENCOUNTER — HOSPITAL ENCOUNTER (EMERGENCY)
Facility: MEDICAL CENTER | Age: 27
End: 2017-11-14
Attending: EMERGENCY MEDICINE
Payer: COMMERCIAL

## 2017-11-14 ENCOUNTER — PATIENT MESSAGE (OUTPATIENT)
Dept: NEUROLOGY | Facility: MEDICAL CENTER | Age: 27
End: 2017-11-14

## 2017-11-14 VITALS
DIASTOLIC BLOOD PRESSURE: 81 MMHG | OXYGEN SATURATION: 99 % | BODY MASS INDEX: 21.68 KG/M2 | SYSTOLIC BLOOD PRESSURE: 119 MMHG | RESPIRATION RATE: 18 BRPM | WEIGHT: 146.39 LBS | TEMPERATURE: 99.2 F | HEART RATE: 70 BPM | HEIGHT: 69 IN

## 2017-11-14 DIAGNOSIS — I80.8 SUPERFICIAL THROMBOPHLEBITIS OF LEFT UPPER EXTREMITY: ICD-10-CM

## 2017-11-14 PROCEDURE — 93971 EXTREMITY STUDY: CPT

## 2017-11-14 PROCEDURE — 99283 EMERGENCY DEPT VISIT LOW MDM: CPT

## 2017-11-14 RX ORDER — CEPHALEXIN 500 MG/1
500 CAPSULE ORAL 3 TIMES DAILY
Qty: 21 CAP | Refills: 0 | Status: SHIPPED | OUTPATIENT
Start: 2017-11-14 | End: 2017-11-21

## 2017-11-14 RX ORDER — PREDNISONE 10 MG/1
10-60 TABLET ORAL DAILY
Status: SHIPPED | COMMUNITY
Start: 2017-11-08 | End: 2017-12-28

## 2017-11-14 NOTE — ED NOTES
Pt bib self with c/o of left arm pain. Pt states last week she had infusion for MS and last night the vein used for infusion started hurting.

## 2017-11-14 NOTE — ED PROVIDER NOTES
ED Provider Note    CHIEF COMPLAINT  Chief Complaint   Patient presents with   • Arm Pain       HPI  Rosita Bowles is a 26 y.o. female who presents To the ER complaining of left arm pain.  The patient had an infusion for MS with either steroids and she said pain and swelling in her vein.  Since that time.  The patient is localized to her left forearm and abdomen.  It is been cannulized.  No chest pain or shortness of breath.  Minimal pain more proximally in the arm.  No history of PE or DVT.  No other acute symptoms or complaints.  No numbness or tingling.    REVIEW OF SYSTEMS  See HPI for further details. All other systems are negative.    PAST MEDICAL HISTORY  Past Medical History:   Diagnosis Date   • Acne 8/7/2012   • Acne vulgaris 6/23/2017   • ADHD (attention deficit hyperactivity disorder) 8/7/2012   • Depression 8/7/2012   • MS (multiple sclerosis) (CMS-[facility])    • Muscle disorder        FAMILY HISTORY  Family History   Problem Relation Age of Onset   • Thyroid Mother    • Thyroid Brother    • Cancer Neg Hx    • Diabetes Neg Hx        SOCIAL HISTORY  Social History     Social History   • Marital status: Single     Spouse name: N/A   • Number of children: N/A   • Years of education: N/A     Social History Main Topics   • Smoking status: Never Smoker   • Smokeless tobacco: Never Used   • Alcohol use 0.0 oz/week      Comment: occasional   • Drug use: No   • Sexual activity: Yes     Partners: Male     Birth control/ protection: Pill      Comment: OCP     Other Topics Concern   • Not on file     Social History Narrative    2013: BF in [employer]. Attends [employer].    2014: was living in [place]. Now back in [place].     2014: working in [email or web address]. BF broke up with her Sept. No friends in [employer].     2015: working 2 jobs. Has BF.        SURGICAL HISTORY  Past Surgical History:   Procedure Laterality Date   • ANKLE ORIF     • DENTAL EXTRACTION(S)         CURRENT MEDICATIONS  Home Medications     Reviewed by Paige PULIDO  "Enrique Strange (Pharmacy Tech) on 11/14/17 at 1448  Med List Status: Complete   Medication Last Dose Status   Biotin 5000 MCG Cap 11/14/2017 Active   buPROPion (WELLBUTRIN XL) 300 MG XL tablet 11/14/2017 Active   Cholecalciferol (VITAMIN D3) 5000 units Tab 11/14/2017 Active   Cyanocobalamin (VITAMIN B-12) 1000 MCG Tab 11/14/2017 Active   Levonorgestrel (KYLEENA) 19.5 MG IUD July/2017 Active   MAGNESIUM CHLORIDE PO 11/14/2017 Active   meclizine (ANTIVERT) 25 MG Tab > 1 week Active   melatonin 3 MG Tab 11/12/2017 Active   methylphenidate (CONCERTA) 54 MG CR tablet 11/14/2017 Active   natalizumab (TYSABRI) 300 MG/15ML Conc 11/3/2017 Active   ondansetron (ZOFRAN) 4 MG Tab tablet 11/13/2017 Active   predniSONE (DELTASONE) 10 MG Tab 11/14/2017 Active                ALLERGIES  Allergies   Allergen Reactions   • Nkda [No Known Drug Allergy]        PHYSICAL EXAM  VITAL SIGNS: /81   Pulse 97   Temp 37.3 °C (99.2 °F)   Resp 18   Ht 1.753 m (5' 9\")   Wt 66.4 kg (146 lb 6.2 oz)   SpO2 99%   BMI 21.62 kg/m²    Constitutional: Well developed, Well nourished, No acute distress, Non-toxic appearance.   HENT: Normocephalic, Atraumatic,   Neck: Normal range of motion, No tenderness, Supple, No stridor.     Cardiovascular: Normal heart rate, Normal rhythm, No murmurs, No rubs, No gallops.   Thorax & Lungs: Normal breath sounds, No respiratory distress, No wheezing, No chest tenderness.   Abdomen: Bowel sounds normal, Soft, No tenderness,     Musculoskeletal: Good range of motion in all major joints.Left upper extremity has a small puncture wound where the vein was cannulized.  The vein is palpable, firm and tender.  It is not red or hot.  No proximal tenderness or cords that are palpable.  Normal neurovascular exam  Neurologic: Alert & oriented x 3, No focal deficits noted.   Psychiatric: Affect normal,        RADIOLOGY/PROCEDURES  UE VENOUS DUPLEX (Specify in Comments Left, Right Or Bilateral)    (Results Pending) "         COURSE & MEDICAL DECISION MAKING  Pertinent Labs & Imaging studies reviewed. (See chart for details)  She presents with a focal area of firmness and tenderness around the vein where she had been cannulized and she received IV Solu-Medrol.  It is tender.  There is really not much proximal tenderness.  She is very concerned about a DVT, so did not sound left upper extremity which is negative for DVT.  This actually has an isolated superficial thrombophlebitis.    Motor the patient takes baby aspirin, apply warm compresses over on a few days of antibiotics because she is immunocompromised after seeing so much high-dose Solu-Medrol.  She is to return to the ER for pain, swelling, redness, fevers or other concerns.  She is agreeable with the plan.  Questions are answered.    She is agreeable to plan.  She is discharged in good condition.  FINAL IMPRESSION  1. Superficial thrombophlebitis of left upper extremity        2.   3.         Electronically signed by: Edmar Barrow, 11/14/2017 3:18 PM

## 2017-11-15 NOTE — DISCHARGE INSTRUCTIONS
Rest, medicines as prescribed.  Take baby aspirin for a couple of days.  Return for pain, redness, swelling, fever or concerns.          Phlebitis  Phlebitis is soreness and swelling (inflammation) of a vein. This can occur in your arms, legs, or torso (trunk), as well as deeper inside your body. Phlebitis is usually not serious when it occurs close to the surface of the body. However, it can cause serious problems when it occurs in a vein deeper inside the body.  CAUSES   Phlebitis can be triggered by various things, including:   · Reduced blood flow through your veins. This can happen with:  ¨ Bed rest over a long period.  ¨ Long-distance travel.  ¨ Injury.  ¨ Surgery.  ¨ Being overweight (obese) or pregnant.  · Having an IV tube put in the vein and getting certain medicines through the vein.  · Cancer and cancer treatment.  · Use of illegal drugs taken through the vein.  · Inflammatory diseases.  · Inherited (genetic) diseases that increase the risk of blood clots.  · Hormone therapy, such as birth control pills.  SIGNS AND SYMPTOMS   · Red, tender, swollen, and painful area on your skin. Usually, the area will be long and narrow.  · Firmness along the center of the affected area. This can indicate that a blood clot has formed.  · Low-grade fever.  DIAGNOSIS   A health care provider can usually diagnose phlebitis by examining the affected area and asking about your symptoms. To check for infection or blood clots, your health care provider may order blood tests or an ultrasound exam of the area. Blood tests and your family history may also indicate if you have an underlying genetic disease that causes blood clots. Occasionally, a piece of tissue is taken from the body (biopsy sample) if an unusual cause of phlebitis is suspected.  TREATMENT   Treatment will vary depending on the severity of the condition and the area of the body affected. Treatment may include:  · Use of a warm compress or heating pad.  · Use of  compression stockings or bandages.  · Anti-inflammatory medicines.  · Removal of any IV tube that may be causing the problem.  · Medicines that kill germs (antibiotics) if an infection is present.  · Blood-thinning medicines if a blood clot is suspected or present.  · In rare cases, surgery may be needed to remove damaged sections of vein.  HOME CARE INSTRUCTIONS   · Only take over-the-counter or prescription medicines as directed by your health care provider. Take all medicines exactly as prescribed.  · Raise (elevate) the affected area above the level of your heart as directed by your health care provider.  · Apply a warm compress or heating pad to the affected area as directed by your health care provider. Do not sleep with the heating pad.  · Use compression stockings or bandages as directed. These will speed healing and prevent the condition from coming back.  · If you are on blood thinners:  ¨ Get follow-up blood tests as directed by your health care provider.  ¨ Check with your health care provider before using any new medicines.  ¨ Carry a medical alert card or wear your medical alert jewelry to show that you are on blood thinners.  · For phlebitis in the legs:  ¨ Avoid prolonged standing or bed rest.  ¨ Keep your legs moving. Raise your legs when sitting or lying.  · Do not smoke.  · Women, particularly those over the age of 35, should consider the risks and benefits of taking the contraceptive pill. This kind of hormone treatment can increase your risk for blood clots.  · Follow up with your health care provider as directed.  SEEK MEDICAL CARE IF:   · You have unusual bruising or any bleeding problems.  · Your swelling or pain in the affected area is not improving.  · You are on anti-inflammatory medicine, and you develop belly (abdominal) pain.  SEEK IMMEDIATE MEDICAL CARE IF:   · You have a sudden onset of chest pain or difficulty breathing.  · You have a fever or persistent symptoms for more than 2-3  days.  · You have a fever and your symptoms suddenly get worse.  MAKE SURE YOU:  · Understand these instructions.  · Will watch your condition.  · Will get help right away if you are not doing well or get worse.     This information is not intended to replace advice given to you by your health care provider. Make sure you discuss any questions you have with your health care provider.     Document Released: 12/12/2002 Document Revised: 10/08/2014 Document Reviewed: 08/25/2014  Elsevier Interactive Patient Education ©2016 [employer]

## 2017-11-15 NOTE — TELEPHONE ENCOUNTER
Please let the patient know that this could in fact be phlebitis, I would not let this ride for too long without getting it looked at, though using hot soaks is reasonable. The extremity needs to be elevated as well. She may end up having to go to the emergency room sooner rather than later.

## 2017-11-30 RX ORDER — MECLIZINE HYDROCHLORIDE 25 MG/1
25 TABLET ORAL 3 TIMES DAILY PRN
Qty: 90 TAB | Refills: 1 | Status: SHIPPED | OUTPATIENT
Start: 2017-11-30 | End: 2017-12-01

## 2017-12-01 ENCOUNTER — OUTPATIENT INFUSION SERVICES (OUTPATIENT)
Dept: ONCOLOGY | Facility: MEDICAL CENTER | Age: 27
End: 2017-12-01
Attending: PSYCHIATRY & NEUROLOGY
Payer: COMMERCIAL

## 2017-12-01 ENCOUNTER — OFFICE VISIT (OUTPATIENT)
Dept: URGENT CARE | Facility: CLINIC | Age: 27
End: 2017-12-01
Payer: COMMERCIAL

## 2017-12-01 VITALS
OXYGEN SATURATION: 99 % | DIASTOLIC BLOOD PRESSURE: 72 MMHG | TEMPERATURE: 97.8 F | HEIGHT: 69 IN | SYSTOLIC BLOOD PRESSURE: 128 MMHG | RESPIRATION RATE: 16 BRPM | WEIGHT: 146 LBS | HEART RATE: 71 BPM | BODY MASS INDEX: 21.62 KG/M2

## 2017-12-01 VITALS
SYSTOLIC BLOOD PRESSURE: 120 MMHG | HEART RATE: 117 BPM | TEMPERATURE: 97.5 F | WEIGHT: 146.16 LBS | OXYGEN SATURATION: 100 % | HEIGHT: 69 IN | BODY MASS INDEX: 21.65 KG/M2 | DIASTOLIC BLOOD PRESSURE: 61 MMHG | RESPIRATION RATE: 18 BRPM

## 2017-12-01 DIAGNOSIS — M54.9 DORSALGIA: ICD-10-CM

## 2017-12-01 PROCEDURE — 306780 HCHG STAT FOR TRANSFUSION PER CASE

## 2017-12-01 PROCEDURE — 99214 OFFICE O/P EST MOD 30 MIN: CPT | Performed by: FAMILY MEDICINE

## 2017-12-01 PROCEDURE — 700111 HCHG RX REV CODE 636 W/ 250 OVERRIDE (IP): Performed by: PSYCHIATRY & NEUROLOGY

## 2017-12-01 PROCEDURE — 700105 HCHG RX REV CODE 258: Performed by: PSYCHIATRY & NEUROLOGY

## 2017-12-01 PROCEDURE — 96413 CHEMO IV INFUSION 1 HR: CPT

## 2017-12-01 RX ORDER — OXYCODONE HYDROCHLORIDE AND ACETAMINOPHEN 5; 325 MG/1; MG/1
1 TABLET ORAL EVERY 6 HOURS PRN
Qty: 30 TAB | Refills: 0 | Status: SHIPPED | OUTPATIENT
Start: 2017-12-01 | End: 2018-02-16

## 2017-12-01 RX ORDER — MELOXICAM 15 MG/1
15 TABLET ORAL DAILY
Qty: 7 TAB | Refills: 1 | Status: SHIPPED | OUTPATIENT
Start: 2017-12-01 | End: 2017-12-08

## 2017-12-01 RX ORDER — HYDROCODONE BITARTRATE AND ACETAMINOPHEN 7.5; 325 MG/1; MG/1
1-2 TABLET ORAL EVERY 6 HOURS PRN
COMMUNITY
End: 2017-12-01

## 2017-12-01 RX ORDER — CYCLOBENZAPRINE HCL 5 MG
5 TABLET ORAL 3 TIMES DAILY PRN
Qty: 30 TAB | Refills: 0 | Status: SHIPPED | OUTPATIENT
Start: 2017-12-01 | End: 2017-12-28

## 2017-12-01 RX ADMIN — NATALIZUMAB 300 MG: 300 INJECTION INTRAVENOUS at 14:59

## 2017-12-01 ASSESSMENT — ENCOUNTER SYMPTOMS
CHILLS: 0
SENSORY CHANGE: 1
FOCAL WEAKNESS: 0
FEVER: 0
BACK PAIN: 1
MYALGIAS: 1
DIZZINESS: 0

## 2017-12-01 ASSESSMENT — PAIN SCALES - GENERAL: PAINLEVEL: 10=SEVERE PAIN

## 2017-12-02 NOTE — PROGRESS NOTES
"Pt presents for monthly Tysabri infusion.  POC discussed and pt verbalizes understanding.  TOUCH online questionnaire completed, pt is appropriate for treatment today.  She denies recent infections, however she does state that she was lifting/moving furniture earlier today and she \"threw out her back\", she is in a lot of pain. Heat packed placed to pain site and pt confirms some pain relief. She plans on visiting urgent care after her infusion today.  Tysabri infused over one hour without s/sx of reaction or intolerance.  One hour observation period completed without incident.  PIV flushed, removed, and site covered with gauze and coban.  Next appointment confirmed.  Pt discharged from IS in [place] under care of family member.   "

## 2017-12-02 NOTE — PROGRESS NOTES
Subjective:      Rosita Bowles is a 26 y.o. female who presents with Muscle Pain (lower back/muscle pain s/p moving furniture x today. No blunt trauma or injury)    Chief Complaint   Patient presents with   • Muscle Pain     lower back/muscle pain s/p moving furniture x today. No blunt trauma or injury        - This is a very pleasant 26 y.o. female with complaints of non radiating low back pain today whilst lifting some furniture. Worse w/ movement, beter rest. No limb weakness numbness heaviness or bowel b;ladder dysfunction           ALLERGIES:  Nkda [no known drug allergy]     PMH:  Past Medical History:   Diagnosis Date   • Acne 8/7/2012   • Acne vulgaris 6/23/2017   • ADHD (attention deficit hyperactivity disorder) 8/7/2012   • Depression 8/7/2012   • MS (multiple sclerosis) (CMS-[facility])    • Muscle disorder         MEDS:    Current Outpatient Prescriptions:   •  meloxicam (MOBIC) 15 MG tablet, Take 1 Tab by mouth every day for 7 days., Disp: 7 Tab, Rfl: 1  •  oxycodone-acetaminophen (PERCOCET) 5-325 MG Tab, Take 1 Tab by mouth every 6 hours as needed., Disp: 30 Tab, Rfl: 0  •  cyclobenzaprine (FLEXERIL) 5 MG tablet, Take 1 Tab by mouth 3 times a day as needed., Disp: 30 Tab, Rfl: 0  •  MAGNESIUM CHLORIDE PO, Take 1 Cap by mouth every day., Disp: , Rfl:   •  predniSONE (DELTASONE) 10 MG Tab, Take 10-60 mg by mouth every day. 6 tab daily for 2 days, then reduce by 1 tab every other day until off  Pt started on 11/8/2017 for acute gouty arthritis, Disp: , Rfl:   •  Biotin 5000 MCG Cap, Take 5,000 mcg by mouth every day at 6 PM. supplement, Disp: , Rfl:   •  melatonin 3 MG Tab, Take 3 mg by mouth 1 time daily as needed (insomnia)., Disp: , Rfl:   •  natalizumab (TYSABRI) 300 MG/15ML Conc, 15 mL by Intravenous route Q 4 Weeks., Disp: 14.84 mL, Rfl: 11  •  Cyanocobalamin (VITAMIN B-12) 1000 MCG Tab, Take 1,000 mcg by mouth every day. supplement, Disp: , Rfl:   •  Cholecalciferol (VITAMIN D3) 5000 units Tab,  "Take 5,000 Units by mouth every day. supplement, Disp: , Rfl:   •  [START ON 12/7/2017] methylphenidate (CONCERTA) 54 MG CR tablet, Take 1 Tab by mouth every day., Disp: 30 Tab, Rfl: 0  •  buPROPion (WELLBUTRIN XL) 300 MG XL tablet, Take 1 Tab by mouth every morning., Disp: 90 Tab, Rfl: 3  •  Levonorgestrel (KYLEENA) 19.5 MG IUD, 1 Each by Intrauterine route See Admin Instructions. Every 5 years birth control, Disp: , Rfl:     ** I have documented what I find to be significant in regards to past medical, social, family and surgical history  in my HPI or under PMH/PSH/FH review section, otherwise it is contributory **           HPI    Review of Systems   Constitutional: Negative for chills and fever.   Musculoskeletal: Positive for back pain and myalgias.   Neurological: Positive for sensory change. Negative for dizziness and focal weakness.          Objective:     /72   Pulse 71   Temp 36.6 °C (97.8 °F)   Resp 16   Ht 1.753 m (5' 9\")   Wt 66.2 kg (146 lb)   SpO2 99%   Breastfeeding? No   BMI 21.56 kg/m²      Physical Exam   Constitutional: She appears well-developed. No distress.   HENT:   Head: Normocephalic and atraumatic.   Eyes: Conjunctivae are normal.   Neck: Neck supple.   Cardiovascular: Regular rhythm.    No murmur heard.  Pulmonary/Chest: Effort normal. No respiratory distress.   Neurological: She is alert. She exhibits normal muscle tone.   Skin: Skin is warm and dry.   Psychiatric: She has a normal mood and affect. Judgment normal.   Nursing note and vitals reviewed.  Bilateral lower extremity strength and sensory intact.  Negative straight leg raise.   Can toe and heel walk  Some pain to palp/rom             Assessment/Plan:         1. Dorsalgia  meloxicam (MOBIC) 15 MG tablet    oxycodone-acetaminophen (PERCOCET) 5-325 MG Tab    cyclobenzaprine (FLEXERIL) 5 MG tablet             Dx & d/c instructions discussed w/ patient and/or family members. Follow up w/ Prvt Dr or here in 3-4 days if " not getting better, sooner if needed,  ER if worse and UC/PCP unavailable.        Possible side effects (i.e. Rash, GI upset/constipation, sedation, elevation of BP or sugars) of any medications given discussed.

## 2017-12-11 ENCOUNTER — HOSPITAL ENCOUNTER (OUTPATIENT)
Dept: LAB | Facility: MEDICAL CENTER | Age: 27
End: 2017-12-11
Attending: PSYCHIATRY & NEUROLOGY
Payer: COMMERCIAL

## 2017-12-11 LAB
ALBUMIN SERPL BCP-MCNC: 4.6 G/DL (ref 3.2–4.9)
ALBUMIN/GLOB SERPL: 2.1 G/DL
ALP SERPL-CCNC: 73 U/L (ref 30–99)
ALT SERPL-CCNC: 20 U/L (ref 2–50)
ANION GAP SERPL CALC-SCNC: 7 MMOL/L (ref 0–11.9)
AST SERPL-CCNC: 16 U/L (ref 12–45)
BASOPHILS # BLD AUTO: 1 % (ref 0–1.8)
BASOPHILS # BLD: 0.07 K/UL (ref 0–0.12)
BILIRUB SERPL-MCNC: 0.5 MG/DL (ref 0.1–1.5)
BUN SERPL-MCNC: 16 MG/DL (ref 8–22)
CALCIUM SERPL-MCNC: 9.9 MG/DL (ref 8.5–10.5)
CHLORIDE SERPL-SCNC: 106 MMOL/L (ref 96–112)
CO2 SERPL-SCNC: 26 MMOL/L (ref 20–33)
CREAT SERPL-MCNC: 0.95 MG/DL (ref 0.5–1.4)
CRP SERPL HS-MCNC: 0.03 MG/DL (ref 0–0.75)
EOSINOPHIL # BLD AUTO: 0.16 K/UL (ref 0–0.51)
EOSINOPHIL NFR BLD: 2.3 % (ref 0–6.9)
ERYTHROCYTE [DISTWIDTH] IN BLOOD BY AUTOMATED COUNT: 44.9 FL (ref 35.9–50)
ERYTHROCYTE [SEDIMENTATION RATE] IN BLOOD BY WESTERGREN METHOD: 0 MM/HOUR (ref 0–20)
FOLATE SERPL-MCNC: 12.7 NG/ML
GFR SERPL CREATININE-BSD FRML MDRD: >60 ML/MIN/1.73 M 2
GLOBULIN SER CALC-MCNC: 2.2 G/DL (ref 1.9–3.5)
GLUCOSE SERPL-MCNC: 93 MG/DL (ref 65–99)
HCT VFR BLD AUTO: 40.9 % (ref 37–47)
HGB BLD-MCNC: 13.5 G/DL (ref 12–16)
IMM GRANULOCYTES # BLD AUTO: 0.02 K/UL (ref 0–0.11)
IMM GRANULOCYTES NFR BLD AUTO: 0.3 % (ref 0–0.9)
LYMPHOCYTES # BLD AUTO: 3.32 K/UL (ref 1–4.8)
LYMPHOCYTES NFR BLD: 47 % (ref 22–41)
MCH RBC QN AUTO: 29.1 PG (ref 27–33)
MCHC RBC AUTO-ENTMCNC: 33 G/DL (ref 33.6–35)
MCV RBC AUTO: 88.1 FL (ref 81.4–97.8)
MONOCYTES # BLD AUTO: 0.89 K/UL (ref 0–0.85)
MONOCYTES NFR BLD AUTO: 12.6 % (ref 0–13.4)
NEUTROPHILS # BLD AUTO: 2.61 K/UL (ref 2–7.15)
NEUTROPHILS NFR BLD: 36.8 % (ref 44–72)
NRBC # BLD AUTO: 0.02 K/UL
NRBC BLD AUTO-RTO: 0.3 /100 WBC
PLATELET # BLD AUTO: 323 K/UL (ref 164–446)
PMV BLD AUTO: 10 FL (ref 9–12.9)
POTASSIUM SERPL-SCNC: 3.9 MMOL/L (ref 3.6–5.5)
PROT SERPL-MCNC: 6.8 G/DL (ref 6–8.2)
RBC # BLD AUTO: 4.64 M/UL (ref 4.2–5.4)
SODIUM SERPL-SCNC: 139 MMOL/L (ref 135–145)
TREPONEMA PALLIDUM IGG+IGM AB [PRESENCE] IN SERUM OR PLASMA BY IMMUNOASSAY: NON REACTIVE
VIT B12 SERPL-MCNC: >1500 PG/ML (ref 211–911)
WBC # BLD AUTO: 7.1 K/UL (ref 4.8–10.8)

## 2017-12-11 PROCEDURE — 85549 MURAMIDASE: CPT

## 2017-12-11 PROCEDURE — 82164 ANGIOTENSIN I ENZYME TEST: CPT

## 2017-12-11 PROCEDURE — 86147 CARDIOLIPIN ANTIBODY EA IG: CPT | Mod: 91

## 2017-12-11 PROCEDURE — 85652 RBC SED RATE AUTOMATED: CPT

## 2017-12-11 PROCEDURE — 85025 COMPLETE CBC W/AUTO DIFF WBC: CPT

## 2017-12-11 PROCEDURE — 80053 COMPREHEN METABOLIC PANEL: CPT

## 2017-12-11 PROCEDURE — 86038 ANTINUCLEAR ANTIBODIES: CPT

## 2017-12-11 PROCEDURE — 86780 TREPONEMA PALLIDUM: CPT

## 2017-12-11 PROCEDURE — 36415 COLL VENOUS BLD VENIPUNCTURE: CPT

## 2017-12-11 PROCEDURE — 82746 ASSAY OF FOLIC ACID SERUM: CPT

## 2017-12-11 PROCEDURE — 83516 IMMUNOASSAY NONANTIBODY: CPT

## 2017-12-11 PROCEDURE — 86140 C-REACTIVE PROTEIN: CPT

## 2017-12-11 PROCEDURE — 82607 VITAMIN B-12: CPT

## 2017-12-12 LAB
ACE SERPL-CCNC: 46 U/L (ref 9–67)
CARDIOLIPIN IGA SER IA-ACNC: 0 APL (ref 0–11)
CARDIOLIPIN IGG SER IA-ACNC: 3 GPL (ref 0–14)
CARDIOLIPIN IGM SER IA-ACNC: 0 MPL (ref 0–12)

## 2017-12-13 LAB
LYSOZYME SERPL-MCNC: 0.81 UG/ML (ref 0–2.75)
NUCLEAR IGG SER QL IA: NORMAL

## 2017-12-14 LAB — AQP4 H2O CHANNEL AB SERPL IA-ACNC: 0 U/ML

## 2017-12-18 ENCOUNTER — HOSPITAL ENCOUNTER (OUTPATIENT)
Dept: RADIOLOGY | Facility: MEDICAL CENTER | Age: 27
End: 2017-12-18
Attending: PSYCHIATRY & NEUROLOGY
Payer: COMMERCIAL

## 2017-12-18 DIAGNOSIS — G35 MULTIPLE SCLEROSIS (HCC): ICD-10-CM

## 2017-12-18 PROCEDURE — A9579 GAD-BASE MR CONTRAST NOS,1ML: HCPCS | Performed by: PSYCHIATRY & NEUROLOGY

## 2017-12-18 PROCEDURE — 700117 HCHG RX CONTRAST REV CODE 255: Performed by: PSYCHIATRY & NEUROLOGY

## 2017-12-18 PROCEDURE — 70553 MRI BRAIN STEM W/O & W/DYE: CPT

## 2017-12-18 RX ADMIN — GADODIAMIDE 15 ML: 287 INJECTION INTRAVENOUS at 10:30

## 2017-12-21 ENCOUNTER — TELEPHONE (OUTPATIENT)
Dept: NEUROLOGY | Facility: MEDICAL CENTER | Age: 27
End: 2017-12-21

## 2017-12-21 NOTE — TELEPHONE ENCOUNTER
Tell Rosita that she does not need steroids every time there may be a mild infection. She also does not need to worry about minimal symptoms that fluctuate but are not part of a progressive picture with ever increasing numbers of symptoms, increasing severity, all lasting 4 days or a week or more until they hit a kelly.

## 2017-12-28 ENCOUNTER — OFFICE VISIT (OUTPATIENT)
Dept: MEDICAL GROUP | Facility: LAB | Age: 27
End: 2017-12-28
Payer: COMMERCIAL

## 2017-12-28 VITALS
OXYGEN SATURATION: 96 % | HEIGHT: 69 IN | DIASTOLIC BLOOD PRESSURE: 64 MMHG | WEIGHT: 144 LBS | SYSTOLIC BLOOD PRESSURE: 106 MMHG | BODY MASS INDEX: 21.33 KG/M2 | RESPIRATION RATE: 16 BRPM | TEMPERATURE: 98.2 F | HEART RATE: 84 BPM

## 2017-12-28 DIAGNOSIS — F90.0 ATTENTION DEFICIT HYPERACTIVITY DISORDER (ADHD), PREDOMINANTLY INATTENTIVE TYPE: ICD-10-CM

## 2017-12-28 DIAGNOSIS — F33.1 MODERATE EPISODE OF RECURRENT MAJOR DEPRESSIVE DISORDER (HCC): ICD-10-CM

## 2017-12-28 DIAGNOSIS — G35 MULTIPLE SCLEROSIS (HCC): ICD-10-CM

## 2017-12-28 PROCEDURE — 99214 OFFICE O/P EST MOD 30 MIN: CPT | Performed by: FAMILY MEDICINE

## 2017-12-28 RX ORDER — METHYLPHENIDATE HYDROCHLORIDE 54 MG/1
54 TABLET ORAL EVERY MORNING
Qty: 30 TAB | Refills: 0 | Status: SHIPPED | OUTPATIENT
Start: 2018-02-17 | End: 2018-02-16

## 2017-12-28 RX ORDER — METHYLPHENIDATE HYDROCHLORIDE 54 MG/1
54 TABLET ORAL DAILY
Qty: 30 TAB | Refills: 0 | Status: SHIPPED | OUTPATIENT
Start: 2018-01-18 | End: 2018-02-17

## 2017-12-28 RX ORDER — METHYLPHENIDATE HYDROCHLORIDE 54 MG/1
54 TABLET ORAL EVERY MORNING
Qty: 30 TAB | Refills: 0 | Status: SHIPPED | OUTPATIENT
Start: 2018-03-19 | End: 2018-02-16

## 2017-12-28 NOTE — PROGRESS NOTES
Subjective:   Rosita Bowles is a 27 y.o. female here today for   Chief Complaint   Patient presents with   • ADHD       1. Attention deficit hyperactivity disorder (ADHD), predominantly inattentive type  Chronic, stable on Concerta 54 mg daily. She does take this every day. She never misses a dose and she has never had any abnormal refills or requests. She states that this helps her significantly in school. She got all A's this semester. She would like to stay on this medication. She denies any weight loss, insomnia, anorexia.    2. Moderate episode of recurrent major depressive disorder (CMS-HCC)  This is chronic.  Patient has had significant life stressor for the last year with her diagnosis of multiple sclerosis as well as relationship changes. She is now in a stable relationship over the last 2 months which she states is supportive. She has been on Wellbutrin  mg daily and has been stable on this. No side effects from the medication that she knows of.    3. Multiple sclerosis (CMS-HCC)  This is chronic. She is following with neurology closely. She has started a new medication, Tysabri and is doing well with this. She has had vision changes and left leg weakness. She was hospitalized and needed steroids.    Current medicines (including changes today)  Current Outpatient Prescriptions   Medication Sig Dispense Refill   • [START ON 1/18/2018] methylphenidate (CONCERTA) 54 MG CR tablet Take 1 Tab by mouth every day for 30 days. 30 Tab 0   • [START ON 2/17/2018] methylphenidate (CONCERTA) 54 MG CR tablet Take 1 Tab by mouth every morning for 30 days. 30 Tab 0   • [START ON 3/19/2018] methylphenidate (CONCERTA) 54 MG CR tablet Take 1 Tab by mouth every morning for 30 days. 30 Tab 0   • MAGNESIUM CHLORIDE PO Take 1 Cap by mouth every day.     • Biotin 5000 MCG Cap Take 5,000 mcg by mouth every day at 6 PM. supplement     • Cyanocobalamin (VITAMIN B-12) 1000 MCG Tab Take 1,000 mcg by mouth every day.  "supplement     • Cholecalciferol (VITAMIN D3) 5000 units Tab Take 5,000 Units by mouth every day. supplement     • buPROPion (WELLBUTRIN XL) 300 MG XL tablet Take 1 Tab by mouth every morning. 90 Tab 3   • Levonorgestrel (KYLEENA) 19.5 MG IUD 1 Each by Intrauterine route See Admin Instructions. Every 5 years  birth control     • oxycodone-acetaminophen (PERCOCET) 5-325 MG Tab Take 1 Tab by mouth every 6 hours as needed. 30 Tab 0   • melatonin 3 MG Tab Take 3 mg by mouth 1 time daily as needed (insomnia).     • natalizumab (TYSABRI) 300 MG/15ML Conc 15 mL by Intravenous route Q 4 Weeks. 14.84 mL 11     No current facility-administered medications for this visit.      She  has a past medical history of Acne (8/7/2012); Acne vulgaris (6/23/2017); ADHD (attention deficit hyperactivity disorder) (8/7/2012); Depression (8/7/2012); MS (multiple sclerosis) (CMS-HCC); and Muscle disorder.    ROS   No fevers  No bowel changes  No LE edema       Objective:     Blood pressure 106/64, pulse 84, temperature 36.8 °C (98.2 °F), resp. rate 16, height 1.753 m (5' 9\"), weight 65.3 kg (144 lb), SpO2 96 %. Body mass index is 21.27 kg/m².   Physical Exam:  Constitutional: Alert, no distress.  Skin: Warm, dry, good turgor, no rashes in visible areas.  Eye: Equal, round and reactive, conjunctiva clear, lids normal.  ENMT: Lips without lesions, good dentition, oropharynx clear.  Neck: Trachea midline, no masses, no thyromegaly. No cervical or supraclavicular lymphadenopathy  Respiratory: Unlabored respiratory effort, lungs clear to auscultation, no wheezes, no ronchi.  Cardiovascular: Normal S1, S2, RRR, no murmur, no edema.  Abdomen: Soft, non-tender, no masses, no hepatosplenomegaly.  Psych: Alert and oriented x3, normal affect and mood.        Assessment and Plan:   The following treatment plan was discussed    1. Attention deficit hyperactivity disorder (ADHD), predominantly inattentive type  Chronic, stable on Concerta 54 mg daily. No " side effects from the medication.  checked and is consistent. 3 month refills given with refills to be done on 1/18/18, 2/17/18, and 3/19/18. Discussed risks and benefits of this medication.  - methylphenidate (CONCERTA) 54 MG CR tablet; Take 1 Tab by mouth every day for 30 days.  Dispense: 30 Tab; Refill: 0  - methylphenidate (CONCERTA) 54 MG CR tablet; Take 1 Tab by mouth every morning for 30 days.  Dispense: 30 Tab; Refill: 0  - methylphenidate (CONCERTA) 54 MG CR tablet; Take 1 Tab by mouth every morning for 30 days.  Dispense: 30 Tab; Refill: 0    2. Moderate episode of recurrent major depressive disorder (CMS-HCC)  Chronic, stable on Wellbutrin  mg daily. Continue yoga, mindfulness, meditation.    3. Multiple sclerosis (CMS-HCC)  Chronic, stable on Tysabri and following with neurology closely.      Followup: Return in about 3 months (around 3/28/2018) for ADHD .       This note was created using voice recognition software. I have made every reasonable attempt to correct errors, however, I do anticipate some grammatical errors.

## 2017-12-29 ENCOUNTER — OUTPATIENT INFUSION SERVICES (OUTPATIENT)
Dept: ONCOLOGY | Facility: MEDICAL CENTER | Age: 27
End: 2017-12-29
Attending: PSYCHIATRY & NEUROLOGY
Payer: COMMERCIAL

## 2017-12-29 VITALS
RESPIRATION RATE: 18 BRPM | OXYGEN SATURATION: 100 % | HEART RATE: 78 BPM | DIASTOLIC BLOOD PRESSURE: 75 MMHG | SYSTOLIC BLOOD PRESSURE: 109 MMHG | HEIGHT: 69 IN | WEIGHT: 146.83 LBS | TEMPERATURE: 98.3 F | BODY MASS INDEX: 21.75 KG/M2

## 2017-12-29 PROCEDURE — 700105 HCHG RX REV CODE 258: Performed by: PSYCHIATRY & NEUROLOGY

## 2017-12-29 PROCEDURE — 700111 HCHG RX REV CODE 636 W/ 250 OVERRIDE (IP): Performed by: PSYCHIATRY & NEUROLOGY

## 2017-12-29 PROCEDURE — 306780 HCHG STAT FOR TRANSFUSION PER CASE

## 2017-12-29 PROCEDURE — 96413 CHEMO IV INFUSION 1 HR: CPT

## 2017-12-29 RX ADMIN — NATALIZUMAB 300 MG: 300 INJECTION INTRAVENOUS at 15:34

## 2017-12-29 ASSESSMENT — PAIN SCALES - GENERAL: PAINLEVEL: NO PAIN

## 2017-12-30 NOTE — PROGRESS NOTES
Pt presented to infusion center for Tysabri. Denies any current s/s of infection or open wounds. TOUCH checklist completed. PIV started, brisk blood return observed. Tysabri infused with no s/s of adverse reaction. 1 hour obs completed with no s/s of adverse reaction. PIV flushed and removed, gauze and coban dressing placed. Pt has next appt. Left on foot in good spirits.

## 2018-01-26 ENCOUNTER — OUTPATIENT INFUSION SERVICES (OUTPATIENT)
Dept: ONCOLOGY | Facility: MEDICAL CENTER | Age: 28
End: 2018-01-26
Attending: PSYCHIATRY & NEUROLOGY
Payer: COMMERCIAL

## 2018-01-26 VITALS
TEMPERATURE: 98.5 F | HEART RATE: 86 BPM | WEIGHT: 143.3 LBS | SYSTOLIC BLOOD PRESSURE: 111 MMHG | HEIGHT: 69 IN | OXYGEN SATURATION: 99 % | BODY MASS INDEX: 21.22 KG/M2 | RESPIRATION RATE: 18 BRPM | DIASTOLIC BLOOD PRESSURE: 70 MMHG

## 2018-01-26 PROCEDURE — 96415 CHEMO IV INFUSION ADDL HR: CPT

## 2018-01-26 PROCEDURE — 306780 HCHG STAT FOR TRANSFUSION PER CASE

## 2018-01-26 PROCEDURE — 96413 CHEMO IV INFUSION 1 HR: CPT

## 2018-01-26 PROCEDURE — 700105 HCHG RX REV CODE 258: Performed by: PSYCHIATRY & NEUROLOGY

## 2018-01-26 PROCEDURE — 700111 HCHG RX REV CODE 636 W/ 250 OVERRIDE (IP): Performed by: PSYCHIATRY & NEUROLOGY

## 2018-01-26 RX ADMIN — NATALIZUMAB 300 MG: 300 INJECTION INTRAVENOUS at 15:33

## 2018-01-26 ASSESSMENT — PAIN SCALES - GENERAL: PAINLEVEL: 2=MINIMAL-SLIGHT

## 2018-01-27 NOTE — PROGRESS NOTES
Pt presents ambulatory to  for monthly Tysabri infusion.  Pt reports feeling well, she denies recent infections.  TOUCH program online questionnaire completed, pt appropriate for treatment today.  PIV started to RFA, blood return noted.  Tysabri infused over 1.5 hours at rate of 75 ml/hr per patient's request.  One hour observation period completed without incident, no adverse reaction noted.  PIV flushed, removed, and site covered with gauze and coban.  Next appointment confirmed.  Pt discharged from [facility] in [facility] under care of family membe.

## 2018-01-31 DIAGNOSIS — N93.9 ABNORMAL UTERINE BLEEDING: ICD-10-CM

## 2018-02-02 ENCOUNTER — HOSPITAL ENCOUNTER (OUTPATIENT)
Dept: RADIOLOGY | Facility: MEDICAL CENTER | Age: 28
End: 2018-02-02
Attending: PSYCHIATRY & NEUROLOGY
Payer: COMMERCIAL

## 2018-02-02 DIAGNOSIS — H46.8 OTHER OPTIC NEURITIS: ICD-10-CM

## 2018-02-02 PROCEDURE — 700117 HCHG RX CONTRAST REV CODE 255: Performed by: PSYCHIATRY & NEUROLOGY

## 2018-02-02 PROCEDURE — 70543 MRI ORBT/FAC/NCK W/O &W/DYE: CPT

## 2018-02-02 PROCEDURE — A9579 GAD-BASE MR CONTRAST NOS,1ML: HCPCS | Performed by: PSYCHIATRY & NEUROLOGY

## 2018-02-02 RX ADMIN — GADODIAMIDE 15 ML: 287 INJECTION INTRAVENOUS at 10:55

## 2018-02-16 ENCOUNTER — OFFICE VISIT (OUTPATIENT)
Dept: NEUROLOGY | Facility: MEDICAL CENTER | Age: 28
End: 2018-02-16
Payer: COMMERCIAL

## 2018-02-16 VITALS
RESPIRATION RATE: 16 BRPM | WEIGHT: 145.8 LBS | TEMPERATURE: 98.8 F | SYSTOLIC BLOOD PRESSURE: 108 MMHG | HEART RATE: 82 BPM | HEIGHT: 70 IN | BODY MASS INDEX: 20.87 KG/M2 | OXYGEN SATURATION: 100 % | DIASTOLIC BLOOD PRESSURE: 62 MMHG

## 2018-02-16 DIAGNOSIS — G35 MULTIPLE SCLEROSIS (HCC): Primary | ICD-10-CM

## 2018-02-16 PROCEDURE — 99214 OFFICE O/P EST MOD 30 MIN: CPT | Performed by: PSYCHIATRY & NEUROLOGY

## 2018-02-16 ASSESSMENT — ENCOUNTER SYMPTOMS
SENSORY CHANGE: 0
BLURRED VISION: 1
TINGLING: 1
SPEECH CHANGE: 0
DOUBLE VISION: 0
MEMORY LOSS: 0
LOSS OF CONSCIOUSNESS: 0
DEPRESSION: 0
CONSTIPATION: 0
HEADACHES: 0

## 2018-02-16 ASSESSMENT — PAIN SCALES - GENERAL: PAINLEVEL: 3=SLIGHT PAIN

## 2018-02-16 NOTE — PROGRESS NOTES
Subjective:      Rosita Bowles is a 27 y.o. female who presents for follow-up with a history of MS.    HPI    Since last seen, Rosita has done very well. She finished her steroid infusions for her relapse in August of last year, having just started her Tysabri. She tolerates monthly infusions, though there is a day afterwards where she feels simply cruddy. She always recovers. She still has the left eye inferior altitudinal deficit, she denies double vision, cognition has gotten better, balance has improved, she denies significant weakness or appendicular incoordination. Bowel and bladder function are stable, she still has some urinary urgency. Fatigue is a little bit better. She is now having some problems with insomnia over the last couple of months, she is not sure why, melatonin 6 mg daily at bedtime has not provided response.    Imaging studies from December, 2017 revealed no evidence of active disease, stable burden of disease, though the distribution is a little unusual in that it is mostly left hemisphere. MRI of the orbits from earlier January, 2018, revealed no evidence of active disease involving the optic nerves.    Medical, surgical and family histories are reviewed, there are no new drug allergies. She has been dating someone for the last couple of months, is quite happy in this regard. She still active at school, now going for a microbiology degree. She is on Tysabri 300 mg IV infusions monthly, Concerta, birth control, vitamin B-12 and other supplements daily as well as bupropion.    Review of Systems   Constitutional: Positive for malaise/fatigue.   Eyes: Positive for blurred vision. Negative for double vision.   Gastrointestinal: Negative for constipation.   Genitourinary: Positive for frequency.   Neurological: Positive for tingling. Negative for sensory change, speech change, loss of consciousness and headaches.   Psychiatric/Behavioral: Negative for depression and memory loss.   All  "other systems reviewed and are negative.       Objective:     /62   Pulse 82   Temp 37.1 °C (98.8 °F)   Resp 16   Ht 1.765 m (5' 9.5\")   Wt 66.1 kg (145 lb 12.8 oz)   SpO2 100%   BMI 21.22 kg/m²      Physical Exam    She appears in no acute distress. Her vital signs are stable. There is no malar rash, Lhermitte's phenomena is absent. Cardiac evaluation is unremarkable. Straight leg raising is negative bilaterally.    Fully oriented, there is no aphasia or inattention. PERRLA/EOMI, the left eye again shows the inferior altitudinal deficit, the right eye visual field testing is intact. There are no nystagmus, facial bones are symmetric, there is no sensory loss across the midline to temperature, the tongue and uvula are midline, jaw jerk is absent, shoulder shrug is symmetric. There is no tremor or drift, strength is intact in all 4 extremities. There are no obvious reflex asymmetries or pathologic reflexes present. She walks with normal station, now healed, toe and tandem walking are done independently and easily. Arm swing is symmetric. There is no appendicular dystaxia, repetitive movements with her hands and feet are now symmetric. Sensory exam is intact to vibration, Max is absent     Assessment/Plan:     1. Multiple sclerosis (CMS-HCC)  Clinically and radiographically stable, we will repeat imaging study in 6 months, follow-up after that. We are coming up on a year after her treatment initiation, PML always needs to be considered as a possibility though she shows no signs clinically or radiographically so far of this developing. Her MICHELLE viral titers were negative when things started, there is no recent recheck them at this time. As for the insomnia, I recommended she increase the melatonin as tolerated, telling her that nightmares can occur as well as simple excessive daytime drowsiness.    Face to face time spent reviewing all the above. She certainly knows the signs and symptoms suggestive of " disease relapse, she knows to call the office in that circumstance. She will likely require Tysabri for quite a long time given how aggressive her disease had been. Phone contact in the interim if needed, follow-up after her imaging is done.    - MR-BRAIN-WITH & W/O; Future  - RENAL FUNCTION PANEL; Future    Time: Evaluation of 25 minutes for exam, review, discussion, and education  Discussion: As mentioned in the assessment, over 60% of the time spent face-to-face counseling and coordinating care

## 2018-02-23 ENCOUNTER — OUTPATIENT INFUSION SERVICES (OUTPATIENT)
Dept: ONCOLOGY | Facility: MEDICAL CENTER | Age: 28
End: 2018-02-23
Attending: PSYCHIATRY & NEUROLOGY
Payer: COMMERCIAL

## 2018-02-23 VITALS
BODY MASS INDEX: 21.29 KG/M2 | OXYGEN SATURATION: 99 % | HEART RATE: 94 BPM | RESPIRATION RATE: 18 BRPM | DIASTOLIC BLOOD PRESSURE: 72 MMHG | WEIGHT: 143.74 LBS | HEIGHT: 69 IN | TEMPERATURE: 98.6 F | SYSTOLIC BLOOD PRESSURE: 112 MMHG

## 2018-02-23 PROCEDURE — 700105 HCHG RX REV CODE 258: Performed by: PSYCHIATRY & NEUROLOGY

## 2018-02-23 PROCEDURE — 96366 THER/PROPH/DIAG IV INF ADDON: CPT

## 2018-02-23 PROCEDURE — 96413 CHEMO IV INFUSION 1 HR: CPT

## 2018-02-23 PROCEDURE — 306780 HCHG STAT FOR TRANSFUSION PER CASE

## 2018-02-23 PROCEDURE — 96365 THER/PROPH/DIAG IV INF INIT: CPT

## 2018-02-23 PROCEDURE — 700111 HCHG RX REV CODE 636 W/ 250 OVERRIDE (IP): Performed by: PSYCHIATRY & NEUROLOGY

## 2018-02-23 RX ADMIN — NATALIZUMAB 300 MG: 300 INJECTION INTRAVENOUS at 15:50

## 2018-02-23 ASSESSMENT — PAIN SCALES - GENERAL: PAINLEVEL: 2=MINIMAL-SLIGHT

## 2018-02-24 NOTE — PROGRESS NOTES
Pt returns to infusion center for monthly Tysabri infusion.  Pt reports continued benefit from treatment.  Tysabri touch checklist completed.  PIV established, brisk blood return noted.  Pt tolerated infusion over 1.5 hrs per pt request without incident.  Monitored for one hour post infusion without incident.  PIV flushed with saline per policy, removed, gauze dressing placed.  Pt left infusion center ambulatory and in good condition.  Returns in four weeks, appointment scheduled.

## 2018-03-13 ENCOUNTER — OFFICE VISIT (OUTPATIENT)
Dept: MEDICAL GROUP | Facility: LAB | Age: 28
End: 2018-03-13
Payer: COMMERCIAL

## 2018-03-13 VITALS
TEMPERATURE: 98.4 F | RESPIRATION RATE: 16 BRPM | SYSTOLIC BLOOD PRESSURE: 116 MMHG | DIASTOLIC BLOOD PRESSURE: 78 MMHG | OXYGEN SATURATION: 98 % | HEART RATE: 80 BPM | HEIGHT: 69 IN | BODY MASS INDEX: 21.62 KG/M2 | WEIGHT: 146 LBS

## 2018-03-13 DIAGNOSIS — N89.8 VAGINAL DISCHARGE: ICD-10-CM

## 2018-03-13 DIAGNOSIS — F90.0 ATTENTION DEFICIT HYPERACTIVITY DISORDER (ADHD), PREDOMINANTLY INATTENTIVE TYPE: ICD-10-CM

## 2018-03-13 DIAGNOSIS — F33.1 MODERATE EPISODE OF RECURRENT MAJOR DEPRESSIVE DISORDER (HCC): ICD-10-CM

## 2018-03-13 PROCEDURE — 99214 OFFICE O/P EST MOD 30 MIN: CPT | Performed by: FAMILY MEDICINE

## 2018-03-13 RX ORDER — METHYLPHENIDATE HYDROCHLORIDE 54 MG/1
54 TABLET ORAL EVERY MORNING
Qty: 30 TAB | Refills: 0 | Status: SHIPPED | OUTPATIENT
Start: 2018-05-18 | End: 2018-06-17

## 2018-03-13 RX ORDER — METHYLPHENIDATE HYDROCHLORIDE 54 MG/1
54 TABLET ORAL EVERY MORNING
Qty: 30 TAB | Refills: 0 | Status: SHIPPED | OUTPATIENT
Start: 2018-04-18 | End: 2018-05-18

## 2018-03-13 RX ORDER — METHYLPHENIDATE HYDROCHLORIDE 54 MG/1
54 TABLET ORAL EVERY MORNING
Qty: 30 TAB | Refills: 0 | Status: SHIPPED | OUTPATIENT
Start: 2018-06-17 | End: 2018-07-10 | Stop reason: SDUPTHER

## 2018-03-13 RX ORDER — METHYLPHENIDATE HYDROCHLORIDE 54 MG/1
54 TABLET ORAL EVERY MORNING
COMMUNITY
End: 2018-03-13 | Stop reason: SDUPTHER

## 2018-03-13 NOTE — PROGRESS NOTES
Subjective:   Rosita Bowles is a 27 y.o. female here today for   Chief Complaint   Patient presents with   • ADHD       1. Attention deficit hyperactivity disorder (ADHD), predominantly inattentive type  Chronic, stable on Concerta 54 mg daily. She does take this every day. She never misses a dose and she has never had any abnormal refills or requests. She states that this helps her significantly in school. She got all A's this semester. She would like to stay on this medication. She denies any weight loss, anorexia. Does have insomnia    2. Vaginal discharge  New to discuss  Patient brings in a ziplock bag of acoustic E vaginal discharge. No color to the discharge. No change in sexual partners. No vaginal itching.    3. Moderate episode of recurrent major depressive disorder (CMS-HCC)  This is chronic. Patient states she has been under more stress with school recently. She is in school full-time. She takes Wellbutrin 300 mg XL daily. This seems to help her well. She is not exercising regularly and she notes that this helps her depression. She does have some new anxiety but relates this to her MS and school.      Current medicines (including changes today)  Current Outpatient Prescriptions   Medication Sig Dispense Refill   • [START ON 4/18/2018] methylphenidate (CONCERTA) 54 MG CR tablet Take 1 Tab by mouth every morning for 30 days. 30 Tab 0   • [START ON 5/18/2018] methylphenidate (CONCERTA) 54 MG CR tablet Take 1 Tab by mouth every morning for 30 days. 30 Tab 0   • [START ON 6/17/2018] methylphenidate (CONCERTA) 54 MG CR tablet Take 1 Tab by mouth every morning for 30 days. 30 Tab 0   • MAGNESIUM CHLORIDE PO Take 1 Cap by mouth every day.     • Biotin 5000 MCG Cap Take 5,000 mcg by mouth every day at 6 PM. supplement     • Cyanocobalamin (VITAMIN B-12) 1000 MCG Tab Take 1,000 mcg by mouth every day. supplement     • Cholecalciferol (VITAMIN D3) 5000 units Tab Take 5,000 Units by mouth every day.  "supplement     • buPROPion (WELLBUTRIN XL) 300 MG XL tablet Take 1 Tab by mouth every morning. 90 Tab 3   • melatonin 3 MG Tab Take 3 mg by mouth 1 time daily as needed (insomnia).     • Levonorgestrel (KYLEENA) 19.5 MG IUD 1 Each by Intrauterine route See Admin Instructions. Every 5 years  birth control       No current facility-administered medications for this visit.      She  has a past medical history of Acne (8/7/2012); Acne vulgaris (6/23/2017); ADHD (8/7/2012); Depression (8/7/2012); and Multiple sclerosis (CMS-[facility]) (11/2/2016).    ROS   No fevers  No bowel changes  No LE edema       Objective:     Blood pressure 116/78, pulse 80, temperature 36.9 °C (98.4 °F), resp. rate 16, height 1.753 m (5' 9\"), weight 66.2 kg (146 lb), SpO2 98 %. Body mass index is 21.56 kg/m².  Physical Exam:  Constitutional: Alert, no distress.  Skin: Warm, dry, good turgor, no rashes in visible areas.  Eye: Equal, round and reactive, conjunctiva clear, lids normal.  ENMT: Lips without lesions, good dentition, oropharynx clear.  Neck: Trachea midline, no masses, no thyromegaly. No cervical or supraclavicular lymphadenopathy  Respiratory: Unlabored respiratory effort, lungs clear to auscultation, no wheezes, no ronchi.  Cardiovascular: Normal S1, S2, RRR, no murmur, no edema.  Abdomen: Soft, non-tender, no masses, no hepatosplenomegaly.  Psych: Alert and oriented x3, normal affect and mood.  Plastic baggy of vaginal discharge is clear, sticky     Assessment and Plan:   The following treatment plan was discussed    1. Attention deficit hyperactivity disorder (ADHD), predominantly inattentive type  Chronic, stable  We will continue Concerta 54 mg daily. Discussed risks of this medication including cardiovascular side effects, insomnia, palpitations, mood changes  Refills for 4/18, 5/18, 6/17   - methylphenidate (CONCERTA) 54 MG CR tablet; Take 1 Tab by mouth every morning for 30 days.  Dispense: 30 Tab; Refill: 0  - methylphenidate " (CONCERTA) 54 MG CR tablet; Take 1 Tab by mouth every morning for 30 days.  Dispense: 30 Tab; Refill: 0  - methylphenidate (CONCERTA) 54 MG CR tablet; Take 1 Tab by mouth every morning for 30 days.  Dispense: 30 Tab; Refill: 0    2. Vaginal discharge  This is a new problem. Based on the consistency of the vaginal discharge that she brings in today, believes that this is likely secondary to ovulation. Patient is not concerned for any STDs. We will monitor.    3. Moderate episode of recurrent major depressive disorder (CMS-HCC)  Chronic, stable, we will continue Wellbutrin  mg daily      Followup: Return in about 4 months (around 7/13/2018) for ADHD.       This note was created using voice recognition software. I have made every reasonable attempt to correct errors, however, I do anticipate some grammatical errors.

## 2018-03-16 ENCOUNTER — TELEPHONE (OUTPATIENT)
Dept: MEDICAL GROUP | Facility: LAB | Age: 28
End: 2018-03-16

## 2018-03-16 NOTE — TELEPHONE ENCOUNTER
----- Message from Rosita Bowles sent at 3/15/2018  6:48 PM PDT -----  Regarding: Prescription Question  Contact: [phone]  Hi Dr. Roy,   I just called [facility] and they said the earliest they could fill the prescription is the 17th (I will be on a plane starting at 6am that day).  I was wondering if you could call them and fix this for  on the 16th.   I'll call this in tomorrow morning, as well, but this is pretty urgent :(    Best regards,   Rosita Bowles

## 2018-03-21 ENCOUNTER — TELEPHONE (OUTPATIENT)
Dept: NEUROLOGY | Facility: MEDICAL CENTER | Age: 28
End: 2018-03-21

## 2018-03-21 NOTE — TELEPHONE ENCOUNTER
Patient called for Dr. Garrett thinking she might be having an MS relapse. She says since this morning she is not able to use her right hand and it stopped working. She says she is barely able to twitch her fingers. She is scheduled for her Tysabri infusion this Friday, 3/23 and is wondering if maybe she should get IV steroids also? Please advise.

## 2018-03-22 NOTE — TELEPHONE ENCOUNTER
Set her up for IV steroids at 1000 mg/day for 3 days. Hopefully we can get this done sooner rather than later.

## 2018-03-23 ENCOUNTER — OUTPATIENT INFUSION SERVICES (OUTPATIENT)
Dept: ONCOLOGY | Facility: MEDICAL CENTER | Age: 28
End: 2018-03-23
Attending: PSYCHIATRY & NEUROLOGY
Payer: COMMERCIAL

## 2018-03-23 ENCOUNTER — TELEPHONE (OUTPATIENT)
Dept: NEUROLOGY | Facility: MEDICAL CENTER | Age: 28
End: 2018-03-23

## 2018-03-23 VITALS
BODY MASS INDEX: 21.91 KG/M2 | SYSTOLIC BLOOD PRESSURE: 119 MMHG | DIASTOLIC BLOOD PRESSURE: 61 MMHG | HEIGHT: 69 IN | RESPIRATION RATE: 18 BRPM | OXYGEN SATURATION: 99 % | WEIGHT: 147.93 LBS | HEART RATE: 91 BPM

## 2018-03-23 DIAGNOSIS — G35 MULTIPLE SCLEROSIS (HCC): ICD-10-CM

## 2018-03-23 PROCEDURE — 306780 HCHG STAT FOR TRANSFUSION PER CASE

## 2018-03-23 PROCEDURE — 700105 HCHG RX REV CODE 258: Performed by: PSYCHIATRY & NEUROLOGY

## 2018-03-23 PROCEDURE — 96413 CHEMO IV INFUSION 1 HR: CPT

## 2018-03-23 PROCEDURE — 96415 CHEMO IV INFUSION ADDL HR: CPT

## 2018-03-23 PROCEDURE — 700111 HCHG RX REV CODE 636 W/ 250 OVERRIDE (IP): Performed by: PSYCHIATRY & NEUROLOGY

## 2018-03-23 RX ORDER — METHYLPREDNISOLONE 4 MG/1
TABLET ORAL
Qty: 1 KIT | Refills: 0 | Status: SHIPPED | OUTPATIENT
Start: 2018-03-23 | End: 2018-07-10

## 2018-03-23 RX ORDER — TEMAZEPAM 15 MG/1
15 CAPSULE ORAL NIGHTLY PRN
Qty: 30 CAP | Refills: 0 | Status: SHIPPED | OUTPATIENT
Start: 2018-03-23 | End: 2018-04-23

## 2018-03-23 RX ADMIN — NATALIZUMAB 300 MG: 300 INJECTION INTRAVENOUS at 16:39

## 2018-03-23 ASSESSMENT — PAIN SCALES - GENERAL: PAINLEVEL: NO PAIN

## 2018-03-23 NOTE — TELEPHONE ENCOUNTER
----- Message from Manas Minor, Med Ass't sent at 3/23/2018  9:49 AM PDT -----  Regarding: FW: Prescription Question  Contact: [phone]  Can you order oral steroids and a sleep aid for the duration?   ----- Message -----  From: Rosita Bowles  Sent: 3/22/2018   8:48 PM  To: Neurology Mas  Subject: Prescription Question                            Hi Manas,   I would be okay doing the steroid taper prior to the IV infusions, but I would request a sleeping aid (like temazepam, which I was given when I was in [place]) so that I can sleep during all of this (the steroids keep me wired at night and I barely sleep).  I'll be back in town this Friday (tomorrow) and able to talk about it over the phone. Today has been all travel since 9am [place], icky.  Best regards,  Rosita Bowles

## 2018-03-24 NOTE — PROGRESS NOTES
"Patient arrived ambulatory to the [facility] for Tysabri. Reviewed vital signs, labs, and physician order. Patient denies S&S of infection. Reviewed TOUCH questions verbally and online,  pt reports yes to question #1, changes to recent eyesight, Dr Garrett notified, per MD ok to proceed with infusion. Patient notified of [facility] will administer Solumedrol at home 3/24/18- 3/27/18, IV to remain in place for home infusion. IV access established in right forearm by Jeannette CORNELIUS, visualized brisk blood return. Tysabri administered of 1 hour and 35 mins as pt insists \"it hurts if it goes any faster\", reviewed with charge RN, no adverse reaction observed.  Pt remained in clinic for 1 hour observation, no adverse reaction observed. IV flushed per protocol, catheter remained in place for Solumedrol infusion on 3/24/18, 4X4 gauze, coban and mesh sleeve placed for protection. Confirmed upcoming appointment date and time with patient. Patient left the [facility] ambulatory in no sign of distress.   "

## 2018-03-27 ENCOUNTER — PATIENT MESSAGE (OUTPATIENT)
Dept: NEUROLOGY | Facility: MEDICAL CENTER | Age: 28
End: 2018-03-27

## 2018-03-27 NOTE — TELEPHONE ENCOUNTER
----- Message from Manas Minor, Med Ass't sent at 3/27/2018 11:56 AM PDT -----  Regarding: FW: Non-Urgent Medical Question  Contact: [phone]      ----- Message -----  From: Rosita Bowles  Sent: 3/27/2018  11:39 AM  To: Neurology Mas  Subject: Non-Urgent Medical Question                      Hi, Team!    Done with the 3 days of IV steroids and starting the taper today.  I was just wondering if you wanted me to get an MRI to see if my pain-in-the-butt immune system is done attacking my brain.    Manas - thank you SO SO SO much for setting me up with the infusions at home - it was such a huge relief that I would be able to get the steroids sooner rather than later!     Hope all is well, everyone!  Best regards,  Rosita Bowles

## 2018-03-27 NOTE — TELEPHONE ENCOUNTER
Tell Rosita that there is no need to repeat imaging at this time. We do not need an MRI scan to tell us that her immune system has kicked up a bit, the relapse tells us all we need. I'm afraid if I continue to put her in an MRI scanner, she will become so magnetized that simply walking down the street will become a life-threatening experience, as all the trash can lids, metal refuse, etc., will come flying and stick to her. Not a pretty picture!

## 2018-04-04 ENCOUNTER — PATIENT MESSAGE (OUTPATIENT)
Dept: MEDICAL GROUP | Facility: LAB | Age: 28
End: 2018-04-04

## 2018-04-05 RX ORDER — AMOXICILLIN AND CLAVULANATE POTASSIUM 875; 125 MG/1; MG/1
1 TABLET, FILM COATED ORAL 2 TIMES DAILY
Qty: 14 TAB | Refills: 0 | Status: SHIPPED | OUTPATIENT
Start: 2018-04-05 | End: 2018-04-12

## 2018-04-16 ENCOUNTER — PATIENT MESSAGE (OUTPATIENT)
Dept: MEDICAL GROUP | Facility: LAB | Age: 28
End: 2018-04-16

## 2018-04-16 DIAGNOSIS — Z11.3 SCREEN FOR STD (SEXUALLY TRANSMITTED DISEASE): ICD-10-CM

## 2018-04-20 ENCOUNTER — OUTPATIENT INFUSION SERVICES (OUTPATIENT)
Dept: ONCOLOGY | Facility: MEDICAL CENTER | Age: 28
End: 2018-04-20
Attending: PSYCHIATRY & NEUROLOGY
Payer: COMMERCIAL

## 2018-04-20 VITALS
DIASTOLIC BLOOD PRESSURE: 52 MMHG | RESPIRATION RATE: 18 BRPM | HEART RATE: 92 BPM | BODY MASS INDEX: 21.22 KG/M2 | HEIGHT: 69 IN | SYSTOLIC BLOOD PRESSURE: 98 MMHG | TEMPERATURE: 98 F | WEIGHT: 143.3 LBS | OXYGEN SATURATION: 98 %

## 2018-04-20 DIAGNOSIS — Z11.3 SCREEN FOR STD (SEXUALLY TRANSMITTED DISEASE): ICD-10-CM

## 2018-04-20 DIAGNOSIS — N93.9 ABNORMAL UTERINE BLEEDING: ICD-10-CM

## 2018-04-20 LAB
ALBUMIN SERPL BCP-MCNC: 4.3 G/DL (ref 3.2–4.9)
ALBUMIN/GLOB SERPL: 1.9 G/DL
ALP SERPL-CCNC: 95 U/L (ref 30–99)
ALT SERPL-CCNC: 16 U/L (ref 2–50)
ANION GAP SERPL CALC-SCNC: 6 MMOL/L (ref 0–11.9)
AST SERPL-CCNC: 16 U/L (ref 12–45)
BASOPHILS # BLD AUTO: 0.5 % (ref 0–1.8)
BASOPHILS # BLD: 0.05 K/UL (ref 0–0.12)
BILIRUB SERPL-MCNC: 0.6 MG/DL (ref 0.1–1.5)
BUN SERPL-MCNC: 18 MG/DL (ref 8–22)
C TRACH DNA SPEC QL NAA+PROBE: NEGATIVE
CALCIUM SERPL-MCNC: 9.1 MG/DL (ref 8.5–10.5)
CHLORIDE SERPL-SCNC: 109 MMOL/L (ref 96–112)
CO2 SERPL-SCNC: 25 MMOL/L (ref 20–33)
CREAT SERPL-MCNC: 0.95 MG/DL (ref 0.5–1.4)
EOSINOPHIL # BLD AUTO: 0.31 K/UL (ref 0–0.51)
EOSINOPHIL NFR BLD: 2.8 % (ref 0–6.9)
ERYTHROCYTE [DISTWIDTH] IN BLOOD BY AUTOMATED COUNT: 44.5 FL (ref 35.9–50)
GLOBULIN SER CALC-MCNC: 2.3 G/DL (ref 1.9–3.5)
GLUCOSE SERPL-MCNC: 97 MG/DL (ref 65–99)
HCT VFR BLD AUTO: 42.6 % (ref 37–47)
HGB BLD-MCNC: 14.6 G/DL (ref 12–16)
HIV 1+2 AB+HIV1 P24 AG SERPL QL IA: NON REACTIVE
IMM GRANULOCYTES # BLD AUTO: 0.03 K/UL (ref 0–0.11)
IMM GRANULOCYTES NFR BLD AUTO: 0.3 % (ref 0–0.9)
LYMPHOCYTES # BLD AUTO: 2.78 K/UL (ref 1–4.8)
LYMPHOCYTES NFR BLD: 25.1 % (ref 22–41)
MCH RBC QN AUTO: 28.7 PG (ref 27–33)
MCHC RBC AUTO-ENTMCNC: 34.3 G/DL (ref 33.6–35)
MCV RBC AUTO: 83.9 FL (ref 81.4–97.8)
MONOCYTES # BLD AUTO: 1.13 K/UL (ref 0–0.85)
MONOCYTES NFR BLD AUTO: 10.2 % (ref 0–13.4)
N GONORRHOEA DNA SPEC QL NAA+PROBE: NEGATIVE
NEUTROPHILS # BLD AUTO: 6.76 K/UL (ref 2–7.15)
NEUTROPHILS NFR BLD: 61.1 % (ref 44–72)
NRBC # BLD AUTO: 0.02 K/UL
NRBC BLD-RTO: 0.2 /100 WBC
PLATELET # BLD AUTO: 305 K/UL (ref 164–446)
PMV BLD AUTO: 9.6 FL (ref 9–12.9)
POTASSIUM SERPL-SCNC: 3.9 MMOL/L (ref 3.6–5.5)
PROT SERPL-MCNC: 6.6 G/DL (ref 6–8.2)
RBC # BLD AUTO: 5.08 M/UL (ref 4.2–5.4)
SODIUM SERPL-SCNC: 140 MMOL/L (ref 135–145)
SPECIMEN SOURCE: NORMAL
TREPONEMA PALLIDUM IGG+IGM AB [PRESENCE] IN SERUM OR PLASMA BY IMMUNOASSAY: NON REACTIVE
TSH SERPL DL<=0.005 MIU/L-ACNC: 2.03 UIU/ML (ref 0.38–5.33)
WBC # BLD AUTO: 11.1 K/UL (ref 4.8–10.8)

## 2018-04-20 PROCEDURE — 700111 HCHG RX REV CODE 636 W/ 250 OVERRIDE (IP): Performed by: PSYCHIATRY & NEUROLOGY

## 2018-04-20 PROCEDURE — 306780 HCHG STAT FOR TRANSFUSION PER CASE

## 2018-04-20 PROCEDURE — 85025 COMPLETE CBC W/AUTO DIFF WBC: CPT

## 2018-04-20 PROCEDURE — 87591 N.GONORRHOEAE DNA AMP PROB: CPT

## 2018-04-20 PROCEDURE — 96413 CHEMO IV INFUSION 1 HR: CPT

## 2018-04-20 PROCEDURE — 87491 CHLMYD TRACH DNA AMP PROBE: CPT

## 2018-04-20 PROCEDURE — 86780 TREPONEMA PALLIDUM: CPT

## 2018-04-20 PROCEDURE — 80053 COMPREHEN METABOLIC PANEL: CPT

## 2018-04-20 PROCEDURE — 700105 HCHG RX REV CODE 258: Performed by: PSYCHIATRY & NEUROLOGY

## 2018-04-20 PROCEDURE — 87389 HIV-1 AG W/HIV-1&-2 AB AG IA: CPT

## 2018-04-20 PROCEDURE — 84443 ASSAY THYROID STIM HORMONE: CPT

## 2018-04-20 RX ADMIN — NATALIZUMAB 300 MG: 300 INJECTION INTRAVENOUS at 12:16

## 2018-04-20 ASSESSMENT — PAIN SCALES - GENERAL: PAINLEVEL: 1=MINIMAL PAIN

## 2018-04-20 NOTE — PROGRESS NOTES
Returns for Tysabri.  Had nausea and emesis early this a.m.  Afebrile.  Some abdominal pain, slight remaining.  No other new sx reported.  MD called to check for OK to tx.  Obtained from Dr Wheeler.  Tx infused over one hour without rx.  Pt states infusion hurts IV site.  Warm pack placed.  Discussed package insert stating to infuse over the one hour.   No rx noted.  Observed one hour post without rx.  DC to care of family.

## 2018-05-05 ENCOUNTER — OFFICE VISIT (OUTPATIENT)
Dept: URGENT CARE | Facility: CLINIC | Age: 28
End: 2018-05-05
Payer: COMMERCIAL

## 2018-05-05 VITALS
DIASTOLIC BLOOD PRESSURE: 58 MMHG | RESPIRATION RATE: 15 BRPM | HEIGHT: 69 IN | BODY MASS INDEX: 21.18 KG/M2 | SYSTOLIC BLOOD PRESSURE: 110 MMHG | TEMPERATURE: 98.8 F | HEART RATE: 100 BPM | WEIGHT: 143 LBS | OXYGEN SATURATION: 96 %

## 2018-05-05 DIAGNOSIS — M43.6 ACUTE TORTICOLLIS: ICD-10-CM

## 2018-05-05 PROCEDURE — 99214 OFFICE O/P EST MOD 30 MIN: CPT | Performed by: FAMILY MEDICINE

## 2018-05-05 RX ORDER — MELOXICAM 15 MG/1
15 TABLET ORAL DAILY
Qty: 30 TAB | Refills: 0 | Status: SHIPPED | OUTPATIENT
Start: 2018-05-05 | End: 2018-07-10

## 2018-05-05 RX ORDER — CYCLOBENZAPRINE HCL 10 MG
10 TABLET ORAL
Qty: 15 TAB | Refills: 0 | Status: SHIPPED | OUTPATIENT
Start: 2018-05-05 | End: 2018-07-10

## 2018-05-07 DIAGNOSIS — M62.830 BACK SPASM: ICD-10-CM

## 2018-05-09 ENCOUNTER — PATIENT MESSAGE (OUTPATIENT)
Dept: NEUROLOGY | Facility: MEDICAL CENTER | Age: 28
End: 2018-05-09

## 2018-05-10 ENCOUNTER — PHYSICAL THERAPY (OUTPATIENT)
Dept: PHYSICAL THERAPY | Facility: REHABILITATION | Age: 28
End: 2018-05-10
Attending: FAMILY MEDICINE
Payer: COMMERCIAL

## 2018-05-10 DIAGNOSIS — M54.6 DISCOGENIC THORACIC PAIN: ICD-10-CM

## 2018-05-10 DIAGNOSIS — M54.2 NECK PAIN: ICD-10-CM

## 2018-05-10 DIAGNOSIS — R29.3 ABNORMAL POSTURE: ICD-10-CM

## 2018-05-10 PROCEDURE — 97535 SELF CARE MNGMENT TRAINING: CPT

## 2018-05-10 PROCEDURE — 97161 PT EVAL LOW COMPLEX 20 MIN: CPT

## 2018-05-10 ASSESSMENT — ENCOUNTER SYMPTOMS
EXACERBATED BY: PROLONGED STANDING
QUALITY: ACHING
QUALITY: SHARP
MIGRAINE HEADACHES: 1
EXACERBATED BY: KEYBOARDING
ALLEVIATING FACTORS: HEAT APPLICATION
EXACERBATED BY: OVERHEAD ACTIVITY
EXACERBATED BY: RAPID POSITION CHANGES
ALLEVIATING FACTORS: POSITION CHANGE
PAIN TIMING: EVERY MORNING
PAIN SCALE: 4
PAIN SCALE AT HIGHEST: 7
ALLEVIATING FACTORS: REST
PAIN SCALE AT LOWEST: 4
ALLEVIATING FACTORS: PHARMACOTHERAPY
EXACERBATED BY: CARRYING

## 2018-05-10 NOTE — TELEPHONE ENCOUNTER
"----- Message from Manas Minor, Med Ass't sent at 5/9/2018  5:15 PM PDT -----  Regarding: FW: Non-Urgent Medical Question  Contact: [phone]      ----- Message -----  From: Rosita Bowles  Sent: 5/9/2018   3:05 PM  To: Neurology Mas  Subject: Non-Urgent Medical Question                      Hi Dr. Garrett,  Last week I went in to urgent care - my neck/back (base of skull to end of ribs and lateral to shoulder) were in a state of spasm, which happened overnight.  I did some quick \"googling\" and one of the first page results was a woman who has MS and experienced something she described as \"like I'm in a pressurized room, and my head is being squished..my neck gets really tight and I get dizzy, it's like the floor or walls tilt.\"  That is -exactly- what I have experienced before but haven't been able to name it - \"cervicogenic dizziness\" is what she said. She also said that she has cervical lesions, and that her nervous system damage plus neck spasms caused that vertigo.  Is this a MS symptom? Still in pain :(   Best regards  Rosita  "

## 2018-05-10 NOTE — OP THERAPY EVALUATION
Outpatient Physical Therapy  INITIAL EVALUATION    [facility] [place] [id number]  [address]  [place] [address]  Phone:  [phone]  Fax:  [phone]    Date of Evaluation: 05/10/2018    Patient: Rosita Bowles  YOB: 1990  MRN: [id number]     Referring Provider: Manuela Green M.D.  [address]  [place] [address]   Referring Diagnosis Back spasm [M62.830]     Time Calculation  Start time: 1346  Stop time: 1447 Time Calculation (min): 61 minutes     Physical Therapy Occurrence Codes    Date of onset of impairment:  18   Date physical therapy care plan established or reviewed:  5/10/18   Date physical therapy treatment started:  5/10/18          Chief Complaint: Back Problem    Visit Diagnoses     ICD-10-CM   1. Abnormal posture R29.3   2. Neck pain M54.2   3. Discogenic thoracic pain M54.6         Subjective:   History of Present Illness:     Mechanism of injury:  Pt presents to PT with complaint of pain through her R scapular area that started about 1 week ago.  Woke up with the pain on 18.  The pain had worsened by the next day, so went to urgent care.  Was given anti-inflam and muscle relaxants.  The meds help, but feels like it is just masking the problem.  Denies N/T. History significant for MS- relapses involving both hands, L eye, LE symptoms.  Unsure if this is a sign of her MS.      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    Headaches:  migraine headaches  Sleep disturbance:  Difficulty falling asleep and interrupted sleep  Pain:     Current pain ratin    At best pain ratin    At worst pain ratin    Quality:  Sharp and aching    Pain timing:  Every morning    Relieving factors:  Heat application, rest, position change and pharmacotherapy    Aggravating factors:  Rapid position changes, prolonged standing, carrying, keyboarding and overhead activity (trying to  situp straight)    Progression:  Improving (with meds)  Social Support:     Lives in:  One-story house    Lives with:  Alone  Diagnostic Tests:     Abnormal x-ray: see epic.    Treatments:     None    Patient Goals:     Patient goals for therapy:  Increased strength, return to sport/leisure activities, decreased pain, increased motion and independence with ADLs/IADLs      Past Medical History:   Diagnosis Date   • Acne 8/7/2012   • Acne vulgaris 6/23/2017   • ADHD 8/7/2012   • Depression 8/7/2012   • Multiple sclerosis (HCC) 11/2/2016     Past Surgical History:   Procedure Laterality Date   • ANKLE ORIF     • DENTAL EXTRACTION(S)       Social History   Substance Use Topics   • Smoking status: Never Smoker   • Smokeless tobacco: Never Used   • Alcohol use 0.0 oz/week      Comment: occasional     Family and Occupational History     Social History   • Marital status: Single     Spouse name: N/A   • Number of children: N/A   • Years of education: N/A       Objective     Postural Observations  Seated posture: fair  Standing posture: fair        Shoulder Screen    Shoulder active range of motion within functional limits.  Shoulder range of motion within functional limits with the following exceptions: R HBB 10% limited with pain medial scap.   Shoulder strength within functional limits.    Neurological Testing     Reflexes   Left   Biceps (C5/C6): brisk (3+)  Brachioradialis (C6): brisk (3+)    Right   Biceps (C5/C6): brisk (3+)  Brachioradialis (C6): brisk (3+)    Myotome testing   Cervical (left)   All left cervical myotomes within normal limits    Cervical (right)   All right cervical myotomes within normal limits    Dermatome testing   Cervical (left)   All left cervical dermatomes intact    Cervical (right)   All right cervical dermatomes intact    Palpation     Right   Tenderness of the cervical paraspinals, levator scapulae, rhomboids and thoracic paraspinals.     Joint Play   Spine     Central PA [place]        C5:  WFL       C6: WFL       C7: WFL       T1: WFL       T2: hypomobile       T3: hypomobile       T4: hypomobile       T5: WFL        Strength:      Additional Strength Details  5/5 painless UEs        Therapeutic Treatments and Modalities:     1. Functional Training, Self Care (CPT 19222), Education: postural derangment, HEP self stretching with lacross ball to t/s with c/s motions, pelvic tilts and UE AROM.     Time-based treatments/modalities:  Functional training, self care minutes (CPT 70355): 15 minutes       Assessment, Response and Plan:   Impairments: abnormal muscle tone, abnormal or restricted ROM, activity intolerance, difficulty performing job, impaired functional mobility, impaired physical strength, lacks appropriate home exercise program, limited ADL's and pain with function    Assessment details:  Ms. Bowles is a 27 y.o female who presents to PT with complaint of R sided neck and scapular pain.  PT eval is consistent with postural dysfunction.  Most restriction noted at the T2-T4 levels.  She is limited in her tolerance for sleep, reaching, sitting up tall due to the pain.  Skilled PT services are indicated to address the mentioned functional limitations and enhance QOL.     Barriers to therapy:  None  Prognosis: good    Goals:   Short Term Goals:   - Pt able to to sit in neutral without pain  - Pt able to report less than 25% limit in sleep related to pain  - Improve c/s flexion to ext to WNL and painless  Short term goal time span:  2-4 weeks      Long Term Goals:    - RMQ improved 8 points  - Able to return to gym routine without increased pain  - Indep with HEP  Long term goal time span:  6-8 weeks    Plan:   Therapy options:  Physical therapy treatment to continue  Planned therapy interventions:  E Stim Unattended (CPT 81599), Functional Training, Self Care (CPT 52086), Hot or Cold Pack Therapy (CPT 73756), Manual Therapy (CPT 36764), Mechanical Traction (CPT 27170), Neuromuscular Re-education  (CPT 02491), Therapeutic Activities (CPT 24608) and Therapeutic Exercise (CPT 35570)  Frequency:  1x week  Duration in weeks:  6  Discussed with:  Patient      Functional Limitation G-Codes and Severity Modifiers        Referring provider co-signature:  I have reviewed this plan of care and my co-signature certifies the need for services.  Certification Dates:   From 5/10/18     To 6/22/18    Physician Signature: ________________________________ Date: ______________

## 2018-05-10 NOTE — TELEPHONE ENCOUNTER
"Please tell Rosita that her symptoms are nonspecific, yesterday can be seen in patient with MS, the acute onset is a little unusual for an acute attack/relapse of her disease. Her disease has never affected the back of her brain in the brainstem where dizziness often is generated. This type of symptom can also be due to something in one or both of the inner ears. She evidently has had this happen before. Spinal cord lesions do not cause dizziness, she has never had any cervical spine disease. PS: Tell her to stop using \"Dr. Zhou\" every time she has a new symptom! And yes, her stress and anxiety related to exams, etc., will confound the issues.  "

## 2018-05-14 ENCOUNTER — TELEPHONE (OUTPATIENT)
Dept: MEDICAL GROUP | Facility: LAB | Age: 28
End: 2018-05-14

## 2018-05-14 NOTE — TELEPHONE ENCOUNTER
"2. Physical Therapy paperwork received from OP therapy Eval requiring provider signature.     3. All appropriate fields completed by Medical Assistant: N/A CMA printed and distributed to MA    4. Paperwork placed in \"MA to Provider\" folder/basket. Awaiting provider completion/signature.  "

## 2018-05-17 ENCOUNTER — PHYSICAL THERAPY (OUTPATIENT)
Dept: PHYSICAL THERAPY | Facility: REHABILITATION | Age: 28
End: 2018-05-17
Attending: FAMILY MEDICINE
Payer: COMMERCIAL

## 2018-05-17 DIAGNOSIS — M54.6 DISCOGENIC THORACIC PAIN: ICD-10-CM

## 2018-05-17 DIAGNOSIS — R29.3 ABNORMAL POSTURE: ICD-10-CM

## 2018-05-17 DIAGNOSIS — M54.2 NECK PAIN: ICD-10-CM

## 2018-05-17 PROCEDURE — 97140 MANUAL THERAPY 1/> REGIONS: CPT

## 2018-05-17 PROCEDURE — 97110 THERAPEUTIC EXERCISES: CPT

## 2018-05-17 NOTE — OP THERAPY DAILY TREATMENT
"  Outpatient Physical Therapy  DAILY TREATMENT     [facility] Physical Therapy [address].  [address]  [place] [address]  Phone:  [phone]  Fax:  [phone]    Date: 05/17/2018    Patient: Rosita Bowles  YOB: 1990  MRN: [id number]     Time Calculation  Start time: 1259  Stop time: 1333 Time Calculation (min): 34 minutes     Chief Complaint: Neck Problem and Shoulder Problem    Visit #: 2    SUBJECTIVE:  About the same. Felt better after it was worked on during eval.  Tried the roller for home, but hasn't gotten a peanut ball yet.  (Ordered today)    OBJECTIVE:        Therapeutic Exercises (CPT 97351):     1. UBE, 4 min alt    2. Peanut ball, upper t/s and CTJ x 5 min    3. Prone nunu, to fatigue- approx 20 reps    4. OA nod, x 20    5. DNF, 6\" x 6     Therapeutic Treatments and Modalities:     1. Manual Therapy (CPT 32892), Prone t/s wedge mobs, prone CTJ rotational mobs with head R.  Supine SOR, manual txn x 5 min, R R1 mobs    Time-based treatments/modalities:  Manual therapy minutes (CPT 25291): 15 minutes  Therapeutic exercise minutes (CPT 02547): 18 minutes       ASSESSMENT:   Response to treatment: No pain with AROM of c/s after tx.  Primary restrictions noted at CTJ and R R1.  Use of peanut ball should help extend relief.     PLAN/RECOMMENDATIONS:   Plan for treatment: therapy treatment to continue next visit.  Planned interventions for next visit: continue with current treatment.      "

## 2018-05-18 ENCOUNTER — OUTPATIENT INFUSION SERVICES (OUTPATIENT)
Dept: ONCOLOGY | Facility: MEDICAL CENTER | Age: 28
End: 2018-05-18
Attending: PSYCHIATRY & NEUROLOGY
Payer: COMMERCIAL

## 2018-05-18 VITALS
HEIGHT: 69 IN | DIASTOLIC BLOOD PRESSURE: 62 MMHG | HEART RATE: 109 BPM | SYSTOLIC BLOOD PRESSURE: 96 MMHG | BODY MASS INDEX: 21.68 KG/M2 | TEMPERATURE: 98.3 F | RESPIRATION RATE: 18 BRPM | WEIGHT: 146.39 LBS | OXYGEN SATURATION: 98 %

## 2018-05-18 PROCEDURE — 306780 HCHG STAT FOR TRANSFUSION PER CASE

## 2018-05-18 PROCEDURE — 700111 HCHG RX REV CODE 636 W/ 250 OVERRIDE (IP): Performed by: PSYCHIATRY & NEUROLOGY

## 2018-05-18 PROCEDURE — 96413 CHEMO IV INFUSION 1 HR: CPT

## 2018-05-18 PROCEDURE — 700105 HCHG RX REV CODE 258: Performed by: PSYCHIATRY & NEUROLOGY

## 2018-05-18 RX ADMIN — NATALIZUMAB 300 MG: 300 INJECTION INTRAVENOUS at 12:14

## 2018-05-18 NOTE — PROGRESS NOTES
Pt presented to infusion center for Tysabri. Denies any current s/s of infection or open wounds. TOUCH checklist completed. PIV started, brisk blood return observed. Tysabri infused over 1 hour with no s/s of adverse reaction, heat pack in place over IV per request. 1 hour obs completed with no s/s of adverse reaction. PIV removed approx 20 mins into obs hour as it infiltrated small amount of saline , gauze and coban dressing placed. Pain at IV site resolved quickly, no residual swelling.  Pt has next appt. Left on foot in good spirits with mom.

## 2018-05-22 ENCOUNTER — PHYSICAL THERAPY (OUTPATIENT)
Dept: PHYSICAL THERAPY | Facility: REHABILITATION | Age: 28
End: 2018-05-22
Attending: FAMILY MEDICINE
Payer: COMMERCIAL

## 2018-05-22 DIAGNOSIS — M54.2 NECK PAIN: ICD-10-CM

## 2018-05-22 DIAGNOSIS — M54.6 DISCOGENIC THORACIC PAIN: ICD-10-CM

## 2018-05-22 DIAGNOSIS — R29.3 ABNORMAL POSTURE: ICD-10-CM

## 2018-05-22 PROCEDURE — 97012 MECHANICAL TRACTION THERAPY: CPT

## 2018-05-22 PROCEDURE — 97140 MANUAL THERAPY 1/> REGIONS: CPT

## 2018-05-22 PROCEDURE — 97110 THERAPEUTIC EXERCISES: CPT

## 2018-05-22 ASSESSMENT — ENCOUNTER SYMPTOMS
LEG PAIN: 0
PARESIS: 0
BACK PAIN: 1
NUMBNESS: 0
TINGLING: 0
PARESTHESIAS: 0
BOWEL INCONTINENCE: 0
WEAKNESS: 0
PERIANAL NUMBNESS: 0

## 2018-05-22 NOTE — PROGRESS NOTES
"Subjective:   Rosita Bowles is a 27 y.o. female who presents for Back Pain (x yesturday Rt side neck to lower )        Back Pain   This is a new problem. The current episode started yesterday. The problem occurs constantly. The problem has been rapidly worsening since onset. The pain is present in the thoracic spine. The pain is severe. Pertinent negatives include no bladder incontinence, bowel incontinence, leg pain, numbness, paresis, paresthesias, perianal numbness, tingling or weakness.     Review of Systems   Gastrointestinal: Negative for bowel incontinence.   Genitourinary: Negative for bladder incontinence.   Musculoskeletal: Positive for back pain.   Neurological: Negative for tingling, weakness, numbness and paresthesias.     Allergies   Allergen Reactions   • Nkda [No Known Drug Allergy]       Objective:   /58   Pulse 100   Temp 37.1 °C (98.8 °F)   Resp 15   Ht 1.753 m (5' 9\")   Wt 64.9 kg (143 lb)   SpO2 96%   BMI 21.12 kg/m²   Physical Exam   Constitutional: She is oriented to person, place, and time. She appears well-developed and well-nourished. No distress.   HENT:   Head: Normocephalic and atraumatic.   Eyes: Conjunctivae and EOM are normal. Pupils are equal, round, and reactive to light.   Cardiovascular: Normal rate, regular rhythm, normal heart sounds and intact distal pulses.    No murmur heard.  Pulmonary/Chest: Effort normal and breath sounds normal. No respiratory distress.   Abdominal: Soft. Bowel sounds are normal. She exhibits no distension. There is no tenderness.   Musculoskeletal:        Cervical back: She exhibits decreased range of motion, tenderness and spasm. She exhibits no bony tenderness and no swelling.        Lumbar back: She exhibits decreased range of motion, tenderness and spasm. She exhibits no bony tenderness and no deformity.   Neurological: She is alert and oriented to person, place, and time. She has normal reflexes. No sensory deficit.   Skin: " Skin is warm and dry.   Psychiatric: She has a normal mood and affect. Her behavior is normal.         Assessment/Plan:   Assessment    1. Acute torticollis  - meloxicam (MOBIC) 15 MG tablet; Take 1 Tab by mouth every day.  Dispense: 30 Tab; Refill: 0  - cyclobenzaprine (FLEXERIL) 10 MG Tab; Take 1 Tab by mouth at bedtime as needed.  Dispense: 15 Tab; Refill: 0  Differential diagnosis, natural history, supportive care, and indications for immediate follow-up discussed.

## 2018-05-22 NOTE — OP THERAPY DAILY TREATMENT
"  Outpatient Physical Therapy  DAILY TREATMENT     [facility] Physical Therapy [address].  [address]  [place] [address]  Phone:  [phone]  Fax:  [phone]    Date: 05/22/2018    Patient: Rosita Bowles  YOB: 1990  MRN: [id number]     Time Calculation  Start time: 1035  Stop time: 1125 Time Calculation (min): 50 minutes     Chief Complaint: Neck Problem    Visit #: 3    SUBJECTIVE:  \"Feeling worse\"  Did ok after LV, but woke up in pain yesterday and did not improve with home management techniques.     OBJECTIVE:        Therapeutic Exercises (CPT 56777):     1. UBE, 4 min alt    3. Prone nunu, to fatigue- approx 20 reps    4. OA nod, x 20    5. DNF, 6\" x 6     Therapeutic Treatments and Modalities:     1. Manual Therapy (CPT 96314), Prone t/s wedge mobs, prone CTJ rotational mobs with head R.  Supine SOR, manual txn x 5 min, R R1 mobs    2. Mechanical Traction (CPT 00708), 18/10#, 60/20 with MH x 15 min    Time-based treatments/modalities:  Manual therapy minutes (CPT 96983): 20 minutes  Therapeutic exercise minutes (CPT 61744): 10 minutes       ASSESSMENT:   Response to treatment: Decreased pain from 4/10 to 2/10 after manual.  A little achy immediately after txn.  Will monitor longterm response.  Discussed sleep posture.     PLAN/RECOMMENDATIONS:   Plan for treatment: therapy treatment to continue next visit.  Planned interventions for next visit: continue with current treatment.      "

## 2018-05-29 ENCOUNTER — PATIENT MESSAGE (OUTPATIENT)
Dept: MEDICAL GROUP | Facility: LAB | Age: 28
End: 2018-05-29

## 2018-05-31 ENCOUNTER — PHYSICAL THERAPY (OUTPATIENT)
Dept: PHYSICAL THERAPY | Facility: REHABILITATION | Age: 28
End: 2018-05-31
Attending: FAMILY MEDICINE
Payer: COMMERCIAL

## 2018-05-31 DIAGNOSIS — R29.3 ABNORMAL POSTURE: ICD-10-CM

## 2018-05-31 DIAGNOSIS — M54.2 NECK PAIN: ICD-10-CM

## 2018-05-31 DIAGNOSIS — M54.6 DISCOGENIC THORACIC PAIN: ICD-10-CM

## 2018-05-31 PROCEDURE — 97012 MECHANICAL TRACTION THERAPY: CPT

## 2018-05-31 PROCEDURE — 97140 MANUAL THERAPY 1/> REGIONS: CPT

## 2018-05-31 PROCEDURE — 97110 THERAPEUTIC EXERCISES: CPT

## 2018-05-31 NOTE — OP THERAPY DAILY TREATMENT
Outpatient Physical Therapy  DAILY TREATMENT     [facility] Physical Therapy [address].  [address]  [place] [address]  Phone:  [phone]  Fax:  [phone]    Date: 05/31/2018    Patient: Rosita Bowles  YOB: 1990  MRN: [id number]     Time Calculation  Start time: 1400  Stop time: 1452 Time Calculation (min): 52 minutes     Chief Complaint: Neck Problem    Visit #: 4    SUBJECTIVE:   Hurt a lot over the weekend due to not taking pain meds on vacation and didn't have heat pack.  Gets 65% relief after PT sessions, but never lasting more than the day. Getting MRI in 2 weeks to ensure no new lesions.     OBJECTIVE:        Therapeutic Exercises (CPT 35163):     1. UBE, 4 min alt    2. Seated retraction to extension, 2x10    3. Prone nunu, to fatigue- approx 20 reps    4. Demo of theracane, x 3 min    Therapeutic Treatments and Modalities:     1. Manual Therapy (CPT 14385), Prone t/s wedge mobs, prone CTJ rotational mobs with head R.  Supine SOR, manual txn x 5 min, R R1 mobs    2. Mechanical Traction (CPT 59784), 18/10#, 60/20 with MH x 15 min    Time-based treatments/modalities:  Manual therapy minutes (CPT 66220): 15 minutes  Therapeutic exercise minutes (CPT 68299): 15 minutes       ASSESSMENT:   Response to treatment: Getting good short term relief with PT, but limited responsiveness with HEP.  Noted to be flexing too much during chin tucks, which could be irritating a discogenic issue.  Modified to extensions.  Given TB traction and further ideas for self mobs with theracane.  Assess responsiveness.     PLAN/RECOMMENDATIONS:   Plan for treatment: therapy treatment to continue next visit.  Planned interventions for next visit: continue with current treatment.

## 2018-06-04 ENCOUNTER — PATIENT MESSAGE (OUTPATIENT)
Dept: MEDICAL GROUP | Facility: LAB | Age: 28
End: 2018-06-04

## 2018-06-04 ENCOUNTER — PHYSICAL THERAPY (OUTPATIENT)
Dept: PHYSICAL THERAPY | Facility: REHABILITATION | Age: 28
End: 2018-06-04
Attending: FAMILY MEDICINE
Payer: COMMERCIAL

## 2018-06-04 ENCOUNTER — PATIENT MESSAGE (OUTPATIENT)
Dept: NEUROLOGY | Facility: MEDICAL CENTER | Age: 28
End: 2018-06-04

## 2018-06-04 ENCOUNTER — TELEPHONE (OUTPATIENT)
Dept: NEUROLOGY | Facility: CLINIC | Age: 28
End: 2018-06-04

## 2018-06-04 DIAGNOSIS — R29.3 ABNORMAL POSTURE: ICD-10-CM

## 2018-06-04 DIAGNOSIS — M54.2 NECK PAIN: ICD-10-CM

## 2018-06-04 DIAGNOSIS — M54.6 DISCOGENIC THORACIC PAIN: ICD-10-CM

## 2018-06-04 PROCEDURE — 97140 MANUAL THERAPY 1/> REGIONS: CPT

## 2018-06-04 PROCEDURE — 97110 THERAPEUTIC EXERCISES: CPT

## 2018-06-04 PROCEDURE — 97014 ELECTRIC STIMULATION THERAPY: CPT

## 2018-06-04 NOTE — TELEPHONE ENCOUNTER
----- Message from Manas Minor, Med Ass't sent at 6/4/2018  9:47 AM PDT -----  Regarding: FW: Non-Urgent Medical Question  Contact: [phone]      ----- Message -----  From: Rosita Bowles  Sent: 6/4/2018   8:59 AM  To: Neurology Mas  Subject: Non-Urgent Medical Question                      Hi Dr. Garrett!    I've been going to PT for my back/neck/shoulder issue and all was going well, but when I woke up this morning, things were almost back to being as they were before I went in to urgent care about a month ago.  I told my physical therapist this morning and she suggested getting an MRI of my cervical/thoracic spine over to my shoulder - she suggested contacting you about putting an order in for this, would that be possible?      Hope all is well with you!    Best regards,  Rosita Bowles

## 2018-06-04 NOTE — OP THERAPY DAILY TREATMENT
Outpatient Physical Therapy  DAILY TREATMENT     [facility] Physical Therapy [address].  [address]  [place] [address]  Phone:  [phone]  Fax:  [phone]    Date: 06/04/2018    Patient: Rosita Bowles  YOB: 1990  MRN: [id number]     Time Calculation  Start time: 0728  Stop time: 0825 Time Calculation (min): 57 minutes     Chief Complaint: Back Problem and Neck Problem    Visit #: 5    SUBJECTIVE:  The pain is severe today.  Not sure why.  Had good days Fri/Sat/Sunday.  Woke up with the pain today.  States she has been doing all of the HEP, which had been helping.  No strenuous activities.  Hasn't hurt this much for weeks.     OBJECTIVE:        Therapeutic Exercises (CPT 65719):     1. UBE, 4 min alt    2. Seated retractions, 2x10    3. Prone nunu, to fatigue- approx 20 reps    Therapeutic Treatments and Modalities:     1. Manual Therapy (CPT 31559), Supine: SOR, CTJ rotational mobs, c/s PAs GIII, R R1 mobs, prone t/s wedge mobs.     2. E Stim Unattended (CPT 62529), IFC and MH to CTJ x 15 min in supine.     Time-based treatments/modalities:  Manual therapy minutes (CPT 00794): 20 minutes  Therapeutic exercise minutes (CPT 34763): 10 minutes       ASSESSMENT:   Response to treatment: Severe pain, R scapular mm spasms and ROM only 25% of normal due to pain.  Slight decrease after tx.  Most painful C5-7 and T4-5.      PLAN/RECOMMENDATIONS:   Plan for treatment: therapy treatment to continue next visit.  Planned interventions for next visit: Progress back to therex if able.  If no clear progress by next visit, my need to refer back to MD.

## 2018-06-04 NOTE — TELEPHONE ENCOUNTER
Tell Rosita that I can't order a test without actually seeing her. She had her cervical spine looked at last year as part of her MS evaluation and there was no disease there in her spine, and no meaningful disease that could cause left shoulder/arm pain. When is her next appt?

## 2018-06-05 DIAGNOSIS — M54.14 THORACIC RADICULOPATHY: ICD-10-CM

## 2018-06-05 DIAGNOSIS — M54.12 CERVICAL RADICULOPATHY: ICD-10-CM

## 2018-06-05 DIAGNOSIS — G89.29 CHRONIC MIDLINE THORACIC BACK PAIN: ICD-10-CM

## 2018-06-05 DIAGNOSIS — M54.6 CHRONIC MIDLINE THORACIC BACK PAIN: ICD-10-CM

## 2018-06-05 NOTE — TELEPHONE ENCOUNTER
Regarding: FW: RE: Non-Urgent Medical Question  Her appt is not till 8/24/18.  ----- Message -----  From: Manjeet Garrett M.D.  Sent: 6/4/2018  12:12 PM  To: Manas Minor, Med Ass't  Subject: RE: RE: Non-Urgent Medical Question              ----- Message from Manjeet Garrett M.D. sent at 6/4/2018 12:12 PM PDT -----       ----- Message from Rosita Bowles to Manas CAROLYN Minor, Med Ass't sent at 6/4/2018  9:57 AM -----   Thank you, Manas!    ----- Message -----  From: Manas Minor Med Ass't  Sent: 6/4/18 9:49 AM  To: Rosita Bowles  Subject: RE: Non-Urgent Medical Question    Reece Becker!    I forwarded your message to Dr. Garrett. CORTEZ he is out of the office till Thursday 6/7/18 so you probably won't get a reply from the doctor till then. Just CORTEZ! As soon as I have an update from him, he will message back!    Thank you!  -Manas :)    ----- Message -----     From: Rosita Bowles     Sent: 6/4/2018  8:59 AM PDT       To: Manjeet Garrett M.D.  Subject: Non-Urgent Medical Question    Hi Dr. Garrett!    I've been going to PT for my back/neck/shoulder issue and all was going well, but when I woke up this morning, things were almost back to being as they were before I went in to urgent care about a month ago.  I told my physical therapist this morning and she suggested getting an MRI of my cervical/thoracic spine over to my shoulder - she suggested contacting you about putting an order in for this, would that be possible?      Hope all is well with you!    Best regards,  Rosita Bowles

## 2018-06-05 NOTE — TELEPHONE ENCOUNTER
Did she talk to her PCP? It does not have to be her MS, and if it is something musculoskeletal, then her PCP is the way to go.

## 2018-06-07 ENCOUNTER — PHYSICAL THERAPY (OUTPATIENT)
Dept: PHYSICAL THERAPY | Facility: REHABILITATION | Age: 28
End: 2018-06-07
Attending: FAMILY MEDICINE
Payer: COMMERCIAL

## 2018-06-07 DIAGNOSIS — M54.6 DISCOGENIC THORACIC PAIN: ICD-10-CM

## 2018-06-07 DIAGNOSIS — R29.3 ABNORMAL POSTURE: ICD-10-CM

## 2018-06-07 DIAGNOSIS — M54.2 NECK PAIN: ICD-10-CM

## 2018-06-07 PROCEDURE — 97140 MANUAL THERAPY 1/> REGIONS: CPT

## 2018-06-07 PROCEDURE — 97012 MECHANICAL TRACTION THERAPY: CPT

## 2018-06-07 PROCEDURE — 97110 THERAPEUTIC EXERCISES: CPT

## 2018-06-07 NOTE — OP THERAPY DAILY TREATMENT
"  Outpatient Physical Therapy  DAILY TREATMENT     [facility] Physical [address].  [address]  [place] [address]  Phone:  [phone]  Fax:  [phone]    Date: 06/07/2018    Patient: Rosita Bowles  YOB: 1990  MRN: [id number]     Time Calculation  Start time: 1503  Stop time: 1604 Time Calculation (min): 61 minutes     Chief Complaint: Neck Problem    Visit #: 6    SUBJECTIVE:  Cont to wake up with 10/10 pain, but improving through the day.  Down to 2/10 and less irritable than when seen last.     OBJECTIVE:      Therapeutic Exercises (CPT 47649):     1. UBE, 4 min alt    2. Seated retractions, 2x10    3. Cat/cow, too painful    4. Scap push ups, only tolerated inclined on table x 10     Therapeutic Treatments and Modalities:     1. Manual Therapy (CPT 43862), Prone: rotational CTJ mobs with c/s rotation, resisted iso to scap stabilizers, seated upper t/s manip x 3, seated upper t/s ext mobs     2. Mechanical Traction (CPT 86650), 18/10#, 60/20\" with     Time-based treatments/modalities:  Manual therapy minutes (CPT 06792): 25 minutes  Therapeutic exercise minutes (CPT 94583): 15 minutes       ASSESSMENT:   Response to treatment: Able to regain c/s rotation to 65 deg and ff/ext to 50 deg after seated t/s manips- most beneficial today.  Due to relief, tried txn again but with a more significant flexion angle to target lower levels.     PLAN/RECOMMENDATIONS:   Plan for treatment: therapy treatment to continue next visit.  Planned interventions for next visit: continue with current treatment.      "

## 2018-06-08 ENCOUNTER — HOSPITAL ENCOUNTER (OUTPATIENT)
Dept: RADIOLOGY | Facility: MEDICAL CENTER | Age: 28
End: 2018-06-08
Attending: PSYCHIATRY & NEUROLOGY
Payer: COMMERCIAL

## 2018-06-08 DIAGNOSIS — G35 MULTIPLE SCLEROSIS (HCC): ICD-10-CM

## 2018-06-08 PROCEDURE — 700117 HCHG RX CONTRAST REV CODE 255: Performed by: PSYCHIATRY & NEUROLOGY

## 2018-06-08 PROCEDURE — A9579 GAD-BASE MR CONTRAST NOS,1ML: HCPCS | Performed by: PSYCHIATRY & NEUROLOGY

## 2018-06-08 PROCEDURE — 70553 MRI BRAIN STEM W/O & W/DYE: CPT

## 2018-06-08 RX ADMIN — GADODIAMIDE 15 ML: 287 INJECTION INTRAVENOUS at 14:13

## 2018-06-10 ENCOUNTER — HOSPITAL ENCOUNTER (OUTPATIENT)
Dept: RADIOLOGY | Facility: MEDICAL CENTER | Age: 28
End: 2018-06-10
Attending: FAMILY MEDICINE
Payer: COMMERCIAL

## 2018-06-10 DIAGNOSIS — M54.14 THORACIC RADICULOPATHY: ICD-10-CM

## 2018-06-10 DIAGNOSIS — M54.12 CERVICAL RADICULOPATHY: ICD-10-CM

## 2018-06-10 DIAGNOSIS — G89.29 CHRONIC MIDLINE THORACIC BACK PAIN: ICD-10-CM

## 2018-06-10 DIAGNOSIS — M54.6 CHRONIC MIDLINE THORACIC BACK PAIN: ICD-10-CM

## 2018-06-10 PROCEDURE — 72141 MRI NECK SPINE W/O DYE: CPT

## 2018-06-10 PROCEDURE — 72146 MRI CHEST SPINE W/O DYE: CPT

## 2018-06-13 DIAGNOSIS — M48.00 CENTRAL SPINAL STENOSIS: ICD-10-CM

## 2018-06-13 DIAGNOSIS — M50.223 HERNIATED NUCLEUS PULPOSUS, C6-7: ICD-10-CM

## 2018-06-14 ENCOUNTER — PHYSICAL THERAPY (OUTPATIENT)
Dept: PHYSICAL THERAPY | Facility: REHABILITATION | Age: 28
End: 2018-06-14
Attending: FAMILY MEDICINE
Payer: COMMERCIAL

## 2018-06-14 DIAGNOSIS — R29.3 ABNORMAL POSTURE: ICD-10-CM

## 2018-06-14 DIAGNOSIS — M54.2 NECK PAIN: ICD-10-CM

## 2018-06-14 DIAGNOSIS — M54.6 DISCOGENIC THORACIC PAIN: ICD-10-CM

## 2018-06-14 PROCEDURE — 97110 THERAPEUTIC EXERCISES: CPT

## 2018-06-14 PROCEDURE — 97014 ELECTRIC STIMULATION THERAPY: CPT

## 2018-06-14 PROCEDURE — 97140 MANUAL THERAPY 1/> REGIONS: CPT

## 2018-06-14 NOTE — OP THERAPY DAILY TREATMENT
Outpatient Physical Therapy  DAILY TREATMENT     [facility] Physical [address].  [address]  [place] [address]  Phone:  [phone]  Fax:  [phone]    Date: 06/14/2018    Patient: Rosita Bowles  YOB: 1990  MRN: [id number]     Time Calculation  Start time: 1401  Stop time: 1453 Time Calculation (min): 52 minutes     Chief Complaint: Neck Problem    Visit #: 7    SUBJECTIVE:  MRI results are in.  Shows disc issues at C7 with ventral cord compression.  Results are consistent with PT presentation.  Pt felt better after manual, but achy after txn.      OBJECTIVE:      Therapeutic Exercises (CPT 87116):     1. UBE, 4 min alt    2. Seated retractions, 2x10    3. Retraction to extension, x 10    4. Scap push ups, inclined on table x 10     Therapeutic Treatments and Modalities:     1. Manual Therapy (CPT 72736), Prone: rotational CTJ mobs with c/s rotation, seated upper t/s manip x 3, seated upper t/s ext mobs  Supine: manual txn x 5 min, hinged extension blocked at C7 and T1.     2. E Stim Unattended (CPT 05190), IFC and MH to CTJ in supine x 15 min    Time-based treatments/modalities:  Manual therapy minutes (CPT 49142): 20 minutes  Therapeutic exercise minutes (CPT 02536): 10 minutes       ASSESSMENT:   Response to treatment: Pt admits to R UE pain/numbness that is intermittent. Follows C7 distribution and worse with direct mobilization.  Mechanical txn appears to be too much at this time.  Manual txn well tolerated and does centralize pain.     PLAN/RECOMMENDATIONS:   Plan for treatment: therapy treatment to continue next visit.  Planned interventions for next visit: continue with current treatment.

## 2018-06-15 ENCOUNTER — OUTPATIENT INFUSION SERVICES (OUTPATIENT)
Dept: ONCOLOGY | Facility: MEDICAL CENTER | Age: 28
End: 2018-06-15
Attending: PSYCHIATRY & NEUROLOGY
Payer: COMMERCIAL

## 2018-06-15 ENCOUNTER — TELEPHONE (OUTPATIENT)
Dept: NEUROLOGY | Facility: MEDICAL CENTER | Age: 28
End: 2018-06-15

## 2018-06-15 VITALS
OXYGEN SATURATION: 100 % | RESPIRATION RATE: 19 BRPM | SYSTOLIC BLOOD PRESSURE: 126 MMHG | BODY MASS INDEX: 21.71 KG/M2 | WEIGHT: 146.61 LBS | TEMPERATURE: 98.6 F | HEIGHT: 69 IN | HEART RATE: 100 BPM | DIASTOLIC BLOOD PRESSURE: 68 MMHG

## 2018-06-15 PROCEDURE — 96413 CHEMO IV INFUSION 1 HR: CPT

## 2018-06-15 PROCEDURE — 700111 HCHG RX REV CODE 636 W/ 250 OVERRIDE (IP): Performed by: PSYCHIATRY & NEUROLOGY

## 2018-06-15 PROCEDURE — 700105 HCHG RX REV CODE 258: Performed by: PSYCHIATRY & NEUROLOGY

## 2018-06-15 PROCEDURE — 306780 HCHG STAT FOR TRANSFUSION PER CASE

## 2018-06-15 RX ADMIN — NATALIZUMAB 300 MG: 300 INJECTION INTRAVENOUS at 11:05

## 2018-06-15 ASSESSMENT — PAIN SCALES - GENERAL: PAINLEVEL_OUTOF10: 3

## 2018-06-16 NOTE — TELEPHONE ENCOUNTER
Tell her to call Dr. Lupe Chacon, he is in town. He is no longer with [facility], I do not know his office phone number off hand.

## 2018-06-16 NOTE — TELEPHONE ENCOUNTER
"----- Message from Manas Minor, Med Ass't sent at 6/14/2018  8:17 AM PDT -----  Regarding: FW: Test Result Question  Contact: [phone]      ----- Message -----  From: Rosita Bowles  Sent: 6/12/2018   2:38 PM  To: Neurology Mas  Subject: Test Result Question                             Hi Dr. Garrett,  I had an MRI of my spine done yesterday (I'll let you know what that showed in a second), but I was wondering if you know any spinal specialists that you would refer?  My PCP is going to recommend one, as well; I figured I'd see who you would recommend. This is not MS-related, but it is related to my central nervous system, so I figured I would ask you.   The MRI revealed (in short) the following:  posterior spurring, neural foraminal narrowing, paracentral disc extrusion markedly indenting the right ventral surface of the spinal cord, and central canal stenosis secondary to facet arthropathy.  Sorry, this is \"kind of\" a lot but I'm not feeling too great right now :(    Best regards,  Rosita Bowles  "

## 2018-06-21 ENCOUNTER — PHYSICAL THERAPY (OUTPATIENT)
Dept: PHYSICAL THERAPY | Facility: REHABILITATION | Age: 28
End: 2018-06-21
Attending: FAMILY MEDICINE
Payer: COMMERCIAL

## 2018-06-21 DIAGNOSIS — M54.2 NECK PAIN: ICD-10-CM

## 2018-06-21 DIAGNOSIS — R29.3 ABNORMAL POSTURE: ICD-10-CM

## 2018-06-21 DIAGNOSIS — M54.6 DISCOGENIC THORACIC PAIN: ICD-10-CM

## 2018-06-21 PROCEDURE — 97110 THERAPEUTIC EXERCISES: CPT

## 2018-06-21 NOTE — OP THERAPY DISCHARGE SUMMARY
Outpatient Physical Therapy  DISCHARGE SUMMARY NOTE      [facility]  [address]  [address]  [place] [address]  Phone:  [phone]  Fax:  [phone]    Date of Visit: 06/21/2018    Patient: Rosita Bowles  YOB: 1990  MRN: [id number]     Referring Provider: Manuela Green M.D.  [address]  [place] [address]   Referring Diagnosis No admission diagnoses are documented for this encounter.     Physical Therapy Occurrence Codes    Date of onset of impairment:  5/4/18   Date physical therapy care plan established or reviewed:  5/10/18   Date physical therapy treatment started:  5/10/18          Your patient is being discharged from Physical Therapy with the following comments:   · Goals met    Comments:  Ms. Bowles has been seen for 8 PT sessions treating her neck and L shoulder pain.  The course of treatment has shown highs and lows.  She had been making steady progress initially, but then woke up with severe pain on visit 5.  MRI done after than point did confirm C7 disc derangement.  Continued extension bias, traction, thoracic mobilizations and postural strength.  She has made significant progress in the last week. Reports the pain to be less than 1/10 at this time and has returned to yoga.  Is finding significant relief in the HEP.  Ms. Bowles feels she will continue to improve on HEP and would like to d/c at this time.  She has scheduled a specialist appt for July she plans to follow through with.     Recommendations:  D/C to HEP.  Call with further questions.     Thank you for the referral!    Flor Rendon, PT, DPT    Date: 6/21/2018

## 2018-06-21 NOTE — OP THERAPY DAILY TREATMENT
"  Outpatient Physical Therapy  DAILY TREATMENT     [facility] Physical Therapy [address].  [address]  [place] [address]  Phone:  [phone]  Fax:  [phone]    Date: 06/21/2018    Patient: Rosita Bowles  YOB: 1990  MRN: [id number]     Time Calculation  Start time: 1503  Stop time: 1535 Time Calculation (min): 32 minutes     Chief Complaint: Neck Problem    Visit #: 8    SUBJECTIVE:  \"I've been doing really well for the last 3 days.\"  Has been focusing on c/s retraction with extension. Did get appt with a physiatrist in July.     OBJECTIVE:      Therapeutic Exercises (CPT 77493):     1. UBE, 4 min alt    2. Rows, L3 x 20    3. Pullbacks, L2 x 20    4. [place], L1 3x30\"    5. Superman, 2x to fatigue    6. Shuttle press, 2C push up x 20, push to OH press x 20       Time-based treatments/modalities:  Therapeutic exercise minutes (CPT 46582): 32 minutes       ASSESSMENT:   Response to treatment: Has pain with pullbacks, sandhya with excessive scapular adduction.  Better tolerated when set to neutral.  Otherwise therex well tolerated and pt describing good return to activity (yoga)    PLAN/RECOMMENDATIONS:   Plan for treatment: no further treatment needed.  Planned interventions for next visit: none, d/c.      "

## 2018-06-21 NOTE — LETTER
June 21, 2018    Manuela Green M.D.  [address] [address]      Re:  Rosita Bowles          Dear Dr. Green,    It was a pleasure seeing your patient, Rosita Bowles, on 6/21/2018 for her final therapy visit.     Please find enclosed a copy of the patient's discharge summary from outpatient physical therapy services.          On behalf of the staff at [facility], we would like to thank you for your referrals.  We appreciate your confidence in our clinic and will continue to work hard to provide the best outcomes for your patients.    We sincerely enjoy treating your patients and hope that you have been happy with their care.  Thank you again, and please call with any questions, concerns or ways that we can best meet your needs.        Sincerely,          Flor Rendon, PT, DPT    No Recipients              Outpatient Physical Therapy  DISCHARGE SUMMARY NOTE      [address] [address]  Phone:  [phone]  Fax:  [phone]    Date of Visit: 06/21/2018    Patient: Rosita Bowles  YOB: 1990  MRN: [id number]     Referring Provider: Manuela Green M.D.  [address] [address]   Referring Diagnosis No admission diagnoses are documented for this encounter.     Physical Therapy Occurrence Codes    Date of onset of impairment:  5/4/18   Date physical therapy care plan established or reviewed:  5/10/18   Date physical therapy treatment started:  5/10/18          Your patient is being discharged from Physical Therapy with the following comments:   · Goals met    Comments:  Ms. Bowles has been seen for 8 PT sessions treating her neck and L shoulder pain.  The course of treatment has shown highs and lows.  She had been making steady progress initially, but then woke up with severe pain on visit 5.  MRI done after than  point did confirm C7 disc derangement.  Continued extension bias, traction, thoracic mobilizations and postural strength.  She has made significant progress in the last week. Reports the pain to be less than 1/10 at this time and has returned to yoga.  Is finding significant relief in the HEP.  Ms. Bowles feels she will continue to improve on HEP and would like to d/c at this time.  She has scheduled a specialist appt for July she plans to follow through with.     Recommendations:  D/C to HEP.  Call with further questions.     Thank you for the referral!    Flor Rendon, PT, DPT    Date: 6/21/2018

## 2018-06-22 ENCOUNTER — OFFICE VISIT (OUTPATIENT)
Dept: PHYSICAL MEDICINE AND REHAB | Facility: MEDICAL CENTER | Age: 28
End: 2018-06-22
Payer: COMMERCIAL

## 2018-06-22 VITALS
SYSTOLIC BLOOD PRESSURE: 128 MMHG | DIASTOLIC BLOOD PRESSURE: 80 MMHG | BODY MASS INDEX: 21.77 KG/M2 | HEIGHT: 69 IN | HEART RATE: 83 BPM | OXYGEN SATURATION: 97 % | TEMPERATURE: 98.8 F | WEIGHT: 147 LBS

## 2018-06-22 DIAGNOSIS — M50.20 CERVICAL DISC HERNIATION: ICD-10-CM

## 2018-06-22 PROCEDURE — 99205 OFFICE O/P NEW HI 60 MIN: CPT | Performed by: PHYSICAL MEDICINE & REHABILITATION

## 2018-06-22 RX ORDER — GABAPENTIN 300 MG/1
300 CAPSULE ORAL 3 TIMES DAILY
Qty: 90 CAP | Refills: 1 | Status: SHIPPED | OUTPATIENT
Start: 2018-06-22 | End: 2018-07-10

## 2018-06-22 ASSESSMENT — PAIN SCALES - GENERAL: PAINLEVEL: 4=SLIGHT-MODERATE PAIN

## 2018-06-22 NOTE — PROGRESS NOTES
New patient note    Physiatry (physical medicine and  Rehabilitation), interventional spine and sports medicine    Date of Service: 6/22/2018    Chief complaint: Neck and right arm pain    HISTORY    HPI: Rosita Bowles 27 y.o. Female with a history of multiple sclerosis for which she has monthly Tysabri treatments, who presents today for evaluation of neck and right arm symptoms.  She reports that she started having symptoms on May 3, 2018.  Without any particular event, she woke up with severe neck and right shoulder pain.  It was intense enough that she screamed.  The pain was shooting and sharp into the right scapula.  Treatment with chiropractor, four times, helped with her symptoms.  Pain is less intense now.  She has had a round of physical therapy, she thinks that this has helped.      While she does not particularly report weakness, she is right-handed.  She continues to have tingling that radiates down her arm to her fifth digit.  It occurs several times an hour.    Sometimes sitting aggravates her symptoms.  Coughing and sneezing result in increased pain.  Bending forward and backward also increase her pain/symptoms.    She is a student and also works in insurance.  She has switched over to using a roller backpack and this has helped.       Medical records review:  I reviewed the notes from her communications with her Neurologist, treatments via family medicine and physical therapy.     Previous treatments:    Physical Therapy: Yes, helpful    Medications the patient is tried: NSAIDs and muscle relaxers    Previous interventions: none    Previous surgeries to relieve the above pain:  none      ROS:   Constitutional Weight loss, she reports with steroids  Eyes Vision changes, noted at onset of MS  ENT Sinus disease and nosebleeds  Psych Depression    Red Flags ROS:   Fever, Chills, Sweats: Denies  Bladder Incontinence: Denies  Bowel Incontinence: denies  Saddle Anesthesia: Denies    All other  systems reviewed and negative.       PMHx:   Past Medical History:   Diagnosis Date   • Acne 8/7/2012   • Acne vulgaris 6/23/2017   • ADHD 8/7/2012   • Depression 8/7/2012   • Multiple sclerosis (HCC) 11/2/2016       PSHx:   Past Surgical History:   Procedure Laterality Date   • ANKLE ORIF     • DENTAL EXTRACTION(S)         Family history   Family History   Problem Relation Age of Onset   • Thyroid Mother    • Thyroid Brother    • Cancer Neg Hx    • Diabetes Neg Hx        Medications:   Current Outpatient Prescriptions   Medication   • gabapentin (NEURONTIN) 300 MG Cap   • methylphenidate (CONCERTA) 54 MG CR tablet   • MAGNESIUM CHLORIDE PO   • Biotin 5000 MCG Cap   • Cyanocobalamin (VITAMIN B-12) 1000 MCG Tab   • Cholecalciferol (VITAMIN D3) 5000 units Tab   • buPROPion (WELLBUTRIN XL) 300 MG XL tablet   • meloxicam (MOBIC) 15 MG tablet   • cyclobenzaprine (FLEXERIL) 10 MG Tab   • MethylPREDNISolone (MEDROL DOSEPAK) 4 MG Tablet Therapy Pack   • melatonin 3 MG Tab   • Levonorgestrel (KYLEENA) 19.5 MG IUD     No current facility-administered medications for this visit.        Allergies:   Allergies   Allergen Reactions   • Nkda [No Known Drug Allergy]        Social Hx:   Social History     Social History   • Marital status: Single     Spouse name: N/A   • Number of children: N/A   • Years of education: N/A     Occupational History   • Not on file.     Social History Main Topics   • Smoking status: Never Smoker   • Smokeless tobacco: Never Used   • Alcohol use 0.0 oz/week      Comment: occasional   • Drug use: No   • Sexual activity: Yes     Partners: Male     Birth control/ protection: Pill      Comment: OCP     Other Topics Concern   •  Service No   • Blood Transfusions No   • Caffeine Concern No   • Occupational Exposure No   • Hobby Hazards No   • Sleep Concern Yes   • Stress Concern No   • Weight Concern No   • Special Diet No   • Back Care No   • Exercise Yes   • Bike Helmet Yes   • Seat Belt Yes   •  "Self-Exams Yes     Social History Narrative    2013: BF in [employer]. Attends [employer].    2014: was living in [place]. Now back in [place].     2014: working in [employer]. BF broke up with her Sept. No friends in [place].     2015: working 2 jobs. Has BF.          EXAMINATION     Physical Exam:   Vitals: Blood pressure 128/80, pulse 83, temperature 37.1 °C (98.8 °F), height 1.753 m (5' 9\"), weight 66.7 kg (147 lb), SpO2 97 %.    Constitutional:   Body Habitus: Body mass index is 21.71 kg/m².  Cooperation: Fully cooperates with exam  Appearance: Well-groomed, well-nourished, not disheveled, in no acute distress    Eyes: No scleral icterus, no proptosis    ENT -no obvious auditory deficits, no facial droop     Skin -no rashes or lesions noted     Respiratory-  breathing comfortable on room air, no audible wheezing    Cardiovascular- capillary refills less than 2 seconds. No lower extremity edema is noted.     Psychiatric- alert and oriented ×3. Normal affect.     Gait - normal gait, no use of ambulatory device, nonantalgic. the patient can toe walk with ease. the patient can heel walk with ease. the patient can tandem walk with ease.    Musculoskeletal -   Cervical spine   Inspection: No deformities of the skin over the cervical spine. No rashes or lesions.    full  A/P ROM in all directions, without  pain      Spurling’s sign: negative bilaterally    No signs of muscular atrophy in bilateral upper extremities     No tenderness to palpation of the cervical facets    Thoracic/Lumbar Spine/Sacral Spine/Hips   Inspection: No evidence of atrophy in bilateral lower extremities throughout     ROM: full  AROM with flexion, extension, lateral flexion, and rotation bilaterally, without pain     Palpation:   No tenderness to palpation in midline at T1-T12 levels. No tenderness to palpation in the left and right of the midline T1-L5  palpation over SI joint: negative bilaterally    palpation over buttock: negative bilaterally    palpation " in hip or over the greater trochanters: negative bilaterally      Lumbar spine Special tests  Neuro tension  Straight leg test negative bilaterally      Neuro     Key points for the international standards for neurological classification of spinal cord injury (ISNCSCI) to light touch.     Dermatome R L   C4 2 2   C5 2 2   C6 2 2   C7 2 2   C8 1 2   T1 2 2   T2 2 2   L2 2 2   L3 2 2   L4 2 2   L5 2 2   S1 2 2   S2 2 2         Motor Exam Upper Extremities   ? Myotome R L   Shoulder flexion C5 5 5   Elbow flexion C5 5 5   Wrist extension C6 5 5   Elbow extension C7 5 5   Finger flexion C8 5- 5   Finger abduction T1 5- 5       Motor Exam Lower Extremities    ? Myotome R L   Hip flexion L2 5 5   Knee extension L3 5 5   Ankle dorsiflexion L4 5 5   Toe extension L5 5 5   Ankle plantarflexion S1 5 5       Zhang’s sign negative bilaterally   Babinski sign negative bilaterally   Clonus of the ankle negative bilaterally     Reflexes  ?  R L   Biceps  2+  2+   Brachioradialis  2+ 2+   Patella  2+ 2+   Achilles   2+ 2+       MEDICAL DECISION MAKING    Medical records review: see under HPI section.     DATA    Labs:   Lab Results   Component Value Date/Time    SODIUM 140 04/20/2018 11:00 AM    POTASSIUM 3.9 04/20/2018 11:00 AM    CHLORIDE 109 04/20/2018 11:00 AM    CO2 25 04/20/2018 11:00 AM    ANION 6.0 04/20/2018 11:00 AM    GLUCOSE 97 04/20/2018 11:00 AM    BUN 18 04/20/2018 11:00 AM    CREATININE 0.95 04/20/2018 11:00 AM    CALCIUM 9.1 04/20/2018 11:00 AM    ASTSGOT 16 04/20/2018 11:00 AM    ALTSGPT 16 04/20/2018 11:00 AM    TBILIRUBIN 0.6 04/20/2018 11:00 AM    ALBUMIN 4.3 04/20/2018 11:00 AM    TOTPROTEIN 6.6 04/20/2018 11:00 AM    GLOBULIN 2.3 04/20/2018 11:00 AM    AGRATIO 1.9 04/20/2018 11:00 AM       Lab Results   Component Value Date/Time    PROTHROMBTM 14.6 10/06/2017 08:20 PM    INR 1.10 10/06/2017 08:20 PM        Lab Results   Component Value Date/Time    WBC 11.1 (H) 04/20/2018 11:00 AM    RBC 5.08 04/20/2018  11:00 AM    HEMOGLOBIN 14.6 04/20/2018 11:00 AM    HEMATOCRIT 42.6 04/20/2018 11:00 AM    MCV 83.9 04/20/2018 11:00 AM    MCH 28.7 04/20/2018 11:00 AM    MCHC 34.3 04/20/2018 11:00 AM    MPV 9.6 04/20/2018 11:00 AM    NEUTSPOLYS 61.10 04/20/2018 11:00 AM    LYMPHOCYTES 25.10 04/20/2018 11:00 AM    MONOCYTES 10.20 04/20/2018 11:00 AM    EOSINOPHILS 2.80 04/20/2018 11:00 AM    BASOPHILS 0.50 04/20/2018 11:00 AM          Imaging: I personally reviewed following images, these are my reads  MRI cervical spine 06/10/2018 Films were reviewed with the patient at the time of the visit  There is note of mild reversal of normal cervical lordosis primarily involving C5-7  At the right C6-7 level, there is a paracentral disc extrusion that cause a significant indent of the right ventral cord.    Reviewing the MRI cervical spine from 10/2017, there is note of degenerative disc disease at C6-7, but the paracentral disc extrusion was not noted at that time.    IMAGING radiology reads. I reviewed the following radiology reads         Results for orders placed during the hospital encounter of 06/08/18   MR-BRAIN-WITH & W/O    Impression 1.  Multiple white matter lesions primarily in the left hemisphere with no significant change from 12/18/2017. Differential considerations again include demyelinating disease/multiple sclerosis as well as autoimmune vasculopathy, Lyme disease,   neuromyelitis optica spectrum disorder, or Susac disease.  2.  No evidence of disease progression or new or enhancing lesions since 12/18/2017.  3.  No evidence of progressive multifocal leukoencephalopathy.                              Results for orders placed during the hospital encounter of 06/10/18   MR-CERVICAL SPINE-W/O    Impression 1. Mild cervical kyphosis from C5 through C7.    2. SMALL RIGHT PARACENTRAL DISC EXTRUSION AT THE C6-7 LEVEL which markedly indents the right ventral surface of the cord.    3. Mild central canal stenosis at the C6-7 level  secondary to mild cervical spondylotic change and hypertrophic degenerative changes of the facets and ligamentum flavum.    4. Mild multilevel neural foraminal narrowing.        Results for orders placed during the hospital encounter of 10/06/17   MR-CERVICAL SPINE-WITH & W/O    Impression 1.  Degenerative disc disease most notable for C6-C7 disc/osteophyte complex with mild central stenosis.  2.  No myelopathic cord signal abnormality at any cervical level. No appreciable demyelinating lesions.           Results for orders placed during the hospital encounter of 10/06/17   MR-LUMBAR SPINE-WITH & W/O    Impression 1.  L4-5 minimal central disc bulging. Mild facet arthropathy. Borderline bilateral foraminal stenosis.  2.  L5-S1 very small central disc protrusion superimposed on minimal disc bulging. Minor facet arthropathy. No central stenosis or significant foraminal stenosis.  3.  No evidence of demyelinating lesion in the conus or distal thoracic cord within the field of view.        Results for orders placed during the hospital encounter of 06/10/18   MR-THORACIC SPINE-W/O    Impression 1. Mild discal degenerative changes of the mid and lower thoracic spine otherwise unremarkable MRI scan of the thoracic spine without contrast.          Results for orders placed during the hospital encounter of 10/06/17   MR-THORACIC SPINE-WITH & W/O    Impression 1.  Minor Schmorl's node endplate irregularities. No clinical significance.  2.  No significant disc bulge or protrusion, central stenosis, or foraminal stenosis at any thoracic level. No myelopathic cord signal or abnormal cord enhancement at any thoracic level. No evidence of demyelinating lesion.                                                                                      Diagnosis   Visit Diagnoses     ICD-10-CM   1. Cervical disc herniation M50.20       ASSESSMENT:  Rosita Bowles 27 y.o. female with a history of multiple sclerosis, who presents  for evaluation of cervical spine and right upper extremity symptoms.     Rosita was seen today for new patient.    Diagnoses and all orders for this visit:    Cervical disc herniation  -     gabapentin (NEURONTIN) 300 MG Cap; Take 1 Cap by mouth 3 times a day.  -     REFERRAL TO NEUROSURGERY    We discussed the finding on the cervical spine MRI as well as the current symptoms that she has.  She has made some adjustments and does feel that she is improved from her initial presentation on May 3, 2018.      We discussed the range of treatment options which include continued exercises from PT, medications, cervical epidural steroid injection and surgical consultation.  Given that she has relatively frequent neuropathic symptoms, we discussed trial of gabapentin.  Discussed that she could try starting taking this at bedtime and increase only if she feel like she is not having side effects or adequate relief.  She will report concerns or side effects.    For now, she would like to consult with Neurosurgery to consider surgical management. She will let me know if symptoms worsen or if she would like to reconsider cervical epidural.       Follow-up: 4 weeks      Please note that this dictation was created using voice recognition software. I have made every reasonable attempt to correct obvious errors but there may be errors of grammar and content that I may have overlooked prior to finalization of this note.      Anibal Khanna MD  Physical Medicine and Rehabilitation  Interventional Spine and Sports Physiatry  [facility]               Manuela Khan

## 2018-06-28 ENCOUNTER — HOSPITAL ENCOUNTER (OUTPATIENT)
Dept: RADIOLOGY | Facility: MEDICAL CENTER | Age: 28
End: 2018-06-28
Attending: NEUROLOGICAL SURGERY
Payer: COMMERCIAL

## 2018-06-28 DIAGNOSIS — M54.2 CERVICALGIA: ICD-10-CM

## 2018-06-28 PROCEDURE — 72050 X-RAY EXAM NECK SPINE 4/5VWS: CPT

## 2018-07-09 ENCOUNTER — TELEPHONE (OUTPATIENT)
Dept: MEDICAL GROUP | Facility: LAB | Age: 28
End: 2018-07-09

## 2018-07-09 NOTE — TELEPHONE ENCOUNTER
ESTABLISHED PATIENT PRE-VISIT PLANNING     Note: Patient will not be contacted if there is no indication to call.     1.  Reviewed notes from the last few office visits within the medical group: Yes    2.  If any orders were placed at last visit or intended to be done for this visit (i.e. 6 mos follow-up), do we have Results/Consult Notes?        •  Labs - Labs were not ordered at last office visit.       •  Imaging - Imaging was not ordered at last office visit.       •  Referrals - No referrals were ordered at last office visit.    3. Is this appointment scheduled as a Hospital Follow-Up? No    4.  Immunizations were updated in Epic using WebIZ?: Epic matches WebIZ       •  Web Iz Recommendations: Patient is up to date on all vaccines    5.  Patient is due for the following Health Maintenance Topics:   There are no preventive care reminders to display for this patient.    - Patient is up-to-date on all Health Maintenance topics. No records have been requested at this time.    6.  MDX printed for Provider? NO    7.  Patient was NOT informed to arrive 15 min prior to their scheduled appointment and bring in their medication bottles.

## 2018-07-10 ENCOUNTER — OFFICE VISIT (OUTPATIENT)
Dept: MEDICAL GROUP | Facility: LAB | Age: 28
End: 2018-07-10
Payer: COMMERCIAL

## 2018-07-10 VITALS
OXYGEN SATURATION: 99 % | HEIGHT: 69 IN | RESPIRATION RATE: 16 BRPM | WEIGHT: 150 LBS | SYSTOLIC BLOOD PRESSURE: 116 MMHG | TEMPERATURE: 98.5 F | BODY MASS INDEX: 22.22 KG/M2 | HEART RATE: 80 BPM | DIASTOLIC BLOOD PRESSURE: 74 MMHG

## 2018-07-10 DIAGNOSIS — M48.02 CERVICAL STENOSIS OF SPINAL CANAL: ICD-10-CM
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
## 2018-07-10 DIAGNOSIS — F90.0 ATTENTION DEFICIT HYPERACTIVITY DISORDER (ADHD), PREDOMINANTLY INATTENTIVE TYPE: ICD-10-CM

## 2018-07-10 DIAGNOSIS — B07.0 PLANTAR WART: ICD-10-CM

## 2018-07-10 PROCEDURE — 17110 DESTRUCTION B9 LES UP TO 14: CPT | Performed by: FAMILY MEDICINE

## 2018-07-10 PROCEDURE — 99214 OFFICE O/P EST MOD 30 MIN: CPT | Mod: 25 | Performed by: FAMILY MEDICINE

## 2018-07-10 RX ORDER — METHYLPHENIDATE HYDROCHLORIDE 54 MG/1
54 TABLET ORAL EVERY MORNING
Qty: 30 TAB | Refills: 0 | Status: SHIPPED | OUTPATIENT
Start: 2018-07-17 | End: 2018-08-16

## 2018-07-10 RX ORDER — METHYLPHENIDATE HYDROCHLORIDE 54 MG/1
54 TABLET ORAL EVERY MORNING
Qty: 30 TAB | Refills: 0 | Status: SHIPPED | OUTPATIENT
Start: 2018-09-15 | End: 2018-07-13

## 2018-07-10 RX ORDER — METHYLPHENIDATE HYDROCHLORIDE 54 MG/1
54 TABLET ORAL EVERY MORNING
Qty: 30 TAB | Refills: 0 | Status: SHIPPED | OUTPATIENT
Start: 2018-08-16 | End: 2018-07-13

## 2018-07-10 NOTE — PROGRESS NOTES
Subjective:   Rosita Bowles is a 27 y.o. female here today for   Chief Complaint   Patient presents with   • ADHD       1. Attention deficit hyperactivity disorder (ADHD), predominantly inattentive type  Chronic, stable on Concerta 54 mg daily. She does take this every day. She never misses a dose and she has never had any abnormal refills or requests. She states that this helps her significantly in school. She got all A's last semester. She would like to stay on this medication as it helps with concentration. She denies any weight loss, anorexia. Does have insomnia that comes and goes.     2. Plantar wart  This is a new problem.  Patient reports having a lesion on the bottom of her left foot for the last several months.  It is not painful but it feels like there is a rock in her foot.    3. Bug bite, initial encounter  This is a new problem.  Patient has 2 lesions on the back of her leg.  They have were swollen and occurred about 4 days ago.  They are improving.  They are extremely pruritic.  No pus.  There is some mild surrounding erythema    4. Cervical stenosis of spinal canal  This is a new problem to discuss with me.  Patient did have an MRI showing this.  She was seen by neurosurgery, Dr. Chacon who discussed risks and benefits of surgery versus a watch and wait approach.  Given the risks of waiting, she has decided to proceed with surgery.  She is not taking any medication regularly for this but does have a prescription for Mobic to help with pain as needed.  She is not taking gabapentin regularly.  She does still have numbness and tingling down her arms some.    Current medicines (including changes today)  Current Outpatient Prescriptions   Medication Sig Dispense Refill   • [START ON 7/17/2018] methylphenidate (CONCERTA) 54 MG CR tablet Take 1 Tab by mouth every morning for 30 days. 30 Tab 0   • [START ON 8/16/2018] methylphenidate (CONCERTA) 54 MG CR tablet Take 1 Tab by mouth every morning for  "30 days. 30 Tab 0   • [START ON 9/15/2018] methylphenidate (CONCERTA) 54 MG CR tablet Take 1 Tab by mouth every morning for 30 days. 30 Tab 0   • MAGNESIUM CHLORIDE PO Take 1 Cap by mouth every day.     • Biotin 5000 MCG Cap Take 5,000 mcg by mouth every day at 6 PM. supplement     • Cyanocobalamin (VITAMIN B-12) 1000 MCG Tab Take 1,000 mcg by mouth every day. supplement     • Cholecalciferol (VITAMIN D3) 5000 units Tab Take 5,000 Units by mouth every day. supplement     • buPROPion (WELLBUTRIN XL) 300 MG XL tablet Take 1 Tab by mouth every morning. 90 Tab 3   • melatonin 3 MG Tab Take 3 mg by mouth 1 time daily as needed (insomnia).     • Levonorgestrel (KYLEENA) 19.5 MG IUD 1 Each by Intrauterine route See Admin Instructions. Every 5 years  birth control       No current facility-administered medications for this visit.      She  has a past medical history of Acne (8/7/2012); Acne vulgaris (6/23/2017); ADHD (8/7/2012); Depression (8/7/2012); and Multiple sclerosis (HCC) (11/2/2016).    ROS   No fevers  No bowel changes  No LE edema       Objective:     Blood pressure 116/74, pulse 80, temperature 36.9 °C (98.5 °F), resp. rate 16, height 1.753 m (5' 9\"), weight 68 kg (150 lb), SpO2 99 %. Body mass index is 22.15 kg/m².   Physical Exam:  Constitutional: Alert, no distress.  Skin: Warm, dry, good turgor, no rashes in visible areas.  Eye: Equal, round and reactive, conjunctiva clear, lids normal.  ENMT: Lips without lesions, good dentition, oropharynx clear.  Neck: Trachea midline, no masses, no thyromegaly. No cervical or supraclavicular lymphadenopathy  Respiratory: Unlabored respiratory effort, lungs clear to auscultation, no wheezes, no ronchi.  Cardiovascular: Normal S1, S2, RRR, no murmur, no edema.  Abdomen: Soft, non-tender, no masses, no hepatosplenomegaly.  Psych: Alert and oriented x3, normal affect and mood.      Assessment and Plan:   The following treatment plan was discussed    1. Attention deficit " hyperactivity disorder (ADHD), predominantly inattentive type  Chronic, stable on Concerta 54 mg daily.  She is not having any side effects from this.  She has had a long history of insomnia and I do not believe that this is secondary to her stimulant.  We have checked her  today which is consistent.  Discussed that she may give herself drug holidays on the weekends if not studying.  I have given her 330 day refills to be filled on 7/17/2018, 8/16/2018, 9/15/2018  Follow-up every 3 months  - methylphenidate (CONCERTA) 54 MG CR tablet; Take 1 Tab by mouth every morning for 30 days.  Dispense: 30 Tab; Refill: 0  - methylphenidate (CONCERTA) 54 MG CR tablet; Take 1 Tab by mouth every morning for 30 days.  Dispense: 30 Tab; Refill: 0  - methylphenidate (CONCERTA) 54 MG CR tablet; Take 1 Tab by mouth every morning for 30 days.  Dispense: 30 Tab; Refill: 0    2. Plantar wart  This is a new problem.  Indication for cryotherapy is pain  CRYOTHERAPY:  Discussed risks and benefits of cryotherapy. Patient verbally agreed. 3 applications of cryotherapy were applied to 1 lesion on R plantar. Patient tolerated procedure well. Aftercare instructions given.    3. Bug bite, initial encounter  New, stable, hydrocortisone cream as needed    4. Cervical stenosis of spinal canal  New, unstable.  Will have surgery planned with Dr. Chacon in the fall.  I have asked her to follow-up with me after this.      Followup: Return in about 3 months (around 10/10/2018) for ADHD, neck surgery.       This note was created using voice recognition software. I have made every reasonable attempt to correct errors, however, I do anticipate some grammatical errors.

## 2018-07-13 ENCOUNTER — APPOINTMENT (OUTPATIENT)
Dept: RADIOLOGY | Facility: MEDICAL CENTER | Age: 28
DRG: 059 | End: 2018-07-13
Attending: EMERGENCY MEDICINE
Payer: COMMERCIAL

## 2018-07-13 ENCOUNTER — OUTPATIENT INFUSION SERVICES (OUTPATIENT)
Dept: ONCOLOGY | Facility: MEDICAL CENTER | Age: 28
End: 2018-07-13
Attending: PSYCHIATRY & NEUROLOGY
Payer: COMMERCIAL

## 2018-07-13 ENCOUNTER — HOSPITAL ENCOUNTER (INPATIENT)
Facility: MEDICAL CENTER | Age: 28
LOS: 1 days | DRG: 059 | End: 2018-07-16
Attending: EMERGENCY MEDICINE | Admitting: HOSPITALIST
Payer: COMMERCIAL

## 2018-07-13 VITALS
OXYGEN SATURATION: 97 % | DIASTOLIC BLOOD PRESSURE: 67 MMHG | HEART RATE: 100 BPM | HEIGHT: 69 IN | RESPIRATION RATE: 18 BRPM | WEIGHT: 148.59 LBS | SYSTOLIC BLOOD PRESSURE: 105 MMHG | TEMPERATURE: 99 F | BODY MASS INDEX: 22.01 KG/M2

## 2018-07-13 LAB
ANION GAP SERPL CALC-SCNC: 5 MMOL/L (ref 0–11.9)
BASOPHILS # BLD AUTO: 0.4 % (ref 0–1.8)
BASOPHILS # BLD: 0.04 K/UL (ref 0–0.12)
BUN SERPL-MCNC: 17 MG/DL (ref 8–22)
CALCIUM SERPL-MCNC: 9.1 MG/DL (ref 8.5–10.5)
CHLORIDE SERPL-SCNC: 108 MMOL/L (ref 96–112)
CO2 SERPL-SCNC: 26 MMOL/L (ref 20–33)
CREAT SERPL-MCNC: 1.02 MG/DL (ref 0.5–1.4)
EOSINOPHIL # BLD AUTO: 0.21 K/UL (ref 0–0.51)
EOSINOPHIL NFR BLD: 2.2 % (ref 0–6.9)
ERYTHROCYTE [DISTWIDTH] IN BLOOD BY AUTOMATED COUNT: 48 FL (ref 35.9–50)
GLUCOSE SERPL-MCNC: 67 MG/DL (ref 65–99)
HCT VFR BLD AUTO: 41.8 % (ref 37–47)
HGB BLD-MCNC: 13.6 G/DL (ref 12–16)
IMM GRANULOCYTES # BLD AUTO: 0.02 K/UL (ref 0–0.11)
IMM GRANULOCYTES NFR BLD AUTO: 0.2 % (ref 0–0.9)
LYMPHOCYTES # BLD AUTO: 4.95 K/UL (ref 1–4.8)
LYMPHOCYTES NFR BLD: 52.8 % (ref 22–41)
MCH RBC QN AUTO: 28.4 PG (ref 27–33)
MCHC RBC AUTO-ENTMCNC: 32.5 G/DL (ref 33.6–35)
MCV RBC AUTO: 87.3 FL (ref 81.4–97.8)
MONOCYTES # BLD AUTO: 0.96 K/UL (ref 0–0.85)
MONOCYTES NFR BLD AUTO: 10.2 % (ref 0–13.4)
NEUTROPHILS # BLD AUTO: 3.2 K/UL (ref 2–7.15)
NEUTROPHILS NFR BLD: 34.2 % (ref 44–72)
NRBC # BLD AUTO: 0.02 K/UL
NRBC BLD-RTO: 0.2 /100 WBC
PLATELET # BLD AUTO: 271 K/UL (ref 164–446)
PMV BLD AUTO: 10.1 FL (ref 9–12.9)
POTASSIUM SERPL-SCNC: 4.4 MMOL/L (ref 3.6–5.5)
RBC # BLD AUTO: 4.79 M/UL (ref 4.2–5.4)
SODIUM SERPL-SCNC: 139 MMOL/L (ref 135–145)
WBC # BLD AUTO: 9.4 K/UL (ref 4.8–10.8)

## 2018-07-13 PROCEDURE — 80048 BASIC METABOLIC PNL TOTAL CA: CPT

## 2018-07-13 PROCEDURE — 99220 PR INITIAL OBSERVATION CARE,LEVL III: CPT | Performed by: HOSPITALIST

## 2018-07-13 PROCEDURE — G0378 HOSPITAL OBSERVATION PER HR: HCPCS

## 2018-07-13 PROCEDURE — 700105 HCHG RX REV CODE 258: Performed by: EMERGENCY MEDICINE

## 2018-07-13 PROCEDURE — 36415 COLL VENOUS BLD VENIPUNCTURE: CPT

## 2018-07-13 PROCEDURE — A9579 GAD-BASE MR CONTRAST NOS,1ML: HCPCS | Performed by: EMERGENCY MEDICINE

## 2018-07-13 PROCEDURE — 700105 HCHG RX REV CODE 258: Performed by: HOSPITALIST

## 2018-07-13 PROCEDURE — 85025 COMPLETE CBC W/AUTO DIFF WBC: CPT

## 2018-07-13 PROCEDURE — 700111 HCHG RX REV CODE 636 W/ 250 OVERRIDE (IP): Performed by: EMERGENCY MEDICINE

## 2018-07-13 PROCEDURE — 700105 HCHG RX REV CODE 258: Performed by: PSYCHIATRY & NEUROLOGY

## 2018-07-13 PROCEDURE — 700117 HCHG RX CONTRAST REV CODE 255: Performed by: EMERGENCY MEDICINE

## 2018-07-13 PROCEDURE — 306780 HCHG STAT FOR TRANSFUSION PER CASE

## 2018-07-13 PROCEDURE — 96413 CHEMO IV INFUSION 1 HR: CPT

## 2018-07-13 PROCEDURE — 700111 HCHG RX REV CODE 636 W/ 250 OVERRIDE (IP): Performed by: PSYCHIATRY & NEUROLOGY

## 2018-07-13 PROCEDURE — 96374 THER/PROPH/DIAG INJ IV PUSH: CPT

## 2018-07-13 PROCEDURE — 99285 EMERGENCY DEPT VISIT HI MDM: CPT

## 2018-07-13 RX ORDER — METHYLPHENIDATE HYDROCHLORIDE 5 MG/1
20 TABLET ORAL 2 TIMES DAILY WITH MEALS
Status: DISCONTINUED | OUTPATIENT
Start: 2018-07-14 | End: 2018-07-15

## 2018-07-13 RX ORDER — SODIUM CHLORIDE 9 MG/ML
INJECTION, SOLUTION INTRAVENOUS CONTINUOUS
Status: ACTIVE | OUTPATIENT
Start: 2018-07-13 | End: 2018-07-14

## 2018-07-13 RX ORDER — MELOXICAM 7.5 MG/1
7.5 TABLET ORAL
COMMUNITY
End: 2018-08-24

## 2018-07-13 RX ORDER — METHYLPREDNISOLONE SODIUM SUCCINATE 500 MG/8ML
500 INJECTION INTRAMUSCULAR; INTRAVENOUS ONCE
Status: DISCONTINUED | OUTPATIENT
Start: 2018-07-13 | End: 2018-07-13

## 2018-07-13 RX ORDER — PROMETHAZINE HYDROCHLORIDE 25 MG/1
12.5-25 SUPPOSITORY RECTAL EVERY 4 HOURS PRN
Status: DISCONTINUED | OUTPATIENT
Start: 2018-07-13 | End: 2018-07-16 | Stop reason: HOSPADM

## 2018-07-13 RX ORDER — BUPROPION HYDROCHLORIDE 150 MG/1
150 TABLET, EXTENDED RELEASE ORAL 2 TIMES DAILY
Status: DISCONTINUED | OUTPATIENT
Start: 2018-07-14 | End: 2018-07-15

## 2018-07-13 RX ORDER — ONDANSETRON 4 MG/1
4 TABLET, ORALLY DISINTEGRATING ORAL EVERY 4 HOURS PRN
Status: DISCONTINUED | OUTPATIENT
Start: 2018-07-13 | End: 2018-07-16 | Stop reason: HOSPADM

## 2018-07-13 RX ORDER — POLYETHYLENE GLYCOL 3350 17 G/17G
1 POWDER, FOR SOLUTION ORAL
Status: DISCONTINUED | OUTPATIENT
Start: 2018-07-13 | End: 2018-07-16 | Stop reason: HOSPADM

## 2018-07-13 RX ORDER — AMOXICILLIN 250 MG
2 CAPSULE ORAL 2 TIMES DAILY
Status: DISCONTINUED | OUTPATIENT
Start: 2018-07-14 | End: 2018-07-16 | Stop reason: HOSPADM

## 2018-07-13 RX ORDER — METHYLPHENIDATE HYDROCHLORIDE 54 MG/1
54 TABLET ORAL EVERY MORNING
Status: DISCONTINUED | OUTPATIENT
Start: 2018-07-14 | End: 2018-07-13

## 2018-07-13 RX ORDER — ONDANSETRON 2 MG/ML
4 INJECTION INTRAMUSCULAR; INTRAVENOUS EVERY 4 HOURS PRN
Status: DISCONTINUED | OUTPATIENT
Start: 2018-07-13 | End: 2018-07-16 | Stop reason: HOSPADM

## 2018-07-13 RX ORDER — PROMETHAZINE HYDROCHLORIDE 25 MG/1
12.5-25 TABLET ORAL EVERY 4 HOURS PRN
Status: DISCONTINUED | OUTPATIENT
Start: 2018-07-13 | End: 2018-07-16 | Stop reason: HOSPADM

## 2018-07-13 RX ORDER — ACETAMINOPHEN 325 MG/1
650 TABLET ORAL EVERY 6 HOURS PRN
Status: DISCONTINUED | OUTPATIENT
Start: 2018-07-13 | End: 2018-07-16 | Stop reason: HOSPADM

## 2018-07-13 RX ORDER — BISACODYL 10 MG
10 SUPPOSITORY, RECTAL RECTAL
Status: DISCONTINUED | OUTPATIENT
Start: 2018-07-13 | End: 2018-07-16 | Stop reason: HOSPADM

## 2018-07-13 RX ADMIN — NATALIZUMAB 300 MG: 300 INJECTION INTRAVENOUS at 15:50

## 2018-07-13 RX ADMIN — SODIUM CHLORIDE: 9 INJECTION, SOLUTION INTRAVENOUS at 23:05

## 2018-07-13 RX ADMIN — SODIUM CHLORIDE 500 MG: 9 INJECTION, SOLUTION INTRAVENOUS at 23:03

## 2018-07-13 RX ADMIN — GADODIAMIDE 15 ML: 287 INJECTION INTRAVENOUS at 23:48

## 2018-07-13 ASSESSMENT — PAIN SCALES - GENERAL
PAINLEVEL_OUTOF10: 5
PAINLEVEL_OUTOF10: 3

## 2018-07-13 NOTE — PROGRESS NOTES
Pt returns to infusion center for monthly Tysabri infusion.  Touch checklist questions answered; PIV established.  Pt tolerated infusion for 32 minutes, at which point pt called reporting that her head felt heavy and that she was having difficulty concentrating.  Pt also reported dizziness.  Infusion stopped with moderate resolution of symptoms.  Pt able to  with right hand, but has very weak  with left hand.  Infusion remains stopped at this time.  Call placed to Dr. Ko; awaiting call back.

## 2018-07-14 PROBLEM — G35 MULTIPLE SCLEROSIS EXACERBATION (HCC): Status: ACTIVE | Noted: 2018-07-14

## 2018-07-14 PROCEDURE — A9270 NON-COVERED ITEM OR SERVICE: HCPCS | Performed by: HOSPITALIST

## 2018-07-14 PROCEDURE — 99225 PR SUBSEQUENT OBSERVATION CARE,LEVEL II: CPT | Performed by: HOSPITALIST

## 2018-07-14 PROCEDURE — 700102 HCHG RX REV CODE 250 W/ 637 OVERRIDE(OP): Performed by: FAMILY MEDICINE

## 2018-07-14 PROCEDURE — 700105 HCHG RX REV CODE 258: Performed by: HOSPITALIST

## 2018-07-14 PROCEDURE — 70553 MRI BRAIN STEM W/O & W/DYE: CPT

## 2018-07-14 PROCEDURE — 700102 HCHG RX REV CODE 250 W/ 637 OVERRIDE(OP): Performed by: HOSPITALIST

## 2018-07-14 PROCEDURE — G0378 HOSPITAL OBSERVATION PER HR: HCPCS

## 2018-07-14 PROCEDURE — 700111 HCHG RX REV CODE 636 W/ 250 OVERRIDE (IP): Performed by: HOSPITALIST

## 2018-07-14 PROCEDURE — A9270 NON-COVERED ITEM OR SERVICE: HCPCS | Performed by: FAMILY MEDICINE

## 2018-07-14 RX ORDER — TEMAZEPAM 15 MG/1
15 CAPSULE ORAL NIGHTLY PRN
Status: DISCONTINUED | OUTPATIENT
Start: 2018-07-14 | End: 2018-07-16 | Stop reason: HOSPADM

## 2018-07-14 RX ORDER — TEMAZEPAM 15 MG/1
15 CAPSULE ORAL NIGHTLY PRN
COMMUNITY
End: 2021-11-04

## 2018-07-14 RX ORDER — DIPHENHYDRAMINE HCL 25 MG
25 TABLET ORAL ONCE
Status: COMPLETED | OUTPATIENT
Start: 2018-07-14 | End: 2018-07-14

## 2018-07-14 RX ADMIN — ONDANSETRON 4 MG: 2 INJECTION INTRAMUSCULAR; INTRAVENOUS at 06:08

## 2018-07-14 RX ADMIN — TEMAZEPAM 15 MG: 15 CAPSULE ORAL at 21:41

## 2018-07-14 RX ADMIN — SODIUM CHLORIDE 500 MG: 9 INJECTION, SOLUTION INTRAVENOUS at 16:53

## 2018-07-14 RX ADMIN — METHYLPHENIDATE HYDROCHLORIDE 20 MG: 5 TABLET ORAL at 17:00

## 2018-07-14 RX ADMIN — DIPHENHYDRAMINE HCL 25 MG: 25 TABLET ORAL at 02:27

## 2018-07-14 RX ADMIN — METHYLPHENIDATE HYDROCHLORIDE 20 MG: 5 TABLET ORAL at 05:44

## 2018-07-14 RX ADMIN — SODIUM CHLORIDE 500 MG: 9 INJECTION, SOLUTION INTRAVENOUS at 05:51

## 2018-07-14 RX ADMIN — BUPROPION HYDROCHLORIDE 150 MG: 150 TABLET, EXTENDED RELEASE ORAL at 05:43

## 2018-07-14 RX ADMIN — BUPROPION HYDROCHLORIDE 150 MG: 150 TABLET, EXTENDED RELEASE ORAL at 16:54

## 2018-07-14 ASSESSMENT — COGNITIVE AND FUNCTIONAL STATUS - GENERAL
MOVING FROM LYING ON BACK TO SITTING ON SIDE OF FLAT BED: A LITTLE
MOVING TO AND FROM BED TO CHAIR: A LITTLE
SUGGESTED CMS G CODE MODIFIER DAILY ACTIVITY: CJ
DAILY ACTIVITIY SCORE: 22
CLIMB 3 TO 5 STEPS WITH RAILING: A LOT
STANDING UP FROM CHAIR USING ARMS: A LITTLE
DRESSING REGULAR UPPER BODY CLOTHING: A LITTLE
MOBILITY SCORE: 17
MOBILITY SCORE: 24
SUGGESTED CMS G CODE MODIFIER MOBILITY: CH
SUGGESTED CMS G CODE MODIFIER MOBILITY: CK
WALKING IN HOSPITAL ROOM: A LOT
HELP NEEDED FOR BATHING: A LITTLE

## 2018-07-14 ASSESSMENT — ENCOUNTER SYMPTOMS
CONSTIPATION: 0
SHORTNESS OF BREATH: 0
MYALGIAS: 0
WEAKNESS: 0
BLURRED VISION: 1
DIZZINESS: 0
CHILLS: 0
NAUSEA: 0
FEVER: 0
HEADACHES: 0
ABDOMINAL PAIN: 0
PALPITATIONS: 0
COUGH: 0
FOCAL WEAKNESS: 1
DIARRHEA: 0
VOMITING: 0

## 2018-07-14 ASSESSMENT — PATIENT HEALTH QUESTIONNAIRE - PHQ9
SUM OF ALL RESPONSES TO PHQ9 QUESTIONS 1 AND 2: 0
2. FEELING DOWN, DEPRESSED, IRRITABLE, OR HOPELESS: NOT AT ALL
1. LITTLE INTEREST OR PLEASURE IN DOING THINGS: NOT AT ALL
SUM OF ALL RESPONSES TO PHQ9 QUESTIONS 1 AND 2: 0
2. FEELING DOWN, DEPRESSED, IRRITABLE, OR HOPELESS: NOT AT ALL
1. LITTLE INTEREST OR PLEASURE IN DOING THINGS: NOT AT ALL

## 2018-07-14 ASSESSMENT — LIFESTYLE VARIABLES
CONSUMPTION TOTAL: NEGATIVE
ALCOHOL_USE: YES
AVERAGE NUMBER OF DAYS PER WEEK YOU HAVE A DRINK CONTAINING ALCOHOL: 0
ON A TYPICAL DAY WHEN YOU DRINK ALCOHOL HOW MANY DRINKS DO YOU HAVE: 0
TOTAL SCORE: 0
HAVE PEOPLE ANNOYED YOU BY CRITICIZING YOUR DRINKING: NO
EVER FELT BAD OR GUILTY ABOUT YOUR DRINKING: NO
EVER_SMOKED: NEVER
TOTAL SCORE: 0
EVER HAD A DRINK FIRST THING IN THE MORNING TO STEADY YOUR NERVES TO GET RID OF A HANGOVER: NO
HOW MANY TIMES IN THE PAST YEAR HAVE YOU HAD 5 OR MORE DRINKS IN A DAY: 0
HAVE YOU EVER FELT YOU SHOULD CUT DOWN ON YOUR DRINKING: NO
TOTAL SCORE: 0

## 2018-07-14 ASSESSMENT — PAIN SCALES - GENERAL
PAINLEVEL_OUTOF10: 0

## 2018-07-14 NOTE — CARE PLAN
Problem: Safety  Goal: Will remain free from falls  Outcome: PROGRESSING AS EXPECTED  Reminded to use call light for assistance to BR

## 2018-07-14 NOTE — H&P
DATE OF ADMISSION:  07/13/2018    PRIMARY CARE PHYSICIAN:  Manuela Green.    NEUROLOGIST:  Manjeet Garrett MD    CHIEF COMPLAINT:  Weakness.    HISTORY OF PRESENT ILLNESS:  Patient is a very pleasant 27-year-old female who   was diagnosed with multiple sclerosis in 06/2016.  She has been on Tysabri   infusions.  She does have an infusion today and during the infusion she began   having weakness.  She states that usually in the past with her MS exacerbation   she has had right-handed weakness and today it was left-handed weakness.    Following this, she was also having lightheadedness and dizziness.  She states   that she has loss of her left peripheral vision and today she noticed some   vision loss in the middle of her eye.  She denies any other complaints other   than weakness.  She feels her legs are very wobbly.    REVIEW OF SYSTEMS:  As per HPI.  All other systems have been reviewed and are   negative.    PAST MEDICAL HISTORY:  1.  Multiple sclerosis diagnosed in 06/2016.  2.  Acne.  3.  Depression.  4.  Attention deficit hyperactivity disorder.    PAST SURGICAL HISTORY:  She has had ankle ORIF.    SOCIAL HISTORY:  She denies tobacco or drug use, occasional alcohol use.    ALLERGIES:  No known drug allergies.    HOME MEDICATIONS:  1.  She is on Tysabri.  2.  Vitamin D.  3.  Vitamin B12.  4.  Meloxicam as needed.  5.  Wellbutrin- mg daily.  6.  Concerta 54 mg continuous release daily.    PHYSICAL EXAMINATION:  VITAL SIGNS:  Blood pressure is 126/77, pulse of 73, respirations 17,   temperature is 36.7, and oxygen saturation is 100% on room air, weight is 65.8   kilograms.  GENERAL:  Patient is pleasant.  She is resting comfortably, currently not in   any acute distress.  HEENT:  Moist mucous membranes.  No oropharyngeal exudates.  Eyes, EOMI,   PERRLA.  NECK:  No lymphadenopathy or JVD.  CARDIOVASCULAR:  Regular rate and rhythm.  No murmurs.  LUNGS:  Clear to auscultation bilaterally.  No rales  or rhonchi.  ABDOMEN:  Positive bowel sounds, soft, nontender, and nondistended.  EXTREMITIES:  No clubbing or cyanosis.  NEUROLOGIC:  She is awake, alert, and oriented to person, place, time, and   situation.  She has vision loss in the left peripheral eye fields.  She also   has numbness in her left hand and weakness in her lower extremities.    IMAGING:  MRI of her brain is currently pending.    LABORATORY DATA:  CBC is unremarkable.  BMP is unremarkable.    ASSESSMENT AND PLAN:  Patient is a very pleasant 27-year-old female who   unfortunately got diagnosed with relapsing remitting multiple sclerosis  2   years ago, comes into the hospital complaining of left hand weakness as well   as lower extremity weakness, vertigo and blurry vision while she was receiving   a Tysabri infusion for her multiple sclerosis.  1.  Probable multiple sclerosis exacerbation.  At this point, she is going to   be put under observation status on the neurology unit.  She will need neuro   checks.  Furthermore, I have started her on Solu-Medrol 500 mg IV b.i.d.  MRI   of her brain has been ordered and neurology will need to be consulted in the   morning for recommendations.  We will also place her on fall precautions.  2.  History of depression.  Continue Wellbutrin- mg b.i.d.  3.  Deep venous thrombosis prophylaxis.  Bilateral sequential compression   devices.  She is ambulatory.  4.  She is a FULL CODE.  5.  Lower extremity weakness  6.  ADHD       ____________________________________     MD TIFFANI Pena / JOSE    DD:  07/13/2018 23:03:29  DT:  07/13/2018 23:21:56    D#:  [id number]  Job#:  [id number]

## 2018-07-14 NOTE — ED NOTES
Med rec complete per pt at bedside   Allergies reviewed  No oral ABX within last 30 days     Pt states she has a hard time sleeping and she knows Temazepam works. She is not currently taking it but has in the past.

## 2018-07-14 NOTE — ED NOTES
Pt reports strong reaction to steroid use.  She is willing to take, but reports she develops leg weakness, severe insomnia and hot flashes/anxiety when taking.

## 2018-07-14 NOTE — ED TRIAGE NOTES
"Chief Complaint   Patient presents with   • Blurred Vision   • Vertigo   • Weakness     left hand   Pt reports having a \"freezing up of R hand\" during her infusion today (treatment for MS).  The medication is not nbew to her but the symptoms are.    BIB WC to room.  Agree with triage assessment.  Changing into gown.  Chart placed for ERP eval.    "

## 2018-07-14 NOTE — ED TRIAGE NOTES
Rosita Bowles  27 y.o.  Chief Complaint   Patient presents with   • Blurred Vision   • Vertigo   • Weakness     left hand     Pt was at infusion center receiving an tysabri infusion for MS. Pt started experiencing vertigo, blurred vision and weakness in her left hand. Pt states she has had an incident similar to this before and the tx was steroids. Pt states these symptoms are new/ worse today then have been in the past.     Triage process explained to patient, educated pt to notify staff at  if s/s worsened, apologized for wait time, and returned pt to lobby.

## 2018-07-14 NOTE — ASSESSMENT & PLAN NOTE
Neurology following  High-dose IV steroids, first full day of was 7/14 so at least through am 7/17.  PT/OT -- she is also much improved  She has an appointment with Dr. Garrett on 7/18 at 1600.

## 2018-07-14 NOTE — PROGRESS NOTES
"Hospital Medicine Daily Progress Note    Date of Service  7/14/2018    Chief Complaint  27 y.o. female admitted 7/13/2018 with MS flare    Hospital Course    28 yo F with MS who is admitted with left UE weakness and visual changes. She is admitted and started on steroids.       Interval Problem Update  7/14 - left upper extremity weakness improving. Both legs are like \"jello.\" She feels like her vision is better. She is anxious about her school exams coming up. Questions answered.    Consultants/Specialty  [facility], neurology    Disposition  Home when steroids done    I spent a total of 30 minutes during this clinical encounter of which > 50% was devoted to counseling and coordinating care including review of records, pertinent lab data and studies, as well as discussing diagnostic evaluation and work up, planned therapeutic interventions and future disposition of care. The assessment and plan reflect discussion of patient with consultants, other healthcare providers, family members.    Review of Systems  Review of Systems   Constitutional: Negative for chills and fever.   Eyes: Positive for blurred vision.   Respiratory: Negative for cough and shortness of breath.    Cardiovascular: Negative for chest pain and palpitations.   Gastrointestinal: Negative for abdominal pain, constipation, diarrhea, nausea and vomiting.   Genitourinary: Negative for dysuria.   Musculoskeletal: Negative for myalgias.   Skin: Negative for itching.   Neurological: Positive for focal weakness. Negative for dizziness, weakness and headaches.   All other systems reviewed and are negative.       Physical Exam  Blood Pressure: 121/61   Temperature: 36.9 °C (98.4 °F)   Pulse: 86   Respiration: 16   Pulse Oximetry: 97 %     Physical Exam   Constitutional: She is oriented to person, place, and time. She appears well-developed and well-nourished. No distress.   HENT:   Head: Normocephalic and atraumatic.   Eyes: Conjunctivae are normal. Pupils are " equal, round, and reactive to light. No scleral icterus.   Neck: Normal range of motion. Neck supple.   Cardiovascular: Normal rate, regular rhythm, normal heart sounds and intact distal pulses.    No murmur heard.  Pulmonary/Chest: Effort normal and breath sounds normal. No respiratory distress. She has no rales.   Abdominal: Soft. Bowel sounds are normal. She exhibits no distension.   Musculoskeletal: She exhibits no edema.   Left UE 4/5 strength BLE 4/5 strength, RUE 5/5   Neurological: She is alert and oriented to person, place, and time.   Skin: Skin is warm and dry.   Vitals reviewed.      Fluids  No intake or output data in the 24 hours ending 07/14/18 1450    Laboratory  Recent Labs      07/13/18   2100   WBC  9.4   RBC  4.79   HEMOGLOBIN  13.6   HEMATOCRIT  41.8   MCV  87.3   MCH  28.4   MCHC  32.5*   RDW  48.0   PLATELETCT  271   MPV  10.1     Recent Labs      07/13/18   2100   SODIUM  139   POTASSIUM  4.4   CHLORIDE  108   CO2  26   GLUCOSE  67   BUN  17   CREATININE  1.02   CALCIUM  9.1                   Imaging  MR-BRAIN-WITH & W/O   Final Result      Stable findings of MS as described above. No evidence of new or active demyelinating lesions.      MR-CERVICAL SPINE-WITH & W/O    (Results Pending)        Assessment/Plan  * Multiple sclerosis exacerbation (HCC)- (present on admission)   Assessment & Plan    Neurology following  High-dose IV steroids  PT/OT

## 2018-07-14 NOTE — ED NOTES
Attempted to alert MRI of pt's h/o Kylena IUD in place.  Awaiting lab results.  No answer to phone call.

## 2018-07-14 NOTE — PROGRESS NOTES
Received pt report from ED RN @5002, Pt arrived to [facility] via [facility]i @6365. Pt is A/Ox4, c/o left hand weakness. Denies pain. No complaints of n/v no dizziness. Pt verbalize weakness on both legs but pt manage to walk to BR with standby assist on a steady gait. 2RN skin assessment done with Daniel no skin issue noted. Pt was able to verbalize needs. Stable VS. Pt stated inability to sleep, medicated one dose of benadryl 25mg.Call light within pt reach.

## 2018-07-14 NOTE — ED PROVIDER NOTES
"ED Provider Note    Scribed for Aren Valencia M.D. by Aren Valencia. 7/13/2018,  9:13 PM.    CHIEF COMPLAINT  Chief Complaint   Patient presents with   • Blurred Vision   • Vertigo   • Weakness     left hand       HPI  Rosita Bowles is a 27 y.o. female with a history of MS and depression who presents to the Emergency Department Pt was at infusion center receiving an tysabri infusion for MS. Pt started experiencing vertigo, blurred vision and weakness in her left hand described as \"freezing up.\" Pt states she has had two incidents similar to this before and the tx was steroids, but both of these episodes involved he is right-hand dominant, and has no symptoms.  She also reports that her legs felt weak bilaterally, but symmetrically.  The confusion or word finding difficulty or speech problems.. Pt states these symptoms are worse today then have been in the past.  I reviewed her records, and a note from 4:50 PM shows that the patient was complaining that her \"head felt heavy and that she was having difficulty concentrating.\"  She also complained of some nonspecific dizziness.  The infusion was stopped, and there was significant improvement in symptoms, but the patient's left hand remained weak, per this note.  Neurology was called.  At 530, a follow-up note shows that the on-call neurologist recommended that the patient come to the emergency department for further evaluation.  At the bedside now, the patient has no symptoms except for persistent weakness in her left hand.  This is isolated to the hand and wrist, she is awake, smiling, upbeat, fully alert and oriented.  Because of her visual complaints, visual acuity was tested, and was 20/50 OD, 20/50 OS, and 20/40 OU.  She says that this is normal for her.  Prior to today's infusion, she has not had or weakness or dizziness or confusion.  She is very clear that she has had very similar guarded during the infusion.  This episode is more concerning " "to her because it is then previous episodes.      REVIEW OF SYSTEMS  See HPI for further details. All other systems are negative.     PAST MEDICAL HISTORY   has a past medical history of Acne (8/7/2012); Acne vulgaris (6/23/2017); ADHD (8/7/2012); Depression (8/7/2012); and Multiple sclerosis (HCC) (11/2/2016).    SOCIAL HISTORY  Social History     Social History Main Topics   • Smoking status: Never Smoker   • Smokeless tobacco: Never Used   • Alcohol use 0.0 oz/week      Comment: occasional   • Drug use: No   • Sexual activity: Yes     Partners: Male     Birth control/ protection: Pill      Comment: OCP     History   Drug Use No       SURGICAL HISTORY   has a past surgical history that includes ankle orif and dental extraction(s).    CURRENT MEDICATIONS  Home Medications     Reviewed by Tiera Esquivel R.N. (Registered Nurse) on 07/14/18 at 0129  Med List Status: Complete   Medication Last Dose Status   Biotin 5000 MCG Cap 7/13/2018 Active   buPROPion (WELLBUTRIN XL) 300 MG XL tablet 7/13/2018 Active   Cholecalciferol (VITAMIN D3) 5000 units Tab 7/13/2018 Active   Cyanocobalamin (VITAMIN B-12) 1000 MCG Tab 7/13/2018 Active   Levonorgestrel (KYLEENA) 19.5 MG IUD 4/13/2018 Active   MAGNESIUM CHLORIDE PO 7/13/2018 Active   melatonin 3 MG Tab prn Active   meloxicam (MOBIC) 7.5 MG Tab 7/13/2018 Active   methylphenidate (CONCERTA) 54 MG CR tablet 7/13/2018 Active                ALLERGIES  Allergies   Allergen Reactions   • Nkda [No Known Drug Allergy]    • Gabapentin      \"I can't move, I can't think\"       PHYSICAL EXAM  VITAL SIGNS: /71   Pulse 76   Temp 36.7 °C (98 °F)   Resp 14   Ht 1.753 m (5' 9\")   Wt 69.8 kg (153 lb 14.1 oz)   SpO2 96%   BMI 22.72 kg/m²   Pulse ox interpretation: I interpret this pulse ox as normal.  Constitutional: Alert in no apparent distress.  HENT: No signs of trauma, Bilateral external ears normal, Nose normal.   Eyes: Conjunctiva normal, Non-icteric.   Neck: " Normal range of motion, Supple, No stridor.   Lymphatic: No lymphadenopathy noted.   Cardiovascular: Regular rate and rhythm, no murmurs.   Thorax & Lungs: Normal breath sounds, No respiratory distress, No wheezing, No chest tenderness.   Abdomen: Bowel sounds normal, Soft, No tenderness, No masses, No pulsatile masses. No peritoneal signs.  Skin: Warm, Dry, No erythema, No rash.   Extremities: Intact distal pulses, No edema, No cyanosis.  Musculoskeletal: Good range of motion in all major joints, with the exception of significant weakness in flexion and extension of the fingers of the left hand.  She has no sensory changes. normal sensation is preserved in no or major deformities noted.   Neurologic: Alert , Normal motor function, Normal sensory function, No focal deficits noted.   Psychiatric: Affect normal, Judgment normal, Mood normal.     DIAGNOSTIC STUDIES / PROCEDURES      LABS  Labs Reviewed   CBC WITH DIFFERENTIAL - Abnormal; Notable for the following:        Result Value    MCHC 32.5 (*)     Neutrophils-Polys 34.20 (*)     Lymphocytes 52.80 (*)     Lymphs (Absolute) 4.95 (*)     Monos (Absolute) 0.96 (*)     All other components within normal limits   BASIC METABOLIC PANEL   ESTIMATED GFR   CBC WITH DIFFERENTIAL   BASIC METABOLIC PANEL     All labs reviewed by me.    RADIOLOGY  MR-BRAIN-WITH & W/O    (Results Pending)     The radiologist's interpretation of all radiological studies have been reviewed by me.    COURSE & MEDICAL DECISION MAKING  Nursing notes, VS, PMSFHx reviewed in chart.     9:13 PM Patient seen and examined at bedside.  She presents well outside any TPA window, and is not a TPA candidate because of her complicating diagnosis of multiple sclerosis, her low NIH score, and my very strong suspicion that her deficits are secondary to multiple sclerosis and or the Tysabri infusion she was receiving.  She will be evaluated with MRI of the brain with and without contrast, to compare to her scan  from June of this year.  She will need to be admitted for this persistent neurologic deficits, and likely will need to start steroids.    1:35 AM This patient has remained stable. Her MRI/MRA is still pending. She seems to be experiencing either a medication reaction to her infusion, or an MS flare. I have discussed this case with the admitting hospitalist as of a couple of hours ago. Per his request, I started Solu-Medrol, giving a 1st 500 mg IV, with the plan to give 500 mg twice a day for the 1st few days of treatment.    DISPOSITION:  Patient will be admitted to Dr. Russell in stable condition.      FINAL IMPRESSION  1. Right hand weakness  2. Medication reaction  3. Multiple sclerosis

## 2018-07-14 NOTE — PROGRESS NOTES
Dr. North (neurology on-call) returned call.  Informed MD that pt was to be escorted to ED for further evaluation.  Advised MD of pt status; MD reports that he is the on-call neurologist for the hospital and will be expecting a call from ED physician.  PIV flushed with saline; kept in place.  Pt to ED via wheelchair and this RN.  Father present.

## 2018-07-14 NOTE — CONSULTS
CC:  LEFT HAND DEAD AFTER TYSABRI    Date of Admission: 7/13/2018    Today's Date: 07/14/18    Consulting Physician: Sabino Leonardo M.D.      HPI:    Rosita Bowles is a 27 y.o. female notes sudden vision loss left eye sparing up right corner, and left hand  weak, soon after starting tysabri infusion yesterday.  Ist infusion had similar event this past fall, 9 th infusion this one.  Has sudden deficits in past with MS.  No other complaints.       ROS:   Constitutional: No fevers or chills.  Eyes: No blurry vision or eye pain.  ENT: No dysphagia or hearing loss.  Respiratory: No cough or shortness of breath.  Cardiovascular: No chest pain or palpitations.  GI: No nausea, vomiting, or diarrhea.  : No urinary incontinence or dysuria.  Musculoskeletal: No joint swelling or arthralgias.  Skin: No skin rashes.  Neuro: See HPI.  Endocrine: No heat or cold intolerance. No polydipsia or polyuria.  Psych: No depression or anxiety.  Heme/Lymph: No easy bruising or swollen lymph nodes.  All other systems were reviewed and were negative.       Past Medical History:   Past Medical History:   Diagnosis Date   • Acne vulgaris 6/23/2017   • Multiple sclerosis (HCC) 11/2/2016   • Depression 8/7/2012   • Acne 8/7/2012   • ADHD 8/7/2012       Past Surgical History:   Past Surgical History:   Procedure Laterality Date   • ANKLE ORIF     • DENTAL EXTRACTION(S)         Social History:   Social History     Social History   • Marital status: Single     Spouse name: N/A   • Number of children: N/A   • Years of education: N/A     Occupational History   • Not on file.     Social History Main Topics   • Smoking status: Never Smoker   • Smokeless tobacco: Never Used   • Alcohol use 0.0 oz/week      Comment: occasional   • Drug use: No   • Sexual activity: Yes     Partners: Male     Birth control/ protection: Pill      Comment: OCP     Other Topics Concern   •  Service No   • Blood Transfusions No   • Caffeine Concern  "No   • Occupational Exposure No   • Hobby Hazards No   • Sleep Concern Yes   • Stress Concern No   • Weight Concern No   • Special Diet No   • Back Care No   • Exercise Yes   • Bike Helmet Yes   • Seat Belt Yes   • Self-Exams Yes     Social History Narrative    2013: BF in [place]. Attends [employer].    2014: was living in [place]. Now back in [place].     2014: working in [employer]. BF broke up with her Sept. No friends in [place].     2015: working 2 jobs. Has BF.        Family History:   Family History   Problem Relation Age of Onset   • Thyroid Mother    • Thyroid Brother    • Cancer Neg Hx    • Diabetes Neg Hx        Allergies:   Allergies   Allergen Reactions   • Nkda [No Known Drug Allergy]    • Gabapentin      \"I can't move, I can't think\"         Current Facility-Administered Medications:   •  buPROPion SR (WELLBUTRIN-SR) tablet 150 mg, 150 mg, Oral, BID, Giuseppe Roche M.D., 150 mg at 07/14/18 0543  •  methylPREDNISolone sod succ (SOLU-MEDROL) 500 mg in  mL IVPB, 500 mg, Intravenous, Q12HRS, Giuseppe Roche M.D., 500 mg at 07/14/18 0551  •  senna-docusate (PERICOLACE or SENOKOT S) 8.6-50 MG per tablet 2 Tab, 2 Tab, Oral, BID **AND** polyethylene glycol/lytes (MIRALAX) PACKET 1 Packet, 1 Packet, Oral, QDAY PRN **AND** magnesium hydroxide (MILK OF MAGNESIA) suspension 30 mL, 30 mL, Oral, QDAY PRN **AND** bisacodyl (DULCOLAX) suppository 10 mg, 10 mg, Rectal, QDAY PRN, Giuseppe Roche M.D.  •  ondansetron (ZOFRAN) syringe/vial injection 4 mg, 4 mg, Intravenous, Q4HRS PRN, Giuseppe Roche M.D., 4 mg at 07/14/18 0608  •  ondansetron (ZOFRAN ODT) dispertab 4 mg, 4 mg, Oral, Q4HRS PRN, Giuseppe Roche M.D.  •  promethazine (PHENERGAN) tablet 12.5-25 mg, 12.5-25 mg, Oral, Q4HRS PRN, Giuseppe Roche M.D.  •  promethazine (PHENERGAN) suppository 12.5-25 mg, 12.5-25 mg, Rectal, Q4HRS PRN, Giuseppe Roche M.D.  •  prochlorperazine (COMPAZINE) injection 5-10 mg, 5-10 mg, Intravenous, Q4HRS PRN, Giuseppe Roche M.D.  •  " "acetaminophen (TYLENOL) tablet 650 mg, 650 mg, Oral, Q6HRS PRN, Giuseppe Roche M.D.  •  methylphenidate (RITALIN) tablet 20 mg, 20 mg, Oral, BID WITH MEALS, Giuseppe Roche M.D., 20 mg at 07/14/18 0544      PHYSICAL EXAM    Vitals:    07/14/18 0125 07/14/18 0400 07/14/18 0722 07/14/18 1100   BP: 114/71 107/45 110/67 121/61   Pulse: 76 81 81 86   Resp: 14 16 16 16   Temp: 36.7 °C (98 °F) 36.9 °C (98.4 °F) 36.6 °C (97.8 °F) 36.9 °C (98.4 °F)   SpO2: 96% 94% 99% 97%   Weight: 69.8 kg (153 lb 14.1 oz)      Height: 1.753 m (5' 9\")          Head/Neck: NCAT. no meningismus neg kernig neg brudzinski. No obvious mass or heard bruit. No tender arteries or lost pulses. No rash of head or neck.    Skin: Warm, dry, intact. No rashes observed head/neck or body    Eyes/Funduscopic: unable to see    Mental Status: Awake, alert, oriented x 3. Name/repeat/fluent/command follow intact. No neglect/extinction. Attention and concentration, recent & remote memory, Fund of Knowledge wnl.     Cranial Nerves: left apd, other CN II-XII intact. OHZTM6ic.   No afferent pupillary defect. EOM full. VF full except left eye loss of vision except RU corner. No nystagmus.       Motor:  Subjective weak of left leg on standing, other intact, no abn mvmts.  bulk  & tone wnl.      Sensory: symmetric to sharp    Coordination: dysmetria absent    DTR's: intact/sym. bilat clonus. Toes up    Gait/Station: n/a fall concern         Labs:  Recent Labs      07/13/18   2100   WBC  9.4   RBC  4.79   HEMOGLOBIN  13.6   HEMATOCRIT  41.8   MCV  87.3   MCH  28.4   MCHC  32.5*   RDW  48.0   PLATELETCT  271   MPV  10.1     Recent Labs      07/13/18   2100   SODIUM  139   POTASSIUM  4.4   CHLORIDE  108   CO2  26   GLUCOSE  67   BUN  17   CREATININE  1.02   CALCIUM  9.1                     Recent Labs      07/13/18   2100   SODIUM  139   POTASSIUM  4.4   CHLORIDE  108   CO2  26   GLUCOSE  67   BUN  17     Recent Labs      07/13/18   2100   SODIUM  139   POTASSIUM  4.4 "   CHLORIDE  108   CO2  26   BUN  17   CREATININE  1.02   CALCIUM  9.1         No results found for this or any previous visit.           Imaging: neuroimaging reviewed and directly visualized by me  MR-BRAIN-WITH & W/O   Final Result      Stable findings of MS as described above. No evidence of new or active demyelinating lesions.      MR-CERVICAL SPINE-WITH & W/O    (Results Pending)       Assessment/Plan:    MS EXACERBATION WITH TYSABRI REACTION, LEFT EYE OPTIC NEURITIS ON EXAM, LEFT HAND WEAKNESS RESOLVED, LEFT LEG SUBJECTIVE WEAKNESS    HX OF CERVICAL SPINAL STENOSIS SEVERE, SURGERY ELECTIVE PENDING    MRC W/ & W/O  STEROID 3-5 DAYS, responding well  R/O PSEUDOEXACERBATION/INFECTION WORKUP; UA/CBC  REPEAT [facility] VIRUS LABS PRIOR TO NEXT TYSABRI; MAY NEED TO STOP THIS MED. DISCUSSED WITH HER NEURO MD SMITH.    No further neuro workup as inpatient if aforementioned studies WNL & maintains/regains neuro baseline.  Neuro sign off, reconsult PRN.  F/u otpt Neuro ASAP.       Tej North M.D.  , Neurohospitalist & Stroke  Clinical Professor, [employer]  Diplomate, Neurology & Neuroimaging

## 2018-07-15 ENCOUNTER — APPOINTMENT (OUTPATIENT)
Dept: RADIOLOGY | Facility: MEDICAL CENTER | Age: 28
DRG: 059 | End: 2018-07-15
Attending: PSYCHIATRY & NEUROLOGY
Payer: COMMERCIAL

## 2018-07-15 PROCEDURE — 97161 PT EVAL LOW COMPLEX 20 MIN: CPT

## 2018-07-15 PROCEDURE — 700102 HCHG RX REV CODE 250 W/ 637 OVERRIDE(OP): Performed by: HOSPITALIST

## 2018-07-15 PROCEDURE — G8980 MOBILITY D/C STATUS: HCPCS | Mod: CI

## 2018-07-15 PROCEDURE — A9270 NON-COVERED ITEM OR SERVICE: HCPCS | Performed by: HOSPITALIST

## 2018-07-15 PROCEDURE — G8978 MOBILITY CURRENT STATUS: HCPCS | Mod: CI

## 2018-07-15 PROCEDURE — 700111 HCHG RX REV CODE 636 W/ 250 OVERRIDE (IP): Performed by: HOSPITALIST

## 2018-07-15 PROCEDURE — 700117 HCHG RX CONTRAST REV CODE 255: Performed by: PSYCHIATRY & NEUROLOGY

## 2018-07-15 PROCEDURE — 700105 HCHG RX REV CODE 258: Performed by: HOSPITALIST

## 2018-07-15 PROCEDURE — 99232 SBSQ HOSP IP/OBS MODERATE 35: CPT | Performed by: HOSPITALIST

## 2018-07-15 PROCEDURE — 770006 HCHG ROOM/CARE - MED/SURG/GYN SEMI*

## 2018-07-15 PROCEDURE — G8979 MOBILITY GOAL STATUS: HCPCS | Mod: CI

## 2018-07-15 PROCEDURE — 72156 MRI NECK SPINE W/O & W/DYE: CPT

## 2018-07-15 PROCEDURE — A9579 GAD-BASE MR CONTRAST NOS,1ML: HCPCS | Performed by: PSYCHIATRY & NEUROLOGY

## 2018-07-15 RX ORDER — METHYLPHENIDATE HYDROCHLORIDE 54 MG/1
54 TABLET ORAL EVERY MORNING
Status: DISCONTINUED | OUTPATIENT
Start: 2018-07-16 | End: 2018-07-16 | Stop reason: HOSPADM

## 2018-07-15 RX ORDER — BUPROPION HYDROCHLORIDE 150 MG/1
300 TABLET, EXTENDED RELEASE ORAL DAILY
Status: DISCONTINUED | OUTPATIENT
Start: 2018-07-15 | End: 2018-07-16 | Stop reason: HOSPADM

## 2018-07-15 RX ORDER — BUPROPION HYDROCHLORIDE 150 MG/1
150 TABLET, EXTENDED RELEASE ORAL ONCE
Status: DISCONTINUED | OUTPATIENT
Start: 2018-07-15 | End: 2018-07-15

## 2018-07-15 RX ORDER — BUPROPION HYDROCHLORIDE 150 MG/1
300 TABLET, EXTENDED RELEASE ORAL DAILY
Status: DISCONTINUED | OUTPATIENT
Start: 2018-07-16 | End: 2018-07-15

## 2018-07-15 RX ORDER — METHYLPHENIDATE HYDROCHLORIDE 54 MG/1
54 TABLET ORAL ONCE
Status: COMPLETED | OUTPATIENT
Start: 2018-07-15 | End: 2018-07-15

## 2018-07-15 RX ADMIN — ONDANSETRON 4 MG: 2 INJECTION INTRAMUSCULAR; INTRAVENOUS at 06:24

## 2018-07-15 RX ADMIN — METHYLPHENIDATE HYDROCHLORIDE 54 MG: 54 TABLET ORAL at 10:15

## 2018-07-15 RX ADMIN — BUPROPION HYDROCHLORIDE 300 MG: 150 TABLET, EXTENDED RELEASE ORAL at 11:30

## 2018-07-15 RX ADMIN — GADODIAMIDE 15 ML: 287 INJECTION INTRAVENOUS at 12:09

## 2018-07-15 RX ADMIN — TEMAZEPAM 15 MG: 15 CAPSULE ORAL at 22:09

## 2018-07-15 RX ADMIN — SODIUM CHLORIDE 500 MG: 9 INJECTION, SOLUTION INTRAVENOUS at 17:57

## 2018-07-15 RX ADMIN — SODIUM CHLORIDE 500 MG: 9 INJECTION, SOLUTION INTRAVENOUS at 06:17

## 2018-07-15 RX ADMIN — BUPROPION HYDROCHLORIDE 150 MG: 150 TABLET, EXTENDED RELEASE ORAL at 06:30

## 2018-07-15 ASSESSMENT — ENCOUNTER SYMPTOMS
MYALGIAS: 0
COUGH: 0
DIARRHEA: 0
CHILLS: 0
ABDOMINAL PAIN: 0
SHORTNESS OF BREATH: 0
WEAKNESS: 0
DIZZINESS: 0
CONSTIPATION: 0
HEADACHES: 0
BLURRED VISION: 0
NAUSEA: 0
VOMITING: 0
FOCAL WEAKNESS: 1
FEVER: 0
PALPITATIONS: 0

## 2018-07-15 ASSESSMENT — GAIT ASSESSMENTS
ASSISTIVE DEVICE: SINGLE POINT CANE
DISTANCE (FEET): 250
GAIT LEVEL OF ASSIST: STAND BY ASSIST

## 2018-07-15 ASSESSMENT — COGNITIVE AND FUNCTIONAL STATUS - GENERAL
MOBILITY SCORE: 24
SUGGESTED CMS G CODE MODIFIER MOBILITY: CH

## 2018-07-15 ASSESSMENT — PAIN SCALES - GENERAL
PAINLEVEL_OUTOF10: 0

## 2018-07-15 NOTE — PROGRESS NOTES
Received pt report from morning RN. Assumed pt care @1900. Pt is A/Ox4. Increased in anxiety noted complaining she had taken Ritalin 2 times today morning and at 1700. Stated she normally takes it once a day in the morning. Requested to have medication for sleep, PRN Temazepam 15mg prescribed. Family at bedside visited @2100. Ambulatory with steady gait. Call light within reach.

## 2018-07-15 NOTE — THERAPY
"Physical Therapy Evaluation completed.   Bed Mobility:  Supine to Sit:  (found up in hallway in chair and returned there)  Transfers: Sit to Stand: Supervised  Gait: Level Of Assist: Stand by Assist with Single Point Cane       Plan of Care: Patient with no further skilled PT needs in the acute care setting at this time  Discharge Recommendations: Equipment: No Equipment Needed. Post-acute therapy Currently anticipate no further skilled therapy needs once patient is discharged from the inpatient setting.    Pt presents very near her functional baseline. She reports having been diagnosed with MS in 2016 and demonstrates excellent insight into her condition including her understanding of fatigue, temperature regulation, energy conservation, etc. She completed over 250' of gait with SPC and also managed stairs with railing (she has flight to enter her apartment) at [facility]. Pt with appropriate mechanics during lowering herself onto her own bottom on stairs for rest and she also reports similar method if she is walking near a wall (although not performed today). At this time, pt does not require further acute PT intervention and appears functionally capable of return home.     See \"Rehab Therapy-Acute\" Patient Summary Report for complete documentation.     "

## 2018-07-15 NOTE — PROGRESS NOTES
"Hospital Medicine Daily Progress Note    Date of Service  7/15/2018    Chief Complaint  27 y.o. female admitted 7/13/2018 with MS flare    Hospital Course    26 yo F with MS who is admitted with left UE weakness and visual changes. She is admitted and started on steroids and tolerated them well with daily improvement.       Interval Problem Update  7/15 - discussed with patient; her dad is also visiting. She is seen up and ambulating in the halls with PT. She looks reasonably good. She states her left UE weakness is better today as well, and her vision changes are resolved.     7/14 - left upper extremity weakness improving. Both legs are like \"jello.\" She feels like her vision is better. She is anxious about her school exams coming up. Questions answered.    Consultants/Specialty  Kiner, neurology    Disposition  Home when steroids done    I spent a total of 30 minutes during this clinical encounter of which > 50% was devoted to counseling and coordinating care including review of records, pertinent lab data and studies, as well as discussing diagnostic evaluation and work up, planned therapeutic interventions and future disposition of care. The assessment and plan reflect discussion of patient with consultants, other healthcare providers, family members.    Review of Systems  Review of Systems   Constitutional: Negative for chills and fever.   Eyes: Negative for blurred vision.   Respiratory: Negative for cough and shortness of breath.    Cardiovascular: Negative for chest pain and palpitations.   Gastrointestinal: Negative for abdominal pain, constipation, diarrhea, nausea and vomiting.   Genitourinary: Negative for dysuria.   Musculoskeletal: Negative for myalgias.   Skin: Negative for itching.   Neurological: Positive for focal weakness. Negative for dizziness, weakness and headaches.   All other systems reviewed and are negative.       Physical Exam  Blood Pressure: 121/61   Temperature: 36.9 °C (98.4 °F) "   Pulse: 86   Respiration: 16   Pulse Oximetry: 97 %     Physical Exam   Constitutional: She is oriented to person, place, and time. She appears well-developed and well-nourished. No distress.   HENT:   Head: Normocephalic and atraumatic.   Eyes: Conjunctivae are normal. Pupils are equal, round, and reactive to light. No scleral icterus.   Neck: Normal range of motion. Neck supple.   Cardiovascular: Normal rate, regular rhythm, normal heart sounds and intact distal pulses.    No murmur heard.  Pulmonary/Chest: Effort normal and breath sounds normal. No respiratory distress. She has no rales.   Abdominal: Soft. Bowel sounds are normal. She exhibits no distension.   Musculoskeletal: She exhibits no edema.   Left UE 4/5 strength BLE 4+/5 strength, RUE 5/5   Neurological: She is alert and oriented to person, place, and time.   Skin: Skin is warm and dry.   Vitals reviewed.      Fluids  No intake or output data in the 24 hours ending 07/15/18 1247    Laboratory  Recent Labs      07/13/18   2100   WBC  9.4   RBC  4.79   HEMOGLOBIN  13.6   HEMATOCRIT  41.8   MCV  87.3   MCH  28.4   MCHC  32.5*   RDW  48.0   PLATELETCT  271   MPV  10.1     Recent Labs      07/13/18   2100   SODIUM  139   POTASSIUM  4.4   CHLORIDE  108   CO2  26   GLUCOSE  67   BUN  17   CREATININE  1.02   CALCIUM  9.1                   Imaging  MR-BRAIN-WITH & W/O   Final Result      Stable findings of MS as described above. No evidence of new or active demyelinating lesions.      MR-CERVICAL SPINE-WITH & W/O    (Results Pending)        Assessment/Plan  * Multiple sclerosis exacerbation (HCC)- (present on admission)   Assessment & Plan    Neurology following  High-dose IV steroids, first full day of was 7/14 so at least through am 7/17.  PT/OT

## 2018-07-15 NOTE — CARE PLAN
Problem: Safety  Goal: Will remain free from falls  Outcome: PROGRESSING AS EXPECTED  Stand by assist to BR  Frequently rounding in place

## 2018-07-15 NOTE — PROGRESS NOTES
"Pt requesting to take her own home medications of Wellbutrin and Concentra. Her father brought these in and they were verified by pharmacy.  Wellbutrin in pt's med drawer and concentra in med select under \"supply position\"  "

## 2018-07-16 VITALS
OXYGEN SATURATION: 100 % | BODY MASS INDEX: 22.79 KG/M2 | DIASTOLIC BLOOD PRESSURE: 64 MMHG | SYSTOLIC BLOOD PRESSURE: 122 MMHG | WEIGHT: 153.88 LBS | HEART RATE: 76 BPM | RESPIRATION RATE: 15 BRPM | TEMPERATURE: 97.2 F | HEIGHT: 69 IN

## 2018-07-16 PROBLEM — G35 MULTIPLE SCLEROSIS EXACERBATION (HCC): Status: RESOLVED | Noted: 2018-07-14 | Resolved: 2018-07-16

## 2018-07-16 LAB
APPEARANCE UR: CLEAR
BILIRUB UR QL STRIP.AUTO: NEGATIVE
COLOR UR: YELLOW
GLUCOSE UR STRIP.AUTO-MCNC: NEGATIVE MG/DL
KETONES UR STRIP.AUTO-MCNC: NEGATIVE MG/DL
LEUKOCYTE ESTERASE UR QL STRIP.AUTO: NEGATIVE
MICRO URNS: NORMAL
NITRITE UR QL STRIP.AUTO: NEGATIVE
PH UR STRIP.AUTO: 7 [PH]
PROT UR QL STRIP: NEGATIVE MG/DL
RBC UR QL AUTO: NEGATIVE
SP GR UR STRIP.AUTO: 1.02
UROBILINOGEN UR STRIP.AUTO-MCNC: 0.2 MG/DL

## 2018-07-16 PROCEDURE — 81003 URINALYSIS AUTO W/O SCOPE: CPT

## 2018-07-16 PROCEDURE — 700105 HCHG RX REV CODE 258: Performed by: HOSPITALIST

## 2018-07-16 PROCEDURE — 97165 OT EVAL LOW COMPLEX 30 MIN: CPT

## 2018-07-16 PROCEDURE — G8987 SELF CARE CURRENT STATUS: HCPCS | Mod: CI

## 2018-07-16 PROCEDURE — 99239 HOSP IP/OBS DSCHRG MGMT >30: CPT | Performed by: HOSPITALIST

## 2018-07-16 PROCEDURE — G8988 SELF CARE GOAL STATUS: HCPCS | Mod: CI

## 2018-07-16 PROCEDURE — 700111 HCHG RX REV CODE 636 W/ 250 OVERRIDE (IP): Performed by: HOSPITALIST

## 2018-07-16 PROCEDURE — G8989 SELF CARE D/C STATUS: HCPCS | Mod: CI

## 2018-07-16 RX ADMIN — BUPROPION HYDROCHLORIDE 300 MG: 150 TABLET, EXTENDED RELEASE ORAL at 06:00

## 2018-07-16 RX ADMIN — METHYLPHENIDATE HYDROCHLORIDE 54 MG: 54 TABLET ORAL at 06:00

## 2018-07-16 RX ADMIN — SODIUM CHLORIDE 500 MG: 9 INJECTION, SOLUTION INTRAVENOUS at 05:58

## 2018-07-16 RX ADMIN — ONDANSETRON 4 MG: 2 INJECTION INTRAMUSCULAR; INTRAVENOUS at 07:46

## 2018-07-16 RX ADMIN — PROCHLORPERAZINE EDISYLATE 10 MG: 5 INJECTION INTRAMUSCULAR; INTRAVENOUS at 11:12

## 2018-07-16 ASSESSMENT — ENCOUNTER SYMPTOMS
DIZZINESS: 0
FOCAL WEAKNESS: 1
SHORTNESS OF BREATH: 0
NAUSEA: 0
ABDOMINAL PAIN: 0
PALPITATIONS: 0
COUGH: 0
BLURRED VISION: 0
MYALGIAS: 0
VOMITING: 0
WEAKNESS: 0
CONSTIPATION: 0
FEVER: 0
DIARRHEA: 0
HEADACHES: 0
CHILLS: 0

## 2018-07-16 ASSESSMENT — PAIN SCALES - GENERAL
PAINLEVEL_OUTOF10: 0

## 2018-07-16 ASSESSMENT — COGNITIVE AND FUNCTIONAL STATUS - GENERAL
DAILY ACTIVITIY SCORE: 24
SUGGESTED CMS G CODE MODIFIER DAILY ACTIVITY: CH

## 2018-07-16 ASSESSMENT — ACTIVITIES OF DAILY LIVING (ADL): TOILETING: INDEPENDENT

## 2018-07-16 NOTE — PROGRESS NOTES
"Hospital Medicine Daily Progress Note    Date of Service  7/16/2018    Chief Complaint  27 y.o. female admitted 7/13/2018 with MS flare    Hospital Course    28 yo F with MS who is admitted with left UE weakness and visual changes. She is admitted and started on steroids and tolerated them well with daily improvement. Neurology was consulted and followed the patient as well.       Interval Problem Update  7/16 - discussed with patient. She is feeling much better and has significant improvement in her strength in her left UE. Her spirits are up today. She tells me she got 5000 steps in yesterday. She is anxious to see Dr. Garrett and has an appointment with him on Wednesday.     7/15 - discussed with patient; her dad is also visiting. She is seen up and ambulating in the halls with PT. She looks reasonably good. She states her left UE weakness is better today as well, and her vision changes are resolved.     7/14 - left upper extremity weakness improving. Both legs are like \"jello.\" She feels like her vision is better. She is anxious about her school exams coming up. Questions answered.    Consultants/Specialty  Kiner, neurology    Disposition  Home when steroids done    I spent a total of 30 minutes during this clinical encounter of which > 50% was devoted to counseling and coordinating care including review of records, pertinent lab data and studies, as well as discussing diagnostic evaluation and work up, planned therapeutic interventions and future disposition of care. The assessment and plan reflect discussion of patient with consultants, other healthcare providers, family members.    Review of Systems  Review of Systems   Constitutional: Negative for chills and fever.   Eyes: Negative for blurred vision.   Respiratory: Negative for cough and shortness of breath.    Cardiovascular: Negative for chest pain and palpitations.   Gastrointestinal: Negative for abdominal pain, constipation, diarrhea, nausea and " vomiting.   Genitourinary: Negative for dysuria.   Musculoskeletal: Negative for myalgias.   Skin: Negative for itching.   Neurological: Positive for focal weakness (improved). Negative for dizziness, weakness and headaches.   All other systems reviewed and are negative.       Physical Exam  Blood Pressure: 121/61   Temperature: 36.9 °C (98.4 °F)   Pulse: 86   Respiration: 16   Pulse Oximetry: 97 %     Physical Exam   Constitutional: She is oriented to person, place, and time. She appears well-developed and well-nourished. No distress.   HENT:   Head: Normocephalic and atraumatic.   Eyes: Conjunctivae are normal. Pupils are equal, round, and reactive to light. No scleral icterus.   Neck: Normal range of motion. Neck supple.   Cardiovascular: Normal rate, regular rhythm, normal heart sounds and intact distal pulses.    No murmur heard.  Pulmonary/Chest: Effort normal and breath sounds normal. No respiratory distress. She has no rales.   Abdominal: Soft. Bowel sounds are normal. She exhibits no distension.   Musculoskeletal: She exhibits no edema.   Left UE 4+/5 strength BLE 5/5 strength, RUE 5/5   Neurological: She is alert and oriented to person, place, and time.   Skin: Skin is warm and dry.   Vitals reviewed.      Fluids    Intake/Output Summary (Last 24 hours) at 07/16/18 1427  Last data filed at 07/16/18 0800   Gross per 24 hour   Intake              150 ml   Output                0 ml   Net              150 ml       Laboratory  Recent Labs      07/13/18   2100   WBC  9.4   RBC  4.79   HEMOGLOBIN  13.6   HEMATOCRIT  41.8   MCV  87.3   MCH  28.4   MCHC  32.5*   RDW  48.0   PLATELETCT  271   MPV  10.1     Recent Labs      07/13/18   2100   SODIUM  139   POTASSIUM  4.4   CHLORIDE  108   CO2  26   GLUCOSE  67   BUN  17   CREATININE  1.02   CALCIUM  9.1                   Imaging  MR-CERVICAL SPINE-WITH & W/O   Final Result      1.  No acute abnormality or abnormal enhancement.   2.  Essentially unchanged focal marked  posterior disc protrusion at C5-6 which results in severe central canal stenosis and cord compression. There is no abnormal cord signal to suggest edema or myelomalacia.   3.  Additional stable multilevel degenerative changes of the cervical spine as described above.      MR-BRAIN-WITH & W/O   Final Result      Stable findings of MS as described above. No evidence of new or active demyelinating lesions.           Assessment/Plan  * Multiple sclerosis exacerbation (HCC)- (present on admission)   Assessment & Plan    Neurology following  High-dose IV steroids, first full day of was 7/14 so at least through am 7/17.  PT/OT -- she is also much improved  She has an appointment with Dr. Garrett on 7/18 at 1600.

## 2018-07-16 NOTE — PROGRESS NOTES
CC: MS      HPI:    Rosita Bowles is a 27 y.o. female who presents today in Neurological follow up for MS flare. Admitted on Friday for left arm weakness and blurry vision while receiving a Tysabri infusion. During the infusion she developed blurry vision and left hand discoordination. She feels her strength in the left hand is back to baseline now. She will finish 3 days of IV Solumedrol today.       She tells me that she has had similar episodes of hand weakness but in her right hand back in October. On another occasion she had left hand weakness as well. One episode occurred while she was helping a friend build something with a block of wood.     Has been on Tysabri since October 2017. She has had blurry vision in the past with Tysabri infusions.         ROS:   Constitutional: No fevers or chills.  Eyes: No blurry vision or eye pain.  ENT: No dysphagia or hearing loss.  Respiratory: No cough or shortness of breath.  Cardiovascular: No chest pain or palpitations.  GI: No nausea, vomiting, or diarrhea.  : No urinary incontinence or dysuria.  Musculoskeletal: No joint swelling or arthralgias.  Skin: No skin rashes.  Neuro: No headaches, dizziness, or tremors.  Endocrine: No heat or cold intolerance. No polydipsia or polyuria.  Psych: No depression or anxiety.  Heme/Lymph: No easy bruising or swollen lymph nodes.      Past Medical History:   Past Medical History:   Diagnosis Date   • Acne vulgaris 6/23/2017   • Multiple sclerosis (HCC) 11/2/2016   • Depression 8/7/2012   • Acne 8/7/2012   • ADHD 8/7/2012       Past Surgical History:   Past Surgical History:   Procedure Laterality Date   • ANKLE ORIF     • DENTAL EXTRACTION(S)         Social History:   Social History     Social History   • Marital status: Single     Spouse name: N/A   • Number of children: N/A   • Years of education: N/A     Occupational History   • Not on file.     Social History Main Topics   • Smoking status: Never Smoker   • Smokeless  "tobacco: Never Used   • Alcohol use 0.0 oz/week      Comment: occasional   • Drug use: No   • Sexual activity: Yes     Partners: Male     Birth control/ protection: Pill      Comment: OCP     Other Topics Concern   •  Service No   • Blood Transfusions No   • Caffeine Concern No   • Occupational Exposure No   • Hobby Hazards No   • Sleep Concern Yes   • Stress Concern No   • Weight Concern No   • Special Diet No   • Back Care No   • Exercise Yes   • Bike Helmet Yes   • Seat Belt Yes   • Self-Exams Yes     Social History Narrative    2013: BF in [place]. Attends [employer].    2014: was living in [place]. Now back in [place].     2014: working in [employer]. BF broke up with her Sept. No friends in [place].     2015: working 2 jobs. Has BF.        Family History:   Family History   Problem Relation Age of Onset   • Thyroid Mother    • Thyroid Brother    • Cancer Neg Hx    • Diabetes Neg Hx        Allergies:   Allergies   Allergen Reactions   • Nkda [No Known Drug Allergy]    • Gabapentin      \"I can't move, I can't think\"       Physical Exam:     Ambulatory Vitals  Encounter Vitals  Temperature: 36.9 °C (98.5 °F)  Temp Source: Temporal  Blood Pressure: 121/70  BP Location: Left, Upper Arm  Patient BP Position: Sitting  Pulse: 60  Respiration: 15  Pulse Oximetry: 100 %  O2 (LPM): 0  O2 Delivery: None (Room Air)  Weight: 69.8 kg (153 lb 14.1 oz)  Weight Source: Bed Scale  Reason weight was either estimated or stated: Non-weight bearing  Height: 175.3 cm (5' 9\")  BMI (Calculated): 22.72  Location: Back  Description: Burning  Breastfeeding Status: No  Constitutional: Well-developed, well-nourished, good hygiene. Appears stated age.  Respiratory: Normal respiratory effort.  Abdomen: Soft Non-tender to Palpation. Non-distended.  Extremities: No peripheral edema, pedal pulses intact.   Skin: Warm, dry, intact. No rashes observed.  Eyes: Sclera anicteric  Neurologic:   Mental Status: Awake, alert, oriented x 3.   Speech: Fluent " with normal prosody.   Memory: Able to recall recent and remote events accurately.    Concentration: Attentive. Able to focus on history and follow multi-step commands.              Fund of Knowledge: Appropriate   Cranial Nerves:    CN II: Left APD.    CN III, IV, VI: Good eye closure, EOMI.     CN V: Facial sensation intact and symmetric.     CN VII: No facial asymmetry.     CN VIII: Hearing intact.     CN IX and X: Palate elevates symmetrically. Normal gag reflex.    CN XI: Symmetric shoulder shrug.     CN XII: Tongue midline.    Sensory: Intact light touch, vibration and temperature.    Coordination: No evidence of past-pointing on finger to nose testing, no dysdiadochokinesia.    DTR's: 3+ in the knees.    Babinski: Toes downgoing bilaterally.   Movements: No tremors observed.   Musculoskeletal:    Strength: 5/5 in upper and lower extremities bilaterally.   Tone: Normal bulk and tone.   Joints: No swelling.     Labs:  Results for VIRGINIA ZHOU (MRN [id number]) as of 7/16/2018 11:56   Ref. Range 7/13/2018 21:00   WBC Latest Ref Range: 4.8 - 10.8 K/uL 9.4   RBC Latest Ref Range: 4.20 - 5.40 M/uL 4.79   Hemoglobin Latest Ref Range: 12.0 - 16.0 g/dL 13.6   Hematocrit Latest Ref Range: 37.0 - 47.0 % 41.8   MCV Latest Ref Range: 81.4 - 97.8 fL 87.3   MCH Latest Ref Range: 27.0 - 33.0 pg 28.4   MCHC Latest Ref Range: 33.6 - 35.0 g/dL 32.5 (L)   RDW Latest Ref Range: 35.9 - 50.0 fL 48.0   Platelet Count Latest Ref Range: 164 - 446 K/uL 271   MPV Latest Ref Range: 9.0 - 12.9 fL 10.1   Neutrophils-Polys Latest Ref Range: 44.00 - 72.00 % 34.20 (L)   Neutrophils (Absolute) Latest Ref Range: 2.00 - 7.15 K/uL 3.20   Lymphocytes Latest Ref Range: 22.00 - 41.00 % 52.80 (H)   Lymphs (Absolute) Latest Ref Range: 1.00 - 4.80 K/uL 4.95 (H)   Monocytes Latest Ref Range: 0.00 - 13.40 % 10.20   Monos (Absolute) Latest Ref Range: 0.00 - 0.85 K/uL 0.96 (H)   Eosinophils Latest Ref Range: 0.00 - 6.90 % 2.20   Eos (Absolute)  Latest Ref Range: 0.00 - 0.51 K/uL 0.21   Basophils Latest Ref Range: 0.00 - 1.80 % 0.40   Baso (Absolute) Latest Ref Range: 0.00 - 0.12 K/uL 0.04   Immature Granulocytes Latest Ref Range: 0.00 - 0.90 % 0.20   Immature Granulocytes (abs) Latest Ref Range: 0.00 - 0.11 K/uL 0.02   Nucleated RBC Latest Units: /100 WBC 0.20   NRBC (Absolute) Latest Units: K/uL 0.02   Sodium Latest Ref Range: 135 - 145 mmol/L 139   Potassium Latest Ref Range: 3.6 - 5.5 mmol/L 4.4   Chloride Latest Ref Range: 96 - 112 mmol/L 108   Co2 Latest Ref Range: 20 - 33 mmol/L 26   Anion Gap Latest Ref Range: 0.0 - 11.9  5.0   Glucose Latest Ref Range: 65 - 99 mg/dL 67   Bun Latest Ref Range: 8 - 22 mg/dL 17   Creatinine Latest Ref Range: 0.50 - 1.40 mg/dL 1.02   GFR If  Latest Ref Range: >60 mL/min/1.73 m 2 >60   GFR If Non  Latest Ref Range: >60 mL/min/1.73 m 2 >60   Calcium Latest Ref Range: 8.5 - 10.5 mg/dL 9.1       Imaging:     MRI brain w/wo contrast from 7/13/18  Stable findings of MS as described above. No evidence of new or active demyelinating lesions.    MRI c-spine w/o contrast from 7/13/18    1.  No acute abnormality or abnormal enhancement.  2.  Essentially unchanged focal marked posterior disc protrusion at C5-6 which results in severe central canal stenosis and cord compression. There is no abnormal cord signal to suggest edema or myelomalacia.  3.  Additional stable multilevel degenerative changes of the cervical spine as described above.         Assessment/Plan:  Multiple sclerosis exacerbation (HCC)  Neurology following  High-dose IV steroids, first full day of was 7/14 so at least through am 7/17.  PT/OT          Neuro Assessment and Plan:   26 yo F with history of left optic neuritis and multiple sclerosis who presented to the ER after developing acute left hand weakness/discoordination, difficulty concentrating and blurred vision skilled nursing through Tysabri infusion on 7/13/18. Her brain and  c-spine MRI imaging id not show any new or active demyelinating lesions. Again seen is a marked posterior disc protrusion at the C5-6 level causing severe central canal stenosis and cord compression. I discussed with the patient and her father today that I do not believe her recent symptoms are attributable to an MS relapse. She has not had a U/A to rule out UTI, but she has no fever/chills or WBC count elevations. She has already consulted with a neurosurgeon who has recommended surgical intervention. It may be possible that her disc is causing intermittent arm weakness.     Plan:  Check Urinalysis prior to discharge  Finish out 3 day course of IV Solumedrol  Follow up appointment scheduled for Wednesday with Dr. Garrett who can consider other causes of intermittent limb weakness, possibly order EMG studies.  She will follow up with her neurosurgeon  Okay to discharge home per neurology.     40 minutes spent reviewing MRI images with patient and her father, reviewing labs, discussing with treating team.     Dori Salazar D.O., M.P.H  MS specialist.   Board Certified Neurologist.  Neurology Clerkship Director, [facility].    Neurology,  [facility].   Tel: [phone]  Fax: [phone]

## 2018-07-17 ENCOUNTER — TELEPHONE (OUTPATIENT)
Dept: NEUROLOGY | Facility: MEDICAL CENTER | Age: 28
End: 2018-07-17

## 2018-07-17 NOTE — TELEPHONE ENCOUNTER
"Find out from Rosita what exactly is required in a letter that would allow her to \"drop\" her class versus simply withdraw. It sounds as if the fact that she was hospitalized when she needed to indicate whether or not she was going to drop.  "

## 2018-07-17 NOTE — DISCHARGE INSTRUCTIONS
Discharge Instructions    Discharged to home by car with relative. Discharged via wheelchair, hospital escort: Yes.  Special equipment needed: Not Applicable    Be sure to schedule a follow-up appointment with your primary care doctor or any specialists as instructed.     Discharge Plan:   Diet Plan: Discussed  Activity Level: Discussed  Confirmed Follow up Appointment: Patient to Call and Schedule Appointment  Confirmed Symptoms Management: Discussed  Medication Reconciliation Updated: Yes  Influenza Vaccine Indication: Patient Refuses    I understand that a diet low in cholesterol, fat, and sodium is recommended for good health. Unless I have been given specific instructions below for another diet, I accept this instruction as my diet prescription.   Other diet: Regular    Special Instructions: None    · Is patient discharged on Warfarin / Coumadin?   No     Depression / Suicide Risk    As you are discharged from this [facility] facility, it is important to learn how to keep safe from harming yourself.    Recognize the warning signs:  · Abrupt changes in personality, positive or negative- including increase in energy   · Giving away possessions  · Change in eating patterns- significant weight changes-  positive or negative  · Change in sleeping patterns- unable to sleep or sleeping all the time   · Unwillingness or inability to communicate  · Depression  · Unusual sadness, discouragement and loneliness  · Talk of wanting to die  · Neglect of personal appearance   · Rebelliousness- reckless behavior  · Withdrawal from people/activities they love  · Confusion- inability to concentrate     If you or a loved one observes any of these behaviors or has concerns about self-harm, here's what you can do:  · Talk about it- your feelings and reasons for harming yourself  · Remove any means that you might use to hurt yourself (examples: pills, rope, extension cords, firearm)  · Get professional help from the community  (Mental Health, Substance Abuse, psychological counseling)  · Do not be alone:Call your Safe Contact- someone whom you trust who will be there for you.  · Call your local CRISIS HOTLINE 822-8130 or 969-279-7166  · Call your local Children's Mobile Crisis Response Team [place] (531) 372-8946 or www.[employer]  · Call the toll free National Suicide Prevention Hotlines   · National Suicide Prevention Lifeline 956-116-JUSI (6923)  · [place] Hope Line Network 800-SUICIDE (741-3770)    Multiple Sclerosis  Multiple sclerosis (MS) is a disease of the central nervous system. It leads to the loss of the insulating covering of the nerves (myelin sheath) of your brain. When this happens, brain signals do not get sent properly or may not get sent at all. The age of onset of MS varies.  What are the causes?  The cause of MS is unknown. However, it is more common in the northern [place] than in the southern [place].  What increases the risk?  There is a higher number of women with MS than men. MS is not an illness that is passed down to you from your family members (inherited). However, your risk of MS is higher if you have a relative with MS.  What are the signs or symptoms?  The symptoms of MS occur in episodes or attacks. These attacks may last weeks to months. There may be long periods of almost no symptoms between attacks. The symptoms of MS vary. This is because of the many different ways it affects the central nervous system. The main symptoms of MS include:  · Vision problems and eye pain.  · Numbness.  · Weakness.  · Inability to move your arms, hands, feet, or legs (paralysis).  · Balance problems.  · Tremors.  How is this diagnosed?  Your health care provider can diagnose MS with the help of imaging exams and lab tests. These may include specialized X-ray exams and spinal fluid tests. The best imaging exam to confirm a diagnosis of MS is an MRI.  How is this treated?  There is no known cure for  MS, but there are medicines that can decrease the number and frequency of attacks. Steroids are often used for short-term relief. Physical and occupational therapy may also help. There are also many new alternative or complementary treatments available to help control the symptoms of MS. Ask your health care provider if any of these other options are right for you.  Follow these instructions at home:  · Take medicines as directed by your health care provider.  · Exercise as directed by your health care provider.  Contact a health care provider if:  You begin to feel depressed.  Get help right away if:  · You develop paralysis.  · You have problems with bladder, bowel, or sexual function.  · You develop mental changes, such as forgetfulness or mood swings.  · You have a period of uncontrolled movements (seizure).  This information is not intended to replace advice given to you by your health care provider. Make sure you discuss any questions you have with your health care provider.  Document Released: 12/15/2001 Document Revised: 05/25/2017 Document Reviewed: 08/25/2014  [employer] Interactive Patient Education © 2017 [employer]  Weakness  Weakness is a lack of strength. You may feel weak all over your body or just in one part of your body. Weakness can be serious. In some cases, you may need more medical tests.  HOME CARE  · Rest.  · Eat a well-balanced diet.  · Try to exercise every day.  · Only take medicines as told by your doctor.  GET HELP RIGHT AWAY IF:   · You cannot do your normal daily activities.  · You cannot walk up and down stairs, or you feel very tired when you do so.  · You have shortness of breath or chest pain.  · You have trouble moving parts of your body.  · You have weakness in only one body part or on only one side of the body.  · You have a fever.  · You have trouble speaking or swallowing.  · You cannot control when you pee (urinate) or poop (bowel movement).  · You have black or bloody  throw up (vomit) or poop.  · Your weakness gets worse or spreads to other body parts.  · You have new aches or pains.  MAKE SURE YOU:   · Understand these instructions.  · Will watch your condition.  · Will get help right away if you are not doing well or get worse.  This information is not intended to replace advice given to you by your health care provider. Make sure you discuss any questions you have with your health care provider.  Document Released: 11/30/2009 Document Revised: 06/18/2013 Document Reviewed: 10/07/2016  [employer] Interactive Patient Education © 2017 [employer]

## 2018-07-17 NOTE — DISCHARGE SUMMARY
Discharge Summary    CHIEF COMPLAINT ON ADMISSION  Chief Complaint   Patient presents with   • Blurred Vision   • Vertigo   • Weakness     left hand       Reason for Admission  Loss of function left hand;      Admission Date  7/13/2018    CODE STATUS  Full    HPI & HOSPITAL COURSE    26 yo F with MS who is admitted with left UE weakness and visual changes. She is admitted and started on steroids and tolerated them well with daily improvement. Neurology was consulted and followed the patient as well.  She had good improvement with steroids and after 3 days of IV solu-medrol, had return of normal function and she was anxious to discharge home.     Therefore, she is discharged in good and stable condition to home with close outpatient follow-up.    The patient met 2-midnight criteria for an inpatient stay at the time of discharge.    Discharge Date  7/16/2018        DISCHARGE DIAGNOSES  Principal Problem (Resolved):    Multiple sclerosis exacerbation (HCC) POA: Yes  Active Problems:    * No active hospital problems. *      FOLLOW UP  Future Appointments  Date Time Provider Department Center   7/18/2018 4:00 PM [facility] None   8/10/2018 10:30 AM RN 3 [facility]   8/24/2018 10:20 AM [facility] None   9/7/2018 10:30 AM RN 4 [facility]   10/4/2018 9:40 AM Manuela Green M.D. [facility] None     Manuela Green M.D.  [address] [address]  [phone]            MEDICATIONS ON DISCHARGE     Medication List      CONTINUE taking these medications      Instructions   Biotin 5000 MCG Caps   Take 5,000 mcg by mouth every day at 6 PM. supplement  Dose:  5000 mcg     buPROPion 300 MG XL tablet  Commonly known as:  WELLBUTRIN XL   Take 1 Tab by mouth every morning.  Dose:  300 mg     KYLEENA 19.5 MG Iud  Generic drug:  Levonorgestrel   1 Each by Intrauterine route See Admin Instructions. Every 5 years birth control  Dose:  1 Each     MAGNESIUM  "CHLORIDE PO   Take 1 Cap by mouth every day.  Dose:  1 Cap     melatonin 3 MG Tabs   Take 3 mg by mouth 1 time daily as needed (insomnia).  Dose:  3 mg     meloxicam 7.5 MG Tabs  Commonly known as:  MOBIC   Take 7.5 mg by mouth 1 time daily as needed for Moderate Pain.  Dose:  7.5 mg     methylphenidate 54 MG CR tablet  Commonly known as:  CONCERTA   Take 1 Tab by mouth every morning for 30 days.  Dose:  54 mg     temazepam 15 MG Caps  Commonly known as:  RESTORIL   Take 15 mg by mouth at bedtime as needed for Sleep.  Dose:  15 mg     vitamin B-12 1000 MCG Tabs   Take 1,000 mcg by mouth every day. supplement  Dose:  1000 mcg     Vitamin D3 5000 units Tabs   Take 5,000 Units by mouth every day. supplement  Dose:  5000 Units            Allergies  Allergies   Allergen Reactions   • Nkda [No Known Drug Allergy]    • Gabapentin      \"I can't move, I can't think\"       DIET  No orders of the defined types were placed in this encounter.      ACTIVITY  As tolerated.  Weight bearing as tolerated    CONSULTATIONS  [facility] North, neurology    PROCEDURES  None    LABORATORY  Lab Results   Component Value Date    SODIUM 139 07/13/2018    POTASSIUM 4.4 07/13/2018    CHLORIDE 108 07/13/2018    CO2 26 07/13/2018    GLUCOSE 67 07/13/2018    BUN 17 07/13/2018    CREATININE 1.02 07/13/2018        Lab Results   Component Value Date    WBC 9.4 07/13/2018    HEMOGLOBIN 13.6 07/13/2018    HEMATOCRIT 41.8 07/13/2018    PLATELETCT 271 07/13/2018        Total time of the discharge process exceeds 35 minutes.  "

## 2018-07-17 NOTE — TELEPHONE ENCOUNTER
"----- Message from Manas Minor, Med Ass't sent at 7/17/2018  8:09 AM PDT -----  Regarding: FW: Non-Urgent Medical Question  Contact: [phone]      ----- Message -----  From: Rosita Bowles  Sent: 7/15/2018   6:25 PM  To: Neurology Mas  Subject: Non-Urgent Medical Question                      Hi Dr. Garrett!  Hope all is well with you and that your wife has had a smooth ride to recovery! :)   I \"was\" taking anatomy and physiology 2 this summer, however due to my unexpected hospital stay, I will have to drop the class... unfortunately, the drop date was the first day of classes - [facility].  I was wondering if you could write a letter to [facility] that could possibly allow me to drop the class instead of having to withdraw (had it not been for the relapse, etc, I would have completed the class).  Would that be at all possible?  I'll be making an attempt to drop the class this week (I think I'll get out of the hospital on Tuesday, fingers crossed).  See you soon!  Best,  -Rosita Bowles  "

## 2018-07-18 ENCOUNTER — OFFICE VISIT (OUTPATIENT)
Dept: NEUROLOGY | Facility: MEDICAL CENTER | Age: 28
End: 2018-07-18
Payer: COMMERCIAL

## 2018-07-18 VITALS
TEMPERATURE: 98.3 F | HEART RATE: 138 BPM | DIASTOLIC BLOOD PRESSURE: 74 MMHG | OXYGEN SATURATION: 98 % | SYSTOLIC BLOOD PRESSURE: 102 MMHG | RESPIRATION RATE: 16 BRPM | BODY MASS INDEX: 21.48 KG/M2 | HEIGHT: 69 IN | WEIGHT: 145 LBS

## 2018-07-18 DIAGNOSIS — G35 MULTIPLE SCLEROSIS (HCC): ICD-10-CM

## 2018-07-18 DIAGNOSIS — M48.02 CERVICAL STENOSIS OF SPINAL CANAL: ICD-10-CM

## 2018-07-18 PROCEDURE — 99215 OFFICE O/P EST HI 40 MIN: CPT | Performed by: PSYCHIATRY & NEUROLOGY

## 2018-07-18 RX ORDER — ASPIRIN 81 MG/1
81 TABLET, CHEWABLE ORAL DAILY
COMMUNITY
End: 2018-08-24

## 2018-07-18 ASSESSMENT — PAIN SCALES - GENERAL: PAINLEVEL: 3=SLIGHT PAIN

## 2018-07-18 ASSESSMENT — ENCOUNTER SYMPTOMS
TINGLING: 1
BLURRED VISION: 1
FOCAL WEAKNESS: 1
CONSTIPATION: 0
LOSS OF CONSCIOUSNESS: 0
DEPRESSION: 0
MEMORY LOSS: 0

## 2018-07-19 NOTE — PROGRESS NOTES
Subjective:      Rosita Bowles is a 27 y.o. female who presents with both her parents, on an urgent basis, with a history of MS and who was recently discovered to have a very severe cervical stenosis at the C5/6 level with recurrent, bilateral, upper extremity weakness.    HPI    MS: Last seen in February of this year, Rosita had continued to do well. Her last disease relapse had occurred in 2017, she had tolerated her steroids well, she had a near full recovery, symptoms basically the residual left eye inferior altitudinal deficit and mild postural instability. There were also some cognitive issues though these were not disabling. In fact she was back at school going full time.    Cervical stenosis: Since that time, she had begun to develop episodic tingling in actual weakness with right upper extremity. This could occur seemingly independent of activity level, head or neck position, etc. She describes tingling in the lateral aspect of the right forearm and fourth and fifth fingers. On occasion the entire right hand would become weakened, she would actually drop objects if she were carrying something. The symptoms would resolve over a matter of hours with full recovery and without sequelae. These were never associated with any other focal deficits. The most recent attack occurred while she was receiving her Tysabri infusion last week, this time it was the left upper extremity, and she ended up in the emergency room, subsequently admitted with the possibility of an MS relapse being considered. She was given 5 days of IV steroids, the symptoms had resolved within a matter of hours, and have not recurred. I discussed the case with the consulting neurologist, Dr. North. I reviewed the electronic health record of the admission.    She was evaluated by my associate, Dr. Salazar, MRI imaging of the brain and cervical spine were both ordered with and without contrast, revealing no evidence of acute or active  "disease, stable burden of disease throughout, the cervical spine again revealing a stable though rather significant central and lateral foraminal narrowing at the C5/6 level with no cord signal abnormality. It was Dr. Salazar's impression that her recurrent symptoms were related to the cervical stenosis itself, not MS disease activity.    Rosita is scheduled for her surgery electively in a couple of weeks, several days prior to her next Tysabri infusion. With his most recent admission, she actually received a portion of the full Tysabri dose. In any case there is concern about healing after surgery while on Tysabri, it was Dr. Salazar's impression that these are not contraindications.    Medical, surgical and family histories are reviewed, there are no new drug allergies. She is having to drop a course that she had been enrolled in because of the recent events and hospitalization. She is concerned about recurrence is related to her disease and if a letter can be dictated indicating this.    Other than her Tysabri for her MS, she is also on Wellbutrin, Concerta, birth control, Restoril, meloxicam, rest as per the electronic health record, noncontributory from my standpoint.    Review of Systems   Constitutional: Negative for malaise/fatigue.   Eyes: Positive for blurred vision.   Gastrointestinal: Negative for constipation.   Skin: Negative for rash.   Neurological: Positive for tingling and focal weakness. Negative for loss of consciousness.   Psychiatric/Behavioral: Negative for depression and memory loss.   All other systems reviewed and are negative.       Objective:     /74   Pulse (!) 138 Comment: 88, back to 121  Temp 36.8 °C (98.3 °F)   Resp 16   Ht 1.753 m (5' 9\")   Wt 65.8 kg (145 lb)   SpO2 98%   BMI 21.41 kg/m²      Physical Exam    She appears in no acute distress. Her vital signs are stable, although her pulse does tend to bounce all over the place. There is no malar rash. The neck is supple, " range of motion is full, but neck extension with head rotation to the right elicits tingling into the right upper extremity distal to the elbow into the fourth and fifth fingers. There is no tenderness of the spinous processes or cervical paraspinal muscle bodies on either side. There is no tenderness at the elbow or wrist on the right. Cardiac evaluation is unremarkable.    Mental status exam remains intact. Cranial nerve exam again was remarkable only for the left eye inferior altitudinal visual deficit on finger counting, otherwise visual fields are full. There is no facial asymmetry or sensory loss, the tongue and uvula are midline without bulbar dysfunction. Motor exam reveals no tremor or drift, strength is intact in all 4 extremities including the intrinsic muscles of both hands. Reflexes are present at all points in the upper extremities, there are no asymmetries, and the legs as well all are present, both toes are downgoing. Repetitive movements with her hands and fingers are intact and symmetric. There is no appendicular dystaxia. She walks with normal station. Sensory exam is intact to vibration, pinprick and temperature throughout.     Assessment/Plan:     1. Multiple sclerosis (HCC)  I agree with Dr. Salazar's assessment that her episodes as described are not likely due to disease activity. The onset is too rapid, resolution as well, for this to be consistent with disease relapse. Imaging of the brain and neck ruled out significant activity as well. Her examination at this time is unchanged from when I had last seen her 6 months ago. She will continue her Tysabri infusions, I don't think there is a need to recheck her MICHELLE viral titers. There is no contraindication for surgery with impaired healing and immunosuppression occurring, Tysabri is safe in this regard. She is also safe to get her Tysabri infusion following the surgery.    2. Cervical stenosis of spinal canal  The bigger issue, she has one of these  disasters waiting to happen given the degree of narrowing and the recurrence of symptoms now bilaterally into both upper extremities. Fortunately there are no sequelae.    A letter can be dictated indicating the nature of her MS itself, chronic disease that can result in total and temporary disability lasting anywhere from 1-3 weeks due to disease relapse. We will follow up with each other otherwise in the next for 5 months. Phone contact in the interim.    Time: Evaluation 45 minutes for exam come review, discussion, and education  Discussion: As mentioned in the assessment, over 60% of the time spent face-to-face counseling and coordination care

## 2018-07-25 ENCOUNTER — HOSPITAL ENCOUNTER (OUTPATIENT)
Dept: LAB | Facility: MEDICAL CENTER | Age: 28
End: 2018-07-25
Attending: NEUROLOGICAL SURGERY
Payer: COMMERCIAL

## 2018-07-25 LAB
25(OH)D3 SERPL-MCNC: 74 NG/ML (ref 30–100)
ANION GAP SERPL CALC-SCNC: 7 MMOL/L (ref 0–11.9)
APPEARANCE UR: CLEAR
APTT PPP: 27.6 SEC (ref 24.7–36)
BASOPHILS # BLD AUTO: 0 % (ref 0–1.8)
BASOPHILS # BLD: 0 K/UL (ref 0–0.12)
BILIRUB UR QL STRIP.AUTO: NEGATIVE
BUN SERPL-MCNC: 13 MG/DL (ref 8–22)
CALCIUM SERPL-MCNC: 9.2 MG/DL (ref 8.5–10.5)
CHLORIDE SERPL-SCNC: 106 MMOL/L (ref 96–112)
CO2 SERPL-SCNC: 26 MMOL/L (ref 20–33)
COLOR UR: YELLOW
CREAT SERPL-MCNC: 1.04 MG/DL (ref 0.5–1.4)
EOSINOPHIL # BLD AUTO: 0.18 K/UL (ref 0–0.51)
EOSINOPHIL NFR BLD: 1.8 % (ref 0–6.9)
ERYTHROCYTE [DISTWIDTH] IN BLOOD BY AUTOMATED COUNT: 47.5 FL (ref 35.9–50)
GLUCOSE SERPL-MCNC: 84 MG/DL (ref 65–99)
GLUCOSE UR STRIP.AUTO-MCNC: NEGATIVE MG/DL
HCT VFR BLD AUTO: 40.6 % (ref 37–47)
HGB BLD-MCNC: 13.5 G/DL (ref 12–16)
INR PPP: 1.12 (ref 0.87–1.13)
KETONES UR STRIP.AUTO-MCNC: NEGATIVE MG/DL
LEUKOCYTE ESTERASE UR QL STRIP.AUTO: NEGATIVE
LYMPHOCYTES # BLD AUTO: 5.45 K/UL (ref 1–4.8)
LYMPHOCYTES NFR BLD: 54 % (ref 22–41)
MANUAL DIFF BLD: NORMAL
MCH RBC QN AUTO: 28.8 PG (ref 27–33)
MCHC RBC AUTO-ENTMCNC: 33.3 G/DL (ref 33.6–35)
MCV RBC AUTO: 86.6 FL (ref 81.4–97.8)
MICRO URNS: NORMAL
MONOCYTES # BLD AUTO: 0.09 K/UL (ref 0–0.85)
MONOCYTES NFR BLD AUTO: 0.9 % (ref 0–13.4)
MORPHOLOGY BLD-IMP: NORMAL
NEUTROPHILS # BLD AUTO: 4.37 K/UL (ref 2–7.15)
NEUTROPHILS NFR BLD: 43.3 % (ref 44–72)
NITRITE UR QL STRIP.AUTO: NEGATIVE
NRBC # BLD AUTO: 0.03 K/UL
NRBC BLD-RTO: 0.3 /100 WBC
PH UR STRIP.AUTO: 7.5 [PH]
PLATELET # BLD AUTO: 281 K/UL (ref 164–446)
PLATELET BLD QL SMEAR: NORMAL
PMV BLD AUTO: 10 FL (ref 9–12.9)
POTASSIUM SERPL-SCNC: 4 MMOL/L (ref 3.6–5.5)
PROT UR QL STRIP: NEGATIVE MG/DL
PROTHROMBIN TIME: 14.1 SEC (ref 12–14.6)
RBC # BLD AUTO: 4.69 M/UL (ref 4.2–5.4)
RBC BLD AUTO: PRESENT
RBC UR QL AUTO: NEGATIVE
SMUDGE CELLS BLD QL SMEAR: NORMAL
SODIUM SERPL-SCNC: 139 MMOL/L (ref 135–145)
SP GR UR STRIP.AUTO: 1.02
UROBILINOGEN UR STRIP.AUTO-MCNC: 0.2 MG/DL
WBC # BLD AUTO: 10.1 K/UL (ref 4.8–10.8)

## 2018-07-25 PROCEDURE — 85610 PROTHROMBIN TIME: CPT

## 2018-07-25 PROCEDURE — 85730 THROMBOPLASTIN TIME PARTIAL: CPT

## 2018-07-25 PROCEDURE — 36415 COLL VENOUS BLD VENIPUNCTURE: CPT

## 2018-07-25 PROCEDURE — 82306 VITAMIN D 25 HYDROXY: CPT

## 2018-07-25 PROCEDURE — 85007 BL SMEAR W/DIFF WBC COUNT: CPT

## 2018-07-25 PROCEDURE — 85027 COMPLETE CBC AUTOMATED: CPT

## 2018-07-25 PROCEDURE — 81003 URINALYSIS AUTO W/O SCOPE: CPT

## 2018-07-25 PROCEDURE — 80048 BASIC METABOLIC PNL TOTAL CA: CPT

## 2018-07-26 ENCOUNTER — TELEPHONE (OUTPATIENT)
Dept: NEUROLOGY | Facility: MEDICAL CENTER | Age: 28
End: 2018-07-26

## 2018-07-26 NOTE — TELEPHONE ENCOUNTER
"Patient called for Dr. Garrett and is wondering when the letter will be done for admissions for school? She needs a letter from neurology so she does not get a \"W\" from her class. Please advise.   "

## 2018-07-26 NOTE — LETTER
2018        Rosita Bowles  : December 3, 1990    To Whom It May Concern:    Ms. Bowles suffers from a neurologic disease that results in episodic periods of total incapacity. These episodes can last upwards of several weeks, during which time she is neither capable of attending school nor performing classwork even from home.    If there are any questions in regards to this matter, my office can be contacted.    Sincerely:        Manjeet Garrett M.D.

## 2018-07-27 ENCOUNTER — APPOINTMENT (OUTPATIENT)
Dept: PHYSICAL MEDICINE AND REHAB | Facility: MEDICAL CENTER | Age: 28
End: 2018-07-27
Payer: COMMERCIAL

## 2018-07-30 ENCOUNTER — TELEPHONE (OUTPATIENT)
Dept: PHYSICAL THERAPY | Facility: REHABILITATION | Age: 28
End: 2018-07-30

## 2018-08-10 ENCOUNTER — OUTPATIENT INFUSION SERVICES (OUTPATIENT)
Dept: ONCOLOGY | Facility: MEDICAL CENTER | Age: 28
End: 2018-08-10
Attending: PSYCHIATRY & NEUROLOGY
Payer: COMMERCIAL

## 2018-08-10 VITALS
BODY MASS INDEX: 21.42 KG/M2 | RESPIRATION RATE: 18 BRPM | HEIGHT: 69 IN | TEMPERATURE: 98.8 F | DIASTOLIC BLOOD PRESSURE: 66 MMHG | HEART RATE: 102 BPM | WEIGHT: 144.62 LBS | OXYGEN SATURATION: 97 % | SYSTOLIC BLOOD PRESSURE: 112 MMHG

## 2018-08-10 DIAGNOSIS — G35 MULTIPLE SCLEROSIS (HCC): ICD-10-CM

## 2018-08-10 PROCEDURE — 700105 HCHG RX REV CODE 258: Performed by: PSYCHIATRY & NEUROLOGY

## 2018-08-10 PROCEDURE — 700111 HCHG RX REV CODE 636 W/ 250 OVERRIDE (IP): Performed by: PSYCHIATRY & NEUROLOGY

## 2018-08-10 PROCEDURE — 96415 CHEMO IV INFUSION ADDL HR: CPT

## 2018-08-10 PROCEDURE — 96413 CHEMO IV INFUSION 1 HR: CPT

## 2018-08-10 PROCEDURE — 306780 HCHG STAT FOR TRANSFUSION PER CASE

## 2018-08-10 RX ADMIN — NATALIZUMAB 300 MG: 300 INJECTION INTRAVENOUS at 11:30

## 2018-08-10 ASSESSMENT — PAIN SCALES - GENERAL: PAINLEVEL_OUTOF10: 2

## 2018-08-10 NOTE — PROGRESS NOTES
Pt returns to infusion center for monthly Tysabri infusion.  ED visit last month resulted in a hospital stay.  Pt with imaging done at that time, per admission notes pt did not have an MS exacerbation, however pt had some nerve compression.  Pt had decompression surgery a week ago and reports that arm pain and numbness has completely resolved.  Also saw Dr. Ko in the interim.  Tysabri touch questionnaire completed.  PIV established, brisk blood return noted.  Tysabri infused over 90 minutes without incident.  Pt monitored for one hour post infusion without incident.  PIV flushed with saline per policy, removed, gauze dressing placed.  Pt left infusion center ambulatory and in good condition.  Returns in one month, appointment scheduled.

## 2018-08-14 DIAGNOSIS — M85.80 DECREASED BONE DENSITY: ICD-10-CM

## 2018-08-24 ENCOUNTER — OFFICE VISIT (OUTPATIENT)
Dept: NEUROLOGY | Facility: MEDICAL CENTER | Age: 28
End: 2018-08-24
Payer: COMMERCIAL

## 2018-08-24 VITALS
TEMPERATURE: 99.1 F | HEART RATE: 97 BPM | OXYGEN SATURATION: 100 % | RESPIRATION RATE: 15 BRPM | DIASTOLIC BLOOD PRESSURE: 58 MMHG | BODY MASS INDEX: 21.8 KG/M2 | HEIGHT: 69 IN | SYSTOLIC BLOOD PRESSURE: 100 MMHG | WEIGHT: 147.16 LBS

## 2018-08-24 DIAGNOSIS — G35 MULTIPLE SCLEROSIS (HCC): Primary | ICD-10-CM

## 2018-08-24 DIAGNOSIS — M50.123 CERVICAL DISC DISORDER AT C6-C7 LEVEL WITH RADICULOPATHY: ICD-10-CM

## 2018-08-24 PROCEDURE — 99214 OFFICE O/P EST MOD 30 MIN: CPT | Performed by: PSYCHIATRY & NEUROLOGY

## 2018-08-24 RX ORDER — CYCLOBENZAPRINE HCL 5 MG
5-10 TABLET ORAL 3 TIMES DAILY PRN
COMMUNITY
End: 2018-11-20

## 2018-08-24 RX ORDER — METHYLPHENIDATE HYDROCHLORIDE 54 MG/1
54 TABLET ORAL EVERY MORNING
COMMUNITY
End: 2018-10-04 | Stop reason: SDUPTHER

## 2018-08-24 ASSESSMENT — ENCOUNTER SYMPTOMS
TINGLING: 0
BLURRED VISION: 1
CONSTIPATION: 0
FALLS: 0
DOUBLE VISION: 0
FOCAL WEAKNESS: 0
MEMORY LOSS: 0
TREMORS: 0
NECK PAIN: 0

## 2018-08-24 ASSESSMENT — PAIN SCALES - GENERAL: PAINLEVEL: 1=MINIMAL PAIN

## 2018-08-24 NOTE — PROGRESS NOTES
"Subjective:      Rosita Bowles is a 27 y.o. female who presents with Follow-Up (MS)            HPI    ROS       Objective:     /58   Pulse 97   Temp 37.3 °C (99.1 °F)   Resp 15   Ht 1.753 m (5' 9\")   Wt 66.7 kg (147 lb 2.5 oz)   SpO2 100%   BMI 21.73 kg/m²      Physical Exam            Assessment/Plan:     There are no diagnoses linked to this encounter.      "

## 2018-08-24 NOTE — PROGRESS NOTES
Subjective:      Rosita Bowles is a 27 y.o. female who presents with her mother, as usual, for follow-up, now 2 weeks status post decompression for a right C6/7, radiculopathy, and a history of MS.    HPI    MS: Since last seen, she has done well from her disease standpoint. On Tysabri infusions, her first done 2 weeks ago following her surgery, she is scheduled again in a couple of weeks. She tolerated the infusion without issue. She has had no symptoms suggestive of disease relapse, though she had been hospitalized a couple of weeks before her surgery because of the right arm pain, imaging of the brain and neck revealing no active disease, rather the C6/7 stenosis.    She still has the field, cut with the left eye, cognition has been intact, her mood overall is improved. There is minimal fatigue. Strength in the upper extremities is now improved, there is no sensory distortion. She walks in stable fashion, there was no loss of coordination or dexterity with the hands or in the legs and feet. Bowel and bladder function are stable. She denies any constrictive sensations around the chest, etc.    Radiculopathy: Doing very well following her decompressive procedure, she has remained off anti-inflammatories, but the surgeon, Dr. Chacon, noted a possibility of pathologic bone structure when he was performing the decompressive surgery. Vitamin D level was 78, renal function and calcium levels were normal, she is having a bone densitometry done in the next 2 weeks. She is scheduled to start rehabilitation on her neck. The pain and radicular symptoms have fully resolved.    Medical, surgical and family history reviewed in electronic health record, there are no new drug allergies. There is no family history of osteopenia or osteoporosis. Other than her Tysabri infusions on monthly basis, she is still on her Wellbutrin, Concerta, Restoril, taken in the evening, melatonin, vitamin B-12, vitamin D and calcium  "supplements, she uses Flexeril as needed.    Review of Systems   Constitutional: Negative for malaise/fatigue.   Eyes: Positive for blurred vision. Negative for double vision.   Gastrointestinal: Negative for constipation.   Genitourinary: Negative for dysuria.   Musculoskeletal: Negative for falls and neck pain.   Neurological: Negative for tingling, tremors and focal weakness.   Psychiatric/Behavioral: Negative for memory loss.   All other systems reviewed and are negative.       Objective:     /58   Pulse 97   Temp 37.3 °C (99.1 °F)   Resp 15   Ht 1.753 m (5' 9\")   Wt 66.7 kg (147 lb 2.5 oz)   SpO2 100%   BMI 21.73 kg/m²      Physical Exam    She appears in no acute distress. Her vital signs are stable. There is no malar rash. The neck is supple, range of motion is not attempted because of her surgery. There is a well-healed surgical scar at the base of the neck on the left. There is no tenderness. Carotid pulses are present without asymmetry. There is no tenderness of either the upper extremities, distal pulses are intact.    Cognition is intact, PERRLA/EOMI, the inferior altitudinal deficit OS is unchanged, visual fields with the right eye to confrontation and finger counting are intact, there is no facial asymmetry, bulbar dysfunction, sensory exam is intact to temperature and light touch bilaterally, there is no dysarthria, shoulder shrug is intact.    Musculoskeletal exam reveals normal tone, there is no tremor. Strength is 5/5 in all 4 extremities. Triceps reflex on the right seems a little depressed when compared to the left, otherwise reflexes are present throughout without asymmetry. There are no pathologic reflexes.    Coordination reveals no appendicular dystaxia with any of the extremities. Heel, toe, and tandem walking are intact, she walks with normal station. Arm swing is symmetric. Repetitive movements with the hands, fingers and feet are symmetric and with normal amplitude and " frequencies bilaterally.    Sensory exam reveals no spinal cord sensory level, temperature and vibration are felt symmetrically throughout. Romberg is absent.     Assessment/Plan:     1. Multiple sclerosis (HCC)  Clinically stable, her last imaging studies done a little over one month ago revealed stable burden of disease without activity, she will continue her Tysabri. Her MICHELLE virus PCR was negative. She will continue Flexeril as needed, we will follow-up for her condition in about 6 months.    2. Cervical disc disorder at C6-C7 level with radiculopathy  Also doing well, she is following up with Dr. Chacon on a regular basis. She will continue her physical therapy. She will follow through with her bone densitometry. She is already on vitamin D and calcium supplements.    Time: 25 minutes was spent face-to-face with the patient and her mother for exam, review, discussion, and education, of this over 50% of the time spent counseling and coordinating care.

## 2018-08-28 ENCOUNTER — HOSPITAL ENCOUNTER (OUTPATIENT)
Dept: RADIOLOGY | Facility: MEDICAL CENTER | Age: 28
End: 2018-08-28
Attending: FAMILY MEDICINE
Payer: COMMERCIAL

## 2018-08-28 DIAGNOSIS — M85.80 DECREASED BONE DENSITY: ICD-10-CM

## 2018-08-28 PROCEDURE — 77080 DXA BONE DENSITY AXIAL: CPT

## 2018-09-08 ENCOUNTER — OUTPATIENT INFUSION SERVICES (OUTPATIENT)
Dept: ONCOLOGY | Facility: MEDICAL CENTER | Age: 28
End: 2018-09-08
Attending: PSYCHIATRY & NEUROLOGY
Payer: COMMERCIAL

## 2018-09-08 VITALS
RESPIRATION RATE: 18 BRPM | DIASTOLIC BLOOD PRESSURE: 69 MMHG | BODY MASS INDEX: 21.71 KG/M2 | SYSTOLIC BLOOD PRESSURE: 111 MMHG | HEIGHT: 69 IN | TEMPERATURE: 97.8 F | OXYGEN SATURATION: 99 % | HEART RATE: 106 BPM | WEIGHT: 146.61 LBS

## 2018-09-08 PROCEDURE — 96413 CHEMO IV INFUSION 1 HR: CPT

## 2018-09-08 PROCEDURE — 306780 HCHG STAT FOR TRANSFUSION PER CASE

## 2018-09-08 PROCEDURE — 700105 HCHG RX REV CODE 258: Performed by: PSYCHIATRY & NEUROLOGY

## 2018-09-08 PROCEDURE — 700111 HCHG RX REV CODE 636 W/ 250 OVERRIDE (IP): Performed by: PSYCHIATRY & NEUROLOGY

## 2018-09-08 RX ADMIN — NATALIZUMAB 300 MG: 300 INJECTION INTRAVENOUS at 14:22

## 2018-09-08 ASSESSMENT — PAIN SCALES - GENERAL: PAINLEVEL_OUTOF10: 0

## 2018-09-08 NOTE — PROGRESS NOTES
Pt ambulated into department with friend for her monthly Tysabri infusion. Pt denied having any new symptoms, or S/S of infection. TOUCH pre-infusion patient checklist completed online. Tysabri infused as ordered. Pt tolerated well, observed by RN for one hour after infusion finished. No signs or symptoms of adverse reaction observed or expressed. Next appointment on 10/5/18 at 10:30 am confirmed with Pt prior to leaving. Left department with friend appearing in good spirits and NAD.

## 2018-09-13 ENCOUNTER — HOSPITAL ENCOUNTER (OUTPATIENT)
Dept: RADIOLOGY | Facility: MEDICAL CENTER | Age: 28
End: 2018-09-13
Attending: SURGERY
Payer: COMMERCIAL

## 2018-09-13 DIAGNOSIS — M54.2 CERVICALGIA: ICD-10-CM

## 2018-09-13 PROCEDURE — 72050 X-RAY EXAM NECK SPINE 4/5VWS: CPT

## 2018-10-04 ENCOUNTER — OFFICE VISIT (OUTPATIENT)
Dept: MEDICAL GROUP | Facility: LAB | Age: 28
End: 2018-10-04
Payer: COMMERCIAL

## 2018-10-04 VITALS
HEART RATE: 92 BPM | RESPIRATION RATE: 18 BRPM | HEIGHT: 69 IN | WEIGHT: 144 LBS | TEMPERATURE: 97.2 F | BODY MASS INDEX: 21.33 KG/M2 | SYSTOLIC BLOOD PRESSURE: 106 MMHG | OXYGEN SATURATION: 98 % | DIASTOLIC BLOOD PRESSURE: 64 MMHG

## 2018-10-04 DIAGNOSIS — Z23 NEED FOR VACCINATION: ICD-10-CM

## 2018-10-04 DIAGNOSIS — Z11.3 SCREEN FOR STD (SEXUALLY TRANSMITTED DISEASE): ICD-10-CM

## 2018-10-04 DIAGNOSIS — F90.0 ATTENTION DEFICIT HYPERACTIVITY DISORDER (ADHD), PREDOMINANTLY INATTENTIVE TYPE: ICD-10-CM

## 2018-10-04 DIAGNOSIS — Z00.00 HEALTH MAINTENANCE EXAMINATION: ICD-10-CM

## 2018-10-04 DIAGNOSIS — Z98.1 STATUS POST CERVICAL SPINAL FUSION: ICD-10-CM

## 2018-10-04 PROCEDURE — 90686 IIV4 VACC NO PRSV 0.5 ML IM: CPT | Performed by: FAMILY MEDICINE

## 2018-10-04 PROCEDURE — 99395 PREV VISIT EST AGE 18-39: CPT | Mod: 25 | Performed by: FAMILY MEDICINE

## 2018-10-04 PROCEDURE — 90471 IMMUNIZATION ADMIN: CPT | Performed by: FAMILY MEDICINE

## 2018-10-04 RX ORDER — METHYLPHENIDATE HYDROCHLORIDE 54 MG/1
54 TABLET ORAL EVERY MORNING
Qty: 30 TAB | Refills: 0 | Status: SHIPPED | OUTPATIENT
Start: 2018-10-15 | End: 2018-11-14

## 2018-10-04 RX ORDER — METHYLPHENIDATE HYDROCHLORIDE 54 MG/1
54 TABLET ORAL EVERY MORNING
Qty: 30 TAB | Refills: 0 | Status: SHIPPED | OUTPATIENT
Start: 2018-12-14 | End: 2018-12-07

## 2018-10-04 RX ORDER — METHYLPHENIDATE HYDROCHLORIDE 54 MG/1
54 TABLET ORAL EVERY MORNING
Qty: 30 TAB | Refills: 0 | Status: SHIPPED | OUTPATIENT
Start: 2018-11-14 | End: 2018-12-14

## 2018-10-04 ASSESSMENT — PATIENT HEALTH QUESTIONNAIRE - PHQ9
7. TROUBLE CONCENTRATING ON THINGS, SUCH AS READING THE NEWSPAPER OR WATCHING TELEVISION: NOT AT ALL
5. POOR APPETITE OR OVEREATING: NOT AT ALL
8. MOVING OR SPEAKING SO SLOWLY THAT OTHER PEOPLE COULD HAVE NOTICED. OR THE OPPOSITE, BEING SO FIGETY OR RESTLESS THAT YOU HAVE BEEN MOVING AROUND A LOT MORE THAN USUAL: NOT AT ALL
SUM OF ALL RESPONSES TO PHQ9 QUESTIONS 1 AND 2: 0
6. FEELING BAD ABOUT YOURSELF - OR THAT YOU ARE A FAILURE OR HAVE LET YOURSELF OR YOUR FAMILY DOWN: NOT AL ALL
2. FEELING DOWN, DEPRESSED, IRRITABLE, OR HOPELESS: NOT AT ALL
9. THOUGHTS THAT YOU WOULD BE BETTER OFF DEAD, OR OF HURTING YOURSELF: NOT AT ALL
4. FEELING TIRED OR HAVING LITTLE ENERGY: SEVERAL DAYS
3. TROUBLE FALLING OR STAYING ASLEEP OR SLEEPING TOO MUCH: SEVERAL DAYS
SUM OF ALL RESPONSES TO PHQ QUESTIONS 1-9: 2
1. LITTLE INTEREST OR PLEASURE IN DOING THINGS: NOT AT ALL

## 2018-10-04 NOTE — PROGRESS NOTES
Subjective:   Rosita Bowles is a 27 y.o. female here today for   Chief Complaint   Patient presents with   • ADHD     3 month follow-up        1. Health maintenance examination  Here for her annual.  She is due for a flu shot.  Her tetanus booster and Pap smears are up-to-date.  She is exercising and eating a healthy diet.  She is wearing sunscreen  Health Maintenance Summary                IMM INFLUENZA Overdue 9/1/2018      Done 9/22/2017 Imm Admin: Influenza Vaccine Quad Inj (Pf)     Patient has more history with this topic...    PAP SMEAR Next Due 1/20/2020      Done 1/20/2017 PATHOLOGY GYN SPECIMEN      Patient has more history with this topic...    IMM DTaP/Tdap/Td Vaccine Next Due 5/31/2023      Done 5/31/2013 Imm Admin: Tdap Vaccine     Patient has more history with this topic...            2. Attention deficit hyperactivity disorder (ADHD), predominantly inattentive type  Chronic, stable on Concerta 54 mg daily. She does take this every day. She never misses a dose and she has never had any abnormal refills or requests. She states that this helps her significantly in school and at home. She got all A's last semester but is out of school this semester due to recent surgery. She would like to stay on this medication as it helps with concentration. She denies any weight loss, anorexia. Does have insomnia that comes and goes.     3. Need for vaccination  Due for flu shot    4. Status post cervical spinal fusion  New  Follows with Dr. Ines GONZALEZ  Had cervical decompression and fusion C6-C7  Had some dysphagia and pain postop  Now she is feeling well.  She recently had an x-ray which was not concerning    Current medicines (including changes today)  Current Outpatient Prescriptions   Medication Sig Dispense Refill   • [START ON 10/15/2018] methylphenidate (CONCERTA) 54 MG CR tablet Take 1 Tab by mouth every morning for 30 days. 30 Tab 0   • [START ON 11/14/2018] methylphenidate (CONCERTA) 54 MG CR  "tablet Take 1 Tab by mouth every morning for 30 days. 30 Tab 0   • [START ON 12/14/2018] methylphenidate (CONCERTA) 54 MG CR tablet Take 1 Tab by mouth every morning for 30 days. 30 Tab 0   • temazepam (RESTORIL) 15 MG Cap Take 15 mg by mouth at bedtime as needed for Sleep.     • MAGNESIUM CHLORIDE PO Take 1 Cap by mouth every day.     • Biotin 5000 MCG Cap Take 5,000 mcg by mouth every day at 6 PM. supplement     • Cyanocobalamin (VITAMIN B-12) 1000 MCG Tab Take 1,000 mcg by mouth every day. supplement     • Cholecalciferol (VITAMIN D3) 5000 units Tab Take 5,000 Units by mouth every day. supplement     • buPROPion (WELLBUTRIN XL) 300 MG XL tablet Take 1 Tab by mouth every morning. 90 Tab 3   • Levonorgestrel (KYLEENA) 19.5 MG IUD 1 Each by Intrauterine route See Admin Instructions. Every 5 years  birth control     • cyclobenzaprine (FLEXERIL) 5 MG tablet Take 5-10 mg by mouth 3 times a day as needed.     • melatonin 3 MG Tab Take 3 mg by mouth 1 time daily as needed (insomnia).       No current facility-administered medications for this visit.      She  has a past medical history of Acne (8/7/2012); Acne vulgaris (6/23/2017); ADHD (8/7/2012); Depression (8/7/2012); and Multiple sclerosis (HCC) (11/2/2016).    ROS   No fevers  No bowel changes  No LE edema       Objective:     Blood pressure 106/64, pulse 92, temperature 36.2 °C (97.2 °F), temperature source Temporal, resp. rate 18, height 1.753 m (5' 9\"), weight 65.3 kg (144 lb), SpO2 98 %, not currently breastfeeding. Body mass index is 21.27 kg/m².   Physical Exam:  Constitutional: Alert, no distress.  Skin: Warm, dry, good turgor, no rashes in visible areas.  Eye: Equal, round and reactive, conjunctiva clear, lids normal.  ENMT: Lips without lesions, good dentition, oropharynx clear.  Neck: Trachea midline, no masses, no thyromegaly. No cervical or supraclavicular lymphadenopathy.  There is a lateral surgical scar that is healing well  Respiratory: Unlabored " respiratory effort, lungs clear to auscultation, no wheezes, no ronchi.  Cardiovascular: Normal S1, S2, RRR, no murmur, no edema.  Abdomen: Soft, non-tender, no masses, no hepatosplenomegaly.  Psych: Alert and oriented x3, normal affect and mood.      Assessment and Plan:   The following treatment plan was discussed    1. Health maintenance examination  Age-appropriate counseling given, flu shot given today, other vaccinations are up-to-date, Pap smear is up-to-date, diet and exercise discussed    2. Attention deficit hyperactivity disorder (ADHD), predominantly inattentive type  Chronic, currently stable on Concerta 54 mg daily.  Discussed side effects of the medication.   checked today and is consistent.  Discussed alternatives of medication although she is tolerating this very well  - methylphenidate (CONCERTA) 54 MG CR tablet; Take 1 Tab by mouth every morning for 30 days.  Dispense: 30 Tab; Refill: 0  - methylphenidate (CONCERTA) 54 MG CR tablet; Take 1 Tab by mouth every morning for 30 days.  Dispense: 30 Tab; Refill: 0  - methylphenidate (CONCERTA) 54 MG CR tablet; Take 1 Tab by mouth every morning for 30 days.  Dispense: 30 Tab; Refill: 0    3. Need for vaccination  - Influenza Vaccine Quad Injection >3Y (PF)    4. Status post cervical spinal fusion  Chronic, stable and following closely with neurosurgery.  She is doing well postoperatively    5. Screen for STD (sexually transmitted disease)  - PANEL [id number]  - RPR  - HEPATITIS B SURFACE ANTIGEN; Future  - CHLAMYDIA/GC PCR URINE OR SWAB; Future        Followup: Return in about 3 months (around 1/4/2019) for ADHD.       This note was created using voice recognition software. I have made every reasonable attempt to correct errors, however, I do anticipate some grammatical errors.

## 2018-10-08 ENCOUNTER — TELEPHONE (OUTPATIENT)
Dept: MEDICAL GROUP | Facility: LAB | Age: 28
End: 2018-10-08

## 2018-10-08 ENCOUNTER — OUTPATIENT INFUSION SERVICES (OUTPATIENT)
Dept: ONCOLOGY | Facility: MEDICAL CENTER | Age: 28
End: 2018-10-08
Attending: PSYCHIATRY & NEUROLOGY
Payer: COMMERCIAL

## 2018-10-08 VITALS
HEIGHT: 70 IN | RESPIRATION RATE: 18 BRPM | OXYGEN SATURATION: 100 % | BODY MASS INDEX: 20.48 KG/M2 | HEART RATE: 86 BPM | DIASTOLIC BLOOD PRESSURE: 89 MMHG | TEMPERATURE: 97.4 F | WEIGHT: 143.08 LBS | SYSTOLIC BLOOD PRESSURE: 120 MMHG

## 2018-10-08 DIAGNOSIS — Z11.3 SCREEN FOR STD (SEXUALLY TRANSMITTED DISEASE): ICD-10-CM

## 2018-10-08 LAB — TREPONEMA PALLIDUM IGG+IGM AB [PRESENCE] IN SERUM OR PLASMA BY IMMUNOASSAY: NON REACTIVE

## 2018-10-08 PROCEDURE — 700111 HCHG RX REV CODE 636 W/ 250 OVERRIDE (IP): Performed by: PSYCHIATRY & NEUROLOGY

## 2018-10-08 PROCEDURE — 700105 HCHG RX REV CODE 258: Performed by: PSYCHIATRY & NEUROLOGY

## 2018-10-08 PROCEDURE — 87591 N.GONORRHOEAE DNA AMP PROB: CPT

## 2018-10-08 PROCEDURE — 86780 TREPONEMA PALLIDUM: CPT

## 2018-10-08 PROCEDURE — 306780 HCHG STAT FOR TRANSFUSION PER CASE

## 2018-10-08 PROCEDURE — 96413 CHEMO IV INFUSION 1 HR: CPT

## 2018-10-08 PROCEDURE — 87491 CHLMYD TRACH DNA AMP PROBE: CPT

## 2018-10-08 RX ADMIN — NATALIZUMAB 300 MG: 300 INJECTION INTRAVENOUS at 11:22

## 2018-10-08 ASSESSMENT — PAIN SCALES - GENERAL: PAINLEVEL_OUTOF10: 0

## 2018-10-08 NOTE — TELEPHONE ENCOUNTER
1. Caller Name: Thelma from [facility]                                          Call Back Number: [phone]      Patient approves a detailed voicemail message: yes    PANEL [id number]     Patient is here today for infusion services, they are not able to preform the PANEL [id number] at today's visit due to the way it was ordered. They are also needing to clarify what the test is for they are assuming HIV but sure.

## 2018-10-08 NOTE — PROGRESS NOTES
Pt presents for monthly Tysabri with no new complaints.  Touch checklist completed.  Pt has paper orders for labs.  PIV inserted with brisk blood return and labs drawn as ordered. Tysabri infused over one hour.  19 minutes into infusion, IV infiltrated. Pt reported burning and minimal swelling. PIV removed and re-sited.  Remaining Tysabri infusion tolerated well.  Pt observed for one hour post infusion with no sign of adverse effect.  PIV flushed and removed; gauze and coban to site.  Pt stable and ambulatory at discharge. Plan to return in four weeks for next infusion.

## 2018-10-09 NOTE — TELEPHONE ENCOUNTER
Yes it is for HIV. That is the test that is in our system. She can go to any of the [facility] labs and they should be able to draw no problem. Thank you!    Manuela Green M.D.

## 2018-10-12 ENCOUNTER — TELEPHONE (OUTPATIENT)
Dept: MEDICAL GROUP | Facility: LAB | Age: 28
End: 2018-10-12

## 2018-10-12 NOTE — TELEPHONE ENCOUNTER
I sent a [employer] message to have the patient go to any [facility] lab to do the PANEL [id number] that Dr. Green had ordered.

## 2018-10-25 ENCOUNTER — HOSPITAL ENCOUNTER (OUTPATIENT)
Dept: RADIOLOGY | Facility: MEDICAL CENTER | Age: 28
End: 2018-10-25
Attending: PHYSICIAN ASSISTANT
Payer: COMMERCIAL

## 2018-10-25 DIAGNOSIS — M54.2 CERVICALGIA: ICD-10-CM

## 2018-10-25 PROCEDURE — 72040 X-RAY EXAM NECK SPINE 2-3 VW: CPT

## 2018-10-28 DIAGNOSIS — F33.42 RECURRENT MAJOR DEPRESSIVE DISORDER, IN FULL REMISSION (HCC): ICD-10-CM

## 2018-10-29 ENCOUNTER — APPOINTMENT (OUTPATIENT)
Dept: MEDICAL GROUP | Facility: LAB | Age: 28
End: 2018-10-29
Payer: COMMERCIAL

## 2018-10-29 RX ORDER — BUPROPION HYDROCHLORIDE 300 MG/1
300 TABLET ORAL EVERY MORNING
Qty: 90 TAB | Refills: 3 | Status: SHIPPED | OUTPATIENT
Start: 2018-10-29 | End: 2019-11-18 | Stop reason: SDUPTHER

## 2018-11-07 ENCOUNTER — OUTPATIENT INFUSION SERVICES (OUTPATIENT)
Dept: ONCOLOGY | Facility: MEDICAL CENTER | Age: 28
End: 2018-11-07
Attending: PSYCHIATRY & NEUROLOGY
Payer: COMMERCIAL

## 2018-11-07 VITALS
DIASTOLIC BLOOD PRESSURE: 56 MMHG | TEMPERATURE: 97.2 F | HEART RATE: 85 BPM | BODY MASS INDEX: 21.21 KG/M2 | HEIGHT: 70 IN | WEIGHT: 148.15 LBS | OXYGEN SATURATION: 100 % | RESPIRATION RATE: 18 BRPM | SYSTOLIC BLOOD PRESSURE: 102 MMHG

## 2018-11-07 DIAGNOSIS — G35 MULTIPLE SCLEROSIS (HCC): ICD-10-CM

## 2018-11-07 DIAGNOSIS — Z11.3 SCREEN FOR STD (SEXUALLY TRANSMITTED DISEASE): ICD-10-CM

## 2018-11-07 PROCEDURE — 700111 HCHG RX REV CODE 636 W/ 250 OVERRIDE (IP): Performed by: PSYCHIATRY & NEUROLOGY

## 2018-11-07 PROCEDURE — 700105 HCHG RX REV CODE 258: Performed by: PSYCHIATRY & NEUROLOGY

## 2018-11-07 PROCEDURE — 306780 HCHG STAT FOR TRANSFUSION PER CASE

## 2018-11-07 PROCEDURE — 96413 CHEMO IV INFUSION 1 HR: CPT

## 2018-11-07 RX ADMIN — NATALIZUMAB 300 MG: 300 INJECTION INTRAVENOUS at 11:16

## 2018-11-07 ASSESSMENT — PAIN SCALES - GENERAL: PAINLEVEL_OUTOF10: 0

## 2018-11-07 NOTE — PROGRESS NOTES
Pt presents today for monthly Tysabri with no new complaints. Touch checklist completed. PIV started and positive blood return noted. Tysabri infused over 1 hour, observation of 1 hour completed. PIV flushed and d/c'd. Pt discharged home stable ambulatory with mother, will return to clinic in 4 weeks for next infusion.

## 2018-11-20 ENCOUNTER — OFFICE VISIT (OUTPATIENT)
Dept: MEDICAL GROUP | Facility: LAB | Age: 28
End: 2018-11-20
Payer: COMMERCIAL

## 2018-11-20 VITALS
OXYGEN SATURATION: 97 % | HEIGHT: 70 IN | SYSTOLIC BLOOD PRESSURE: 116 MMHG | BODY MASS INDEX: 20.99 KG/M2 | RESPIRATION RATE: 16 BRPM | HEART RATE: 108 BPM | WEIGHT: 146.61 LBS | DIASTOLIC BLOOD PRESSURE: 60 MMHG | TEMPERATURE: 98.4 F

## 2018-11-20 DIAGNOSIS — J01.90 ACUTE NON-RECURRENT SINUSITIS, UNSPECIFIED LOCATION: ICD-10-CM

## 2018-11-20 DIAGNOSIS — J40 BRONCHITIS: ICD-10-CM

## 2018-11-20 DIAGNOSIS — B00.9 HSV-1 (HERPES SIMPLEX VIRUS 1) INFECTION: ICD-10-CM

## 2018-11-20 LAB
FLUAV+FLUBV AG SPEC QL IA: NEGATIVE
INT CON NEG: NEGATIVE
INT CON POS: POSITIVE

## 2018-11-20 PROCEDURE — 87804 INFLUENZA ASSAY W/OPTIC: CPT | Performed by: FAMILY MEDICINE

## 2018-11-20 PROCEDURE — 99214 OFFICE O/P EST MOD 30 MIN: CPT | Performed by: FAMILY MEDICINE

## 2018-11-20 RX ORDER — BENZONATATE 100 MG/1
100 CAPSULE ORAL 3 TIMES DAILY PRN
Qty: 60 CAP | Refills: 0 | Status: SHIPPED | OUTPATIENT
Start: 2018-11-20 | End: 2018-12-07

## 2018-11-20 RX ORDER — AMOXICILLIN AND CLAVULANATE POTASSIUM 875; 125 MG/1; MG/1
1 TABLET, FILM COATED ORAL 2 TIMES DAILY
Qty: 14 TAB | Refills: 0 | Status: SHIPPED | OUTPATIENT
Start: 2018-11-20 | End: 2018-11-27

## 2018-11-20 RX ORDER — VALACYCLOVIR HYDROCHLORIDE 1 G/1
1000 TABLET, FILM COATED ORAL 2 TIMES DAILY
COMMUNITY
End: 2018-11-20 | Stop reason: SDUPTHER

## 2018-11-20 RX ORDER — FLUCONAZOLE 150 MG/1
150 TABLET ORAL ONCE
Qty: 1 TAB | Refills: 0 | Status: SHIPPED | OUTPATIENT
Start: 2018-11-20 | End: 2018-11-20

## 2018-11-20 RX ORDER — VALACYCLOVIR HYDROCHLORIDE 1 G/1
1000 TABLET, FILM COATED ORAL 2 TIMES DAILY PRN
Qty: 60 TAB | Refills: 5 | Status: SHIPPED | OUTPATIENT
Start: 2018-11-20 | End: 2019-03-21 | Stop reason: SDUPTHER

## 2018-11-20 RX ORDER — LORATADINE 10 MG/1
10 TABLET ORAL DAILY
COMMUNITY
End: 2019-04-02

## 2018-11-20 NOTE — PROGRESS NOTES
Subjective:   Rosita Bowles is a 27 y.o. female here today for   Chief Complaint   Patient presents with   • Sinus Problem     x 4 days; sinus pressure and pain, cough, loss of voice       1. HSV-1 (herpes simplex virus 1) infection  Chronic.  She has had outbreaks of oral herpes simplex as well as on her buttock.  She is treating with as needed Valtrex which works well for her.    2. Acute non-recurrent sinusitis, unspecified location  3. Bronchitis  New to discuss.  Patient reports 3 days of sinus pressure that is located in the forehead, behind the eyes, and the cheeks, and above the teeth.  She is having purulent nasal discharge that smells terrible.  She is also having a cough that is difficult to control and keeping her up at night.  She is having sputum production which is orange/yellow.  She has had some blood mixed with the sputum.  No fevers or chills but is feeling fatigued.  She does have sick contacts in her boyfriend.  Has MS and is on suppressive therapy  Usually gets a yeast infxn with abx       Current medicines (including changes today)  Current Outpatient Prescriptions   Medication Sig Dispense Refill   • loratadine (CLARITIN) 10 MG Tab Take 10 mg by mouth every day.     • valacyclovir (VALTREX) 1 GM Tab Take 1 Tab by mouth 2 times a day as needed (cold sores). 60 Tab 5   • fluconazole (DIFLUCAN) 150 MG tablet Take 1 Tab by mouth Once for 1 dose. 1 Tab 0   • amoxicillin-clavulanate (AUGMENTIN) 875-125 MG Tab Take 1 Tab by mouth 2 times a day for 7 days. 14 Tab 0   • benzonatate (TESSALON) 100 MG Cap Take 1 Cap by mouth 3 times a day as needed for Cough. 60 Cap 0   • methylphenidate (CONCERTA) 54 MG CR tablet Take 1 Tab by mouth every morning for 30 days. 30 Tab 0   • buPROPion (WELLBUTRIN XL) 300 MG XL tablet TAKE 1 TAB BY MOUTH EVERY MORNING. 90 Tab 3   • [START ON 12/14/2018] methylphenidate (CONCERTA) 54 MG CR tablet Take 1 Tab by mouth every morning for 30 days. 30 Tab 0   •  "temazepam (RESTORIL) 15 MG Cap Take 15 mg by mouth at bedtime as needed for Sleep.     • MAGNESIUM CHLORIDE PO Take 1 Cap by mouth every day.     • Biotin 5000 MCG Cap Take 5,000 mcg by mouth every day at 6 PM. supplement     • melatonin 3 MG Tab Take 3 mg by mouth 1 time daily as needed (insomnia).     • Cyanocobalamin (VITAMIN B-12) 1000 MCG Tab Take 1,000 mcg by mouth every day. supplement     • Cholecalciferol (VITAMIN D3) 5000 units Tab Take 5,000 Units by mouth every day. supplement     • Levonorgestrel (KYLEENA) 19.5 MG IUD 1 Each by Intrauterine route See Admin Instructions. Every 5 years  birth control       No current facility-administered medications for this visit.      She  has a past medical history of Acne (8/7/2012); Acne vulgaris (6/23/2017); ADHD (8/7/2012); Depression (8/7/2012); and Multiple sclerosis (HCC) (11/2/2016).    ROS   No fevers  No diarrhea  No shortness of breath       Objective:     Blood pressure 116/60, pulse (!) 108, temperature 36.9 °C (98.4 °F), temperature source Temporal, resp. rate 16, height 1.77 m (5' 9.69\"), weight 66.5 kg (146 lb 9.7 oz), SpO2 97 %, not currently breastfeeding. Body mass index is 21.22 kg/m².   Physical Exam:  Constitutional: Alert, no distress.  Skin: Warm, dry, good turgor, no rashes in visible areas.  Eye: Equal, round and reactive, conjunctiva clear, lids normal.  ENMT: Lips without lesions, good dentition, oropharynx clear.  Tympanic membranes pearly gray bilaterally.  Tenderness to palpation on the frontal and maxillary sinuses bilaterally  Neck: Trachea midline, no masses, no thyromegaly.  Positive cervical  lymphadenopathy bilaterally  Respiratory: Unlabored respiratory effort, lungs clear to auscultation, no wheezes, no ronchi.  Cardiovascular: Normal S1, S2, RRR, no murmur, no edema.  Abdomen: Soft, non-tender, no masses, no hepatosplenomegaly.  Psych: Alert and oriented x3, normal affect and mood.      Assessment and Plan:   The following " treatment plan was discussed    1. HSV-1 (herpes simplex virus 1) infection  This is chronic, controlled with valtrex prn  - valacyclovir (VALTREX) 1 GM Tab; Take 1 Tab by mouth 2 times a day as needed (cold sores).  Dispense: 60 Tab; Refill: 5    2. Acute non-recurrent sinusitis, unspecified location  New, uncontrolled.  Treat with Augmentin.  Natural course of the disease discussed.  Return precautions given.  Side effects of medications discussed  - POCT Influenza A/B  - amoxicillin-clavulanate (AUGMENTIN) 875-125 MG Tab; Take 1 Tab by mouth 2 times a day for 7 days.  Dispense: 14 Tab; Refill: 0  - INFLUENZA RAPID; Future    3. Bronchitis  New, uncontrolled.  Moment.  Treat with Tessalon Perles  - benzonatate (TESSALON) 100 MG Cap; Take 1 Cap by mouth 3 times a day as needed for Cough.  Dispense: 60 Cap; Refill: 0      Followup: Return if symptoms worsen or fail to improve.       This note was created using voice recognition software. I have made every reasonable attempt to correct errors, however, I do anticipate some grammatical errors.

## 2018-12-05 RX ORDER — FLUCONAZOLE 150 MG/1
150 TABLET ORAL
Qty: 3 TAB | Refills: 0 | Status: SHIPPED | OUTPATIENT
Start: 2018-12-05 | End: 2018-12-12

## 2018-12-07 ENCOUNTER — OUTPATIENT INFUSION SERVICES (OUTPATIENT)
Dept: ONCOLOGY | Facility: MEDICAL CENTER | Age: 28
End: 2018-12-07
Attending: PSYCHIATRY & NEUROLOGY
Payer: COMMERCIAL

## 2018-12-07 VITALS
DIASTOLIC BLOOD PRESSURE: 63 MMHG | HEIGHT: 70 IN | BODY MASS INDEX: 21.08 KG/M2 | RESPIRATION RATE: 18 BRPM | OXYGEN SATURATION: 100 % | TEMPERATURE: 97.2 F | WEIGHT: 147.27 LBS | SYSTOLIC BLOOD PRESSURE: 119 MMHG | HEART RATE: 105 BPM

## 2018-12-07 DIAGNOSIS — Z11.3 SCREEN FOR STD (SEXUALLY TRANSMITTED DISEASE): ICD-10-CM

## 2018-12-07 PROCEDURE — 306780 HCHG STAT FOR TRANSFUSION PER CASE

## 2018-12-07 PROCEDURE — 700111 HCHG RX REV CODE 636 W/ 250 OVERRIDE (IP): Performed by: PSYCHIATRY & NEUROLOGY

## 2018-12-07 PROCEDURE — 700105 HCHG RX REV CODE 258: Performed by: PSYCHIATRY & NEUROLOGY

## 2018-12-07 PROCEDURE — 96413 CHEMO IV INFUSION 1 HR: CPT

## 2018-12-07 RX ADMIN — NATALIZUMAB 300 MG: 300 INJECTION INTRAVENOUS at 11:43

## 2018-12-07 ASSESSMENT — PAIN SCALES - GENERAL: PAINLEVEL_OUTOF10: 0

## 2018-12-07 NOTE — PROGRESS NOTES
Pt presented to infusion center for monthly Tysabri. Denies any current s/s of infection or open wounds. TOUCH checklist completed. PIV started, brisk blood return observed, however infiltrated with saline and removed. New PIV started. Tysabri infused with no s/s of adverse reaction. 1 hour obs completed with no s/s of adverse reaction. PIV flushed and removed, gauze and coban dressing placed. Pt has next appt. Left on foot with boyfriend in good spirits.

## 2018-12-12 ENCOUNTER — PATIENT MESSAGE (OUTPATIENT)
Dept: MEDICAL GROUP | Facility: LAB | Age: 28
End: 2018-12-12

## 2018-12-12 RX ORDER — FLUCONAZOLE 150 MG/1
150 TABLET ORAL
Qty: 4 TAB | Refills: 1 | Status: SHIPPED | OUTPATIENT
Start: 2018-12-12 | End: 2018-12-28 | Stop reason: SDUPTHER

## 2018-12-19 ENCOUNTER — HOSPITAL ENCOUNTER (OUTPATIENT)
Dept: RADIOLOGY | Facility: MEDICAL CENTER | Age: 28
End: 2018-12-19
Attending: FAMILY MEDICINE
Payer: COMMERCIAL

## 2018-12-19 ENCOUNTER — PATIENT MESSAGE (OUTPATIENT)
Dept: MEDICAL GROUP | Facility: LAB | Age: 28
End: 2018-12-19

## 2018-12-19 DIAGNOSIS — M54.5 ACUTE MIDLINE LOW BACK PAIN, WITH SCIATICA PRESENCE UNSPECIFIED: ICD-10-CM

## 2018-12-19 PROCEDURE — 72100 X-RAY EXAM L-S SPINE 2/3 VWS: CPT

## 2018-12-20 ENCOUNTER — PATIENT MESSAGE (OUTPATIENT)
Dept: MEDICAL GROUP | Facility: LAB | Age: 28
End: 2018-12-20

## 2018-12-20 DIAGNOSIS — M51.36 OTHER INTERVERTEBRAL DISC DEGENERATION, LUMBAR REGION: ICD-10-CM

## 2018-12-28 ENCOUNTER — HOSPITAL ENCOUNTER (EMERGENCY)
Facility: MEDICAL CENTER | Age: 28
End: 2018-12-28
Attending: EMERGENCY MEDICINE
Payer: COMMERCIAL

## 2018-12-28 VITALS
DIASTOLIC BLOOD PRESSURE: 65 MMHG | TEMPERATURE: 97.1 F | HEIGHT: 70 IN | BODY MASS INDEX: 21.59 KG/M2 | WEIGHT: 150.79 LBS | RESPIRATION RATE: 16 BRPM | OXYGEN SATURATION: 95 % | HEART RATE: 90 BPM | SYSTOLIC BLOOD PRESSURE: 122 MMHG

## 2018-12-28 DIAGNOSIS — G35 MULTIPLE SCLEROSIS EXACERBATION (HCC): ICD-10-CM

## 2018-12-28 LAB
ALBUMIN SERPL BCP-MCNC: 4.3 G/DL (ref 3.2–4.9)
ALBUMIN/GLOB SERPL: 1.9 G/DL
ALP SERPL-CCNC: 82 U/L (ref 30–99)
ALT SERPL-CCNC: 12 U/L (ref 2–50)
ANION GAP SERPL CALC-SCNC: 7 MMOL/L (ref 0–11.9)
APPEARANCE UR: CLEAR
AST SERPL-CCNC: 18 U/L (ref 12–45)
BASOPHILS # BLD AUTO: 0.4 % (ref 0–1.8)
BASOPHILS # BLD: 0.04 K/UL (ref 0–0.12)
BILIRUB SERPL-MCNC: 0.3 MG/DL (ref 0.1–1.5)
BILIRUB UR QL STRIP.AUTO: NEGATIVE
BUN SERPL-MCNC: 19 MG/DL (ref 8–22)
CALCIUM SERPL-MCNC: 9.3 MG/DL (ref 8.5–10.5)
CHLORIDE SERPL-SCNC: 108 MMOL/L (ref 96–112)
CO2 SERPL-SCNC: 24 MMOL/L (ref 20–33)
COLOR UR: YELLOW
CREAT SERPL-MCNC: 1 MG/DL (ref 0.5–1.4)
EOSINOPHIL # BLD AUTO: 0.18 K/UL (ref 0–0.51)
EOSINOPHIL NFR BLD: 1.9 % (ref 0–6.9)
ERYTHROCYTE [DISTWIDTH] IN BLOOD BY AUTOMATED COUNT: 46.1 FL (ref 35.9–50)
GLOBULIN SER CALC-MCNC: 2.3 G/DL (ref 1.9–3.5)
GLUCOSE SERPL-MCNC: 120 MG/DL (ref 65–99)
GLUCOSE UR STRIP.AUTO-MCNC: NEGATIVE MG/DL
HCT VFR BLD AUTO: 40.1 % (ref 37–47)
HGB BLD-MCNC: 13.4 G/DL (ref 12–16)
IMM GRANULOCYTES # BLD AUTO: 0.02 K/UL (ref 0–0.11)
IMM GRANULOCYTES NFR BLD AUTO: 0.2 % (ref 0–0.9)
KETONES UR STRIP.AUTO-MCNC: NEGATIVE MG/DL
LEUKOCYTE ESTERASE UR QL STRIP.AUTO: NEGATIVE
LYMPHOCYTES # BLD AUTO: 5.21 K/UL (ref 1–4.8)
LYMPHOCYTES NFR BLD: 56.1 % (ref 22–41)
MCH RBC QN AUTO: 28.5 PG (ref 27–33)
MCHC RBC AUTO-ENTMCNC: 33.4 G/DL (ref 33.6–35)
MCV RBC AUTO: 85.3 FL (ref 81.4–97.8)
MICRO URNS: NORMAL
MONOCYTES # BLD AUTO: 0.6 K/UL (ref 0–0.85)
MONOCYTES NFR BLD AUTO: 6.5 % (ref 0–13.4)
NEUTROPHILS # BLD AUTO: 3.24 K/UL (ref 2–7.15)
NEUTROPHILS NFR BLD: 34.9 % (ref 44–72)
NITRITE UR QL STRIP.AUTO: NEGATIVE
NRBC # BLD AUTO: 0.03 K/UL
NRBC BLD-RTO: 0.3 /100 WBC
PH UR STRIP.AUTO: 5 [PH]
PLATELET # BLD AUTO: 286 K/UL (ref 164–446)
PMV BLD AUTO: 10.1 FL (ref 9–12.9)
POTASSIUM SERPL-SCNC: 3.6 MMOL/L (ref 3.6–5.5)
PROT SERPL-MCNC: 6.6 G/DL (ref 6–8.2)
PROT UR QL STRIP: NEGATIVE MG/DL
RBC # BLD AUTO: 4.7 M/UL (ref 4.2–5.4)
RBC UR QL AUTO: NEGATIVE
SODIUM SERPL-SCNC: 139 MMOL/L (ref 135–145)
SP GR UR STRIP.AUTO: 1.02
UROBILINOGEN UR STRIP.AUTO-MCNC: 0.2 MG/DL
WBC # BLD AUTO: 9.3 K/UL (ref 4.8–10.8)

## 2018-12-28 PROCEDURE — 700105 HCHG RX REV CODE 258: Performed by: EMERGENCY MEDICINE

## 2018-12-28 PROCEDURE — 96366 THER/PROPH/DIAG IV INF ADDON: CPT

## 2018-12-28 PROCEDURE — 96365 THER/PROPH/DIAG IV INF INIT: CPT

## 2018-12-28 PROCEDURE — A9270 NON-COVERED ITEM OR SERVICE: HCPCS | Performed by: EMERGENCY MEDICINE

## 2018-12-28 PROCEDURE — 81003 URINALYSIS AUTO W/O SCOPE: CPT

## 2018-12-28 PROCEDURE — 700102 HCHG RX REV CODE 250 W/ 637 OVERRIDE(OP): Performed by: EMERGENCY MEDICINE

## 2018-12-28 PROCEDURE — 85025 COMPLETE CBC W/AUTO DIFF WBC: CPT

## 2018-12-28 PROCEDURE — 99284 EMERGENCY DEPT VISIT MOD MDM: CPT

## 2018-12-28 PROCEDURE — 36415 COLL VENOUS BLD VENIPUNCTURE: CPT

## 2018-12-28 PROCEDURE — 80053 COMPREHEN METABOLIC PANEL: CPT

## 2018-12-28 PROCEDURE — 700111 HCHG RX REV CODE 636 W/ 250 OVERRIDE (IP): Performed by: EMERGENCY MEDICINE

## 2018-12-28 RX ORDER — PREDNISONE 50 MG/1
100 TABLET ORAL DAILY
Qty: 6 TAB | Refills: 0 | Status: SHIPPED | OUTPATIENT
Start: 2018-12-28 | End: 2018-12-31

## 2018-12-28 RX ORDER — METHYLPREDNISOLONE SODIUM SUCCINATE 500 MG/8ML
500 INJECTION INTRAMUSCULAR; INTRAVENOUS ONCE
Status: DISCONTINUED | OUTPATIENT
Start: 2018-12-28 | End: 2018-12-28

## 2018-12-28 RX ORDER — FLUCONAZOLE 150 MG/1
150 TABLET ORAL
Qty: 4 TAB | Refills: 1 | Status: SHIPPED | OUTPATIENT
Start: 2018-12-28 | End: 2019-01-07

## 2018-12-28 RX ORDER — OMEPRAZOLE 20 MG/1
20 CAPSULE, DELAYED RELEASE ORAL ONCE
Status: COMPLETED | OUTPATIENT
Start: 2018-12-28 | End: 2018-12-28

## 2018-12-28 RX ADMIN — SODIUM CHLORIDE 500 MG: 9 INJECTION, SOLUTION INTRAVENOUS at 17:38

## 2018-12-28 RX ADMIN — OMEPRAZOLE 20 MG: 20 CAPSULE, DELAYED RELEASE ORAL at 20:47

## 2018-12-28 RX ADMIN — SODIUM CHLORIDE 500 MG: 9 INJECTION, SOLUTION INTRAVENOUS at 20:47

## 2018-12-28 ASSESSMENT — LIFESTYLE VARIABLES: DO YOU DRINK ALCOHOL: NO

## 2018-12-28 NOTE — ED TRIAGE NOTES
Ambulatory to triage with report of  Chief Complaint   Patient presents with   • Other     paralysis of R hand after hug last night.  reports Hx of MS and this has happened to her X4 in past, usually resolved with steriod therapy   also slight decrease in ROM with wrist

## 2018-12-28 NOTE — ED PROVIDER NOTES
ED Provider Note    ER PROVIDER NOTE         CHIEF COMPLAINT  Chief Complaint   Patient presents with   • Other     paralysis of R hand after hug last night.  reports Hx of MS and this has happened to her X4 in past, usually resolved with steriod therapy       HPI  Rosita Bowles is a 28 y.o. female who presents to the emergency department complaining of weakness and paralysis to her right hand.  Patient has history of MS with occasional flares, has had 2 prior episodes of similar to her right hand.  Her symptoms began last night after a hike and gradually progressed to where she could not move the hand.  She states she has a full sensation but when she tries to move her fingers they just twitch.  She denies any other focal weakness numbness or tingling.  No headaches, no visual symptoms.  She has been in her normal state of health without fevers or chills, no cough, congestion, abdominal pain or urinary symptoms.  She has been under increased stress recently due to car trouble as well as travel with family    REVIEW OF SYSTEMS  Pertinent positives include weakness. Pertinent negatives include no fever. See HPI for details. All other systems reviewed and are negative.    PAST MEDICAL HISTORY   has a past medical history of Acne (8/7/2012); Acne vulgaris (6/23/2017); ADHD (8/7/2012); Depression (8/7/2012); and Multiple sclerosis (HCC) (11/2/2016).    SURGICAL HISTORY   has a past surgical history that includes ankle orif; dental extraction(s); and cervical disk and fusion anterior (08/07/2018).    FAMILY HISTORY  Family History   Problem Relation Age of Onset   • Thyroid Mother    • Thyroid Brother    • Cancer Neg Hx    • Diabetes Neg Hx        SOCIAL HISTORY  Social History     Social History   • Marital status: Single     Spouse name: N/A   • Number of children: N/A   • Years of education: N/A     Social History Main Topics   • Smoking status: Never Smoker   • Smokeless tobacco: Never Used   • Alcohol use  "0.0 oz/week      Comment: occasional   • Drug use: No   • Sexual activity: Yes     Partners: Male     Birth control/ protection: Pill      Comment: OCP     Other Topics Concern   •  Service No   • Blood Transfusions No   • Caffeine Concern No   • Occupational Exposure No   • Hobby Hazards No   • Sleep Concern Yes   • Stress Concern No   • Weight Concern No   • Special Diet No   • Back Care No   • Exercise Yes   • Bike Helmet Yes   • Seat Belt Yes   • Self-Exams Yes     Social History Narrative    2013: BF in [place]. Attends [employer].    2014: was living in [place]. Now back in [place].     2014: working in [employer]. BF broke up with her Sept. No friends in [employer].     2015: working 2 jobs. Has BF.       History   Drug Use No       CURRENT MEDICATIONS  Home Medications     Reviewed by Lana Mcdermott R.N. (Registered Nurse) on 12/28/18 at 1440  Med List Status: Partial   Medication Last Dose Status   Biotin 5000 MCG Cap 12/28/2018 Active   buPROPion (WELLBUTRIN XL) 300 MG XL tablet 12/28/2018 Active   Cholecalciferol (VITAMIN D3) 5000 units Tab 12/28/2018 Active   Cyanocobalamin (VITAMIN B-12) 1000 MCG Tab 12/28/2018 Active   fluconazole (DIFLUCAN) 150 MG tablet  Active   Levonorgestrel (KYLEENA) 19.5 MG IUD 12/28/2018 Active   loratadine (CLARITIN) 10 MG Tab 12/28/2018 Active   MAGNESIUM CHLORIDE PO 11/30/2018 Active   melatonin 3 MG Tab 12/7/2018 Active   temazepam (RESTORIL) 15 MG Cap 7/28/2018 Active   valacyclovir (VALTREX) 1 GM Tab 12/7/2018 Active                ALLERGIES  Allergies   Allergen Reactions   • Nkda [No Known Drug Allergy]        PHYSICAL EXAM  VITAL SIGNS: /65   Pulse 78   Temp 36.2 °C (97.1 °F) (Temporal)   Resp 16   Ht 1.765 m (5' 9.5\")   Wt 68.4 kg (150 lb 12.7 oz)   LMP 12/26/2018   SpO2 97%   BMI 21.95 kg/m²   Pulse ox interpretation: **I interpret this pulse ox as normal.    Constitutional: Alert in no apparent distress.  HENT: No signs of trauma, Bilateral external ears " normal, Nose normal.   Eyes: Pupils are equal and reactive, Conjunctiva normal, Non-icteric.   Neck: Normal range of motion, No tenderness, Supple, No stridor.   Lymphatic: No lymphadenopathy noted.   Cardiovascular: Regular rate and rhythm, no murmurs.   Thorax & Lungs: Normal breath sounds, No respiratory distress, No wheezing, No chest tenderness.   Abdomen: Bowel sounds normal, Soft, No tenderness, No masses, No pulsatile masses. No peritoneal signs.  Skin: Warm, Dry, No erythema, No rash.   Back: No bony tenderness, No CVA tenderness.   Extremities: Intact distal pulses, No edema, No tenderness, No cyanosis, Negative Nicolas's sign.  Musculoskeletal: Good range of motion in all major joints. No tenderness to palpation or major deformities noted.   Neurologic: Alert, cranial nerves intact, visual fields appear intact to confrontation, no nystagmus, speech is appropriate or not slurred, upper extremities bilaterally exhibit no drift, no dysmetria, 5 out of 5 strength with bilateral bicep/tricep, intact  on the left hand, on right hand, patient has slight movement of her fingers when asked to  although no significant strength sensation intact to light touch throughout upper extremities. Lower extremities strength 5 out of 5 thigh extension/flexion, knee extension/flexion, dorsiflexion plantar flexion. .  2+ patella reflexes.  sensation intact to light touch.   Ambulates with steady gait, steady tandem gait  Psychiatric: Affect normal, Judgment normal, Mood normal.       DIAGNOSTIC STUDIES / PROCEDURES        LABS  Labs Reviewed   CBC WITH DIFFERENTIAL - Abnormal; Notable for the following:        Result Value    MCHC 33.4 (*)     Neutrophils-Polys 34.90 (*)     Lymphocytes 56.10 (*)     Lymphs (Absolute) 5.21 (*)     All other components within normal limits   COMP METABOLIC PANEL - Abnormal; Notable for the following:     Glucose 120 (*)     All other components within normal limits   URINALYSIS   ESTIMATED  GFR       All labs reviewed by me.    RADIOLOGY  No orders to display     The radiologist's interpretation of all radiological studies have been reviewed by me.    COURSE & MEDICAL DECISION MAKING  Nursing notes, VS, PMSFHx reviewed in chart.    3:25 PM Patient seen and examined at bedside.  Page to neurology.    3:58 PM  Discussed case with Dr. Mullins.  He recommend screening labs for infection.  If she does not have an infection then bolus of 500 mg Solu-Medrol followed by 2 additional days of oral steroids    4:52 PM  Patient reevaluated, her labs are returned, discussed plan for steroids and home      7:15PM  Patient is finishing her steroids, as she is finishing, her neurologist that has seen her before Dr. North has come on.  I discussed the case with him, he agrees with IV steroid bolus, 1 g, followed by 2 additional days of oral medication and she will follow-up with Dr. Ko    Discussed with pharmacy to arrange for appropriate steroid dosing    Updated patient on all discussions and results, plan for discharge, she is quite well-appearing and very happy with this follow-up plan          Decision Making:  This is a 28 y.o. female present with right hand weakness.  Symptoms are consistent with MS exacerbation and she has had prior flare in the same area.  She has no prodromal infectious symptoms or pseudo-exacerbation findings.  Negative urinalysis, no leukocytosis.  Given the similarity to past as well as her history of disease I do not think this represents CVA or similar process.  In consultation with neurology, patient will be given bolus dose steroids and follow-up as an outpatient    The patient will return for new or worsening symptoms and is stable at the time of discharge.    The patient is referred to a primary physician for blood pressure management, diabetic screening, and for all other preventative health concerns.      DISPOSITION:  Patient will be discharged home in stable  condition.    FOLLOW UP:  Manjeet Garrett M.D.  [address] [place] [id number] [phone]    Call in 2 days        OUTPATIENT MEDICATIONS:  New Prescriptions    PREDNISONE (DELTASONE) 50 MG TAB    Take 2 Tabs by mouth every day for 3 days.         FINAL IMPRESSION  1. Multiple sclerosis exacerbation (HCC)         The note accurately reflects work and decisions made by me.  Obinna Levy  12/28/2018  9:13 PM

## 2018-12-28 NOTE — TELEPHONE ENCOUNTER
From: Rosita Bowles  Sent: 12/28/2018 12:16 PM PST  Subject: Medication Renewal Request    Rosita Bowles would like a refill of the following medications:     fluconazole (DIFLUCAN) 150 MG tablet [Manuela Green M.D.]    Preferred pharmacy: [id number] - [place], [place] - [address] [facility]    Comment:

## 2018-12-29 NOTE — ED NOTES
Discharging patient home. Verbalized understanding of discharge instructions, follow up appointments, and home care. All questions answered and all personal belongings with patient at time of discharge. Vital signs WNL. Patient given discharge information papers and discharge assessment completed.

## 2018-12-29 NOTE — CONSULTS
"  29 yo with hx of MS on tysabri in past now no DMT, right hand weakness since last night had similar episodes in past resolved with steroids.  No pain or trauma, no other complaints. Hx of cervical disc repair in past months without incident or further exacerbation. Patient well known to me saw her this summer 2018.     Exam reported right hand paresis as in past    Durable Medical Equipment-Specific Vitals  Vitals  Blood Pressure: 122/65  Temperature: 36.2 °C (97.1 °F)  Temp src: Temporal  Pulse: 78  Respiration: 16  Pulse Oximetry: 97 %  Height: 176.5 cm (5' 9.5\")  Weight: 68.4 kg (150 lb 12.7 oz)    R hand paresis, likely MS flair    R/o pseudo exacerbation/infection  IV solumedrol 1g  Steroid equivalent for 3-4 days if wants to do as otpt with Harpreet Garrett or Martin LYN as otpt; call [phone] or [phone] for aptmt  PPI and awareness of BG elevation    If need/agreeable can admit for imaging & IV steroid      Referring Provider-  Obinna Levy M.D. Agrees as above    "

## 2019-01-03 DIAGNOSIS — F90.0 ATTENTION DEFICIT HYPERACTIVITY DISORDER (ADHD), PREDOMINANTLY INATTENTIVE TYPE: ICD-10-CM

## 2019-01-03 RX ORDER — METHYLPHENIDATE HYDROCHLORIDE 54 MG/1
54 TABLET ORAL EVERY MORNING
Qty: 30 TAB | Refills: 0 | Status: CANCELLED | OUTPATIENT
Start: 2019-01-03 | End: 2019-02-02

## 2019-01-03 NOTE — TELEPHONE ENCOUNTER
----- Message from Rosita Bowles sent at 1/2/2019  8:22 PM PST -----  Regarding: Prescription Question  Contact: [phone]  Hi Dr. Roy!    I think I will need some of the methylphenidate refilled a bit early since I will be in [facility] (I leave on the 11th and get back on the 20th) - would it be possible to arrange me getting the pills accordingly?    See you next week, yay!!    -Rosita Bowles

## 2019-01-04 ENCOUNTER — OUTPATIENT INFUSION SERVICES (OUTPATIENT)
Dept: ONCOLOGY | Facility: MEDICAL CENTER | Age: 29
End: 2019-01-04
Attending: PSYCHIATRY & NEUROLOGY
Payer: COMMERCIAL

## 2019-01-04 VITALS
WEIGHT: 149.91 LBS | BODY MASS INDEX: 21.46 KG/M2 | HEIGHT: 70 IN | TEMPERATURE: 97 F | RESPIRATION RATE: 18 BRPM | SYSTOLIC BLOOD PRESSURE: 100 MMHG | HEART RATE: 99 BPM | OXYGEN SATURATION: 98 % | DIASTOLIC BLOOD PRESSURE: 63 MMHG

## 2019-01-04 DIAGNOSIS — G35 MULTIPLE SCLEROSIS (HCC): ICD-10-CM

## 2019-01-04 DIAGNOSIS — Z11.3 SCREEN FOR STD (SEXUALLY TRANSMITTED DISEASE): ICD-10-CM

## 2019-01-04 PROCEDURE — 96413 CHEMO IV INFUSION 1 HR: CPT

## 2019-01-04 PROCEDURE — 700105 HCHG RX REV CODE 258: Performed by: PSYCHIATRY & NEUROLOGY

## 2019-01-04 PROCEDURE — 700111 HCHG RX REV CODE 636 W/ 250 OVERRIDE (IP): Performed by: PSYCHIATRY & NEUROLOGY

## 2019-01-04 PROCEDURE — 306780 HCHG STAT FOR TRANSFUSION PER CASE

## 2019-01-04 RX ADMIN — NATALIZUMAB 300 MG: 300 INJECTION INTRAVENOUS at 11:20

## 2019-01-04 ASSESSMENT — PAIN SCALES - GENERAL: PAINLEVEL_OUTOF10: 0

## 2019-01-04 NOTE — PROGRESS NOTES
"Pt scheduled for Tysabri. Arrived ambulatory, independent; accompanied by boyfriend. Pt denied pain/discomfort, s/sx infection; endorsed recent worsening of MS symptoms, including bilat \"hand paralysis\" & intermittent worsening blurry vision; symptom burden also prompted recent ED visit. TOUCH program online checklist completed; call placed to Dr. Ko to report symptoms, MD aware of situation, TORB ok to treat. PIV placed to L-FA; easily flushed, brisk blood return. Infusion completed over 90 mins per pt request (c/o \"PIV burning @1hr rate\") w/o adverse events. 1-hr post-obs completed w/o adverse events. PIV flushed, brisk blood return; removed, cath intact. Treatment plan reviewed; pt verbalized knowledge of next appt; denied any unmet needs. Pt discharged ambulatory, independent, NAD.  "

## 2019-01-09 ENCOUNTER — OFFICE VISIT (OUTPATIENT)
Dept: MEDICAL GROUP | Facility: LAB | Age: 29
End: 2019-01-09
Payer: COMMERCIAL

## 2019-01-09 VITALS
BODY MASS INDEX: 22.07 KG/M2 | DIASTOLIC BLOOD PRESSURE: 50 MMHG | RESPIRATION RATE: 18 BRPM | TEMPERATURE: 97.7 F | OXYGEN SATURATION: 99 % | SYSTOLIC BLOOD PRESSURE: 92 MMHG | HEIGHT: 69 IN | WEIGHT: 149 LBS | HEART RATE: 104 BPM

## 2019-01-09 DIAGNOSIS — M54.50 LUMBAR BACK PAIN: ICD-10-CM

## 2019-01-09 DIAGNOSIS — F90.0 ATTENTION DEFICIT HYPERACTIVITY DISORDER (ADHD), PREDOMINANTLY INATTENTIVE TYPE: ICD-10-CM

## 2019-01-09 DIAGNOSIS — B07.0 PLANTAR WART: ICD-10-CM

## 2019-01-09 DIAGNOSIS — G35 MULTIPLE SCLEROSIS (HCC): ICD-10-CM

## 2019-01-09 PROCEDURE — 17110 DESTRUCTION B9 LES UP TO 14: CPT | Performed by: FAMILY MEDICINE

## 2019-01-09 PROCEDURE — 99214 OFFICE O/P EST MOD 30 MIN: CPT | Mod: 25 | Performed by: FAMILY MEDICINE

## 2019-01-09 RX ORDER — METHYLPHENIDATE HYDROCHLORIDE 54 MG/1
54 TABLET ORAL EVERY MORNING
Qty: 30 TAB | Refills: 0 | Status: SHIPPED | OUTPATIENT
Start: 2019-01-10 | End: 2019-02-09

## 2019-01-09 RX ORDER — METHYLPHENIDATE HYDROCHLORIDE 54 MG/1
54 TABLET ORAL EVERY MORNING
Qty: 30 TAB | Refills: 0 | Status: SHIPPED | OUTPATIENT
Start: 2019-03-11 | End: 2019-01-10

## 2019-01-09 RX ORDER — METHYLPHENIDATE HYDROCHLORIDE 54 MG/1
54 TABLET ORAL EVERY MORNING
Qty: 30 TAB | Refills: 0 | Status: SHIPPED | OUTPATIENT
Start: 2019-02-09 | End: 2019-01-10

## 2019-01-09 NOTE — PROGRESS NOTES
Subjective:   Rosita Bowles is a 28 y.o. female here today for   Chief Complaint   Patient presents with   • Follow-Up       1. Attention deficit hyperactivity disorder (ADHD), predominantly inattentive type  Chronic, stable on Concerta 54 mg daily. She does take this every day. She never misses a dose and she has never had any abnormal refills or requests. She states that this helps her significantly in school and at home. She got all A's last semester but is out of school this semester due to recent surgery. She would like to stay on this medication as it helps with concentration. She denies any weight loss, anorexia. Does have insomnia that comes and goes.  She is going to be going to [place] and will need an early refill this month    2. Multiple sclerosis (HCC)  Chronic.  She was recently in the hospital needing IV steroids for a flare.  She continues to get her infusions of Tysabri.  Her flare included right hand weakness and ability to use it.    3. Lumbar back pain  This is a new problem.  Patient feels lower back pain without current radiculopathy.  She was having shooting pain radiating up her pinna and down into her hip and buttock but this is now resolved.    4. Plantar wart  This is chronic.  We did do cryotherapy on this previously.  It is on the left foot.  Slightly painful when walking on it.  It did decrease in size with cryotherapy back in July and she believes that may have resolved completely but is now growing again.    Current medicines (including changes today)  Current Outpatient Prescriptions   Medication Sig Dispense Refill   • methylphenidate (CONCERTA) 54 MG CR tablet Take 1 Tab by mouth every morning for 30 days. 30 Tab 0   • predniSONE (DELTASONE) 10 MG Tab 6 tab daily for 2 days, then reduce by 1 tab every other day until off 45 Tab 0   • loratadine (CLARITIN) 10 MG Tab Take 10 mg by mouth every day.     • valacyclovir (VALTREX) 1 GM Tab Take 1 Tab by mouth 2 times a day as  "needed (cold sores). 60 Tab 5   • buPROPion (WELLBUTRIN XL) 300 MG XL tablet TAKE 1 TAB BY MOUTH EVERY MORNING. 90 Tab 3   • temazepam (RESTORIL) 15 MG Cap Take 15 mg by mouth at bedtime as needed for Sleep.     • MAGNESIUM CHLORIDE PO Take 1 Cap by mouth every day.     • Biotin 5000 MCG Cap Take 5,000 mcg by mouth every day at 6 PM. supplement     • melatonin 3 MG Tab Take 3 mg by mouth 1 time daily as needed (insomnia).     • Cyanocobalamin (VITAMIN B-12) 1000 MCG Tab Take 1,000 mcg by mouth every day. supplement     • Cholecalciferol (VITAMIN D3) 5000 units Tab Take 5,000 Units by mouth every day. supplement     • Levonorgestrel (KYLEENA) 19.5 MG IUD 1 Each by Intrauterine route See Admin Instructions. Every 5 years  birth control       No current facility-administered medications for this visit.      She  has a past medical history of Acne (8/7/2012); Acne vulgaris (6/23/2017); ADHD (8/7/2012); Depression (8/7/2012); Multiple sclerosis (HCC) (11/2/2016); and Plantar wart (1/9/2019).    ROS   No fevers  No bowel changes  No LE edema       Objective:     Blood pressure (!) 92/50, pulse (!) 104, temperature 36.5 °C (97.7 °F), temperature source Temporal, resp. rate 18, height 1.753 m (5' 9\"), weight 67.6 kg (149 lb), last menstrual period 12/26/2018, SpO2 99 %, not currently breastfeeding. Body mass index is 22 kg/m².   Physical Exam:  Constitutional: Alert, no distress.  Skin: Warm, dry, good turgor, no rashes in visible areas.  There is a small plantar wart on the left ball of the foot  Eye: Equal, round and reactive, conjunctiva clear, lids normal.  ENMT: Lips without lesions, good dentition, oropharynx clear.  Neck: Trachea midline, no masses, no thyromegaly. No cervical or supraclavicular lymphadenopathy  Respiratory: Unlabored respiratory effort, lungs clear to auscultation, no wheezes, no ronchi.  Cardiovascular: Normal S1, S2, RRR, no murmur, no edema.  Abdomen: Soft, non-tender, no masses, no " hepatosplenomegaly.  Psych: Alert and oriented x3, normal affect and mood.      Assessment and Plan:   The following treatment plan was discussed    1. Attention deficit hyperactivity disorder (ADHD), predominantly inattentive type  This is a chronic, stable problem on Concerta 54 mg daily.   checked today and is consistent.  We will need to update urine drug screen at the next visit.  3 months of refills given.  We are going to give a 2-day early refill this time because she is going to be leaving for [place] and we would like for her to have her medication before she leaves.  Discussed continued exercise  - methylphenidate (CONCERTA) 54 MG CR tablet; Take 1 Tab by mouth every morning for 30 days.  Dispense: 30 Tab; Refill: 0    2. Multiple sclerosis (HCC)  Chronic, did have a relapse recently.  Following with neurology very regularly and is on medication management.    3. Lumbar back pain  This is chronic, currently stable.  Discussed MRI if any more radiculopathy    4. Plantar wart  CRYOTHERAPY:  Discussed risks and benefits of cryotherapy. Patient verbally agreed. 3 applications of cryotherapy were applied to 1 wart lesion on left foot. Patient tolerated procedure well. Aftercare instructions given.          Followup: Return in about 3 months (around 4/9/2019) for ADHD.       This note was created using voice recognition software. I have made every reasonable attempt to correct errors, however, I do anticipate some grammatical errors.

## 2019-01-10 ENCOUNTER — OFFICE VISIT (OUTPATIENT)
Dept: NEUROLOGY | Facility: MEDICAL CENTER | Age: 29
End: 2019-01-10
Payer: COMMERCIAL

## 2019-01-10 VITALS
RESPIRATION RATE: 16 BRPM | SYSTOLIC BLOOD PRESSURE: 98 MMHG | DIASTOLIC BLOOD PRESSURE: 58 MMHG | OXYGEN SATURATION: 96 % | TEMPERATURE: 98.4 F | HEART RATE: 97 BPM | BODY MASS INDEX: 22.04 KG/M2 | WEIGHT: 148.8 LBS | HEIGHT: 69 IN

## 2019-01-10 DIAGNOSIS — G35 MULTIPLE SCLEROSIS (HCC): Primary | ICD-10-CM

## 2019-01-10 PROCEDURE — 99213 OFFICE O/P EST LOW 20 MIN: CPT | Performed by: PSYCHIATRY & NEUROLOGY

## 2019-01-10 RX ORDER — PREDNISONE 10 MG/1
TABLET ORAL
Qty: 45 TAB | Refills: 0 | Status: SHIPPED | OUTPATIENT
Start: 2019-01-10 | End: 2019-03-07

## 2019-01-10 ASSESSMENT — ENCOUNTER SYMPTOMS
TREMORS: 0
CONSTIPATION: 0
SENSORY CHANGE: 1
MEMORY LOSS: 1
DIZZINESS: 0
FOCAL WEAKNESS: 1
LOSS OF CONSCIOUSNESS: 0

## 2019-01-10 ASSESSMENT — PAIN SCALES - GENERAL: PAINLEVEL: NO PAIN

## 2019-01-11 NOTE — PROGRESS NOTES
Subjective:      Rosita Bowles is a 28 y.o. female who presents for urgent follow-up, with a history of MS, having suffered from what sounds like were to brief disease relapses, the most recent resulting in inpatient treatment.     HPI    Rosita had been doing well, still on Tysabri 300 mg IV infusions monthly, usually received in the first part of each month.  She is tolerating the infusions without issue.  For unclear reason she had 2 episodes of right upper extremity heaviness involving over about 24 hours, though it resolved quickly the first time around in November, her most recent episode in the end of December resulted in hospitalization where she received IV steroids for 2 days and then a very rapid titration at home.  During the steroid treatment, the left hand became weakened for about 1 or 2 hours though this improved.  The right upper extremity symptoms are now back at baseline.    With these episodes, she denied any preceding flulike illnesses, there were no other symptoms of cranial nerve abnormality, visual loss, Lhermitte phenomena, constrictive sensations across the chest, bilateral lower extremity paresthesias or weakness.  She still complains of a chronic cognitive slowing, she is writing things down more often, it has become quite frustrating though not dysfunctional.    Of note, she does have chronic back pain, plain spine films revealed degenerative changes only, she is pending MRI.    Medical, surgical and family histories are reviewed in the electronic health record, there are no new drug allergies.  She now has a new significant other, they are about to travel to [place].  She is on her Tysabri infusions, Concerta, Wellbutrin XL, Restoril, IUD with hormone, and the rest as per the electronic health record, noncontributory from my standpoint.    Review of Systems   Constitutional: Negative for malaise/fatigue.   Gastrointestinal: Negative for constipation.   Genitourinary: Negative  "for frequency and urgency.   Neurological: Positive for sensory change and focal weakness. Negative for dizziness, tremors and loss of consciousness.   Psychiatric/Behavioral: Positive for memory loss.   All other systems reviewed and are negative.       Objective:     BP (!) 98/58 (BP Location: Right arm, Patient Position: Sitting)   Pulse 97   Temp 36.9 °C (98.4 °F) (Temporal)   Resp 16   Ht 1.753 m (5' 9\")   Wt 67.5 kg (148 lb 12.8 oz)   LMP 12/26/2018   SpO2 96%   BMI 21.97 kg/m²      Physical Exam    She appears in no acute distress.  Vital signs are stable.  There is no malar rash.  The neck is supple, range of motion is limited because of her surgery.  Cardiac evaluation is unremarkable.    Cognition is intact, cranial nerve exam reveals PERRLA/EOMI, visual fields are grossly full bilaterally, there is no bulbar dysfunction or facial asymmetry.    Musculoskeletal exam reveals normal tone throughout, there is no tremor or drift.  Strength is 5/5 in all 4 extremities.  There are no pathologic reflexes.  Fine motor control with repetitive movements is intact and symmetric in the hands.  She walks with normal station, arm swing is symmetric.     Assessment/Plan:     1. Multiple sclerosis (HCC)  I am a little concerned given her history of disease activity and having failed several medications prior to Tysabri.  Also given her history of cervical decompression because of a radiculopathy, imaging of the brain and cervical spine both should be done.  For now we continue Tysabri, obviously.  Fortunately, her examination has not changed or progressed to any degree in terms of worsening deficits.  For her trip, I will provide a steroid pack so that she has something to take were she to have symptoms suggestive of disease relapse.  Unfortunately she does have a rough time tolerating oral prednisone.  We will call her with results of the imaging if it warrants a change in her regimen, otherwise we talk specifics " when she follows up in the next 3 months or so.    - MR-BRAIN-WITH & W/O; Future  - MR-CERVICAL SPINE-WITH & W/O; Future  - predniSONE (DELTASONE) 10 MG Tab; 6 tab daily for 2 days, then reduce by 1 tab every other day until off  Dispense: 45 Tab; Refill: 0    Time: 20 minutes spent face-to-face for exam, review, discussion, and education, of this over 70% of the time spent counseling and coordinating care surrounding all of the above issues.

## 2019-02-01 ENCOUNTER — OUTPATIENT INFUSION SERVICES (OUTPATIENT)
Dept: ONCOLOGY | Facility: MEDICAL CENTER | Age: 29
End: 2019-02-01
Attending: PSYCHIATRY & NEUROLOGY
Payer: COMMERCIAL

## 2019-02-01 VITALS — TEMPERATURE: 98 F | BODY MASS INDEX: 21.43 KG/M2 | WEIGHT: 149.69 LBS | HEIGHT: 70 IN

## 2019-02-01 DIAGNOSIS — G35 MULTIPLE SCLEROSIS (HCC): ICD-10-CM

## 2019-02-01 PROCEDURE — 306780 HCHG STAT FOR TRANSFUSION PER CASE

## 2019-02-01 PROCEDURE — 96413 CHEMO IV INFUSION 1 HR: CPT

## 2019-02-01 PROCEDURE — 700111 HCHG RX REV CODE 636 W/ 250 OVERRIDE (IP): Performed by: PSYCHIATRY & NEUROLOGY

## 2019-02-01 PROCEDURE — 700105 HCHG RX REV CODE 258: Performed by: PSYCHIATRY & NEUROLOGY

## 2019-02-01 RX ADMIN — NATALIZUMAB 300 MG: 300 INJECTION INTRAVENOUS at 11:12

## 2019-02-01 NOTE — PROGRESS NOTES
Patient arives to infusion services for monthly Tysabri.  Touch questionnaire completed and is ok for treatment today.  PIV established with some difficulty. Positive blood return noted to PIV. Patient has a fear of needles. Spouse is at chairside for support.  Tysabri infusion completed and one hour observation tolerated without complications.  PIV flushed and removed upon discharge.  Patient is scheduled for next appt.  Patient dc home in stable condition with spouse.

## 2019-02-03 ENCOUNTER — HOSPITAL ENCOUNTER (OUTPATIENT)
Dept: RADIOLOGY | Facility: MEDICAL CENTER | Age: 29
End: 2019-02-03
Attending: PSYCHIATRY & NEUROLOGY
Payer: COMMERCIAL

## 2019-02-03 ENCOUNTER — HOSPITAL ENCOUNTER (OUTPATIENT)
Dept: RADIOLOGY | Facility: MEDICAL CENTER | Age: 29
End: 2019-02-03
Attending: FAMILY MEDICINE
Payer: COMMERCIAL

## 2019-02-03 DIAGNOSIS — G35 MULTIPLE SCLEROSIS (HCC): ICD-10-CM

## 2019-02-03 DIAGNOSIS — M51.36 OTHER INTERVERTEBRAL DISC DEGENERATION, LUMBAR REGION: ICD-10-CM

## 2019-02-03 PROCEDURE — 72148 MRI LUMBAR SPINE W/O DYE: CPT

## 2019-02-03 PROCEDURE — 72156 MRI NECK SPINE W/O & W/DYE: CPT

## 2019-02-03 PROCEDURE — 700117 HCHG RX CONTRAST REV CODE 255: Performed by: PSYCHIATRY & NEUROLOGY

## 2019-02-03 PROCEDURE — 70553 MRI BRAIN STEM W/O & W/DYE: CPT

## 2019-02-03 PROCEDURE — A9585 GADOBUTROL INJECTION: HCPCS | Performed by: PSYCHIATRY & NEUROLOGY

## 2019-02-03 RX ORDER — GADOBUTROL 604.72 MG/ML
7 INJECTION INTRAVENOUS ONCE
Status: COMPLETED | OUTPATIENT
Start: 2019-02-03 | End: 2019-02-03

## 2019-02-03 RX ADMIN — GADOBUTROL 7.5 ML: 604.72 INJECTION INTRAVENOUS at 14:53

## 2019-03-01 ENCOUNTER — OUTPATIENT INFUSION SERVICES (OUTPATIENT)
Dept: ONCOLOGY | Facility: MEDICAL CENTER | Age: 29
End: 2019-03-01
Attending: PSYCHIATRY & NEUROLOGY
Payer: COMMERCIAL

## 2019-03-01 VITALS
HEIGHT: 70 IN | WEIGHT: 149.91 LBS | SYSTOLIC BLOOD PRESSURE: 109 MMHG | OXYGEN SATURATION: 99 % | RESPIRATION RATE: 18 BRPM | BODY MASS INDEX: 21.46 KG/M2 | TEMPERATURE: 98.5 F | HEART RATE: 102 BPM | DIASTOLIC BLOOD PRESSURE: 65 MMHG

## 2019-03-01 PROCEDURE — 700105 HCHG RX REV CODE 258: Performed by: PSYCHIATRY & NEUROLOGY

## 2019-03-01 PROCEDURE — 96415 CHEMO IV INFUSION ADDL HR: CPT

## 2019-03-01 PROCEDURE — 96413 CHEMO IV INFUSION 1 HR: CPT

## 2019-03-01 PROCEDURE — 306780 HCHG STAT FOR TRANSFUSION PER CASE

## 2019-03-01 PROCEDURE — 700111 HCHG RX REV CODE 636 W/ 250 OVERRIDE (IP): Performed by: PSYCHIATRY & NEUROLOGY

## 2019-03-01 RX ADMIN — NATALIZUMAB 300 MG: 300 INJECTION INTRAVENOUS at 11:59

## 2019-03-01 NOTE — PROGRESS NOTES
Arrives to infusion for monthly Tysabri. Denies any complaints. Touch online questions answered, wnl for treatment today. PIV placed with brisk blood return. Tysabri infused over 1.5 hours per patient request. No reactions. Observed for one hour with no reactions. PIV flushed and removed with tip intact. Coban and gauze over site. Discharged home to self care with next appointment confirmed and no distress.

## 2019-03-07 ENCOUNTER — OFFICE VISIT (OUTPATIENT)
Dept: NEUROLOGY | Facility: MEDICAL CENTER | Age: 29
End: 2019-03-07
Payer: COMMERCIAL

## 2019-03-07 VITALS
HEIGHT: 69 IN | BODY MASS INDEX: 22.22 KG/M2 | OXYGEN SATURATION: 97 % | TEMPERATURE: 97.4 F | WEIGHT: 150 LBS | RESPIRATION RATE: 15 BRPM | HEART RATE: 90 BPM | DIASTOLIC BLOOD PRESSURE: 70 MMHG | SYSTOLIC BLOOD PRESSURE: 108 MMHG

## 2019-03-07 DIAGNOSIS — G35 MULTIPLE SCLEROSIS (HCC): ICD-10-CM

## 2019-03-07 PROCEDURE — 99213 OFFICE O/P EST LOW 20 MIN: CPT | Performed by: PSYCHIATRY & NEUROLOGY

## 2019-03-07 ASSESSMENT — ENCOUNTER SYMPTOMS
FOCAL WEAKNESS: 0
MEMORY LOSS: 1
SPEECH CHANGE: 0
DEPRESSION: 0
SENSORY CHANGE: 0
DIZZINESS: 0
HEADACHES: 0
BLURRED VISION: 0
TREMORS: 0

## 2019-03-07 ASSESSMENT — PAIN SCALES - GENERAL: PAINLEVEL: NO PAIN

## 2019-03-07 NOTE — PROGRESS NOTES
"Subjective:      Rosita Bowles is a 28 y.o. female who presents for quick follow-up, last seen 2 months ago, with a history of MS, and who had been treated for a brief mild relapse with IV steroids.    HPI    Rosita has done well, she denies any other symptoms suggestive of disease relapse, even mild symptomatic worsening with heat exposure, elevated stress levels, etc.  She is on Tysabri infusions every month with good tolerability.  She and her significant other, Manjeet, had a good trip to [place].  She is now going full on in college, multiple AP classes including chemistry, physics, and advanced calculus.  So far straight A student, she is quite happy in this regard.  Even with this, she still notes the mental \"fog\" that can get in the way, she simply takes longer when studying, obviously getting results.    MRI imaging of the brain, cervical and lumbar spines were done 1 month ago, showing no evidence of active disease absent enhancement or diffusion weighting abnormalities, burden of disease was unchanged in the brain, mostly left hemispheric involvement, lumbar and cervical spines were unremarkable for any disease, acute or chronic, though the postoperative myelomalacia of the cervical spine was unchanged.    Symptomatically, she still has the visual impairment of the left eye, but no double vision.  Bowel and bladder function and central functions are intact, there are no sensory distortions including Lhermitte's phenomena or constrictive sensations around the chest.  Motor and coordination functions are intact.    Medical, surgical and family histories are reviewed in the electronic health record, there are no new drug allergies.  Other than her Tysabri, from my standpoint she has not required any symptomatic relief, she is on Wellbutrin, birth control, Restoril, the rest as per the electronic health record, noncontributory from my standpoint.    Review of Systems   Constitutional: Negative for " "malaise/fatigue.   Eyes: Negative for blurred vision.   Neurological: Negative for dizziness, tremors, sensory change, speech change, focal weakness and headaches.   Psychiatric/Behavioral: Positive for memory loss. Negative for depression.   All other systems reviewed and are negative.       Objective:     /70 (BP Location: Right arm, Patient Position: Sitting)   Pulse 90   Temp 36.3 °C (97.4 °F) (Temporal)   Resp 15   Ht 1.753 m (5' 9\")   Wt 68 kg (150 lb)   SpO2 97%   BMI 22.15 kg/m²    Physical Exam    She appears in no acute distress.  Vital signs are stable.  There is no malar rash or temporal tenderness.  The neck is supple, range of motion is full, slightly constrained because of her cervical fusion.  Cardiac evaluation reveals a regular rhythm.    Cognition is grossly intact, there is no evidence of focal cognitive or language deficit.  PERRLA/EOMI, visual field assessment of the left eye still shows only a superior nasal quadrantic function, and the right eye visual fields are full.  Facial movements are symmetric, tongue and uvula are midline without bulbar dysfunction, shoulder shrug and head rotation are intact and symmetric.  Sensory exam is intact to temperature bilaterally.    Musculoskeletal exam reveals normal tone without tremor, asterixis, or drift.  Strength is 5/5 throughout.  Reflexes are very brisk without obvious asymmetry from side to side, they are brisk in the legs than in the arms, there are no pathologic reflexes.  She walks with normal station, arm swing is symmetric, there is no appendicular dystaxia and fine motor control is intact in the hands and feet bilaterally without asymmetry.  Sensory exam is intact to temperature and vibration.     Assessment/Plan:     1. Multiple sclerosis (HCC)  Clinically and radiographically stable, and continuing Tysabri for now despite 2 episodes that suggested possible mild disease relapse within the last several months.  We will " follow-up in 6 months, given what is going on in school, with all of the stress involved, she feels more comfortable with frequent check in's.  I have been quite pleased with the degree of control she has been able to achieve, historically she has had great difficulty tolerating medicines or simply failing because of inefficacy.    Time: 20 minutes spent face-to-face for exam, review, discussion, and education, of this over 60% of the time spent counseling and coordinating care surrounding all of the above issues.

## 2019-03-12 DIAGNOSIS — H47.232 OPTIC CUPPING, LEFT: ICD-10-CM

## 2019-03-19 ENCOUNTER — TELEPHONE (OUTPATIENT)
Dept: MEDICAL GROUP | Facility: LAB | Age: 29
End: 2019-03-19

## 2019-03-19 NOTE — TELEPHONE ENCOUNTER
Voicemail from [facility] [phone]  Regarding referral for pt. She has an appt this Thursday they will need to reschedule is they do not her from the referral office. I did reach out to them today.

## 2019-03-21 ENCOUNTER — HOSPITAL ENCOUNTER (EMERGENCY)
Facility: MEDICAL CENTER | Age: 29
End: 2019-03-21
Attending: EMERGENCY MEDICINE
Payer: COMMERCIAL

## 2019-03-21 VITALS
DIASTOLIC BLOOD PRESSURE: 80 MMHG | TEMPERATURE: 98.3 F | WEIGHT: 149.69 LBS | RESPIRATION RATE: 18 BRPM | BODY MASS INDEX: 22.11 KG/M2 | OXYGEN SATURATION: 98 % | HEART RATE: 96 BPM | SYSTOLIC BLOOD PRESSURE: 119 MMHG

## 2019-03-21 DIAGNOSIS — B00.9 HSV-1 (HERPES SIMPLEX VIRUS 1) INFECTION: ICD-10-CM

## 2019-03-21 DIAGNOSIS — T78.40XA ALLERGIC REACTION, INITIAL ENCOUNTER: ICD-10-CM

## 2019-03-21 PROCEDURE — 700102 HCHG RX REV CODE 250 W/ 637 OVERRIDE(OP): Performed by: EMERGENCY MEDICINE

## 2019-03-21 PROCEDURE — 99284 EMERGENCY DEPT VISIT MOD MDM: CPT

## 2019-03-21 PROCEDURE — A9270 NON-COVERED ITEM OR SERVICE: HCPCS | Performed by: EMERGENCY MEDICINE

## 2019-03-21 RX ORDER — FAMOTIDINE 20 MG/1
20 TABLET, FILM COATED ORAL ONCE
Status: COMPLETED | OUTPATIENT
Start: 2019-03-21 | End: 2019-03-21

## 2019-03-21 RX ORDER — DIPHENHYDRAMINE HCL 25 MG
50 TABLET ORAL ONCE
Status: COMPLETED | OUTPATIENT
Start: 2019-03-21 | End: 2019-03-21

## 2019-03-21 RX ADMIN — FAMOTIDINE 20 MG: 20 TABLET, FILM COATED ORAL at 17:43

## 2019-03-21 RX ADMIN — DIPHENHYDRAMINE HCL 50 MG: 25 TABLET ORAL at 17:43

## 2019-03-21 NOTE — ED TRIAGE NOTES
".Rosita Bowles  .  Chief Complaint   Patient presents with   • Allergic Reaction     patient was at eye dr and 5 minutes after her eyes were diulated patient reports \"feels like tomi is trying to choke me\".      Patient ambulatory to triage with above complaint. Patient speaking in full sentences without difficulty and maintaining secretions. Airway patent. Patient appears anxious. .Blood Pressure: 118/75, Pulse: 90, Respiration: 16, Temperature: 36.8 °C (98.3 °F), Weight: 67.9 kg (149 lb 11.1 oz), Pulse Oximetry: 93 %    Patient to lobby and instructed to inform staff of any needs.  "

## 2019-03-21 NOTE — TELEPHONE ENCOUNTER
Was the patient seen in the last year in this department? Yes  1/9/19  Does patient have an active prescription for medications requested? No     Received Request Via: Pharmacy

## 2019-03-22 NOTE — ED NOTES
Discharge instructions given to pt including follow up with pcp or returning if no improvement of symptoms or to return if worse. Questions answered by RN. Denies any new complaints. Discharged w/stable vitals and able to ambulate w/steady gait. NAD. Respiration unlabored. Skin pwd. Able to speak in full sentences.

## 2019-03-22 NOTE — ED PROVIDER NOTES
"ED Provider Note    Scribed for Edmar Cardoso M.D. by Kathryn Martin. 3/21/2019, 5:28 PM.    Primary care provider: Manuela Green M.D.  Means of arrival: Private vehicle  History obtained from: Patient  History limited by: None    CHIEF COMPLAINT  Chief Complaint   Patient presents with   • Allergic Reaction     patient was at eye dr and 5 minutes after her eyes were diulated patient reports \"feels like soneone is trying to choke me\".        HPI  Rosita Bowles is a 28 y.o. female who presents to the Emergency Department with complaints of resolving shortness of breath following a mild allergic reaction two hours prior to arrival. The patient states she was at her eye doctor's office earlier today where she had a dilation performed. She notes that directly following the addition of eyedrops she began to have a sensation of throat closing, clamminess, paleness and shortness of breath, which has improved significantly upon arrival. She denies taking any medications prior to arrival. She denies any rash, vomiting, abdominal pain, or wheezing. The patient reports taking Claritin in the past, but denies having any prior known allergies. She notes she has not eaten since this morning.    REVIEW OF SYSTEMS  Pertinent positives include shortness of breath, clamminess, paleness and sensation of throat closing.  Pertinent negatives include rash, vomiting, abdominal pain, or wheezing.  See HPI for further details.    PAST MEDICAL HISTORY   has a past medical history of Acne (8/7/2012); Acne vulgaris (6/23/2017); ADHD (8/7/2012); Depression (8/7/2012); Multiple sclerosis (HCC) (11/2/2016); and Plantar wart (1/9/2019).    SURGICAL HISTORY   has a past surgical history that includes ankle orif; dental extraction(s); and cervical disk and fusion anterior (08/07/2018).    SOCIAL HISTORY  Social History   Substance Use Topics   • Smoking status: Never Smoker   • Smokeless tobacco: Never Used   • Alcohol use " 0.0 oz/week      Comment: occasional      History   Drug Use No       FAMILY HISTORY  Family History   Problem Relation Age of Onset   • Thyroid Mother    • Thyroid Brother    • Cancer Neg Hx    • Diabetes Neg Hx        CURRENT MEDICATIONS  Reviewed.  See Encounter Summary.     ALLERGIES  Allergies   Allergen Reactions   • Nkda [No Known Drug Allergy]        PHYSICAL EXAM  VITAL SIGNS: /75   Pulse 90   Temp 36.8 °C (98.3 °F) (Temporal)   Resp 16   Wt 67.9 kg (149 lb 11.1 oz)   LMP 03/18/2019   SpO2 93%   BMI 22.11 kg/m²   Constitutional: Alert in no apparent distress.  HENT: Normocephalic, Atraumatic, Bilateral external ears normal. Nose normal. posterior oropharynx is clear, no stridor, tolerating oral secretions.  Eyes: Pupils are equal and reactive. Conjunctiva normal, non-icteric.   Heart: Regular rate and rythm, no murmurs.    Abdomen: Active bowel sounds. Soft. Non-tender.  Lungs: Clear to auscultation bilaterally. No wheezing.  Skin: Warm, Dry, No erythema, No rash.   Neurologic: Alert, Grossly non-focal.   Psychiatric: Affect normal, Judgment normal, Mood normal, Appears appropriate and not intoxicated.     DIAGNOSTIC STUDIES / PROCEDURES     COURSE & MEDICAL DECISION MAKING  Nursing notes, VS, PMSFHx reviewed in chart.    5:28 PM - Patient seen and examined at bedside. Patient will be treated with Benadryl (50 mg) and Pepcid (20 mg).    6:20 PM - Patient re-evaluated at beside. Patient reports feeling improved and is comfortable going home at this time. Discussed patient's condition and treatment plan. The patient understood and is in agreement.      The patient will return for new or worsening symptoms and is stable at the time of discharge.      Decision Making:  This is a 28 y.o. year old female who presents with above complaint.  At this point it is hard to definitively say that this is an allergic reaction although her subjective story about her complaint of throat closing with potentially  be consistent with this.  I advised her to be cautious with regards to further ophthalmologic drops but she should talk to her ophthalmologist about possible alternatives or even possible pretreatment prior to her further ophthalmology exams in the future.  Patient has been very pleasant and is feeling better at this point.  She is eating and drinking with no problem and she will be discharged home with outpatient instructions for continued usage Benadryl and Pepcid as needed.  She is also understanding of return precautions.  At this point I do not believe there is any evidence or diagnostic criteria to meet that of anaphylaxis.  She was not given epinephrine nor an EpiPen for home.          DISPOSITION:  Patient will be discharged home in stable condition.    FOLLOW UP:  Manuela Green M.D.  [address] [id number]  [facility] [address]  [phone]          FINAL IMPRESSION  1. Allergic reaction, initial encounter          Kathryn GARCÍA (Scribe), am scribing for, and in the presence of, Edmar Cardoso M.D..    Electronically signed by: Kathryn Martin (Scribe), 3/21/2019    Edmar GARCÍA M.D. personally performed the services described in this documentation, as scribed by Kathryn Martin in my presence, and it is both accurate and complete. C.    The note accurately reflects work and decisions made by me.  Edmar Cardoso  3/21/2019  11:13 PM

## 2019-03-25 RX ORDER — VALACYCLOVIR HYDROCHLORIDE 1 G/1
1000 TABLET, FILM COATED ORAL 2 TIMES DAILY PRN
Qty: 180 TAB | Refills: 3 | Status: SHIPPED | OUTPATIENT
Start: 2019-03-25 | End: 2023-07-18 | Stop reason: SDUPTHER

## 2019-03-29 ENCOUNTER — OUTPATIENT INFUSION SERVICES (OUTPATIENT)
Dept: ONCOLOGY | Facility: MEDICAL CENTER | Age: 29
End: 2019-03-29
Attending: PSYCHIATRY & NEUROLOGY
Payer: COMMERCIAL

## 2019-03-29 VITALS
OXYGEN SATURATION: 100 % | HEART RATE: 93 BPM | HEIGHT: 70 IN | TEMPERATURE: 97.5 F | DIASTOLIC BLOOD PRESSURE: 65 MMHG | BODY MASS INDEX: 21.34 KG/M2 | WEIGHT: 149.03 LBS | SYSTOLIC BLOOD PRESSURE: 102 MMHG | RESPIRATION RATE: 18 BRPM

## 2019-03-29 DIAGNOSIS — G35 MULTIPLE SCLEROSIS (HCC): ICD-10-CM

## 2019-03-29 PROCEDURE — 700105 HCHG RX REV CODE 258: Performed by: PSYCHIATRY & NEUROLOGY

## 2019-03-29 PROCEDURE — 96413 CHEMO IV INFUSION 1 HR: CPT

## 2019-03-29 PROCEDURE — 700111 HCHG RX REV CODE 636 W/ 250 OVERRIDE (IP): Performed by: PSYCHIATRY & NEUROLOGY

## 2019-03-29 PROCEDURE — 306780 HCHG STAT FOR TRANSFUSION PER CASE

## 2019-03-29 RX ADMIN — NATALIZUMAB 300 MG: 300 INJECTION INTRAVENOUS at 12:13

## 2019-03-29 NOTE — PROGRESS NOTES
Arrives to infusion for monthly Tysabri. Denies any complaints. Touch online questions answered, wnl for treatment today. PIV placed with brisk blood return. Tysabri infused over 1 hour per protocol. No reactions. Observed for one hour with no reactions. PIV flushed and removed with tip intact. Coban and gauze over site. Discharged home to self care with next appointment confirmed and no distress.

## 2019-04-02 ENCOUNTER — OFFICE VISIT (OUTPATIENT)
Dept: MEDICAL GROUP | Facility: LAB | Age: 29
End: 2019-04-02
Payer: COMMERCIAL

## 2019-04-02 VITALS
DIASTOLIC BLOOD PRESSURE: 52 MMHG | HEART RATE: 105 BPM | WEIGHT: 145.2 LBS | OXYGEN SATURATION: 98 % | TEMPERATURE: 98 F | HEIGHT: 70 IN | BODY MASS INDEX: 20.79 KG/M2 | RESPIRATION RATE: 18 BRPM | SYSTOLIC BLOOD PRESSURE: 98 MMHG

## 2019-04-02 DIAGNOSIS — T78.40XS ALLERGIC REACTION, SEQUELA: ICD-10-CM

## 2019-04-02 DIAGNOSIS — B07.8 OTHER VIRAL WARTS: ICD-10-CM

## 2019-04-02 DIAGNOSIS — F90.0 ATTENTION DEFICIT HYPERACTIVITY DISORDER (ADHD), PREDOMINANTLY INATTENTIVE TYPE: ICD-10-CM

## 2019-04-02 PROCEDURE — 99214 OFFICE O/P EST MOD 30 MIN: CPT | Performed by: FAMILY MEDICINE

## 2019-04-02 RX ORDER — METHYLPHENIDATE HYDROCHLORIDE 54 MG/1
54 TABLET ORAL DAILY
Qty: 30 TAB | Refills: 0 | Status: SHIPPED | OUTPATIENT
Start: 2019-04-13 | End: 2019-05-13

## 2019-04-02 RX ORDER — METHYLPHENIDATE HYDROCHLORIDE 54 MG/1
54 TABLET ORAL EVERY MORNING
Qty: 30 TAB | Refills: 0 | Status: CANCELLED | OUTPATIENT
Start: 2019-04-02 | End: 2019-05-02

## 2019-04-02 RX ORDER — METHYLPHENIDATE HYDROCHLORIDE 54 MG/1
54 TABLET ORAL EVERY MORNING
Qty: 30 TAB | Refills: 0 | Status: SHIPPED | OUTPATIENT
Start: 2019-05-13 | End: 2019-06-12

## 2019-04-02 RX ORDER — METHYLPHENIDATE HYDROCHLORIDE 54 MG/1
54 TABLET ORAL EVERY MORNING
Qty: 30 TAB | Refills: 0 | Status: SHIPPED | OUTPATIENT
Start: 2019-06-12 | End: 2019-06-11

## 2019-04-02 RX ORDER — METHYLPHENIDATE HYDROCHLORIDE 54 MG/1
1 TABLET ORAL DAILY
Refills: 0 | COMMUNITY
Start: 2019-03-14 | End: 2019-04-02 | Stop reason: SDUPTHER

## 2019-04-03 NOTE — PROGRESS NOTES
Subjective:   Rosita Bowles is a 28 y.o. female here today for   Chief Complaint   Patient presents with   • Follow-Up     MRI results    • Medication Refill   • Allergic Reaction     to eye dialator medication        1. Attention deficit hyperactivity disorder (ADHD), predominantly inattentive type  Chronic, stable on Concerta 54 mg daily. She does take this every day. She never misses a dose and she has never had any abnormal refills or requests. She states that this helps her significantly in school and at home.  She would like to stay on this medication as it helps with concentration. She denies any weight loss, anorexia. Does have insomnia that comes and goes, but has improved recently.      2. Allergic reaction, sequela  This is a new problem, patient was at her eye doctor when they did a dilated eye exam.  Within minutes of putting in the eyedrops, her throat started to swell shut and she had shortness of breath.  She went to the ER.  At that point her allergy had improved.  No EpiPen was given.  She had been given the same eyedrops previously she believes without any.    3. Other viral warts  Chronic  Has had plantar wart  Improved but not resolved  Was expensive to get cryotherapy, not well covered   Has a new lesion on her hand    Current medicines (including changes today)  Current Outpatient Prescriptions   Medication Sig Dispense Refill   • [START ON 4/13/2019] methylphenidate (CONCERTA) 54 MG CR tablet Take 1 Tab by mouth every day for 30 days. 30 Tab 0   • [START ON 5/13/2019] methylphenidate (CONCERTA) 54 MG CR tablet Take 1 Tab by mouth every morning for 30 days. 30 Tab 0   • [START ON 6/12/2019] methylphenidate (CONCERTA) 54 MG CR tablet Take 1 Tab by mouth every morning for 30 days. 30 Tab 0   • valacyclovir (VALTREX) 1 GM Tab Take 1 Tab by mouth 2 times a day as needed (cold sores). 180 Tab 3   • buPROPion (WELLBUTRIN XL) 300 MG XL tablet TAKE 1 TAB BY MOUTH EVERY MORNING. 90 Tab 3   •  "Biotin 5000 MCG Cap Take 5,000 mcg by mouth every day at 6 PM. supplement     • Cyanocobalamin (VITAMIN B-12) 1000 MCG Tab Take 1,000 mcg by mouth every day. supplement     • Cholecalciferol (VITAMIN D3) 5000 units Tab Take 5,000 Units by mouth every day. supplement     • Levonorgestrel (KYLEENA) 19.5 MG IUD 1 Each by Intrauterine route See Admin Instructions. Every 5 years  birth control     • temazepam (RESTORIL) 15 MG Cap Take 15 mg by mouth at bedtime as needed for Sleep.     • melatonin 3 MG Tab Take 3 mg by mouth 1 time daily as needed (insomnia).       No current facility-administered medications for this visit.      She  has a past medical history of Acne (8/7/2012); Acne vulgaris (6/23/2017); ADHD (8/7/2012); Depression (8/7/2012); Multiple sclerosis (HCC) (11/2/2016); and Plantar wart (1/9/2019).    ROS   No fevers  No bowel changes  No LE edema       Objective:     Blood pressure (!) 98/52, pulse (!) 105, temperature 36.7 °C (98 °F), temperature source Temporal, resp. rate 18, height 1.775 m (5' 9.9\"), weight 65.9 kg (145 lb 3.2 oz), last menstrual period 03/18/2019, SpO2 98 %, not currently breastfeeding. Body mass index is 20.89 kg/m².  Physical Exam:  Constitutional: Alert, no distress.  Skin: Warm, dry, good turgor, there is a round nodule on the DIP joint of the third digit  Eye: Equal, round and reactive, conjunctiva clear, lids normal.  ENMT: Lips without lesions, good dentition, oropharynx clear.  Neck: Trachea midline, no masses, no thyromegaly. No cervical or supraclavicular lymphadenopathy  Respiratory: Unlabored respiratory effort, lungs clear to auscultation, no wheezes, no ronchi.  Cardiovascular: Normal S1, S2, RRR, no murmur, no edema.  Abdomen: Soft, non-tender, no masses, no hepatosplenomegaly.  Psych: Alert and oriented x3, normal affect and mood.      Assessment and Plan:   The following treatment plan was discussed    1. Attention deficit hyperactivity disorder (ADHD), predominantly " inattentive type  Chronic, stable on Concerta 54 mg daily without significant side effects other than potentially ray nods phenomenon.  Again discussed risks and benefits of the medication.   checked today and is consistent.  No early refills.  Urine drug screen and controlled substance agreement signed today.  3 months of refills given.  She will follow-up in 3 months for refills with Dr. Garcia   - methylphenidate (CONCERTA) 54 MG CR tablet; Take 1 Tab by mouth every day for 30 days.  Dispense: 30 Tab; Refill: 0  - methylphenidate (CONCERTA) 54 MG CR tablet; Take 1 Tab by mouth every morning for 30 days.  Dispense: 30 Tab; Refill: 0  - methylphenidate (CONCERTA) 54 MG CR tablet; Take 1 Tab by mouth every morning for 30 days.  Dispense: 30 Tab; Refill: 0  - Pain Management Screen; Future  - Controlled Substance Treatment Agreement    2. Allergic reaction, sequela  New, likely allergic reaction due to the dilating drops.  She knows to discuss this or any eye exam.  She has never had any other severe allergic reaction so at this point in time we will hold off on any EpiPen.  ER precautions given.    3. Other viral warts  New lesion on the hand, chronic lesion on the foot.  Cryotherapy was expensive and not well covered by her insurance and so she is going to try over-the-counter medications.      Followup: Return in about 3 months (around 7/2/2019).       This note was created using voice recognition software. I have made every reasonable attempt to correct errors, however, I do anticipate some grammatical errors.

## 2019-04-26 ENCOUNTER — OUTPATIENT INFUSION SERVICES (OUTPATIENT)
Dept: ONCOLOGY | Facility: MEDICAL CENTER | Age: 29
End: 2019-04-26
Attending: PSYCHIATRY & NEUROLOGY
Payer: COMMERCIAL

## 2019-04-26 VITALS
SYSTOLIC BLOOD PRESSURE: 103 MMHG | HEIGHT: 70 IN | TEMPERATURE: 98.7 F | WEIGHT: 147.05 LBS | BODY MASS INDEX: 21.05 KG/M2 | DIASTOLIC BLOOD PRESSURE: 59 MMHG | HEART RATE: 80 BPM | OXYGEN SATURATION: 100 % | RESPIRATION RATE: 18 BRPM

## 2019-04-26 DIAGNOSIS — G35 MULTIPLE SCLEROSIS (HCC): ICD-10-CM

## 2019-04-26 PROCEDURE — 306780 HCHG STAT FOR TRANSFUSION PER CASE

## 2019-04-26 PROCEDURE — 700105 HCHG RX REV CODE 258: Performed by: PSYCHIATRY & NEUROLOGY

## 2019-04-26 PROCEDURE — 700111 HCHG RX REV CODE 636 W/ 250 OVERRIDE (IP): Performed by: PSYCHIATRY & NEUROLOGY

## 2019-04-26 PROCEDURE — 96413 CHEMO IV INFUSION 1 HR: CPT

## 2019-04-26 RX ADMIN — NATALIZUMAB 300 MG: 300 INJECTION INTRAVENOUS at 11:10

## 2019-04-26 NOTE — PROGRESS NOTES
Patient arrived for monthly Tysabri; online TOUCH checklist completed. Ok to proceed.  PIV started, Tysabri infused per MAR.  One hour monitoring completed without issue.  PIV flushed, removed. Next appt confirmed. Discharged home in good spirits under no apparent distress.

## 2019-05-24 ENCOUNTER — OUTPATIENT INFUSION SERVICES (OUTPATIENT)
Dept: ONCOLOGY | Facility: MEDICAL CENTER | Age: 29
End: 2019-05-24
Attending: PSYCHIATRY & NEUROLOGY
Payer: COMMERCIAL

## 2019-05-24 VITALS
HEART RATE: 112 BPM | SYSTOLIC BLOOD PRESSURE: 118 MMHG | HEIGHT: 70 IN | DIASTOLIC BLOOD PRESSURE: 62 MMHG | WEIGHT: 146.39 LBS | RESPIRATION RATE: 18 BRPM | BODY MASS INDEX: 20.96 KG/M2 | OXYGEN SATURATION: 96 % | TEMPERATURE: 97.3 F

## 2019-05-24 DIAGNOSIS — G35 MULTIPLE SCLEROSIS (HCC): ICD-10-CM

## 2019-05-24 PROCEDURE — 306780 HCHG STAT FOR TRANSFUSION PER CASE

## 2019-05-24 PROCEDURE — 700105 HCHG RX REV CODE 258: Performed by: PSYCHIATRY & NEUROLOGY

## 2019-05-24 PROCEDURE — 700111 HCHG RX REV CODE 636 W/ 250 OVERRIDE (IP): Performed by: PSYCHIATRY & NEUROLOGY

## 2019-05-24 PROCEDURE — 96365 THER/PROPH/DIAG IV INF INIT: CPT

## 2019-05-24 RX ADMIN — NATALIZUMAB 300 MG: 300 INJECTION INTRAVENOUS at 10:46

## 2019-05-24 NOTE — PROGRESS NOTES
Pt presented to infusion center for monthly Tysabri. Denies any current s/s of infection or open wounds. TOUCH checklist completed. PIV started, brisk blood return observed. Tysabri infused with no s/s of adverse reaction. 1 hour obs completed with no s/s of adverse reaction. PIV flushed and removed, gauze and coban dressing placed. Next appts made for pt, she has MyChart. Left on foot with boyfriend in good spirits.

## 2019-05-28 RX ORDER — FLUCONAZOLE 150 MG/1
150 TABLET ORAL DAILY
Qty: 2 TAB | Refills: 0 | Status: SHIPPED | OUTPATIENT
Start: 2019-05-28 | End: 2019-06-11

## 2019-06-11 ENCOUNTER — OFFICE VISIT (OUTPATIENT)
Dept: NEUROLOGY | Facility: MEDICAL CENTER | Age: 29
End: 2019-06-11
Payer: COMMERCIAL

## 2019-06-11 VITALS
DIASTOLIC BLOOD PRESSURE: 76 MMHG | TEMPERATURE: 98.1 F | HEART RATE: 95 BPM | SYSTOLIC BLOOD PRESSURE: 122 MMHG | BODY MASS INDEX: 21.62 KG/M2 | HEIGHT: 69 IN | OXYGEN SATURATION: 98 % | WEIGHT: 146 LBS | RESPIRATION RATE: 16 BRPM

## 2019-06-11 DIAGNOSIS — R29.898 WEAKNESS OF RIGHT FOOT: ICD-10-CM

## 2019-06-11 PROCEDURE — 99214 OFFICE O/P EST MOD 30 MIN: CPT | Performed by: PSYCHIATRY & NEUROLOGY

## 2019-06-11 ASSESSMENT — ENCOUNTER SYMPTOMS
SENSORY CHANGE: 0
TINGLING: 0
FOCAL WEAKNESS: 1
FALLS: 0
CONSTIPATION: 0
MEMORY LOSS: 0
TREMORS: 0

## 2019-06-11 ASSESSMENT — PAIN SCALES - GENERAL: PAINLEVEL: NO PAIN

## 2019-06-11 NOTE — PROGRESS NOTES
Subjective:      Rosita Bowles is a 28 y.o. female who presents for urgent follow-up, with a history of stable MS, on monthly Tysabri infusions, but who suffered from a very severe, but transient right foot weakness episode following a short run on Friday, June 7, 2019.    HPI    Rosita had been doing well, in her usual state of health, when she had engaged in about a 15 mile jog with some friends.  About 15 minutes after the run was done, the right foot became paralyzed.  She denied cramping or dystonic posturing, rather she simply could not move the foot at all.  The right leg itself felt quite strong.  She denied any involvement of the right hand or upper extremity in its entirety.  There was no pain from the back radiating into the leg, the foot itself was nontender nonswollen, there were no sensory distortions.  Bowel and bladder function were stable, there was no involvement of the left lower extremity.    She contacted the office, within a matter of 12 hours she had started a prednisone titration of 60 mg, she is taking the first dose, and the symptoms have resolved completely within 24 hours or less.  They have not recurred.  She still remains on prednisone, now at a 40 mg daily dose, titrating by 10 mg every other day.  She is tolerating steroids without issue.    Historically, her MS did involve weakness with the right lower extremity in its entirety, this was something new.  Otherwise, her MS symptoms in general were not exacerbated with this, the visual deficits with the left eye are unchanged, she denies any new symptoms with bulbar dysfunction, compressive and squeezing sensations around the chest or lower abdomen, or upper extremity motor or sensory distortions on the left.  She was not on any new medications.    MRI of the brain, cervical, and thoracic spines have been done in February of this year, spinal imaging revealing no evidence of intrinsic cord disease though there was some  "myelomalacia in the cervical cord, imaging of the brain revealed a stable burden of disease without evidence of active inflammation.    Medical, surgical and family histories are reviewed in the electronic health record, there are no new drug allergies.  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  She is quite happy in this regard.    She is on Tysabri 300 mg monthly, Restoril, Kyleena, Concerta and Wellbutrin.    Review of Systems   Constitutional: Negative for malaise/fatigue.   Gastrointestinal: Negative for constipation.   Genitourinary: Negative for frequency and urgency.   Musculoskeletal: Negative for falls.   Neurological: Positive for focal weakness. Negative for tingling, tremors and sensory change.   Psychiatric/Behavioral: Negative for memory loss.   All other systems reviewed and are negative.       Objective:     /76 (BP Location: Left arm, Patient Position: Sitting)   Pulse 95   Temp 36.7 °C (98.1 °F) (Temporal)   Resp 16   Ht 1.753 m (5' 9\")   Wt 66.2 kg (146 lb)   SpO2 98%   BMI 21.56 kg/m²      Physical Exam    She appears in no acute distress.  Vital signs are stable.  There is no malar rash.  The neck is supple, range of motion is full, there meets phenomena is absent.  Cardiac evaluation is unremarkable.  Straight leg raising is negative bilaterally.  Neither of the lower extremities exhibits any edema, distal pulses are intact.    Cognition is intact.  PERRLA/EOMI, the mild left temporal inferior quadrantic deficit OS is unchanged.  Facial movements are symmetric, there is no bulbar dysfunction, sensory exam is intact bilaterally to temperature.    Musculoskeletal exam reveals normal strength throughout, including both lower extremities.  Reflexes are brisker with the right leg when compared to the left, both toes are downgoing.  Repetitive movements with the feet " are intact and symmetric.  She walks without circumduction or a limp favoring the right leg.  Sensory exam is intact to vibration and temperature bilaterally.     Assessment/Plan:     1. Weakness of right foot  I think a pseudo-exacerbation is most likely the cause of her symptoms related to the physical exertion and likely overheating.  The rapid resolution is inconsistent with disease reactivation, the absence of other associated symptoms as well will be more consistent with a simple symptom worsening.  Thus, I think she can stop the prednisone now rather than completing the titration.  Starting the steroids probably had a little to do with the rapid recovery.    I do not think imaging is really necessary, her examination is otherwise unchanged.  He can follow-up in 3 months as previously scheduled.  She knows to call the office if something like this were to happen, but especially if symptoms were to persist for more than 24 hours, progressively worsening over a longer interval before hitting a kelly.    Time: 25 minutes spent face-to-face for exam, review, discussion, and education, of this over 70% of the time spent counseling and coordinating care surrounding all of the above issues.

## 2019-06-13 DIAGNOSIS — R76.11 TUBERCULIN TEST REACTION: ICD-10-CM

## 2019-06-15 ENCOUNTER — OFFICE VISIT (OUTPATIENT)
Dept: URGENT CARE | Facility: MEDICAL CENTER | Age: 29
End: 2019-06-15
Payer: COMMERCIAL

## 2019-06-15 VITALS
TEMPERATURE: 98.5 F | HEIGHT: 69 IN | SYSTOLIC BLOOD PRESSURE: 100 MMHG | HEART RATE: 105 BPM | BODY MASS INDEX: 21.18 KG/M2 | WEIGHT: 143 LBS | DIASTOLIC BLOOD PRESSURE: 60 MMHG | RESPIRATION RATE: 16 BRPM | OXYGEN SATURATION: 98 %
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
## 2019-06-15 DIAGNOSIS — T36.95XA ANTIBIOTIC-INDUCED YEAST INFECTION: ICD-10-CM

## 2019-06-15 DIAGNOSIS — B37.9 ANTIBIOTIC-INDUCED YEAST INFECTION: ICD-10-CM

## 2019-06-15 PROCEDURE — 99214 OFFICE O/P EST MOD 30 MIN: CPT | Performed by: NURSE PRACTITIONER

## 2019-06-15 RX ORDER — FLUCONAZOLE 150 MG/1
150 TABLET ORAL DAILY
Qty: 2 TAB | Refills: 0 | Status: SHIPPED | OUTPATIENT
Start: 2019-06-15 | End: 2019-06-27 | Stop reason: SDUPTHER

## 2019-06-15 RX ORDER — SULFAMETHOXAZOLE AND TRIMETHOPRIM 800; 160 MG/1; MG/1
1 TABLET ORAL 2 TIMES DAILY
Qty: 14 TAB | Refills: 0 | Status: SHIPPED | OUTPATIENT
Start: 2019-06-15 | End: 2019-06-22

## 2019-06-15 ASSESSMENT — ENCOUNTER SYMPTOMS
NAUSEA: 0
SHORTNESS OF BREATH: 0
WHEEZING: 0
FEVER: 0
PALPITATIONS: 0
CHILLS: 0
VOMITING: 0

## 2019-06-15 NOTE — PROGRESS NOTES
Subjective:   Rosita Bowles is a 28 y.o. female who presents for Other (Bug bite Left arm x 1 day )        HPI   Patient with new onset suspected insect bite to the left upper arm that occurred yesterday while she was walking her dog. States the area has been itching and with mild redness. Has been taking Benadryl with mild relief. Denies fever or chills. Denies alleviating or aggravating factors. Denies pain or discharge.    Accompanied by boyfriend in office.    Review of Systems   Constitutional: Negative for chills and fever.   Respiratory: Negative for shortness of breath and wheezing.    Cardiovascular: Negative for chest pain and palpitations.   Gastrointestinal: Negative for nausea and vomiting.   Skin: Positive for itching and rash.     PMH:  has a past medical history of Acne (8/7/2012); Acne vulgaris (6/23/2017); ADHD (8/7/2012); Depression (8/7/2012); Multiple sclerosis (HCC) (11/2/2016); and Plantar wart (1/9/2019).  MEDS:   Current Outpatient Prescriptions:   •  sulfamethoxazole-trimethoprim (BACTRIM DS) 800-160 MG tablet, Take 1 Tab by mouth 2 times a day for 7 days., Disp: 14 Tab, Rfl: 0  •  fluconazole (DIFLUCAN) 150 MG tablet, Take 1 Tab by mouth every day., Disp: 2 Tab, Rfl: 0  •  natalizumab (TYSABRI) 300 MG/15ML Conc, by Intravenous route., Disp: , Rfl:   •  valacyclovir (VALTREX) 1 GM Tab, Take 1 Tab by mouth 2 times a day as needed (cold sores)., Disp: 180 Tab, Rfl: 3  •  buPROPion (WELLBUTRIN XL) 300 MG XL tablet, TAKE 1 TAB BY MOUTH EVERY MORNING., Disp: 90 Tab, Rfl: 3  •  temazepam (RESTORIL) 15 MG Cap, Take 15 mg by mouth at bedtime as needed for Sleep., Disp: , Rfl:   •  Biotin 5000 MCG Cap, Take 5,000 mcg by mouth every day at 6 PM. supplement, Disp: , Rfl:   •  melatonin 3 MG Tab, Take 3 mg by mouth 1 time daily as needed (insomnia)., Disp: , Rfl:   •  Cyanocobalamin (VITAMIN B-12) 1000 MCG Tab, Take 1,000 mcg by mouth every day. supplement, Disp: , Rfl:   •  Cholecalciferol  "(VITAMIN D3) 5000 units Tab, Take 5,000 Units by mouth every day. supplement, Disp: , Rfl:   •  Levonorgestrel (KYLEENA) 19.5 MG IUD, 1 Each by Intrauterine route See Admin Instructions. Every 5 years birth control, Disp: , Rfl:   ALLERGIES:   Allergies   Allergen Reactions   • Nkda [No Known Drug Allergy]      SURGHX:   Past Surgical History:   Procedure Laterality Date   • CERVICAL DISK AND FUSION ANTERIOR  08/07/2018   • ANKLE ORIF     • DENTAL EXTRACTION(S)       SOCHX:  reports that she has never smoked. She has never used smokeless tobacco. She reports that she drinks alcohol. She reports that she does not use drugs.  FH: Family history was reviewed, no pertinent findings to report     Objective:   /60 (BP Location: Right arm, Patient Position: Sitting, BP Cuff Size: Adult)   Pulse (!) 105   Temp 36.9 °C (98.5 °F) (Temporal)   Resp 16   Ht 1.753 m (5' 9.02\")   Wt 64.9 kg (143 lb)   SpO2 98%   BMI 21.11 kg/m²   Physical Exam   Constitutional: She appears well-developed and well-nourished. No distress.   HENT:   Head: Normocephalic.   Right Ear: Hearing normal.   Left Ear: Hearing normal.   Nose: Nose normal.   Eyes: Pupils are equal, round, and reactive to light. Conjunctivae, EOM and lids are normal.   Neck: Normal range of motion.   Cardiovascular: Normal rate, regular rhythm and normal heart sounds.    Pulmonary/Chest: Effort normal and breath sounds normal. No respiratory distress. She has no decreased breath sounds. She has no wheezes.   Neurological: She is alert.   Skin: Skin is warm. No purpura and no rash noted. Rash is not nodular, not pustular and not urticarial. She is not diaphoretic.        Approximately 1 inch by 2.5 inch mild erythematous/swelling area to the left upper arm. No discharge or lesion noted. Mild warmth and without red streaking. No area of fluctuance noted.   Psychiatric: She has a normal mood and affect. Her speech is normal and behavior is normal. Judgment and " thought content normal.   Vitals reviewed.        Assessment/Plan:   Assessment    1. Insect bite, initial encounter  - sulfamethoxazole-trimethoprim (BACTRIM DS) 800-160 MG tablet; Take 1 Tab by mouth 2 times a day for 7 days.  Dispense: 14 Tab; Refill: 0    2. Antibiotic-induced yeast infection  - fluconazole (DIFLUCAN) 150 MG tablet; Take 1 Tab by mouth every day.  Dispense: 2 Tab; Refill: 0    Discussed to apply ice to area PRN and continue with OTC antihistamines as prior - may also take OTC Zyrtec or Claritin. May also apply OTC Hydrocortisone cream to the area for itching and redness.   Contingent antibiotic prescription given to patient to fill upon meeting criteria of guidelines discussed - Bactrim for suspected cellulitis worsening. Patient states with frequent yeast infections after taking antibiotics.   Use an oral probiotic daily, such as Culturelle, Align, or yogurt to reduce gastrointestinal symptoms from antibiotic use.    Differential diagnosis, natural history, supportive care, and indications for immediate follow-up discussed.

## 2019-06-19 ENCOUNTER — TELEPHONE (OUTPATIENT)
Dept: MEDICAL GROUP | Facility: LAB | Age: 29
End: 2019-06-19

## 2019-06-19 NOTE — TELEPHONE ENCOUNTER
Patient called stating she is having a dental procedure done today that usually requires 2,000mg amoxicillin 1 hour prior but she is already on sulfamethoxazole-trimethoprim (and has three days left) for a bug bite infection so she was wondering if she would still need to take the amoxicillin. I spoke to Dr. Garcia who said it is safe for her to take both, but if she does not need to take amoxicillin if she is already taking the  sulfamethoxazole-trimethoprim. I informed the patient and she said she would let her dentist know.

## 2019-06-21 ENCOUNTER — OUTPATIENT INFUSION SERVICES (OUTPATIENT)
Dept: ONCOLOGY | Facility: MEDICAL CENTER | Age: 29
End: 2019-06-21
Attending: PSYCHIATRY & NEUROLOGY
Payer: COMMERCIAL

## 2019-06-21 VITALS
WEIGHT: 145.72 LBS | BODY MASS INDEX: 20.86 KG/M2 | HEART RATE: 84 BPM | OXYGEN SATURATION: 97 % | HEIGHT: 70 IN | SYSTOLIC BLOOD PRESSURE: 116 MMHG | TEMPERATURE: 97.6 F | RESPIRATION RATE: 18 BRPM | DIASTOLIC BLOOD PRESSURE: 67 MMHG

## 2019-06-21 DIAGNOSIS — R76.11 TUBERCULIN TEST REACTION: ICD-10-CM

## 2019-06-21 DIAGNOSIS — G35 MULTIPLE SCLEROSIS (HCC): ICD-10-CM

## 2019-06-21 PROCEDURE — 700105 HCHG RX REV CODE 258: Performed by: PSYCHIATRY & NEUROLOGY

## 2019-06-21 PROCEDURE — 306780 HCHG STAT FOR TRANSFUSION PER CASE

## 2019-06-21 PROCEDURE — 36415 COLL VENOUS BLD VENIPUNCTURE: CPT

## 2019-06-21 PROCEDURE — 700111 HCHG RX REV CODE 636 W/ 250 OVERRIDE (IP): Performed by: PSYCHIATRY & NEUROLOGY

## 2019-06-21 PROCEDURE — 86480 TB TEST CELL IMMUN MEASURE: CPT

## 2019-06-21 PROCEDURE — 96365 THER/PROPH/DIAG IV INF INIT: CPT

## 2019-06-21 RX ADMIN — NATALIZUMAB 300 MG: 300 INJECTION INTRAVENOUS at 11:16

## 2019-06-21 NOTE — PROGRESS NOTES
Pt arrives to [facility] for Tysabri infusion.  Pt denies any s/sx of infection.  Pt had a spider bite to LEX this week and urgent care gave pt Rx for antibiotic.  LUE has no appearance of redness or swelling and skin is intact.  TOUCH online checklist completed.  PIV placed to R-FA with positive blood return noted.  Tysabri infused over 1 hour.  Heat pack applied to PIV site per pt request throughout infusion.  1 hour observation completed without adverse effects.  Quantiferon TB test drawn via PIV per order in chart. PIV flushed and removed.  Confirmed next appt with pt.  Pt dc home with boyfriend.

## 2019-06-24 LAB
GAMMA INTERFERON BACKGROUND BLD IA-ACNC: 0.64 IU/ML
M TB IFN-G BLD-IMP: NEGATIVE
M TB IFN-G CD4+ BCKGRND COR BLD-ACNC: -0.57 IU/ML
MITOGEN IGNF BCKGRD COR BLD-ACNC: >10 IU/ML
QFT TB2 - NIL TBQ2: -0.59 IU/ML

## 2019-06-27 ENCOUNTER — OFFICE VISIT (OUTPATIENT)
Dept: MEDICAL GROUP | Facility: LAB | Age: 29
End: 2019-06-27
Payer: COMMERCIAL

## 2019-06-27 VITALS
TEMPERATURE: 97.8 F | WEIGHT: 144 LBS | BODY MASS INDEX: 20.62 KG/M2 | HEIGHT: 70 IN | HEART RATE: 70 BPM | SYSTOLIC BLOOD PRESSURE: 96 MMHG | RESPIRATION RATE: 14 BRPM | OXYGEN SATURATION: 99 % | DIASTOLIC BLOOD PRESSURE: 56 MMHG

## 2019-06-27 DIAGNOSIS — B37.9 ANTIBIOTIC-INDUCED YEAST INFECTION: ICD-10-CM

## 2019-06-27 DIAGNOSIS — F90.0 ATTENTION DEFICIT HYPERACTIVITY DISORDER (ADHD), PREDOMINANTLY INATTENTIVE TYPE: ICD-10-CM

## 2019-06-27 DIAGNOSIS — T36.95XA ANTIBIOTIC-INDUCED YEAST INFECTION: ICD-10-CM

## 2019-06-27 PROCEDURE — 99214 OFFICE O/P EST MOD 30 MIN: CPT | Performed by: FAMILY MEDICINE

## 2019-06-27 RX ORDER — METHYLPHENIDATE HYDROCHLORIDE 54 MG/1
54 TABLET ORAL EVERY MORNING
Qty: 31 TAB | Refills: 0 | Status: SHIPPED | OUTPATIENT
Start: 2019-08-27 | End: 2019-09-24 | Stop reason: SDUPTHER

## 2019-06-27 RX ORDER — FLUCONAZOLE 150 MG/1
150 TABLET ORAL DAILY
Qty: 2 TAB | Refills: 0 | Status: SHIPPED | OUTPATIENT
Start: 2019-06-27 | End: 2019-12-10 | Stop reason: SDUPTHER

## 2019-06-27 RX ORDER — FLUCONAZOLE 150 MG/1
150 TABLET ORAL DAILY
Qty: 2 TAB | Refills: 0 | Status: SHIPPED | OUTPATIENT
Start: 2019-06-27 | End: 2019-06-27 | Stop reason: SDUPTHER

## 2019-06-27 RX ORDER — METHYLPHENIDATE HYDROCHLORIDE 54 MG/1
54 TABLET ORAL EVERY MORNING
Qty: 30 TAB | Refills: 0 | Status: SHIPPED | OUTPATIENT
Start: 2019-07-27 | End: 2019-08-27

## 2019-06-27 RX ORDER — METHYLPHENIDATE HYDROCHLORIDE 54 MG/1
54 TABLET ORAL EVERY MORNING
Qty: 30 TAB | Refills: 0 | Status: SHIPPED | OUTPATIENT
Start: 2019-06-27 | End: 2019-07-27

## 2019-06-27 NOTE — PROGRESS NOTES
Subjective:     CC: Controlled substance    HPI:   Rosita presents today with     ADHD:  This is a chronic stable issue, new to me.  Patient is currently on Concerta 54 mg daily.  She takes this every day and is compliant with her medications.  She does not take any other controlled substances.  She understands the benefits, risks, side effects, and contraindications of this.  She has been going up and down in weight per chart graph and I discussed the possibility of low appetite with this.  She does states she has low appetite however she will place better reminders for snacking and mealtime.  She also has some brain onset could possibly be associated with this however she does do supportive care.    Past Medical History:   Diagnosis Date   • Acne 8/7/2012   • Acne vulgaris 6/23/2017   • ADHD 8/7/2012   • Depression 8/7/2012   • Multiple sclerosis (HCC) 11/2/2016   • Plantar wart 1/9/2019       Social History   Substance Use Topics   • Smoking status: Never Smoker   • Smokeless tobacco: Never Used   • Alcohol use 0.0 oz/week      Comment: occasional       Current Outpatient Prescriptions Ordered in [facility]   Medication Sig Dispense Refill   • fluconazole (DIFLUCAN) 150 MG tablet Take 1 Tab by mouth every day. 2 Tab 0   • methylphenidate (CONCERTA) 54 MG CR tablet Take 1 Tab by mouth every morning for 30 days. 30 Tab 0   • [START ON 7/27/2019] methylphenidate (CONCERTA) 54 MG CR tablet Take 1 Tab by mouth every morning for 31 days. 30 Tab 0   • [START ON 8/27/2019] methylphenidate (CONCERTA) 54 MG CR tablet Take 1 Tab by mouth every morning for 31 days. 31 Tab 0   • natalizumab (TYSABRI) 300 MG/15ML Conc by Intravenous route.     • valacyclovir (VALTREX) 1 GM Tab Take 1 Tab by mouth 2 times a day as needed (cold sores). 180 Tab 3   • buPROPion (WELLBUTRIN XL) 300 MG XL tablet TAKE 1 TAB BY MOUTH EVERY MORNING. 90 Tab 3   • Biotin 5000 MCG Cap Take 5,000 mcg by mouth every day at 6 PM. supplement     • Cyanocobalamin  "(VITAMIN B-12) 1000 MCG Tab Take 1,000 mcg by mouth every day. supplement     • Cholecalciferol (VITAMIN D3) 5000 units Tab Take 5,000 Units by mouth every day. supplement     • Levonorgestrel (KYLEENA) 19.5 MG IUD 1 Each by Intrauterine route See Admin Instructions. Every 5 years  birth control     • temazepam (RESTORIL) 15 MG Cap Take 15 mg by mouth at bedtime as needed for Sleep.     • melatonin 3 MG Tab Take 3 mg by mouth 1 time daily as needed (insomnia).       No current [facility]-ordered facility-administered medications on file.        Allergies:  Nkda [no known drug allergy]  ROS:  Gen: no fevers/chill, +changes in weight  Eyes: no changes in vision  ENT: no sore throat, no hearing loss, no bloody nose  Pulm: no sob, no cough  CV: no chest pain, no palpitations  GI: no nausea/vomiting, no diarrhea  : no dysuria  MSk: no myalgias  Skin: no rash  Neuro: no headaches, no numbness/tingling  Heme/Lymph: no easy bruising      Objective:       Exam:  BP (!) 96/56 (BP Location: Left arm, Patient Position: Sitting, BP Cuff Size: Adult)   Pulse 70   Temp 36.6 °C (97.8 °F) (Temporal)   Resp 14   Ht 1.775 m (5' 9.88\")   Wt 65.3 kg (144 lb)   LMP 06/11/2019   SpO2 99%   BMI 20.73 kg/m²  Body mass index is 20.73 kg/m².    Gen: Alert and oriented, No apparent distress.  Neck: Neck is supple without lymphadenopathy.  Lungs: Normal effort, CTA bilaterally, no wheezes, rhonchi, or rales  CV: Regular rate and rhythm. No murmurs, rubs, or gallops.               Ext: No clubbing, cyanosis, edema.    Assessment & Plan:     28 y.o. female with the following -     1. Attention deficit hyperactivity disorder (ADHD), predominantly inattentive type  Medication refilled for the next 3 months.   and urine drug screen up-to-date.  Patient has a risks, contraindications, and side effects of this medication.  - methylphenidate (CONCERTA) 54 MG CR tablet; Take 1 Tab by mouth every morning for 30 days.  Dispense: 30 Tab; Refill: " 0  - methylphenidate (CONCERTA) 54 MG CR tablet; Take 1 Tab by mouth every morning for 31 days.  Dispense: 30 Tab; Refill: 0  - methylphenidate (CONCERTA) 54 MG CR tablet; Take 1 Tab by mouth every morning for 31 days.  Dispense: 31 Tab; Refill: 0        Please note that this dictation was created using voice recognition software. I have made every reasonable attempt to correct obvious errors, but I expect that there are errors of grammar and possibly content that I did not discover before finalizing the note.

## 2019-07-19 ENCOUNTER — OUTPATIENT INFUSION SERVICES (OUTPATIENT)
Dept: ONCOLOGY | Facility: MEDICAL CENTER | Age: 29
End: 2019-07-19
Attending: PSYCHIATRY & NEUROLOGY
Payer: COMMERCIAL

## 2019-07-19 VITALS
RESPIRATION RATE: 18 BRPM | DIASTOLIC BLOOD PRESSURE: 85 MMHG | HEART RATE: 88 BPM | OXYGEN SATURATION: 100 % | TEMPERATURE: 98.3 F | HEIGHT: 70 IN | BODY MASS INDEX: 20.45 KG/M2 | WEIGHT: 142.86 LBS | SYSTOLIC BLOOD PRESSURE: 120 MMHG

## 2019-07-19 PROCEDURE — 700105 HCHG RX REV CODE 258: Performed by: PSYCHIATRY & NEUROLOGY

## 2019-07-19 PROCEDURE — 306780 HCHG STAT FOR TRANSFUSION PER CASE

## 2019-07-19 PROCEDURE — 96365 THER/PROPH/DIAG IV INF INIT: CPT

## 2019-07-19 PROCEDURE — 700111 HCHG RX REV CODE 636 W/ 250 OVERRIDE (IP): Performed by: PSYCHIATRY & NEUROLOGY

## 2019-07-19 RX ADMIN — NATALIZUMAB 300 MG: 300 INJECTION INTRAVENOUS at 08:28

## 2019-08-15 ENCOUNTER — TELEPHONE (OUTPATIENT)
Dept: MEDICAL GROUP | Facility: LAB | Age: 29
End: 2019-08-15

## 2019-08-15 NOTE — TELEPHONE ENCOUNTER
MEDICATION PRIOR AUTHORIZATION NEEDED:    1. Name of Medication: Methylphenidate ER 54 mg tab    2. Requested By (Name of Pharmacy): [facility] pharmacy     3. Is insurance on file current? yes    4. What is the name & phone number of the 3rd party payor? [phone]    DOCUMENTATION OF PAR STATUS:    1. Name of Medication & Dose: Methylphenidate ER 54 mg tab     2. Name of Prescription Coverage Company & phone #: [phone]    3. Date Prior Auth Submitted: 8/15/19    4. What information was given to obtain insurance decision? Attention deficit hyperactivity disorder (ADHD), predominantly inattentive type (F90.0)    5. Prior Auth Status? Pending    6. Patient Notified: N\A

## 2019-08-17 ENCOUNTER — OUTPATIENT INFUSION SERVICES (OUTPATIENT)
Dept: ONCOLOGY | Facility: MEDICAL CENTER | Age: 29
End: 2019-08-17
Attending: PSYCHIATRY & NEUROLOGY
Payer: COMMERCIAL

## 2019-08-17 VITALS
SYSTOLIC BLOOD PRESSURE: 118 MMHG | RESPIRATION RATE: 18 BRPM | TEMPERATURE: 97.8 F | HEIGHT: 70 IN | WEIGHT: 144.84 LBS | HEART RATE: 94 BPM | BODY MASS INDEX: 20.74 KG/M2 | OXYGEN SATURATION: 100 % | DIASTOLIC BLOOD PRESSURE: 67 MMHG

## 2019-08-17 DIAGNOSIS — G35 MULTIPLE SCLEROSIS (HCC): ICD-10-CM

## 2019-08-17 PROCEDURE — 306780 HCHG STAT FOR TRANSFUSION PER CASE

## 2019-08-17 PROCEDURE — 96365 THER/PROPH/DIAG IV INF INIT: CPT

## 2019-08-17 PROCEDURE — 700111 HCHG RX REV CODE 636 W/ 250 OVERRIDE (IP): Performed by: PSYCHIATRY & NEUROLOGY

## 2019-08-17 PROCEDURE — 700105 HCHG RX REV CODE 258: Performed by: PSYCHIATRY & NEUROLOGY

## 2019-08-17 RX ADMIN — NATALIZUMAB 300 MG: 300 INJECTION INTRAVENOUS at 10:56

## 2019-08-17 NOTE — PROGRESS NOTES
Pt to [facility] for Tysabri infusion.  Pt denied any current infections. PIV placed, flushed easily, kevin briskly. Pt requested warm packs to arm during infusion to prevent any pain at IV site.  Tysabri infused over 1 hour as ordered, pt tolerated well. Pt remained in [facility] for 1 hour for hour.  No s/sx of reaction. PIV flushed with NS, then d/c'd. Catheter tip remained intact. Gauze and coban to site. Pt left [facility] in NAD, copy of appointment schedule given to patient.

## 2019-09-14 ENCOUNTER — OUTPATIENT INFUSION SERVICES (OUTPATIENT)
Dept: ONCOLOGY | Facility: MEDICAL CENTER | Age: 29
End: 2019-09-14
Attending: PSYCHIATRY & NEUROLOGY
Payer: COMMERCIAL

## 2019-09-14 VITALS
BODY MASS INDEX: 21.4 KG/M2 | SYSTOLIC BLOOD PRESSURE: 113 MMHG | OXYGEN SATURATION: 99 % | HEART RATE: 96 BPM | WEIGHT: 149.47 LBS | HEIGHT: 70 IN | RESPIRATION RATE: 18 BRPM | TEMPERATURE: 98.1 F | DIASTOLIC BLOOD PRESSURE: 62 MMHG

## 2019-09-14 PROCEDURE — 306780 HCHG STAT FOR TRANSFUSION PER CASE

## 2019-09-14 PROCEDURE — 700111 HCHG RX REV CODE 636 W/ 250 OVERRIDE (IP): Performed by: PSYCHIATRY & NEUROLOGY

## 2019-09-14 PROCEDURE — 96365 THER/PROPH/DIAG IV INF INIT: CPT

## 2019-09-14 PROCEDURE — 700105 HCHG RX REV CODE 258: Performed by: PSYCHIATRY & NEUROLOGY

## 2019-09-14 RX ADMIN — NATALIZUMAB 300 MG: 300 INJECTION INTRAVENOUS at 11:00

## 2019-09-14 NOTE — PROGRESS NOTES
ll here for Tysabri infusion. Rosita reports no new or changed symptoms. TOUCH pre-infusion patient checklist completed online. Tysabri infused as ordered. Rosita tolerated well. Rosita then observed by RN for one hour after infusion finished. No signed or symptoms of adverse reaction observed or expressed. Rosita DC'd home with  in good condition and in NAD. Rosita returns monthly. Appointment confirm for next treatment.

## 2019-09-19 ENCOUNTER — TELEPHONE (OUTPATIENT)
Dept: MEDICAL GROUP | Facility: LAB | Age: 29
End: 2019-09-19

## 2019-09-19 DIAGNOSIS — H47.232 OPTIC DISC CUPPING, LEFT: ICD-10-CM

## 2019-09-19 NOTE — TELEPHONE ENCOUNTER
VOICEMAIL  1. Caller Name: [facility]                      Call Back Number: [phone]    2. Message:[facility]  Called asking for a referral on Rosita, she is coming in for a medical eye exam in a few days. If you have any questions please call her at [phone]    3. Patient approves office to leave a detailed voicemail/MyChart message: yes

## 2019-09-24 ENCOUNTER — OFFICE VISIT (OUTPATIENT)
Dept: MEDICAL GROUP | Facility: LAB | Age: 29
End: 2019-09-24
Payer: COMMERCIAL

## 2019-09-24 VITALS
OXYGEN SATURATION: 95 % | BODY MASS INDEX: 21.24 KG/M2 | TEMPERATURE: 98.6 F | SYSTOLIC BLOOD PRESSURE: 106 MMHG | DIASTOLIC BLOOD PRESSURE: 56 MMHG | WEIGHT: 148.4 LBS | HEIGHT: 70 IN | HEART RATE: 90 BPM

## 2019-09-24 DIAGNOSIS — Z23 NEED FOR VACCINATION: ICD-10-CM

## 2019-09-24 DIAGNOSIS — F90.0 ATTENTION DEFICIT HYPERACTIVITY DISORDER (ADHD), PREDOMINANTLY INATTENTIVE TYPE: ICD-10-CM

## 2019-09-24 PROCEDURE — 90471 IMMUNIZATION ADMIN: CPT | Performed by: FAMILY MEDICINE

## 2019-09-24 PROCEDURE — 90686 IIV4 VACC NO PRSV 0.5 ML IM: CPT | Performed by: FAMILY MEDICINE

## 2019-09-24 PROCEDURE — 99214 OFFICE O/P EST MOD 30 MIN: CPT | Mod: 25 | Performed by: FAMILY MEDICINE

## 2019-09-24 RX ORDER — METHYLPHENIDATE HYDROCHLORIDE 54 MG/1
54 TABLET ORAL EVERY MORNING
Qty: 31 TAB | Refills: 0 | Status: SHIPPED | OUTPATIENT
Start: 2019-12-15 | End: 2019-12-17 | Stop reason: SDUPTHER

## 2019-09-24 RX ORDER — METHYLPHENIDATE HYDROCHLORIDE 54 MG/1
54 TABLET ORAL EVERY MORNING
Qty: 31 TAB | Refills: 0 | Status: SHIPPED | OUTPATIENT
Start: 2019-11-15 | End: 2019-09-24 | Stop reason: SDUPTHER

## 2019-09-24 RX ORDER — METHYLPHENIDATE HYDROCHLORIDE 54 MG/1
54 TABLET ORAL EVERY MORNING
Qty: 31 TAB | Refills: 0 | Status: SHIPPED | OUTPATIENT
Start: 2019-10-15 | End: 2019-09-24 | Stop reason: SDUPTHER

## 2019-09-24 NOTE — PROGRESS NOTES
Subjective:     CC: Controlled substance    HPI:   Rosita presents today with     ADHD:  Chronic stable issue.  Patient currently on controlled substance, Concerta 54 mg daily.  She has been diagnosed with ADHD for some time.  She is compliant with her medications.  She understands the benefits, risks, side effects and contraindications of this medication.  She denies any issues with appetite or insomnia.      Past Medical History:   Diagnosis Date   • Acne 8/7/2012   • Acne vulgaris 6/23/2017   • ADHD 8/7/2012   • Depression 8/7/2012   • Multiple sclerosis (HCC) 11/2/2016   • Plantar wart 1/9/2019       Social History     Tobacco Use   • Smoking status: Never Smoker   • Smokeless tobacco: Never Used   Substance Use Topics   • Alcohol use: Yes     Alcohol/week: 0.0 oz     Comment: occasional   • Drug use: No       Current Outpatient Medications Ordered in Epic   Medication Sig Dispense Refill   • fluconazole (DIFLUCAN) 150 MG tablet Take 1 Tab by mouth every day. 2 Tab 0   • methylphenidate (CONCERTA) 54 MG CR tablet Take 1 Tab by mouth every morning for 31 days. 31 Tab 0   • natalizumab (TYSABRI) 300 MG/15ML Conc by Intravenous route.     • valacyclovir (VALTREX) 1 GM Tab Take 1 Tab by mouth 2 times a day as needed (cold sores). 180 Tab 3   • buPROPion (WELLBUTRIN XL) 300 MG XL tablet TAKE 1 TAB BY MOUTH EVERY MORNING. 90 Tab 3   • temazepam (RESTORIL) 15 MG Cap Take 15 mg by mouth at bedtime as needed for Sleep.     • Biotin 5000 MCG Cap Take 5,000 mcg by mouth every day at 6 PM. supplement     • melatonin 3 MG Tab Take 3 mg by mouth 1 time daily as needed (insomnia).     • Cyanocobalamin (VITAMIN B-12) 1000 MCG Tab Take 1,000 mcg by mouth every day. supplement     • Cholecalciferol (VITAMIN D3) 5000 units Tab Take 5,000 Units by mouth every day. supplement     • Levonorgestrel (KYLEENA) 19.5 MG IUD 1 Each by Intrauterine route See Admin Instructions. Every 5 years  birth control       No current Epic-ordered  "facility-administered medications on file.        Allergies:  Other drug and Nkda [no known drug allergy]    Health Maintenance: Completed    ROS:  Gen: no fevers/chill, no changes in weight  Eyes: no changes in vision  ENT: no sore throat, no hearing loss, no bloody nose  Pulm: no sob, no cough  CV: no chest pain, no palpitations  GI: no nausea/vomiting, no diarrhea  : no dysuria  MSk: no myalgias  Skin: no rash  Neuro: no headaches, no numbness/tingling  Heme/Lymph: no easy bruising      Objective:       Exam:  /56 (BP Location: Left arm, Patient Position: Sitting)   Pulse 90   Temp 37 °C (98.6 °F) (Temporal)   Ht 1.778 m (5' 10\")   Wt 67.3 kg (148 lb 6.4 oz)   SpO2 95%   BMI 21.29 kg/m²  Body mass index is 21.29 kg/m².    Gen: Alert and oriented, No apparent distress.  Neck: Neck is supple without lymphadenopathy.  Lungs: Normal effort, CTA bilaterally, no wheezes, rhonchi, or rales  CV: Regular rate and rhythm. No murmurs, rubs, or gallops.               Ext: No clubbing, cyanosis, edema.      Assessment & Plan:     28 y.o. female with the following -     1. Need for vaccination  Administered today  - Influenza Vaccine Quad Injection (PF)    2. Attention deficit hyperactivity disorder (ADHD), predominantly inattentive type  Patient understands this prescription is a controlled substance which is potentially habit-forming and its use is regulated by the [employer].  We also discussed the new \"black box\" warning regarding the lethal combination of opioids and benzodiazepines.  Refills are subject to terms of a controlled substance agreement and patient has an updated one on file.  Most recent UDS is appropriate.  They are aware that any refill requires an office visit.  Narcotics have may adverse effects and the risks of addiction, accidental overdose and death were emphasized.  Provided prescriptions for the next three months.    - methylphenidate (CONCERTA) 54 MG CR tablet; Take 1 Tab by mouth every " morning for 31 days.  Dispense: 31 Tab; Refill: 0    Please note that this dictation was created using voice recognition software. I have made every reasonable attempt to correct obvious errors, but I expect that there are errors of grammar and possibly content that I did not discover before finalizing the note.

## 2019-10-09 ENCOUNTER — OFFICE VISIT (OUTPATIENT)
Dept: URGENT CARE | Facility: CLINIC | Age: 29
End: 2019-10-09
Payer: COMMERCIAL

## 2019-10-09 ENCOUNTER — APPOINTMENT (OUTPATIENT)
Dept: URGENT CARE | Facility: MEDICAL CENTER | Age: 29
End: 2019-10-09
Payer: COMMERCIAL

## 2019-10-09 ENCOUNTER — APPOINTMENT (OUTPATIENT)
Dept: RADIOLOGY | Facility: IMAGING CENTER | Age: 29
End: 2019-10-09
Attending: PHYSICIAN ASSISTANT
Payer: COMMERCIAL

## 2019-10-09 VITALS
SYSTOLIC BLOOD PRESSURE: 104 MMHG | TEMPERATURE: 98.9 F | RESPIRATION RATE: 16 BRPM | WEIGHT: 150.8 LBS | DIASTOLIC BLOOD PRESSURE: 60 MMHG | OXYGEN SATURATION: 100 % | HEIGHT: 70 IN | HEART RATE: 88 BPM | BODY MASS INDEX: 21.59 KG/M2

## 2019-10-09 DIAGNOSIS — S63.601A SPRAIN OF RIGHT THUMB, UNSPECIFIED SITE OF FINGER, INITIAL ENCOUNTER: ICD-10-CM

## 2019-10-09 PROCEDURE — 73140 X-RAY EXAM OF FINGER(S): CPT | Mod: TC,RT | Performed by: PHYSICIAN ASSISTANT

## 2019-10-09 PROCEDURE — 99214 OFFICE O/P EST MOD 30 MIN: CPT | Performed by: PHYSICIAN ASSISTANT

## 2019-10-09 ASSESSMENT — ENCOUNTER SYMPTOMS
TINGLING: 0
FEVER: 0
SENSORY CHANGE: 1
FOCAL WEAKNESS: 0
CHILLS: 0

## 2019-10-09 ASSESSMENT — PAIN SCALES - GENERAL: PAINLEVEL: 4=SLIGHT-MODERATE PAIN

## 2019-10-09 NOTE — PROGRESS NOTES
"Subjective:   Rosita Bowles is a 28 y.o. female who presents for Finger Injury (right thumb, while playing volleyball, had been swollen, bruised, patient felt a \"grinding\" into her thumb x4 days)    This is a new problem.  Patient presents urgent care with pain in the right thumb following a volleyball game over the weekend.  Patient is right-hand dominant.  She reports that she does not recall a specific injury during volleyball however after getting home she realized that the thumb was very swollen.  This area has become bruised and is painful with certain movements.        HPI  Review of Systems   Constitutional: Negative for chills, fever and malaise/fatigue.   Musculoskeletal: Positive for joint pain.   Neurological: Positive for sensory change. Negative for tingling and focal weakness.   All other systems reviewed and are negative.    Allergies   Allergen Reactions   • Other Drug Shortness of Breath and Swelling     Eye dye for dilation     • Nkda [No Known Drug Allergy]         Objective:   /60 (BP Location: Left arm, Patient Position: Sitting, BP Cuff Size: Adult)   Pulse 88   Temp 37.2 °C (98.9 °F) (Temporal)   Resp 16   Ht 1.778 m (5' 10\")   Wt 68.4 kg (150 lb 12.8 oz)   SpO2 100%   BMI 21.64 kg/m²   Physical Exam   Constitutional: She is oriented to person, place, and time. She appears well-developed and well-nourished.   HENT:   Head: Normocephalic and atraumatic.   Right Ear: External ear normal.   Left Ear: External ear normal.   Nose: Nose normal.   Mouth/Throat: Oropharynx is clear and moist.   Eyes: Pupils are equal, round, and reactive to light. Conjunctivae and EOM are normal.   Neck: Normal range of motion. Neck supple.   Cardiovascular: Normal rate, regular rhythm and normal heart sounds.   Pulmonary/Chest: Effort normal and breath sounds normal.   Musculoskeletal:        Right hand: She exhibits decreased range of motion, tenderness, bony tenderness and swelling. She " exhibits no deformity. Normal sensation noted. Normal strength noted.        Hands:  Lymphadenopathy:     She has no cervical adenopathy.   Neurological: She is alert and oriented to person, place, and time. She has normal strength. No sensory deficit.   Skin: Skin is warm and dry. No rash noted.   Psychiatric: She has a normal mood and affect. Judgment normal.           Assessment/Plan:   Assessment    1. Sprain of right thumb, unspecified site of finger, initial encounter  - DX-FINGER(S) 2+ RIGHT; Future  - REFERRAL TO SPORTS MEDICINE    X-rays obtained, read by radiologist and reviewed by myself without fracture.  Patient is placed in a thumb spica splint and referral was placed to sports medicine for further evaluation and management.  Recommend ice, elevation, ibuprofen as needed for pain not to exceed recommended daily dose.    Differential diagnosis, natural history, supportive care, and indications for immediate follow-up discussed.    Red flag warning symptoms and strict ER/follow-up precautions given.  The patient demonstrated a good understanding and agreed with the treatment plan.  Please note that this note was created using voice recognition speech to text software. Every effort has been made to correct obvious errors.  However, I expect there are errors of grammar and possibly context that were not discovered prior to finalizing the note  SULY Vernon PA-C

## 2019-10-09 NOTE — PATIENT INSTRUCTIONS
Sprain  A sprain happens when the bands of tissue that connect bones and hold joints together (ligaments) stretch too much or tear.  HOME CARE  · Raise (elevate) the injured area to lessen puffiness (swelling).  · Put ice on the injured area.  · Put ice in a plastic bag.  · Place a towel between your skin and the bag.  · Leave the ice on for 15-20 minutes, 3-4 times a day.  · Do this for the first 24 hours or as told by your doctor.  · Wear any splints, braces, castings, or elastic wraps as told by your child's doctor.  · Eat healthy foods.  · Only take medicine as told by your doctor.  GET HELP RIGHT AWAY IF:   · There is numbness or tingling in the injured limb.  · The toes or fingers become blue or white in the injured limb.  · The sprained limb is cold to the touch.  · There is a sharp, shooting pain in the injured limb.  · The puffiness is getting worse instead of better.  MAKE SURE YOU:   · Understand these instructions.  · Will watch this condition.  · Will get help right away if you are not doing well or get worse.  Document Released: 06/05/2009 Document Revised: 03/11/2013 Document Reviewed: 11/03/2010  [employer]Care® Patient Information ©2014 [employer], [employer].

## 2019-10-12 ENCOUNTER — OUTPATIENT INFUSION SERVICES (OUTPATIENT)
Dept: ONCOLOGY | Facility: MEDICAL CENTER | Age: 29
End: 2019-10-12
Attending: PSYCHIATRY & NEUROLOGY
Payer: COMMERCIAL

## 2019-10-12 VITALS
SYSTOLIC BLOOD PRESSURE: 96 MMHG | WEIGHT: 148.15 LBS | OXYGEN SATURATION: 100 % | TEMPERATURE: 97.8 F | DIASTOLIC BLOOD PRESSURE: 59 MMHG | HEIGHT: 70 IN | HEART RATE: 99 BPM | BODY MASS INDEX: 21.21 KG/M2 | RESPIRATION RATE: 18 BRPM

## 2019-10-12 PROCEDURE — 96365 THER/PROPH/DIAG IV INF INIT: CPT

## 2019-10-12 PROCEDURE — 306780 HCHG STAT FOR TRANSFUSION PER CASE

## 2019-10-12 PROCEDURE — 700105 HCHG RX REV CODE 258: Performed by: PSYCHIATRY & NEUROLOGY

## 2019-10-12 PROCEDURE — 700111 HCHG RX REV CODE 636 W/ 250 OVERRIDE (IP): Performed by: PSYCHIATRY & NEUROLOGY

## 2019-10-12 RX ADMIN — NATALIZUMAB 300 MG: 300 INJECTION INTRAVENOUS at 11:04

## 2019-10-12 NOTE — PROGRESS NOTES
Pt presented to infusion center for monthly Tysabri. Denies any current s/s of infection or open wounds. TOUCH checklist completed. Pt wanted to have MICHELLE virus drawn here but informed her due to TOUCH program, it has to be drawn at [facility]. She verbalized understanding. PIV started, brisk blood return observed. Tysabri infused with no s/s of adverse reaction. 1 hour obs completed with no s/s of adverse reaction. PIV flushed and removed, gauze and coban dressing placed. Pt has next appt. Left on foot in good spirits.

## 2019-10-17 ENCOUNTER — OFFICE VISIT (OUTPATIENT)
Dept: MEDICAL GROUP | Facility: CLINIC | Age: 29
End: 2019-10-17
Payer: COMMERCIAL

## 2019-10-17 VITALS
HEIGHT: 70 IN | BODY MASS INDEX: 21.21 KG/M2 | RESPIRATION RATE: 18 BRPM | SYSTOLIC BLOOD PRESSURE: 106 MMHG | DIASTOLIC BLOOD PRESSURE: 68 MMHG | WEIGHT: 148.15 LBS | HEART RATE: 99 BPM | OXYGEN SATURATION: 97 % | TEMPERATURE: 98.1 F

## 2019-10-17 DIAGNOSIS — S63.641A SKIER'S THUMB, RIGHT, INITIAL ENCOUNTER: ICD-10-CM

## 2019-10-17 PROCEDURE — 99203 OFFICE O/P NEW LOW 30 MIN: CPT | Performed by: FAMILY MEDICINE

## 2019-10-17 ASSESSMENT — ENCOUNTER SYMPTOMS
DIZZINESS: 0
FEVER: 0
SHORTNESS OF BREATH: 0
VOMITING: 0
NAUSEA: 0
CHILLS: 0

## 2019-10-17 NOTE — PROGRESS NOTES
Subjective:      Rosita Bowles is a 28 y.o. female who presents with Finger Injury (Referral from / R thumb injury )     Referred by  Lilian Vernon P.A.-C.  for evaluation of RIGHT thumb pain (right-hand-dominant)    HPI   RIGHT thumb pain   Date of injury, October 5, 2019  Mechanism of injury, playing volleyball and hit with a spike in the RIGHT hand  She had sudden pain at the time of injury, but really got worse after the game  She was more concerned about the swelling in the bruising  Pain is predominantly at the RIGHT MCP joint  Sharp pain while brushing her hair in the shower without wearing her brace  No radiation  Improved with rest and bracing, thumb spica splint  Worse with gripping or using the hand  Occasional night symptoms  Not currently taking medication for pain  Denies any prior issues with the RIGHT hand    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   Respiratory: Negative for shortness of breath.    Cardiovascular: Negative for chest pain.   Gastrointestinal: Negative for nausea and vomiting.   Neurological: Negative for dizziness.     PMH:  has a past medical history of Acne (8/7/2012), Acne vulgaris (6/23/2017), ADHD (8/7/2012), Depression (8/7/2012), Multiple sclerosis (HCC) (11/2/2016), and Plantar wart (1/9/2019).  MEDS:   Current Outpatient Medications:   •  [START ON 12/15/2019] methylphenidate (CONCERTA) 54 MG CR tablet, Take 1 Tab by mouth every morning for 31 days., Disp: 31 Tab, Rfl: 0  •  fluconazole (DIFLUCAN) 150 MG tablet, Take 1 Tab by mouth every day., Disp: 2 Tab, Rfl: 0  •  natalizumab (TYSABRI) 300 MG/15ML Conc, by Intravenous route., Disp: , Rfl:   •  valacyclovir (VALTREX) 1 GM Tab, Take 1 Tab by mouth 2 times a day as needed (cold sores)., Disp: 180 Tab, Rfl: 3  •  buPROPion (WELLBUTRIN XL) 300 MG XL tablet, TAKE 1 TAB BY MOUTH EVERY  "MORNING., Disp: 90 Tab, Rfl: 3  •  temazepam (RESTORIL) 15 MG Cap, Take 15 mg by mouth at bedtime as needed for Sleep., Disp: , Rfl:   •  Biotin 5000 MCG Cap, Take 5,000 mcg by mouth every day at 6 PM. supplement, Disp: , Rfl:   •  melatonin 3 MG Tab, Take 3 mg by mouth 1 time daily as needed (insomnia)., Disp: , Rfl:   •  Cyanocobalamin (VITAMIN B-12) 1000 MCG Tab, Take 1,000 mcg by mouth every day. supplement, Disp: , Rfl:   •  Cholecalciferol (VITAMIN D3) 5000 units Tab, Take 5,000 Units by mouth every day. supplement, Disp: , Rfl:   •  Levonorgestrel (KYLEENA) 19.5 MG IUD, 1 Each by Intrauterine route See Admin Instructions. Every 5 years birth control, Disp: , Rfl:   ALLERGIES:   Allergies   Allergen Reactions   • Other Drug Shortness of Breath and Swelling     Eye dye for dilation     • Nkda [No Known Drug Allergy]      SURGHX:   Past Surgical History:   Procedure Laterality Date   • CERVICAL DISK AND FUSION ANTERIOR  08/07/2018   • ANKLE ORIF     • DENTAL EXTRACTION(S)       SOCHX:  reports that she has never smoked. She has never used smokeless tobacco. She reports that she drinks alcohol. She reports that she does not use drugs.  FH: Family history was reviewed, no pertinent findings to report       Objective:     /68 (BP Location: Left arm, Patient Position: Sitting, BP Cuff Size: Adult)   Pulse 99   Temp 36.7 °C (98.1 °F) (Temporal)   Resp 18   Ht 1.78 m (5' 10.08\")   Wt 67.2 kg (148 lb 2.4 oz)   SpO2 97%   BMI 21.21 kg/m²      Physical Exam     Hand exam    NAD  Alert and oriented    BILATERAL WRIST exam  Range of motion intact  No tenderness along scaphoid, TFCC insertion, distal radius or distal ulna  Tinel's testing is NEGATIVE  The hand is otherwise neurovascularly intact    BILATERAL hand exam  NO swelling  NO deformity  Range of motion of all MCP, DIP and PIP joints NORMAL  Pinky opposition NORMAL  Grind test is NEGATIVE  Collateral ligament testing demonstrates 1+ laxity on the RIGHT " compared to left with POSITIVE tenderness at the ulnar collateral ligament of the RIGHT thumb    Assessment/Plan:     1. Skier's thumb, right, initial encounter       Date of injury, October 5, 2019  Mechanism of injury, playing volleyball and hit with a spike in the RIGHT hand    Patient was placed in a thumb spica splint in urgent care  The plan is to continue thumb spica splint for 4 weeks (until November 2, 2019) and 4 weeks of protected activity for an additional 4 weeks (until early December 2019)        10/9/2019 2:21 PM    HISTORY/REASON FOR EXAM:  Pain/Deformity Following Trauma; injury and pain right thumb.  Jammed RIGHT thumb playing volleyball 2 days ago, pain, swelling and bruising.    TECHNIQUE/EXAM DESCRIPTION AND NUMBER OF VIEWS:  3 views of the RIGHT fingers.    COMPARISON: None    FINDINGS:  No focal soft tissue swelling.  No radiopaque foreign body.  No fracture or dislocation.      Impression       No fracture or dislocation of RIGHT thumb.     Interpreted in the office today with the patient    Thank you Lilian Vernon P.A.-C. for allowing me to participate in caring for your patient.

## 2019-10-17 NOTE — LETTER
October 17, 2019         Patient: Rosita Bowles   YOB: 1990   Date of Visit: 10/17/2019           To Whom it May Concern:    Rosita Bowles was seen in my clinic on 10/17/2019. She may return to work/school, but she must wear her thumb spica splint for the next 6 weeks.    If you have any questions or concerns, please don't hesitate to call.        Sincerely,           Brandon Murray M.D.  Electronically Signed

## 2019-10-30 ENCOUNTER — OFFICE VISIT (OUTPATIENT)
Dept: MEDICAL GROUP | Facility: LAB | Age: 29
End: 2019-10-30
Payer: COMMERCIAL

## 2019-10-30 VITALS
RESPIRATION RATE: 17 BRPM | DIASTOLIC BLOOD PRESSURE: 60 MMHG | WEIGHT: 150 LBS | HEART RATE: 103 BPM | SYSTOLIC BLOOD PRESSURE: 86 MMHG | TEMPERATURE: 98.7 F | HEIGHT: 70 IN | BODY MASS INDEX: 21.47 KG/M2 | OXYGEN SATURATION: 97 %

## 2019-10-30 DIAGNOSIS — L98.9 SKIN LESION: ICD-10-CM

## 2019-10-30 PROCEDURE — 99214 OFFICE O/P EST MOD 30 MIN: CPT | Performed by: FAMILY MEDICINE

## 2019-10-30 RX ORDER — PREDNISONE 20 MG/1
TABLET ORAL
COMMUNITY
Start: 2019-10-28 | End: 2019-12-09

## 2019-10-30 NOTE — PROGRESS NOTES
Subjective:     CC: Skin Issue    HPI:   Rosita presents today with     Skin Issue:  Patient states that for 2 months she has had a 3 small lesions by her armpit that are not itchy and have not changed with the exception of having gotten bigger.  She states they have never gotten red or have gotten worse other than in size.  She denies any bleeding or crusting.  She denies any shaving or hair removal in that area.  She denies any history of bedbugs and she washes her sheets frequently.  She denies them being painful or burning.  She has not tried anything to help her any moisturizers.      Past Medical History:   Diagnosis Date   • Acne 8/7/2012   • Acne vulgaris 6/23/2017   • ADHD 8/7/2012   • Depression 8/7/2012   • Multiple sclerosis (HCC) 11/2/2016   • Plantar wart 1/9/2019       Social History     Tobacco Use   • Smoking status: Never Smoker   • Smokeless tobacco: Never Used   Substance Use Topics   • Alcohol use: Yes     Alcohol/week: 0.0 oz     Comment: occasional   • Drug use: No       Current Outpatient Medications Ordered in Epic   Medication Sig Dispense Refill   • predniSONE (DELTASONE) 20 MG Tab      • hydrocortisone 2.5 % Cream topical cream Apply 1 g to affected area(s) 2 times a day. 1 Tube 0   • [START ON 12/15/2019] methylphenidate (CONCERTA) 54 MG CR tablet Take 1 Tab by mouth every morning for 31 days. 31 Tab 0   • fluconazole (DIFLUCAN) 150 MG tablet Take 1 Tab by mouth every day. 2 Tab 0   • natalizumab (TYSABRI) 300 MG/15ML Conc by Intravenous route.     • valacyclovir (VALTREX) 1 GM Tab Take 1 Tab by mouth 2 times a day as needed (cold sores). 180 Tab 3   • buPROPion (WELLBUTRIN XL) 300 MG XL tablet TAKE 1 TAB BY MOUTH EVERY MORNING. 90 Tab 3   • temazepam (RESTORIL) 15 MG Cap Take 15 mg by mouth at bedtime as needed for Sleep.     • Biotin 5000 MCG Cap Take 5,000 mcg by mouth every day at 6 PM. supplement     • melatonin 3 MG Tab Take 3 mg by mouth 1 time daily as needed (insomnia).     •  "Cyanocobalamin (VITAMIN B-12) 1000 MCG Tab Take 1,000 mcg by mouth every day. supplement     • Cholecalciferol (VITAMIN D3) 5000 units Tab Take 5,000 Units by mouth every day. supplement     • Levonorgestrel (KYLEENA) 19.5 MG IUD 1 Each by Intrauterine route See Admin Instructions. Every 5 years  birth control       No current Epic-ordered facility-administered medications on file.        Allergies:  Other drug      ROS:  Gen: no fevers/chill, no changes in weight  Eyes: no changes in vision  ENT: no sore throat, no hearing loss, no bloody nose  Pulm: no sob, no cough  CV: no chest pain, no palpitations  GI: no nausea/vomiting, no diarrhea  : no dysuria  MSk: no myalgias  Skin: no rash  Neuro: no headaches, no numbness/tingling  Heme/Lymph: no easy bruising      Objective:       Exam:  BP (!) 86/60 (BP Location: Left arm, Patient Position: Sitting, BP Cuff Size: Adult)   Pulse (!) 103   Temp 37.1 °C (98.7 °F) (Temporal)   Resp 17   Ht 1.78 m (5' 10.08\")   Wt 68 kg (150 lb)   SpO2 97%   BMI 21.47 kg/m²  Body mass index is 21.47 kg/m².    Gen: Alert and oriented, No apparent distress.  Neck: Neck is supple without lymphadenopathy.  Lungs: Normal effort, CTA bilaterally, no wheezes, rhonchi, or rales  CV: Regular rate and rhythm. No murmurs, rubs, or gallops.               Ext: No clubbing, cyanosis, edema.  3 small non-circumscribed 0.1 x 0.1 lesions mildly erythematous, slightly plaque he skin color with flakiness.      Assessment & Plan:     28 y.o. female with the following -     1. Skin lesion  Does present with a very typical bedbug presentation however the history does not match.  Possibly just a dermatitis and is very vague picture.  Will trial hydrocortisone and moisturizing creams and will follow-up if no resolution.    Please note that this dictation was created using voice recognition software. I have made every reasonable attempt to correct obvious errors, but I expect that there are errors of " grammar and possibly content that I did not discover before finalizing the note.

## 2019-11-09 ENCOUNTER — OUTPATIENT INFUSION SERVICES (OUTPATIENT)
Dept: ONCOLOGY | Facility: MEDICAL CENTER | Age: 29
End: 2019-11-09
Attending: PSYCHIATRY & NEUROLOGY
Payer: COMMERCIAL

## 2019-11-09 VITALS
OXYGEN SATURATION: 100 % | SYSTOLIC BLOOD PRESSURE: 122 MMHG | DIASTOLIC BLOOD PRESSURE: 71 MMHG | TEMPERATURE: 98.1 F | BODY MASS INDEX: 21.9 KG/M2 | WEIGHT: 153 LBS | HEART RATE: 90 BPM | RESPIRATION RATE: 18 BRPM | HEIGHT: 70 IN

## 2019-11-09 PROCEDURE — 96365 THER/PROPH/DIAG IV INF INIT: CPT

## 2019-11-09 PROCEDURE — 700105 HCHG RX REV CODE 258: Performed by: PSYCHIATRY & NEUROLOGY

## 2019-11-09 PROCEDURE — 306780 HCHG STAT FOR TRANSFUSION PER CASE

## 2019-11-09 PROCEDURE — 700111 HCHG RX REV CODE 636 W/ 250 OVERRIDE (IP): Performed by: PSYCHIATRY & NEUROLOGY

## 2019-11-09 RX ADMIN — NATALIZUMAB 300 MG: 300 INJECTION INTRAVENOUS at 10:40

## 2019-11-18 DIAGNOSIS — F33.42 RECURRENT MAJOR DEPRESSIVE DISORDER, IN FULL REMISSION (HCC): ICD-10-CM

## 2019-11-18 RX ORDER — BUPROPION HYDROCHLORIDE 300 MG/1
TABLET ORAL
Qty: 90 TAB | Refills: 0 | Status: SHIPPED | OUTPATIENT
Start: 2019-11-18 | End: 2020-02-10

## 2019-11-18 NOTE — TELEPHONE ENCOUNTER
Was the patient seen in the last year in this department? Yes 10/30/19    Does patient have an active prescription for medications requested? No     Received Request Via: Pharmacy

## 2019-11-26 ENCOUNTER — OFFICE VISIT (OUTPATIENT)
Dept: MEDICAL GROUP | Facility: CLINIC | Age: 29
End: 2019-11-26
Payer: COMMERCIAL

## 2019-11-26 VITALS
WEIGHT: 146 LBS | OXYGEN SATURATION: 95 % | SYSTOLIC BLOOD PRESSURE: 110 MMHG | BODY MASS INDEX: 20.9 KG/M2 | HEIGHT: 70 IN | TEMPERATURE: 97.8 F | HEART RATE: 88 BPM | DIASTOLIC BLOOD PRESSURE: 80 MMHG | RESPIRATION RATE: 16 BRPM

## 2019-11-26 DIAGNOSIS — S63.641D SKIER'S THUMB, RIGHT, SUBSEQUENT ENCOUNTER: ICD-10-CM

## 2019-11-26 PROCEDURE — 99212 OFFICE O/P EST SF 10 MIN: CPT | Performed by: FAMILY MEDICINE

## 2019-11-26 NOTE — PROGRESS NOTES
Subjective:      Rosita Bowles is a 28 y.o. 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    Physical Exam     Hand exam    NAD  Alert and oriented    BILATERAL hand exam  Collateral ligament testing demonstrates 1+ laxity on the RIGHT compared to left with NEGATIVE tenderness at the ulnar collateral ligament of the RIGHT thumb    Assessment/Plan:     1. Skier's thumb, right, subsequent encounter         Date of injury, October 5, 2019  Mechanism of injury, playing volleyball and hit with a spike in the RIGHT hand    The plan is to continue thumb spica splint for 4 weeks (until November 2, 2019) and 4 weeks of protected activity for an additional 4 weeks (until early December 2019)    Doing well, follow-up as needed        10/9/2019 2:21 PM    HISTORY/REASON FOR EXAM:  Pain/Deformity Following Trauma; injury and pain right thumb.  Jammed RIGHT thumb playing volleyball 2 days ago, pain, swelling and bruising.    TECHNIQUE/EXAM DESCRIPTION AND NUMBER OF VIEWS:  3 views of the RIGHT fingers.    COMPARISON: None    FINDINGS:  No focal soft tissue swelling.  No radiopaque foreign body.  No fracture or dislocation.      Impression       No fracture or dislocation of RIGHT thumb.     Thank you Lilian Vernon P.A.-C. for allowing me to participate in caring for your patient.

## 2019-12-07 ENCOUNTER — OUTPATIENT INFUSION SERVICES (OUTPATIENT)
Dept: ONCOLOGY | Facility: MEDICAL CENTER | Age: 29
End: 2019-12-07
Attending: PSYCHIATRY & NEUROLOGY
Payer: COMMERCIAL

## 2019-12-07 VITALS
HEIGHT: 70 IN | SYSTOLIC BLOOD PRESSURE: 106 MMHG | DIASTOLIC BLOOD PRESSURE: 68 MMHG | BODY MASS INDEX: 21.02 KG/M2 | OXYGEN SATURATION: 98 % | TEMPERATURE: 97 F | RESPIRATION RATE: 18 BRPM | WEIGHT: 146.83 LBS | HEART RATE: 60 BPM

## 2019-12-07 DIAGNOSIS — G35 MULTIPLE SCLEROSIS (HCC): ICD-10-CM

## 2019-12-07 PROCEDURE — 700105 HCHG RX REV CODE 258: Performed by: PSYCHIATRY & NEUROLOGY

## 2019-12-07 PROCEDURE — 306780 HCHG STAT FOR TRANSFUSION PER CASE

## 2019-12-07 PROCEDURE — 96365 THER/PROPH/DIAG IV INF INIT: CPT

## 2019-12-07 PROCEDURE — 700111 HCHG RX REV CODE 636 W/ 250 OVERRIDE (IP): Performed by: PSYCHIATRY & NEUROLOGY

## 2019-12-07 RX ADMIN — NATALIZUMAB 300 MG: 300 INJECTION INTRAVENOUS at 08:20

## 2019-12-09 ENCOUNTER — OFFICE VISIT (OUTPATIENT)
Dept: NEUROLOGY | Facility: MEDICAL CENTER | Age: 29
End: 2019-12-09
Payer: COMMERCIAL

## 2019-12-09 VITALS
HEART RATE: 98 BPM | OXYGEN SATURATION: 99 % | WEIGHT: 146 LBS | DIASTOLIC BLOOD PRESSURE: 68 MMHG | HEIGHT: 69 IN | BODY MASS INDEX: 21.62 KG/M2 | SYSTOLIC BLOOD PRESSURE: 124 MMHG

## 2019-12-09 DIAGNOSIS — M50.123 CERVICAL DISC DISORDER AT C6-C7 LEVEL WITH RADICULOPATHY: ICD-10-CM

## 2019-12-09 DIAGNOSIS — G35 MULTIPLE SCLEROSIS (HCC): Primary | ICD-10-CM

## 2019-12-09 PROCEDURE — 99213 OFFICE O/P EST LOW 20 MIN: CPT | Performed by: PSYCHIATRY & NEUROLOGY

## 2019-12-09 ASSESSMENT — ENCOUNTER SYMPTOMS
DEPRESSION: 0
TINGLING: 0
MEMORY LOSS: 0
FOCAL WEAKNESS: 0
BLURRED VISION: 1
SENSORY CHANGE: 0
CONSTIPATION: 0
DOUBLE VISION: 0
SPEECH CHANGE: 0
TREMORS: 0
WEAKNESS: 0

## 2019-12-10 DIAGNOSIS — B37.9 ANTIBIOTIC-INDUCED YEAST INFECTION: ICD-10-CM

## 2019-12-10 DIAGNOSIS — T36.95XA ANTIBIOTIC-INDUCED YEAST INFECTION: ICD-10-CM

## 2019-12-10 RX ORDER — FLUCONAZOLE 150 MG/1
150 TABLET ORAL DAILY
Qty: 2 TAB | Refills: 0 | Status: SHIPPED | OUTPATIENT
Start: 2019-12-10 | End: 2019-12-31 | Stop reason: SDUPTHER

## 2019-12-10 NOTE — PROGRESS NOTES
Subjective:      Rosita Bowles is a 29 y.o. female who presents for follow-up, last seen in June, 2019, with a history of MS and cervical radiculopathy.     HPI    MS: She has continued to do well, denies any episodes suggestive of disease relapse.  She just received her most recent Tysabri infusion, she is tolerating them well.  Unfortunately she was required to have laboratories done at [facility], so I do not have these results.  MRI imaging of the brain from February of this year was completely unremarkable for acute disease activity or significant change in burden of disease.  MRI the cervical spine was unremarkable for intrinsic cord signal abnormality or inflammation, only postoperative changes though there is still foraminal narrowing.    She is doing well cognitively, though she is recognizing a slowness with mental processing and resulting difficulties with multitasking.  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  She and her significant other are doing well, she met his family for the first time over Thanksgiving.    The left eye inferior quadrantic deficit is unchanged, she denies double vision or bulbar symptoms.  Fatigue is not an issue, there is no focal motor or sensory problem at this time.  Balance is stable, there have been no major changes in bowel or bladder function or sexual dysfunction.    Medical, surgical and family histories are reviewed, there are no new drug allergies.  She is on Tysabri 300 mg infusions monthly, Concerta, Valtrex as needed, Wellbutrin XL, and Restoril.    Review of Systems   Constitutional: Negative for malaise/fatigue.   Eyes: Positive for blurred vision. Negative for double vision.   Gastrointestinal: Negative for constipation.   Genitourinary: Negative for frequency.   Neurological: Negative for tingling, tremors, sensory change, speech change, focal weakness and weakness.  "  Psychiatric/Behavioral: Negative for depression and memory loss.   All other systems reviewed and are negative.       Objective:     /68 (BP Location: Left arm, Patient Position: Sitting, BP Cuff Size: Adult)   Pulse 98   Ht 1.753 m (5' 9\")   Wt 66.2 kg (146 lb)   SpO2 99%   BMI 21.56 kg/m²      Physical Exam    As always she appears in no acute distress.  Very pleasant and spirit and demeanor, always cooperative.  Her vital signs are stable.  There is no malar rash, Lhermitte's phenomena is absent.  Range of motion is still constrained because of her surgery.  Compression maneuvers were not attempted.    Cognition is intact.  PERRLA/EOMI, the inferior nasal quadrantic deficit OS is unchanged.  Facial movements are symmetric, sensory exam is intact to temperature bilaterally.  The tongue and uvula are midline.  Shoulder shrug is symmetric.    Musculoskeletal exam reveals no obvious drift or tremor, strength is intact in all 4 extremities.  Reflexes remain exquisitely brisk, mostly in the lower extremities, but there are no right to left asymmetries.  Both toes are downgoing.    She stands easily, gait is normal in station, heel, toe, and tandem walking can all be done independently.  There is no appendicular dystaxia.  Repetitive movements are intact and symmetric.    Sensory exam remains intact to temperature and vibration.  Romberg is absent.     Assessment/Plan:     1. Multiple sclerosis (HCC)  Clinically stable, we will repeat another MRI study of the brain early next year, we can follow-up afterwards.  She will continue her Tysabri infusions, we will try to obtain copies of records from [facility] laboratories confirming that she remains MICHELLE viral antibody negative.  Imaging certainly is not showing any signs suggestive of PML.    - MR-BRAIN-WITH & W/O; Future  - Renal Function Panel; Future    2. Cervical disc disorder at C6-C7 level with radiculopathy  Doing well, there are no new issues though she is " complaining of more neck and back pain, likely related to stress as she undergoes testing in school.    Time: 20 minutes spent for exam, review, discussion, and education, of this over 50% of the time spent counseling and coordinating care.

## 2019-12-17 ENCOUNTER — OFFICE VISIT (OUTPATIENT)
Dept: MEDICAL GROUP | Facility: LAB | Age: 29
End: 2019-12-17
Payer: COMMERCIAL

## 2019-12-17 VITALS
BODY MASS INDEX: 21.83 KG/M2 | DIASTOLIC BLOOD PRESSURE: 54 MMHG | SYSTOLIC BLOOD PRESSURE: 110 MMHG | OXYGEN SATURATION: 93 % | TEMPERATURE: 97.6 F | HEIGHT: 69 IN | WEIGHT: 147.4 LBS | HEART RATE: 73 BPM

## 2019-12-17 DIAGNOSIS — F90.0 ATTENTION DEFICIT HYPERACTIVITY DISORDER (ADHD), PREDOMINANTLY INATTENTIVE TYPE: ICD-10-CM

## 2019-12-17 PROCEDURE — 99214 OFFICE O/P EST MOD 30 MIN: CPT | Performed by: FAMILY MEDICINE

## 2019-12-17 RX ORDER — METHYLPHENIDATE HYDROCHLORIDE 54 MG/1
54 TABLET ORAL EVERY MORNING
Qty: 31 TAB | Refills: 0 | Status: SHIPPED | OUTPATIENT
Start: 2020-02-17 | End: 2020-03-11 | Stop reason: SDUPTHER

## 2019-12-17 RX ORDER — METHYLPHENIDATE HYDROCHLORIDE 54 MG/1
54 TABLET ORAL EVERY MORNING
Qty: 31 TAB | Refills: 0 | Status: SHIPPED | OUTPATIENT
Start: 2020-01-17 | End: 2019-12-17 | Stop reason: SDUPTHER

## 2019-12-17 RX ORDER — METHYLPHENIDATE HYDROCHLORIDE 54 MG/1
54 TABLET ORAL EVERY MORNING
Qty: 31 TAB | Refills: 0 | Status: SHIPPED | OUTPATIENT
Start: 2019-12-17 | End: 2019-12-17 | Stop reason: SDUPTHER

## 2019-12-17 NOTE — PROGRESS NOTES
Subjective:     CC: ADHD:    HPI:   Rosita presents today with     ADHD:  Chronic stable issue.  Patient has a history of ADHD for several years now.  She is compliant with her medications, Concerta 54 mg daily.  She denies any issues with appetite, insomnia, or weight loss.  Patient denies any side effects understands the benefits, risks, and contraindication of medication.    Past Medical History:   Diagnosis Date   • Acne 8/7/2012   • Acne vulgaris 6/23/2017   • ADHD 8/7/2012   • Depression 8/7/2012   • Multiple sclerosis (HCC) 11/2/2016   • Plantar wart 1/9/2019       Social History     Tobacco Use   • Smoking status: Never Smoker   • Smokeless tobacco: Never Used   Substance Use Topics   • Alcohol use: Yes     Alcohol/week: 0.0 oz     Comment: occasional   • Drug use: No       Current Outpatient Medications Ordered in Epic   Medication Sig Dispense Refill   • [START ON 2/17/2020] methylphenidate (CONCERTA) 54 MG CR tablet Take 1 Tab by mouth every morning for 31 days. 31 Tab 0   • fluconazole (DIFLUCAN) 150 MG tablet Take 1 Tab by mouth every day. 2 Tab 0   • buPROPion (WELLBUTRIN XL) 300 MG XL tablet TAKE 1 TABLET BY MOUTH EVERY DAY IN THE MORNING 90 Tab 0   • hydrocortisone 2.5 % Cream topical cream Apply 1 g to affected area(s) 2 times a day. 1 Tube 0   • valacyclovir (VALTREX) 1 GM Tab Take 1 Tab by mouth 2 times a day as needed (cold sores). 180 Tab 3   • temazepam (RESTORIL) 15 MG Cap Take 15 mg by mouth at bedtime as needed for Sleep.     • Biotin 5000 MCG Cap Take 5,000 mcg by mouth every day at 6 PM. supplement     • melatonin 3 MG Tab Take 3 mg by mouth 1 time daily as needed (insomnia).     • Cyanocobalamin (VITAMIN B-12) 1000 MCG Tab Take 1,000 mcg by mouth every day. supplement     • Cholecalciferol (VITAMIN D3) 5000 units Tab Take 5,000 Units by mouth every day. supplement     • Levonorgestrel (KYLEENA) 19.5 MG IUD 1 Each by Intrauterine route See Admin Instructions. Every 5 years  birth control    "  • natalizumab (TYSABRI) 300 MG/15ML Conc by Intravenous route.       No current [facility]-ordered facility-administered medications on file.        Allergies:  Other drug    ROS:  Gen: no fevers/chill, no changes in weight  Eyes: no changes in vision  ENT: no sore throat, no hearing loss, no bloody nose  Pulm: no sob, no cough  CV: no chest pain, no palpitations  GI: no nausea/vomiting, no diarrhea  : no dysuria  MSk: no myalgias  Skin: no rash  Neuro: no headaches, no numbness/tingling  Heme/Lymph: no easy bruising      Objective:       Exam:  /54 (BP Location: Left arm, Patient Position: Sitting)   Pulse 73   Temp 36.4 °C (97.6 °F) (Temporal)   Ht 1.753 m (5' 9\")   Wt 66.9 kg (147 lb 6.4 oz)   SpO2 93%   BMI 21.77 kg/m²  Body mass index is 21.77 kg/m².    Gen: Alert and oriented, No apparent distress.  Neck: Neck is supple without lymphadenopathy.  Lungs: Normal effort, CTA bilaterally, no wheezes, rhonchi, or rales  CV: Regular rate and rhythm. No murmurs, rubs, or gallops.               Ext: No clubbing, cyanosis, edema.    Assessment & Plan:     29 y.o. female with the following -     1. Attention deficit hyperactivity disorder (ADHD), predominantly inattentive type  Continue Concerta at this time  Patient understands this prescription is a controlled substance which is potentially habit-forming and its use is regulated by the [place].  We also discussed the new \"black box\" warning regarding the lethal combination of opioids and benzodiazepines.  Refills are subject to terms of a controlled substance agreement and patient has an updated one on file.  Most recent UDS is appropriate.  They are aware that any refill requires an office visit.  Narcotics have may adverse effects and the risks of addiction, accidental overdose and death were emphasized.  Provided prescriptions for the next three months.  - methylphenidate (CONCERTA) 54 MG CR tablet; Take 1 Tab by mouth every morning for 31 days.  Dispense: 31 " Tab; Refill: 0        Please note that this dictation was created using voice recognition software. I have made every reasonable attempt to correct obvious errors, but I expect that there are errors of grammar and possibly content that I did not discover before finalizing the note.

## 2019-12-27 ENCOUNTER — TELEPHONE (OUTPATIENT)
Dept: NEUROLOGY | Facility: MEDICAL CENTER | Age: 29
End: 2019-12-27

## 2019-12-27 NOTE — TELEPHONE ENCOUNTER
"Dr. Garrett patient reached out to us Via [employer] and stated this:     \"Hi Dr. Garrett,     I think I just experienced \"MS hug\" - I felt numbness and some pressure on the left side of my chest (around where my heart is) for about 5 minutes. I'm not sure if this is indicative of anything, but I felt I should just give you an update.     Best,     Rosita Bowles     Is this a normal thing with MS patients?  And how is this treated?   Please advise.   "

## 2019-12-31 DIAGNOSIS — B37.9 ANTIBIOTIC-INDUCED YEAST INFECTION: ICD-10-CM

## 2019-12-31 DIAGNOSIS — T36.95XA ANTIBIOTIC-INDUCED YEAST INFECTION: ICD-10-CM

## 2019-12-31 RX ORDER — FLUCONAZOLE 150 MG/1
150 TABLET ORAL DAILY
Qty: 2 TAB | Refills: 0 | Status: SHIPPED | OUTPATIENT
Start: 2019-12-31 | End: 2020-08-10

## 2019-12-31 NOTE — TELEPHONE ENCOUNTER
Tell Rosita to stop freaking herself out every time her symptoms may fluctuate.  She has a lesion in her spinal cord that will explain the symptoms, and symptoms will fluctuate from hour to hour, sometimes day by day.  The issue is that they resolve quickly and spontaneously.  This is not an indication that her disease is activating, on the other hand, chest pain can often be an indicator of other problems that are nonneurologic, in her circumstance, most of these are benign.

## 2020-01-04 ENCOUNTER — OUTPATIENT INFUSION SERVICES (OUTPATIENT)
Dept: ONCOLOGY | Facility: MEDICAL CENTER | Age: 30
End: 2020-01-04
Attending: PSYCHIATRY & NEUROLOGY
Payer: COMMERCIAL

## 2020-01-04 VITALS
HEART RATE: 85 BPM | OXYGEN SATURATION: 100 % | DIASTOLIC BLOOD PRESSURE: 69 MMHG | HEIGHT: 70 IN | RESPIRATION RATE: 18 BRPM | BODY MASS INDEX: 20.89 KG/M2 | SYSTOLIC BLOOD PRESSURE: 116 MMHG | TEMPERATURE: 97.4 F | WEIGHT: 145.94 LBS

## 2020-01-04 PROCEDURE — 306780 HCHG STAT FOR TRANSFUSION PER CASE

## 2020-01-04 PROCEDURE — 700111 HCHG RX REV CODE 636 W/ 250 OVERRIDE (IP): Performed by: PSYCHIATRY & NEUROLOGY

## 2020-01-04 PROCEDURE — 700105 HCHG RX REV CODE 258: Performed by: PSYCHIATRY & NEUROLOGY

## 2020-01-04 PROCEDURE — 96365 THER/PROPH/DIAG IV INF INIT: CPT

## 2020-01-04 RX ADMIN — NATALIZUMAB 300 MG: 300 INJECTION INTRAVENOUS at 10:38

## 2020-01-04 NOTE — PROGRESS NOTES
Pt arrived ambulatory to [facility] for Tysabri infusion. TOUCH program questions completed. POC discussed with pt and she agrees with plan. PIV established, brisk blood return noted. Pt medicated per MAR. Pt monitored x 1 hour post Tysabri infusion. Pt tolerated treatment without s/s adverse reaction. PIV dc'd catheter tip intact, gauze and coban dressing applied. Pt discharged to self care, [facility].

## 2020-02-01 ENCOUNTER — OUTPATIENT INFUSION SERVICES (OUTPATIENT)
Dept: ONCOLOGY | Facility: MEDICAL CENTER | Age: 30
End: 2020-02-01
Attending: PSYCHIATRY & NEUROLOGY
Payer: COMMERCIAL

## 2020-02-01 VITALS
RESPIRATION RATE: 18 BRPM | TEMPERATURE: 98.4 F | BODY MASS INDEX: 21.65 KG/M2 | SYSTOLIC BLOOD PRESSURE: 113 MMHG | DIASTOLIC BLOOD PRESSURE: 58 MMHG | OXYGEN SATURATION: 100 % | HEART RATE: 89 BPM | HEIGHT: 70 IN | WEIGHT: 151.24 LBS

## 2020-02-01 PROCEDURE — 306780 HCHG STAT FOR TRANSFUSION PER CASE

## 2020-02-01 NOTE — PROGRESS NOTES
Pt to [facility] for Tysabri infusion.  JCV titer lab results obtained. Pt now MICHELLE positive as of 11/26/19.  No indication in most recent MD note that MD was aware.  Dr Asfhord notified (neurologist on call).  Received orders to hold Tysabri today and pt to follow up with Dr Garrett on Monday 2/4/2020.    Pt verbalized understanding of plan, LOF to self care.

## 2020-02-04 ENCOUNTER — TELEPHONE (OUTPATIENT)
Dept: NEUROLOGY | Facility: MEDICAL CENTER | Age: 30
End: 2020-02-04

## 2020-02-04 DIAGNOSIS — G35 MULTIPLE SCLEROSIS (HCC): ICD-10-CM

## 2020-02-04 NOTE — TELEPHONE ENCOUNTER
----- Message from Rosita Bowles sent at 2/3/2020  8:08 PM PST -----  Regarding: Test Result Question  Contact: [phone]  Hi Dr. Garrett,    My blood test results indicate that I have the MICHELLE virus (index value 3.7) - because of this, I did not receive the Tysabri infusion this past Saturday. This test was done November 26 of 2019. I was wondering if you want me to continue on Tysabri or switch to another drug - just in case, I have another infusion scheduled for this Saturday (4:30pm).     Hope all is well!    -Rosita Bowles

## 2020-02-05 NOTE — TELEPHONE ENCOUNTER
Tell Rosita to hold on the next Tysabri infusion.  We will need to start Ocrevus infusions 2 months after her last Tysabri infusion.  Have her read up on the drug, lets see if we can get her into the office before the end of this month to talk about the drug.  In the meantime, we need to set it up through the infusion center.  She also needs to come in and get screening for hepatitis, something that is a risk for reactivation in patients who have been exposed in the past.  The labs are in epic.

## 2020-02-07 ENCOUNTER — OFFICE VISIT (OUTPATIENT)
Dept: NEUROLOGY | Facility: MEDICAL CENTER | Age: 30
End: 2020-02-07
Payer: COMMERCIAL

## 2020-02-07 VITALS
WEIGHT: 143 LBS | HEART RATE: 88 BPM | SYSTOLIC BLOOD PRESSURE: 124 MMHG | BODY MASS INDEX: 21.18 KG/M2 | OXYGEN SATURATION: 97 % | HEIGHT: 69 IN | DIASTOLIC BLOOD PRESSURE: 70 MMHG

## 2020-02-07 DIAGNOSIS — G35 MULTIPLE SCLEROSIS (HCC): Primary | ICD-10-CM

## 2020-02-07 PROCEDURE — 99214 OFFICE O/P EST MOD 30 MIN: CPT | Performed by: PSYCHIATRY & NEUROLOGY

## 2020-02-07 ASSESSMENT — ENCOUNTER SYMPTOMS
MEMORY LOSS: 0
FOCAL WEAKNESS: 0
TREMORS: 0
DOUBLE VISION: 0
PHOTOPHOBIA: 0
CONSTIPATION: 0
POLYDIPSIA: 1
TINGLING: 0
SENSORY CHANGE: 0

## 2020-02-07 NOTE — PROGRESS NOTES
"Subjective:      oRsita Bowles is a 29 y.o. female who presents for follow-up, with a history of MS, but whose recent MICHELLE virus index was found to be significantly elevated at 3.7.    HPI    Clinically, Rosita has done well from a disease standpoint.  She is now been more than a year after her last disease relapse, stable on Tysabri 300 mg infusions once a month.  She had had her first MICHELLE viral index drawn before she moved to the area, back in [place], remembers being told that it was \"unremarkable\".  Unfortunately, I was never able to get the actual value documented.    Regardless, she was doing well, she still has the left eye visual impairment, minimal gait ataxia and not much else other than sensory symptoms in the feet.  Fatigue is not an issue, bowel and bladder control are maintained.  She denies Lhermitte's phenomena or constrictive sensations consistent with the \"MS hug\".  She recently underwent routine MICHELLE viral screens, the index came back significantly elevated.  She has missed her last 2 infusions of Tysabri.  She now presents.    Her last MRI scan of the brain and neck were in February, 2019, both demonstrating stable burdens of disease in the brain and cervical spine, no evidence of new lesions or active disease with enhancement and DWI abnormalities.    Medical, surgical and family histories are reviewed, there are no new drug allergies.  From my standpoint, she is on no medication symptomatically, she remains on Kyleena birth control, temazepam 50 mg nightly as needed, Wellbutrin XL, Concerta, Valtrex as needed and Diflucan 150 mg daily.    Review of Systems   Constitutional: Negative for malaise/fatigue.   Eyes: Negative for double vision and photophobia.   Gastrointestinal: Negative for constipation.   Genitourinary: Negative for frequency and urgency.   Neurological: Negative for tingling, tremors, sensory change and focal weakness.   Endo/Heme/Allergies: Positive for polydipsia. " "  Psychiatric/Behavioral: Negative for memory loss.   All other systems reviewed and are negative.       Objective:     /70 (BP Location: Left arm, Patient Position: Sitting, BP Cuff Size: Adult)   Pulse 88   Ht 1.753 m (5' 9\")   Wt 64.9 kg (143 lb)   SpO2 97%   BMI 21.12 kg/m²      Physical Exam    She appears in no acute distress.  Vital signs are stable.  There is no malar rash or temporal tenderness.  The neck is supple, range of motion is full, Lhermitte's phenomena is absent.  Cardiac evaluation is unremarkable.    Cognition is intact.  PERRLA/EOMI, the left inferior altitudinal visual field deficit is unchanged, visual fields are intact with the right eye.  Facial movements remain symmetric, sensory exam is intact to temperature bilaterally, the tongue and uvula are midline without bulbar dysfunction.  Shoulder shrug and head rotation are intact and symmetric.    Musculoskeletal exam reveals normal tone without tremor or drift.  Strength is 5/5 in all 4 extremities.  There are no pathologic reflexes.    She stands easily, gait is normal in station, arm swing is symmetric, there is no circumduction with either of the lower extremities.  Repetitive movements are intact and symmetric in all 4 extremities.    Sensory exam is intact to vibration and temperature.     Assessment/Plan:     1. Multiple sclerosis (HCC)  Unfortunately, given the elevated index, Tysabri is contraindicated.  Ocrevus 300 mg infusions will be started at a 2-week interval, followed by 600 mg every 6 months dated from the first 300 mg infusion.  Premedication with IV methylprednisolone, Benadryl and Tylenol are part of the routine.  Hepatitis surface antigen and core antibody will be obtained given the elevated risk of infectious hepatitis being reactivated.  Other infections including varicella are seen but they do not become systemic.  There is no need to check immunity at this time.  For immunizations, she was encouraged to get " these, though with are competent forms, not attenuated virus.  This is a CD20 cell suppressor treatment, and thus the elevated risk of viral infections.    There is a theoretical increased risk for PML, though MICHELLE viral indexes are not typically checked.  There is no contraindication even in people where their index is elevated.  Given the severity of her disease in the past, she does warrant a more aggressive treatment, thus use of Ocrevus over some of the other oral agents.  Rationale for this was reviewed.  Her last imaging study was a year ago, repeat should be done for that reason alone, but certainly as a baseline prior to starting treatment.  We talked about side effects and long-term adverse effects with Ocrevus, the need for monitoring, etc.  She feels quite comfortable with all of the above.  We will follow-up in about 6 months.    - MR-BRAIN-WITH & W/O; Future  - MR-CERVICAL SPINE-WITH & W/O; Future    Time: 25-minute spent face-to-face for exam, review, discussion, and education, of this over 60% of the time spent counseling and coordinating care.

## 2020-02-08 ENCOUNTER — APPOINTMENT (OUTPATIENT)
Dept: ONCOLOGY | Facility: MEDICAL CENTER | Age: 30
End: 2020-02-08
Attending: PSYCHIATRY & NEUROLOGY
Payer: COMMERCIAL

## 2020-02-09 DIAGNOSIS — F33.42 RECURRENT MAJOR DEPRESSIVE DISORDER, IN FULL REMISSION (HCC): ICD-10-CM

## 2020-02-10 RX ORDER — BUPROPION HYDROCHLORIDE 300 MG/1
TABLET ORAL
Qty: 90 TAB | Refills: 0 | Status: SHIPPED | OUTPATIENT
Start: 2020-02-10 | End: 2020-03-11 | Stop reason: SDUPTHER

## 2020-02-10 NOTE — TELEPHONE ENCOUNTER
Received request via: Pharmacy    Was the patient seen in the last year in this department? Yes  12/17/19  Does the patient have an active prescription (recently filled or refills available) for medication(s) requested? No

## 2020-02-11 ENCOUNTER — HOSPITAL ENCOUNTER (OUTPATIENT)
Dept: LAB | Facility: MEDICAL CENTER | Age: 30
End: 2020-02-11
Attending: PSYCHIATRY & NEUROLOGY
Payer: COMMERCIAL

## 2020-02-11 DIAGNOSIS — G35 MULTIPLE SCLEROSIS (HCC): ICD-10-CM

## 2020-02-11 PROCEDURE — 87340 HEPATITIS B SURFACE AG IA: CPT

## 2020-02-11 PROCEDURE — 86704 HEP B CORE ANTIBODY TOTAL: CPT

## 2020-02-11 PROCEDURE — 36415 COLL VENOUS BLD VENIPUNCTURE: CPT

## 2020-02-12 ENCOUNTER — TELEPHONE (OUTPATIENT)
Dept: NEUROLOGY | Facility: MEDICAL CENTER | Age: 30
End: 2020-02-12

## 2020-02-12 ENCOUNTER — HOSPITAL ENCOUNTER (OUTPATIENT)
Dept: RADIOLOGY | Facility: MEDICAL CENTER | Age: 30
End: 2020-02-12
Attending: PSYCHIATRY & NEUROLOGY
Payer: COMMERCIAL

## 2020-02-12 ENCOUNTER — OFFICE VISIT (OUTPATIENT)
Dept: MEDICAL GROUP | Facility: LAB | Age: 30
End: 2020-02-12
Payer: COMMERCIAL

## 2020-02-12 ENCOUNTER — HOSPITAL ENCOUNTER (OUTPATIENT)
Facility: MEDICAL CENTER | Age: 30
End: 2020-02-12
Attending: FAMILY MEDICINE
Payer: COMMERCIAL

## 2020-02-12 VITALS
HEIGHT: 69 IN | HEART RATE: 96 BPM | OXYGEN SATURATION: 94 % | BODY MASS INDEX: 21.77 KG/M2 | WEIGHT: 147 LBS | TEMPERATURE: 98.2 F | SYSTOLIC BLOOD PRESSURE: 100 MMHG | DIASTOLIC BLOOD PRESSURE: 56 MMHG

## 2020-02-12 DIAGNOSIS — G35 MULTIPLE SCLEROSIS (HCC): ICD-10-CM

## 2020-02-12 DIAGNOSIS — N89.8 VAGINAL DISCHARGE: ICD-10-CM

## 2020-02-12 DIAGNOSIS — Z00.00 WELL WOMAN EXAM WITHOUT GYNECOLOGICAL EXAM: ICD-10-CM

## 2020-02-12 LAB
CYTOLOGY REG CYTOL: NORMAL
HBV CORE AB SERPL QL IA: NEGATIVE
HBV SURFACE AG SER QL: NEGATIVE

## 2020-02-12 PROCEDURE — 99395 PREV VISIT EST AGE 18-39: CPT | Performed by: FAMILY MEDICINE

## 2020-02-12 PROCEDURE — 87510 GARDNER VAG DNA DIR PROBE: CPT

## 2020-02-12 PROCEDURE — 88175 CYTOPATH C/V AUTO FLUID REDO: CPT

## 2020-02-12 PROCEDURE — 700117 HCHG RX CONTRAST REV CODE 255: Performed by: PSYCHIATRY & NEUROLOGY

## 2020-02-12 PROCEDURE — 87480 CANDIDA DNA DIR PROBE: CPT

## 2020-02-12 PROCEDURE — 87660 TRICHOMONAS VAGIN DIR PROBE: CPT

## 2020-02-12 PROCEDURE — 70553 MRI BRAIN STEM W/O & W/DYE: CPT

## 2020-02-12 PROCEDURE — 72156 MRI NECK SPINE W/O & W/DYE: CPT

## 2020-02-12 PROCEDURE — A9576 INJ PROHANCE MULTIPACK: HCPCS | Performed by: PSYCHIATRY & NEUROLOGY

## 2020-02-12 RX ADMIN — GADOTERIDOL 14 ML: 279.3 INJECTION, SOLUTION INTRAVENOUS at 08:43

## 2020-02-12 ASSESSMENT — PATIENT HEALTH QUESTIONNAIRE - PHQ9
2. FEELING DOWN, DEPRESSED, IRRITABLE, OR HOPELESS: NOT AT ALL
9. THOUGHTS THAT YOU WOULD BE BETTER OFF DEAD, OR OF HURTING YOURSELF: NOT AT ALL
5. POOR APPETITE OR OVEREATING: NOT AT ALL
1. LITTLE INTEREST OR PLEASURE IN DOING THINGS: NOT AT ALL
7. TROUBLE CONCENTRATING ON THINGS, SUCH AS READING THE NEWSPAPER OR WATCHING TELEVISION: NOT AT ALL
4. FEELING TIRED OR HAVING LITTLE ENERGY: NOT AT ALL
3. TROUBLE FALLING OR STAYING ASLEEP OR SLEEPING TOO MUCH: NOT AT ALL
6. FEELING BAD ABOUT YOURSELF - OR THAT YOU ARE A FAILURE OR HAVE LET YOURSELF OR YOUR FAMILY DOWN: NOT AL ALL
SUM OF ALL RESPONSES TO PHQ QUESTIONS 1-9: 0
SUM OF ALL RESPONSES TO PHQ9 QUESTIONS 1 AND 2: 0
8. MOVING OR SPEAKING SO SLOWLY THAT OTHER PEOPLE COULD HAVE NOTICED. OR THE OPPOSITE, BEING SO FIGETY OR RESTLESS THAT YOU HAVE BEEN MOVING AROUND A LOT MORE THAN USUAL: NOT AT ALL

## 2020-02-13 LAB
CANDIDA DNA VAG QL PROBE+SIG AMP: NEGATIVE
G VAGINALIS DNA VAG QL PROBE+SIG AMP: NEGATIVE
T VAGINALIS DNA VAG QL PROBE+SIG AMP: NEGATIVE

## 2020-02-13 NOTE — TELEPHONE ENCOUNTER
Please call Rosita and tell her the MRI scans of her brain and cervical spine are stable, no evidence of new and active disease nor evidence of previous disease with increased scarring and lesion load.  I am very pleased.

## 2020-02-15 ENCOUNTER — HOSPITAL ENCOUNTER (OUTPATIENT)
Dept: LAB | Facility: MEDICAL CENTER | Age: 30
End: 2020-02-15
Attending: PSYCHIATRY & NEUROLOGY
Payer: COMMERCIAL

## 2020-02-15 LAB
ALBUMIN SERPL BCP-MCNC: 4.3 G/DL (ref 3.2–4.9)
BUN SERPL-MCNC: 11 MG/DL (ref 8–22)
CALCIUM SERPL-MCNC: 9.2 MG/DL (ref 8.5–10.5)
CHLORIDE SERPL-SCNC: 109 MMOL/L (ref 96–112)
CO2 SERPL-SCNC: 24 MMOL/L (ref 20–33)
CREAT SERPL-MCNC: 0.92 MG/DL (ref 0.5–1.4)
GLUCOSE SERPL-MCNC: 89 MG/DL (ref 65–99)
PHOSPHATE SERPL-MCNC: 3.8 MG/DL (ref 2.5–4.5)
POTASSIUM SERPL-SCNC: 4.3 MMOL/L (ref 3.6–5.5)
SODIUM SERPL-SCNC: 141 MMOL/L (ref 135–145)

## 2020-02-15 PROCEDURE — 80069 RENAL FUNCTION PANEL: CPT

## 2020-03-01 ENCOUNTER — OUTPATIENT INFUSION SERVICES (OUTPATIENT)
Dept: ONCOLOGY | Facility: MEDICAL CENTER | Age: 30
End: 2020-03-01
Attending: PSYCHIATRY & NEUROLOGY
Payer: COMMERCIAL

## 2020-03-01 VITALS
WEIGHT: 145.28 LBS | HEIGHT: 70 IN | DIASTOLIC BLOOD PRESSURE: 57 MMHG | OXYGEN SATURATION: 100 % | TEMPERATURE: 98.1 F | SYSTOLIC BLOOD PRESSURE: 103 MMHG | BODY MASS INDEX: 20.8 KG/M2 | RESPIRATION RATE: 18 BRPM | HEART RATE: 103 BPM

## 2020-03-01 PROCEDURE — 700105 HCHG RX REV CODE 258: Performed by: PSYCHIATRY & NEUROLOGY

## 2020-03-01 PROCEDURE — 96415 CHEMO IV INFUSION ADDL HR: CPT

## 2020-03-01 PROCEDURE — 306780 HCHG STAT FOR TRANSFUSION PER CASE

## 2020-03-01 PROCEDURE — 96413 CHEMO IV INFUSION 1 HR: CPT

## 2020-03-01 PROCEDURE — 96365 THER/PROPH/DIAG IV INF INIT: CPT

## 2020-03-01 PROCEDURE — 96375 TX/PRO/DX INJ NEW DRUG ADDON: CPT

## 2020-03-01 PROCEDURE — 96366 THER/PROPH/DIAG IV INF ADDON: CPT

## 2020-03-01 PROCEDURE — 700111 HCHG RX REV CODE 636 W/ 250 OVERRIDE (IP): Performed by: PSYCHIATRY & NEUROLOGY

## 2020-03-01 RX ORDER — DIPHENHYDRAMINE HYDROCHLORIDE 50 MG/ML
50 INJECTION INTRAMUSCULAR; INTRAVENOUS PRN
Status: DISCONTINUED | OUTPATIENT
Start: 2020-03-01 | End: 2020-03-01 | Stop reason: HOSPADM

## 2020-03-01 RX ORDER — METHYLPREDNISOLONE SODIUM SUCCINATE 125 MG/2ML
125 INJECTION, POWDER, LYOPHILIZED, FOR SOLUTION INTRAMUSCULAR; INTRAVENOUS ONCE
Status: COMPLETED | OUTPATIENT
Start: 2020-03-01 | End: 2020-03-01

## 2020-03-01 RX ADMIN — METHYLPREDNISOLONE SODIUM SUCCINATE 125 MG: 125 INJECTION, POWDER, FOR SOLUTION INTRAMUSCULAR; INTRAVENOUS at 08:36

## 2020-03-01 RX ADMIN — OCRELIZUMAB 300 MG: 300 INJECTION INTRAVENOUS at 09:19

## 2020-03-01 RX ADMIN — DIPHENHYDRAMINE HYDROCHLORIDE 50 MG: 50 INJECTION INTRAMUSCULAR; INTRAVENOUS at 08:39

## 2020-03-01 ASSESSMENT — FIBROSIS 4 INDEX: FIB4 SCORE: 0.51

## 2020-03-01 NOTE — PROGRESS NOTES
Patient arrived ambulatory to [facility] for Day 1 Ocrevus.  Reviewed plan of care and medication side effects, pt verbalized understanding.  PIV established with good blood return noted.  Pre medications given.  Ocrevus infused, titrating per pharmacy protocol.  At 150 ml/hr, pt complained of PIV discomfort.  Line flushed with brisk blood return noted.  Ocrevus turned down to 120 ml/hr.  Pt denied any other further discomfort.  Ocrevus completed and pt monitored for 1 hour post with no s/s reaction noted.  PIV flushed, removed, and gauze/coban placed.  Pt ambulated out of clinic in no apparent distress.

## 2020-03-02 NOTE — TELEPHONE ENCOUNTER
Received request via: Patient    Was the patient seen in the last year in this department? No    2/12/2020    Does the patient have an active prescription (recently filled or refills available) for medication(s) requested? No

## 2020-03-03 ENCOUNTER — PHYSICAL THERAPY (OUTPATIENT)
Dept: PHYSICAL THERAPY | Facility: MEDICAL CENTER | Age: 30
End: 2020-03-03
Attending: FAMILY MEDICINE
Payer: COMMERCIAL

## 2020-03-03 DIAGNOSIS — G35 MULTIPLE SCLEROSIS (HCC): ICD-10-CM

## 2020-03-03 PROCEDURE — 97110 THERAPEUTIC EXERCISES: CPT

## 2020-03-03 PROCEDURE — 97162 PT EVAL MOD COMPLEX 30 MIN: CPT

## 2020-03-03 ASSESSMENT — ENCOUNTER SYMPTOMS
PAIN SCALE AT HIGHEST: 6
PAIN SCALE AT LOWEST: 1
QUALITY: DULL ACHE
QUALITY: ACHING
PAIN SCALE: 1

## 2020-03-03 NOTE — OP THERAPY EVALUATION
"  Outpatient Physical Therapy  INITIAL NEUROLOGICAL EVALUATION    [facility]  [address]  [place] [address]  Phone:  [phone]  Fax:  [phone]    Date of Evaluation: 03/03/2020    Patient: Rosita Bowles  YOB: 1990  MRN: [id number]     Referring Provider: Blanca Garcia M.D.  [address] [address]  [place] [address]   Referring Diagnosis Multiple sclerosis (HCC) [G35]     Time Calculation  Start time: 1430  Stop time: 1520 Time Calculation (min): 50 minutes       Physical Therapy Occurrence Codes    Date physical therapy care plan established or reviewed:  3/3/20   Date physical therapy treatment started:  3/3/20          Chief Complaint: No chief complaint on file.    Visit Diagnoses     ICD-10-CM   1. Multiple sclerosis (HCC) G35       Subjective:   History of Present Illness:     Mechanism of injury:  Pt \"Rosita\" states she has early signs of arthritis in her back and bulging discs in her back. She has consistent back pain. Pain is all along her spine. And can feel shooting pain in her low back with some movements like twisting. Unable to sit for a long period of time without adjustment. Nothing has really been helpful, but has not really tried much. Pt does not have much free time as she is in school full time until 2021. Not really limited with school, pushes through.     Pt denies n/t into BLE, denies saddle anesthesia.     Has history of cx fusion in neck, cannot extend as much.     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 [facility] and frequently has to push heavy pts around the table.     MS: she has lost the ability to use her hands a lot and lost ability of the RLE once (because of steroids). Loss of hands is not consistent, but a worry her because it is inconsistent and she needs the use of her R hand.     No peripheral vision in the L eye, loss of hands randomly, loss of " R leg randomly. Just switched medications.   Pain:     Current pain ratin    At best pain ratin    At worst pain ratin    Quality:  Aching and dull ache (mostly dull, consistent, irritating pain. shooting when she turns)    Pain timing: worse at the end of the day.  Patient Goals:     Patient goals for therapy:  Increased motion, decreased pain and increased strength    Other patient goals:  Work on back muscles,       Past Medical History:   Diagnosis Date   • Acne 2012   • Acne vulgaris 2017   • ADHD 2012   • Depression 2012   • Multiple sclerosis (HCC) 2016   • Plantar wart 2019     Past Surgical History:   Procedure Laterality Date   • CERVICAL DISK AND FUSION ANTERIOR  2018   • ANKLE ORIF     • DENTAL EXTRACTION(S)       Social History     Tobacco Use   • Smoking status: Never Smoker   • Smokeless tobacco: Never Used   Substance Use Topics   • Alcohol use: Yes     Alcohol/week: 0.0 oz     Comment: occasional     Family and Occupational History     Socioeconomic History   • Marital status: Single     Spouse name: Not on file   • Number of children: Not on file   • Years of education: Not on file   • Highest education level: Not on file   Occupational History   • Not on file       Objective:   Active Range of Motion:   Upper extremity (left):     All left upper extremity active range of motion: All within functional limits  Upper extremity (right):     All right upper extremity active range of motion: All within functional limits  Lower extremity (left):     All left lower extremity active range of motion: All within functional limits  Lower extremity (right):     All right lower extremity active range of motion: All within functional limits      Passive Range of Motion:   Upper extremity (left):     All left upper extremity passive range of motion: All within functional limits  Upper extremity (right):     All right upper extremity passive range of motion: All  "within functional limits  Lower extremity (left):     All left lower extremity passive range of motion: All within functional limits  Lower extremity (right):     All right lower extremity passive range of motion: All within functional limits    Strength:   Lower extremity (left):     Hip flexion: 4+    Knee flexion: 5    Knee extension: 5    Ankle dorsiflexion: 5    Ankle plantar flexion: 5  Lower extremity (right):     Hip flexion: 4+    Knee flexion: 5    Knee extension: 5    Ankle dorsiflexion: 5    Ankle plantar flexion: 5    Strength Comments:  Lx ROM:   All WFL, no pain in back  : most pain with  to L4-5, but TTP with  throughout T1-L5. Hypermobility of L3-5 with   (+) prone instability test  (-) SLR  Squat: slight increase in knee valgus collapse bilaterally      Tone, Sensation and Coordination:   Tone:     Left upper extremity muscle tone: Normal    Right upper extremity muscle tone: Normal    Left lower extremity muscle tone: Normal    Right lower extremity muscle tone: Normal    Modified Willow:     Tone comments:   B DTRs: 2+/4 for B patella and ankle  (-) heel/toe walking bilaterally  (-) ankle clonus bilaterally    Vision/Perception:     Vision/Perception Comments:   Not tested. Pt reports loss of peripheral vision on the L        Therapeutic Exercises (CPT 07531):     1. ADIM, 5\" x 10 x 1    2. ADIM + hip adduction, 5\" x 10 x 1    3. ADIM + marching, 10 x 1    4. ADIM + BKFO, 10 x 1    10. UPOC: 04/28/20      Time-based treatments/modalities:  Therapeutic exercise minutes (CPT 20264): 10 minutes       Assessment, Response and Plan:   Impairments: abnormal muscle firing, abnormal or restricted ROM, activity intolerance, impaired physical strength, lacks appropriate home exercise program and pain with function    Assessment details:  Pt is a pleasant, cooperative 28 yo female who presents with LBP likely as a result of instability. Pt has positive prone instability test, decreased " core stability, and increased hypermobility of . Pt has MS diagnosis, negative for changes in reflexes, (-) clonus, no tone, no significant BLE weakness with muscle testing. Pt's symptoms with MS are loss of peripheral vision in L eye and random bouts of L leg and B hand weakness. Pt will benefit from skilled physical therapy in order to strengthen her core and hips, improve her motor control with activity, and decrease her overall back pain in order to return to ADLs and recreational activities with less pain and restriction.  Other barriers to therapy:  Diagnosis of MS, pt is student  Prognosis: good    Goals:   Short Term Goals:   1. Pt is to be able to perform squat with improved BLE lateral control.  2. Pt is able to move into twisting while sitting without reports of shooting pain into her low back  3. Pt is to perform HEP without cueing demonstrating compliance   Short term goal time span:  2-4 weeks      Long Term Goals:    1. Pt is to be able to have 0/10 VAS pain at rest  2. Pt is to be able to perform pt transfers with proper body mechanics without cueing  3. Pt is to be able to feel comfortable in doing own exercise program for form without cueing  Long term goal time span:  6-8 weeks    Plan:   Therapy options:  Physical therapy treatment to continue  Planned therapy interventions:  E Stim Unattended (CPT 16971), Gait Training (CPT 27553), Manual Therapy (CPT 30930), Neuromuscular Re-education (CPT 77898), Therapeutic Activities (CPT 84245) and Therapeutic Exercise (CPT 90039)  Frequency:  2x week  Duration in weeks:  8  Discussed with:  Patient  Plan details:  1-2x/week for 8 weeks      Functional Assessment Used          Referring provider co-signature:  I have reviewed this plan of care and my co-signature certifies the need for services.  Certification Dates:   From 03/03/20     To 04/28/20    Physician Signature: ________________________________ Date: ______________

## 2020-03-11 ENCOUNTER — OFFICE VISIT (OUTPATIENT)
Dept: MEDICAL GROUP | Facility: LAB | Age: 30
End: 2020-03-11
Payer: COMMERCIAL

## 2020-03-11 VITALS
HEIGHT: 70 IN | WEIGHT: 144.8 LBS | SYSTOLIC BLOOD PRESSURE: 144 MMHG | TEMPERATURE: 98.6 F | BODY MASS INDEX: 20.73 KG/M2 | OXYGEN SATURATION: 98 % | DIASTOLIC BLOOD PRESSURE: 62 MMHG | HEART RATE: 90 BPM

## 2020-03-11 DIAGNOSIS — F90.0 ATTENTION DEFICIT HYPERACTIVITY DISORDER (ADHD), PREDOMINANTLY INATTENTIVE TYPE: ICD-10-CM

## 2020-03-11 DIAGNOSIS — F33.42 RECURRENT MAJOR DEPRESSIVE DISORDER, IN FULL REMISSION (HCC): ICD-10-CM

## 2020-03-11 DIAGNOSIS — L98.9 SKIN LESION: ICD-10-CM

## 2020-03-11 PROCEDURE — 99214 OFFICE O/P EST MOD 30 MIN: CPT | Performed by: FAMILY MEDICINE

## 2020-03-11 RX ORDER — BUPROPION HYDROCHLORIDE 300 MG/1
300 TABLET ORAL EVERY MORNING
Qty: 90 TAB | Refills: 3 | Status: SHIPPED | OUTPATIENT
Start: 2020-03-11 | End: 2021-03-18 | Stop reason: SDUPTHER

## 2020-03-11 RX ORDER — METHYLPHENIDATE HYDROCHLORIDE 54 MG/1
54 TABLET ORAL EVERY MORNING
Qty: 31 TAB | Refills: 0 | Status: SHIPPED | OUTPATIENT
Start: 2020-03-11 | End: 2020-03-11 | Stop reason: SDUPTHER

## 2020-03-11 RX ORDER — METHYLPHENIDATE HYDROCHLORIDE 54 MG/1
54 TABLET ORAL EVERY MORNING
Qty: 31 TAB | Refills: 0 | Status: SHIPPED | OUTPATIENT
Start: 2020-05-11 | End: 2020-06-11

## 2020-03-11 RX ORDER — METHYLPHENIDATE HYDROCHLORIDE 54 MG/1
54 TABLET ORAL EVERY MORNING
Qty: 31 TAB | Refills: 0 | Status: SHIPPED | OUTPATIENT
Start: 2020-04-11 | End: 2020-03-11 | Stop reason: SDUPTHER

## 2020-03-11 ASSESSMENT — FIBROSIS 4 INDEX: FIB4 SCORE: 0.51

## 2020-03-11 NOTE — PROGRESS NOTES
Subjective:     CC: ADHD    HPI:   Rosita presents today with    ADHD:  This is a chronic stable issue.  Patient here for ADHD medication refills.  Patient is currently compliant with her Concerta.  She denies any issues with insomnia, appetite, palpitations, or weight loss.  She oftentimes does do drug holidays.  She is not on any other controlled substances.    Past Medical History:   Diagnosis Date   • Acne 8/7/2012   • Acne vulgaris 6/23/2017   • ADHD 8/7/2012   • Depression 8/7/2012   • Multiple sclerosis (HCC) 11/2/2016   • Plantar wart 1/9/2019       Social History     Tobacco Use   • Smoking status: Never Smoker   • Smokeless tobacco: Never Used   Substance Use Topics   • Alcohol use: Yes     Alcohol/week: 0.0 oz     Comment: occasional   • Drug use: No       Current Outpatient Medications Ordered in Epic   Medication Sig Dispense Refill   • [START ON 5/11/2020] methylphenidate (CONCERTA) 54 MG CR tablet Take 1 Tab by mouth every morning for 31 days. 31 Tab 0   • buPROPion (WELLBUTRIN XL) 300 MG XL tablet Take 1 Tab by mouth every morning. 90 Tab 3   • hydrocortisone 2.5 % Cream topical cream APPLY 1 GRAM TO AFFECTED AREA(S) 2 TIMES A DAY. 28.35 g 1   • fluconazole (DIFLUCAN) 150 MG tablet Take 1 Tab by mouth every day. 2 Tab 0   • natalizumab (TYSABRI) 300 MG/15ML Conc by Intravenous route.     • valacyclovir (VALTREX) 1 GM Tab Take 1 Tab by mouth 2 times a day as needed (cold sores). 180 Tab 3   • temazepam (RESTORIL) 15 MG Cap Take 15 mg by mouth at bedtime as needed for Sleep.     • Biotin 5000 MCG Cap Take 5,000 mcg by mouth every day at 6 PM. supplement     • melatonin 3 MG Tab Take 3 mg by mouth 1 time daily as needed (insomnia).     • Cyanocobalamin (VITAMIN B-12) 1000 MCG Tab Take 1,000 mcg by mouth every day. supplement     • Cholecalciferol (VITAMIN D3) 5000 units Tab Take 5,000 Units by mouth every day. supplement     • Levonorgestrel (KYLEENA) 19.5 MG IUD 1 Each by Intrauterine route See Admin  "Instructions. Every 5 years  birth control       No current Epic-ordered facility-administered medications on file.        Allergies:  Other drug    ROS:  Gen: no fevers/chill, no changes in weight  Eyes: no changes in vision  ENT: no sore throat, no hearing loss, no bloody nose  Pulm: no sob, no cough  CV: no chest pain, no palpitations  GI: no nausea/vomiting, no diarrhea  : no dysuria  MSk: no myalgias  Skin: no rash  Neuro: no headaches, no numbness/tingling  Heme/Lymph: no easy bruising      Objective:       Exam:  /62 (BP Location: Left arm, Patient Position: Sitting)   Pulse 90   Temp 37 °C (98.6 °F) (Temporal)   Ht 1.775 m (5' 9.88\")   Wt 65.7 kg (144 lb 12.8 oz)   SpO2 98%   BMI 20.85 kg/m²  Body mass index is 20.85 kg/m².    Gen: Alert and oriented, No apparent distress.  Neck: Neck is supple without lymphadenopathy.  Lungs: Normal effort, CTA bilaterally, no wheezes, rhonchi, or rales  CV: Regular rate and rhythm. No murmurs, rubs, or gallops.               Ext: No clubbing, cyanosis, edema.    Assessment & Plan:     29 y.o. female with the following -     1.  ADHD  Medications refilled.Patient understands this prescription is a controlled substance which is potentially habit-forming and its use is regulated by the [employer].  We also discussed the new \"black box\" warning regarding the lethal combination of opioids and benzodiazepines.  Refills are subject to terms of a controlled substance agreement and patient has an updated one on file.  Most recent UDS is appropriate.  They are aware that any refill requires an office visit.  Narcotics have may adverse effects and the risks of addiction, accidental overdose and death were emphasized.  Provided prescriptions for the next hree months.      Please note that this dictation was created using voice recognition software. I have made every reasonable attempt to correct obvious errors, but I expect that there are errors of grammar and possibly content " that I did not discover before finalizing the note.

## 2020-03-15 ENCOUNTER — OUTPATIENT INFUSION SERVICES (OUTPATIENT)
Dept: ONCOLOGY | Facility: MEDICAL CENTER | Age: 30
End: 2020-03-15
Attending: PSYCHIATRY & NEUROLOGY
Payer: COMMERCIAL

## 2020-03-15 VITALS
HEART RATE: 79 BPM | SYSTOLIC BLOOD PRESSURE: 100 MMHG | HEIGHT: 70 IN | BODY MASS INDEX: 20.89 KG/M2 | WEIGHT: 145.94 LBS | DIASTOLIC BLOOD PRESSURE: 59 MMHG | OXYGEN SATURATION: 100 % | RESPIRATION RATE: 18 BRPM | TEMPERATURE: 97.5 F

## 2020-03-15 DIAGNOSIS — G35 MULTIPLE SCLEROSIS (HCC): ICD-10-CM

## 2020-03-15 PROCEDURE — 96375 TX/PRO/DX INJ NEW DRUG ADDON: CPT

## 2020-03-15 PROCEDURE — 700105 HCHG RX REV CODE 258: Performed by: PSYCHIATRY & NEUROLOGY

## 2020-03-15 PROCEDURE — 96415 CHEMO IV INFUSION ADDL HR: CPT

## 2020-03-15 PROCEDURE — 306780 HCHG STAT FOR TRANSFUSION PER CASE

## 2020-03-15 PROCEDURE — 700111 HCHG RX REV CODE 636 W/ 250 OVERRIDE (IP): Performed by: PSYCHIATRY & NEUROLOGY

## 2020-03-15 PROCEDURE — 96413 CHEMO IV INFUSION 1 HR: CPT

## 2020-03-15 RX ORDER — METHYLPREDNISOLONE SODIUM SUCCINATE 125 MG/2ML
125 INJECTION, POWDER, LYOPHILIZED, FOR SOLUTION INTRAMUSCULAR; INTRAVENOUS ONCE
Status: COMPLETED | OUTPATIENT
Start: 2020-03-15 | End: 2020-03-15

## 2020-03-15 RX ADMIN — DIPHENHYDRAMINE HYDROCHLORIDE 50 MG: 50 INJECTION INTRAMUSCULAR; INTRAVENOUS at 09:41

## 2020-03-15 RX ADMIN — METHYLPREDNISOLONE SODIUM SUCCINATE 125 MG: 125 INJECTION, POWDER, FOR SOLUTION INTRAMUSCULAR; INTRAVENOUS at 09:38

## 2020-03-15 RX ADMIN — OCRELIZUMAB 300 MG: 300 INJECTION INTRAVENOUS at 10:22

## 2020-03-15 ASSESSMENT — FIBROSIS 4 INDEX: FIB4 SCORE: 0.51

## 2020-03-16 NOTE — PROGRESS NOTES
Patient arrived to [facility] for D15 Ocrevus.  Denies any acute changes, s/s of infection, or fevers.  PIV established with good blood return noted.  Pre medications given and Ocrevus infused per order, titrating per protocol.  Max rate of 120 ml/hr (per patient request) tolerated.  Pt monitored for 1 hour post with no s/s reaction noted.  PIV flushed, removed, and gauze/coban placed.  Appointment request sent to scheduling.  Pt ambulated out of clinic in no apparent distress.

## 2020-03-17 ENCOUNTER — PHYSICAL THERAPY (OUTPATIENT)
Dept: PHYSICAL THERAPY | Facility: MEDICAL CENTER | Age: 30
End: 2020-03-17
Attending: FAMILY MEDICINE
Payer: COMMERCIAL

## 2020-03-17 DIAGNOSIS — G35 MULTIPLE SCLEROSIS (HCC): ICD-10-CM

## 2020-03-17 PROCEDURE — 97110 THERAPEUTIC EXERCISES: CPT

## 2020-03-17 NOTE — OP THERAPY DAILY TREATMENT
"  Outpatient Physical Therapy  DAILY TREATMENT     [facility]  [address]  [place] [address]  Phone:  [phone]  Fax:  [phone]    Date: 03/17/2020    Patient: Rosita Bowles  YOB: 1990  MRN: [id number]     Time Calculation  Start time: 0935  Stop time: 1005 Time Calculation (min): 30 minutes       Chief Complaint: Back Problem    Visit #: 2    SUBJECTIVE:  Going to self quarantine until 4/7/20 so I have changed all my appointments. Noticing I'm getting pain in my neck and at L shoulder blade to arm similar to what I had prior to my fusion.    OBJECTIVE:          Therapeutic Exercises (CPT 11840):     1. ADIM , x2 min    2. ADIM + dying bug    3. ADIM + clam    4. Bridge on ball, 5\" x10    5. ADIM + standing extension , x15 blue    6. ADIM + standing extension with march, x10 blue    7. Anti rotation step outs, R and L x5 ea blue    9. Review of posture to prevent sway back and excessive pressure at L5/S1      Therapeutic Exercise Summary: Printed copy of HEP handout provided to pt with progressions that she can try while in self quarantine until 4/7.       Time-based treatments/modalities:  Therapeutic exercise minutes (CPT 50926): 30 minutes       Pain rating before treatment: 0  Pain rating after treatment: 0    ASSESSMENT:   Response to treatment: Pt tends to complete exercises quickly and advised she slow her speed and focus on quality of movement. In standing she assumes a sway back resting posture that increase extension at L5-S1 and may contribute to her symptoms. Discussed neutral resting posture. HEP provided with progressions as pt has self elected to no return to outpatient PT until 4/7 due to commodities.     PLAN/RECOMMENDATIONS:   Plan for treatment: therapy treatment to continue next visit.  Planned interventions for next visit: Pt on hold until 4/7/20.       "

## 2020-03-24 ENCOUNTER — APPOINTMENT (OUTPATIENT)
Dept: PHYSICAL THERAPY | Facility: MEDICAL CENTER | Age: 30
End: 2020-03-24
Attending: FAMILY MEDICINE
Payer: COMMERCIAL

## 2020-03-26 ENCOUNTER — APPOINTMENT (OUTPATIENT)
Dept: PHYSICAL THERAPY | Facility: MEDICAL CENTER | Age: 30
End: 2020-03-26
Attending: FAMILY MEDICINE
Payer: COMMERCIAL

## 2020-03-31 ENCOUNTER — APPOINTMENT (OUTPATIENT)
Dept: PHYSICAL THERAPY | Facility: MEDICAL CENTER | Age: 30
End: 2020-03-31
Attending: FAMILY MEDICINE
Payer: COMMERCIAL

## 2020-04-02 ENCOUNTER — APPOINTMENT (OUTPATIENT)
Dept: PHYSICAL THERAPY | Facility: MEDICAL CENTER | Age: 30
End: 2020-04-02
Attending: FAMILY MEDICINE
Payer: COMMERCIAL

## 2020-04-07 ENCOUNTER — APPOINTMENT (OUTPATIENT)
Dept: PHYSICAL THERAPY | Facility: MEDICAL CENTER | Age: 30
End: 2020-04-07
Attending: FAMILY MEDICINE
Payer: COMMERCIAL

## 2020-05-13 DIAGNOSIS — R76.11 TUBERCULIN TEST REACTION: ICD-10-CM

## 2020-05-18 ENCOUNTER — APPOINTMENT (OUTPATIENT)
Dept: RADIOLOGY | Facility: MEDICAL CENTER | Age: 30
End: 2020-05-18
Attending: PSYCHIATRY & NEUROLOGY
Payer: COMMERCIAL

## 2020-05-20 ENCOUNTER — OFFICE VISIT (OUTPATIENT)
Dept: DERMATOLOGY | Facility: IMAGING CENTER | Age: 30
End: 2020-05-20
Payer: COMMERCIAL

## 2020-05-20 ENCOUNTER — HOSPITAL ENCOUNTER (OUTPATIENT)
Dept: LAB | Facility: MEDICAL CENTER | Age: 30
End: 2020-05-20
Attending: NURSE PRACTITIONER
Payer: COMMERCIAL

## 2020-05-20 VITALS — WEIGHT: 146 LBS | HEIGHT: 70 IN | BODY MASS INDEX: 20.9 KG/M2 | TEMPERATURE: 97.7 F

## 2020-05-20 DIAGNOSIS — L84 CORN OF FOOT: ICD-10-CM

## 2020-05-20 DIAGNOSIS — L70.0 ACNE VULGARIS: ICD-10-CM

## 2020-05-20 DIAGNOSIS — L30.9 DERMATITIS: ICD-10-CM

## 2020-05-20 PROCEDURE — 36415 COLL VENOUS BLD VENIPUNCTURE: CPT

## 2020-05-20 PROCEDURE — 86480 TB TEST CELL IMMUN MEASURE: CPT

## 2020-05-20 PROCEDURE — 99203 OFFICE O/P NEW LOW 30 MIN: CPT | Performed by: DERMATOLOGY

## 2020-05-20 RX ORDER — TRIAMCINOLONE ACETONIDE 1 MG/G
OINTMENT TOPICAL
Qty: 30 G | Refills: 2 | Status: SHIPPED | OUTPATIENT
Start: 2020-05-20 | End: 2021-05-26

## 2020-05-20 RX ORDER — TRETINOIN 0.25 MG/G
GEL TOPICAL
Qty: 1 TUBE | Refills: 3 | Status: SHIPPED | OUTPATIENT
Start: 2020-05-20 | End: 2021-07-07

## 2020-05-20 RX ORDER — CLINDAMYCIN PHOSPHATE AND BENZOYL PEROXIDE 10; 37.5 MG/G; MG/G
1 GEL TOPICAL EVERY MORNING
Qty: 30 G | Refills: 3 | Status: SHIPPED | OUTPATIENT
Start: 2020-05-20

## 2020-05-20 ASSESSMENT — FIBROSIS 4 INDEX: FIB4 SCORE: 0.51

## 2020-05-20 NOTE — PROGRESS NOTES
DERMATOLOGY NOTE  NEW VISIT       Chief complaint: Rash       HPI:small area's of a rash  Onset: fall  Symptoms: starts as small red bumps, then turns scaly, not itchy  Aggravating factors: no  Alleviating factors: no  New creams/topicals: no  New medications (up to last 6 months): no  New travel: no  Other exposures: n/a  Treatments: hydrocortisone 2.5% for a few months, doesn't believe it helps.          Past Medical History:   Diagnosis Date   • Acne 8/7/2012   • Acne vulgaris 6/23/2017   • ADHD 8/7/2012   • Depression 8/7/2012   • Multiple sclerosis (HCC) 11/2/2016   • Plantar wart 1/9/2019        Family History   Problem Relation Age of Onset   • Thyroid Mother    • Thyroid Brother    • Cancer Neg Hx    • Diabetes Neg Hx         Social History     Socioeconomic History   • Marital status: Single     Spouse name: Not on file   • Number of children: Not on file   • Years of education: Not on file   • Highest education level: Not on file   Occupational History   • Not on file   Social Needs   • Financial resource strain: Not on file   • Food insecurity     Worry: Not on file     Inability: Not on file   • Transportation needs     Medical: Not on file     Non-medical: Not on file   Tobacco Use   • Smoking status: Never Smoker   • Smokeless tobacco: Never Used   Substance and Sexual Activity   • Alcohol use: Yes     Alcohol/week: 0.0 oz     Comment: occasional   • Drug use: No   • Sexual activity: Yes     Partners: Male     Birth control/protection: Pill     Comment: OCP   Lifestyle   • Physical activity     Days per week: Not on file     Minutes per session: Not on file   • Stress: Not on file   Relationships   • Social connections     Talks on phone: Not on file     Gets together: Not on file     Attends Protestant service: Not on file     Active member of club or organization: Not on file     Attends meetings of clubs or organizations: Not on file     Relationship status: Not on file   • Intimate partner violence      Fear of current or ex partner: Not on file     Emotionally abused: Not on file     Physically abused: Not on file     Forced sexual activity: Not on file   Other Topics Concern   •  Service No   • Blood Transfusions No   • Caffeine Concern No   • Occupational Exposure No   • Hobby Hazards No   • Sleep Concern Yes   • Stress Concern No   • Weight Concern No   • Special Diet No   • Back Care No   • Exercise Yes   • Bike Helmet Yes   • Seat Belt Yes   • Self-Exams Yes   Social History Narrative    2013: BF in [place]. Attends [employer].    2014: was living in [place]. Now back in [place].     2014: working in [employer]. BF broke up with her Sept. No friends in [employer] [employer].     2015: working 2 jobs. Has BF.         Allergies   Allergen Reactions   • Other Drug Shortness of Breath and Swelling     Eye dye for dilation          MEDICATIONS:  Medications relevant to specialty reviewed.    Current Outpatient Medications:   •  buPROPion (WELLBUTRIN XL) 300 MG XL tablet, Take 1 Tab by mouth every morning., Disp: 90 Tab, Rfl: 3  •  temazepam (RESTORIL) 15 MG Cap, Take 15 mg by mouth at bedtime as needed for Sleep., Disp: , Rfl:   •  methylphenidate (CONCERTA) 54 MG CR tablet, Take 1 Tab by mouth every morning for 31 days., Disp: 31 Tab, Rfl: 0  •  hydrocortisone 2.5 % Cream topical cream, APPLY 1 GRAM TO AFFECTED AREA(S) 2 TIMES A DAY. (Patient not taking: Reported on 5/20/2020), Disp: 28.35 g, Rfl: 1  •  fluconazole (DIFLUCAN) 150 MG tablet, Take 1 Tab by mouth every day., Disp: 2 Tab, Rfl: 0  •  natalizumab (TYSABRI) 300 MG/15ML Conc, by Intravenous route., Disp: , Rfl:   •  valacyclovir (VALTREX) 1 GM Tab, Take 1 Tab by mouth 2 times a day as needed (cold sores)., Disp: 180 Tab, Rfl: 3  •  Biotin 5000 MCG Cap, Take 5,000 mcg by mouth every day at 6 PM. supplement, Disp: , Rfl:   •  melatonin 3 MG Tab, Take 3 mg by mouth 1 time daily as needed (insomnia)., Disp: , Rfl:   •  Cyanocobalamin (VITAMIN B-12) 1000 MCG Tab, Take  "1,000 mcg by mouth every day. supplement, Disp: , Rfl:   •  Cholecalciferol (VITAMIN D3) 5000 units Tab, Take 5,000 Units by mouth every day. supplement, Disp: , Rfl:   •  Levonorgestrel (KYLEENA) 19.5 MG IUD, 1 Each by Intrauterine route See Admin Instructions. Every 5 years birth control, Disp: , Rfl:      REVIEW OF SYSTEMS:   Positive for skin (see HPI)  Negative for fevers, chills and cough  All other systems reviewed and are negative.     EXAM:  Temp 36.5 °C (97.7 °F)   Ht 1.778 m (5' 10\")   Wt 66.2 kg (146 lb)   BMI 20.95 kg/m²   Constitutional: Well-developed, well-nourished, and in no distress.   Head: normocephalic and atraumatic.  Neck: Normal range of motion. Neck supple.   Pulmonary/Chest: Effort normal.   Neurological: Alert and oriented.    A focused skin exam was performed including the affected areas of the head (including face), neck, chest, abdomen, back and bilateral upper extremities and right foot;  excluding the area under patient's mask (given current Covid19 pandemic). Notable findings on exam today listed below. Remaining above-listed examined areas within normal limits / negative for rashes or lesions.  - right upper arm with pink scaly thin plaque   -face with scattered open and closed comedones   -right foot with skin-colored corn     IMPRESSION / PLAN:    1. Dermatitis  - We reviewed dry skin care in detail, including short showers or baths with luke warm water, limited use of fragrance-free soap, generous use of bland fragrance-free emollients within 3 min of exiting the shower or bath, and fragrance free laundry products.  - triamcinolone 0.1%  ointment BID to affected areas of the body until the skin is smooth.  - Application of topical steroid should be followed immediately by a thick layer of vaseline, hydrolatum, or aquaphor.  - We reviewed risks/benefits/expectations of treatment in detail, including side effects of long term topical steroid use (such as striae, atrophy and " telangiectasias).  - call if not improving and rtc with new lesion for biopsy    2. Corn of foot  - educated patient about diagnosis, management options, and expectations of treatment  - pared today with sterile dermablade  - recommend sal acid 40% pads qhs, starting in 1 week  - may cover with duct tape daily    3. Acne vulgaris  - educated patient about diagnosis, management options, and expectations of treatment  - recommended OTC daily face wash (cetaphil or cerave)  - start daily oil-free moisturizer in the morning (Aveeno positively radiant, Cerave AM or PM lotion - AM has SPF)  - start tretinoin 0.025% cream -- apply a thin film to the entire face. To start every other night for the first 2-3 weeks, then can increase to nightly as tolerated, side effect of irritation discussed. Apply to completely dry skin and may use noncomedogenic moisturizer on top (such as Cerave PM) if too drying.  - start clindamycin/benzoyl peroxide 1.2-3.75% gel q AM. Warned that benzoyl-peroxide can bleach fabrics.  - Do not pick lesions  - patient counseled to stop all acne medications if planning to get pregnant     Return to clinic in: Return in about 4 months (around 9/20/2020) for acne. and as needed for any new or changing skin lesions.

## 2020-05-22 ENCOUNTER — TELEPHONE (OUTPATIENT)
Dept: NEUROLOGY | Facility: MEDICAL CENTER | Age: 30
End: 2020-05-22

## 2020-05-22 ENCOUNTER — HOSPITAL ENCOUNTER (OUTPATIENT)
Dept: RADIOLOGY | Facility: MEDICAL CENTER | Age: 30
End: 2020-05-22
Attending: PSYCHIATRY & NEUROLOGY
Payer: COMMERCIAL

## 2020-05-22 DIAGNOSIS — G35 MULTIPLE SCLEROSIS (HCC): ICD-10-CM

## 2020-05-22 LAB
GAMMA INTERFERON BACKGROUND BLD IA-ACNC: 0.03 IU/ML
M TB IFN-G BLD-IMP: NEGATIVE
M TB IFN-G CD4+ BCKGRND COR BLD-ACNC: -0.01 IU/ML
MITOGEN IGNF BCKGRD COR BLD-ACNC: >10 IU/ML
QFT TB2 - NIL TBQ2: 0 IU/ML

## 2020-05-22 PROCEDURE — 70553 MRI BRAIN STEM W/O & W/DYE: CPT

## 2020-05-22 PROCEDURE — A9576 INJ PROHANCE MULTIPACK: HCPCS | Performed by: PSYCHIATRY & NEUROLOGY

## 2020-05-22 PROCEDURE — 700117 HCHG RX CONTRAST REV CODE 255: Performed by: PSYCHIATRY & NEUROLOGY

## 2020-05-22 RX ADMIN — GADOTERIDOL 10 ML: 279.3 INJECTION, SOLUTION INTRAVENOUS at 13:51

## 2020-05-23 NOTE — TELEPHONE ENCOUNTER
Call Rosita and let her know that the MRI of her brain, though atypical, is stable.  I am quite pleased about the results.  There is no evidence of active disease, no evidence that in the last 4 months anyway, that her disease has picked up in any way.

## 2020-06-01 DIAGNOSIS — S39.013A TEAR OF VAGINAL MUSCLE, INITIAL ENCOUNTER: ICD-10-CM

## 2020-06-16 ENCOUNTER — TELEPHONE (OUTPATIENT)
Dept: MEDICAL GROUP | Facility: LAB | Age: 30
End: 2020-06-16

## 2020-06-16 DIAGNOSIS — G35 MULTIPLE SCLEROSIS (HCC): ICD-10-CM

## 2020-06-23 ENCOUNTER — TELEMEDICINE (OUTPATIENT)
Dept: MEDICAL GROUP | Facility: LAB | Age: 30
End: 2020-06-23
Payer: COMMERCIAL

## 2020-06-23 VITALS
TEMPERATURE: 98.3 F | DIASTOLIC BLOOD PRESSURE: 60 MMHG | WEIGHT: 147.4 LBS | HEIGHT: 70 IN | HEART RATE: 80 BPM | SYSTOLIC BLOOD PRESSURE: 100 MMHG | BODY MASS INDEX: 21.1 KG/M2

## 2020-06-23 DIAGNOSIS — M19.90 OSTEOARTHRITIS, UNSPECIFIED OSTEOARTHRITIS TYPE, UNSPECIFIED SITE: ICD-10-CM

## 2020-06-23 PROCEDURE — 99214 OFFICE O/P EST MOD 30 MIN: CPT | Mod: 95,CR | Performed by: FAMILY MEDICINE

## 2020-06-23 ASSESSMENT — FIBROSIS 4 INDEX: FIB4 SCORE: 0.51

## 2020-06-23 NOTE — PROGRESS NOTES
Telemedicine Visit: Established Patient     This encounter was conducted via Zoom .   Verbal consent was obtained. Patient's identity was verified.    Subjective:   CC: Knee Pain  Rosita Bowles is a 29 y.o. female presenting for evaluation and management of:    Bilateral Knee Pain:   This is an acute issue, new to me.  Patient is started working out heavily and she has noticed that both her knees hurt after exercise.  She hears a crunching noise when she is working out and she has some tenderness underneath her kneecap.  She denies any numbness or tingling.  She denies any trauma or injury.    ROS   Denies any recent fevers or chills. No nausea or vomiting. No chest pains or shortness of breath.     Allergies   Allergen Reactions   • Other Drug Shortness of Breath and Swelling     Eye dye for dilation         Current medicines (including changes today)  Current Outpatient Medications   Medication Sig Dispense Refill   • triamcinolone acetonide (KENALOG) 0.1 % Ointment Apply twice daily to affected areas of body until the skin is smooth. Repeat as needed. Do not use on face or genitals. 30 g 2   • tretinoin (RETIN-A) 0.025 % gel Pea-sized amount to the entire face at night. Start q2-3nights a week, increase to nightly as tolerated 1 Tube 3   • Clindamycin Phos-Benzoyl Perox (ONEXTON) 1.2-3.75 % Gel 1 Application by Apply externally route every morning. To entire face 30 g 3   • buPROPion (WELLBUTRIN XL) 300 MG XL tablet Take 1 Tab by mouth every morning. 90 Tab 3   • fluconazole (DIFLUCAN) 150 MG tablet Take 1 Tab by mouth every day. 2 Tab 0   • valacyclovir (VALTREX) 1 GM Tab Take 1 Tab by mouth 2 times a day as needed (cold sores). 180 Tab 3   • temazepam (RESTORIL) 15 MG Cap Take 15 mg by mouth at bedtime as needed for Sleep.     • Biotin 5000 MCG Cap Take 5,000 mcg by mouth every day at 6 PM. supplement     • melatonin 3 MG Tab Take 3 mg by mouth 1 time daily as needed (insomnia).     • Cyanocobalamin  "(VITAMIN B-12) 1000 MCG Tab Take 1,000 mcg by mouth every day. supplement     • Cholecalciferol (VITAMIN D3) 5000 units Tab Take 5,000 Units by mouth every day. supplement     • Levonorgestrel (KYLEENA) 19.5 MG IUD 1 Each by Intrauterine route See Admin Instructions. Every 5 years  birth control     • hydrocortisone 2.5 % Cream topical cream APPLY 1 GRAM TO AFFECTED AREA(S) 2 TIMES A DAY. (Patient not taking: Reported on 5/20/2020) 28.35 g 1   • natalizumab (TYSABRI) 300 MG/15ML Conc by Intravenous route.       No current facility-administered medications for this visit.        Patient Active Problem List    Diagnosis Date Noted   • Other viral warts 04/02/2019   • Plantar wart 01/09/2019   • Cervical disc disorder at C6-C7 level with radiculopathy 07/10/2018   • Primary insomnia 10/08/2017   • HSV-1 (herpes simplex virus 1) infection 06/23/2017   • Sinus pressure 05/03/2017   • Multiple sclerosis (HCC) 11/02/2016   • Seasonal allergies 05/31/2013   • Depression 08/07/2012   • ADHD 08/07/2012       Family History   Problem Relation Age of Onset   • Thyroid Mother    • Thyroid Brother    • Cancer Neg Hx    • Diabetes Neg Hx        She  has a past medical history of Acne (8/7/2012), Acne vulgaris (6/23/2017), ADHD (8/7/2012), Depression (8/7/2012), Multiple sclerosis (HCC) (11/2/2016), and Plantar wart (1/9/2019).  She  has a past surgical history that includes ankle orif; dental extraction(s); and cervical disk and fusion anterior (08/07/2018).       Objective:   /60 (BP Location: Left arm, Patient Position: Sitting, BP Cuff Size: Adult)   Pulse 80   Temp 36.8 °C (98.3 °F) (Oral)   Ht 1.778 m (5' 10\")   Wt 66.9 kg (147 lb 6.4 oz)   BMI 21.15 kg/m²     Physical Exam:  Constitutional: Alert, no distress, well-groomed.  Skin: No rashes in visible areas.  Eye: Round. Conjunctiva clear, lids normal. No icterus.   ENMT: Lips pink without lesions, good dentition, moist mucous membranes. Phonation normal.  Neck: " No masses, no thyromegaly. Moves freely without pain.  CV: Pulse as reported by patient  Respiratory: Unlabored respiratory effort, no cough or audible wheeze  Psych: Alert and oriented x3, normal affect and mood.       Assessment and Plan:   The following treatment plan was discussed:     1. Osteoarthritis, unspecified osteoarthritis type, unspecified site  Description of joint line tenderness and crepitus.  Likely very mild arthritis.  Discussed continuing exercise and supportive management.  We will continue to monitor.    Follow-up: No follow-ups on file.

## 2020-07-06 ENCOUNTER — TELEMEDICINE (OUTPATIENT)
Dept: MEDICAL GROUP | Facility: LAB | Age: 30
End: 2020-07-06
Payer: COMMERCIAL

## 2020-07-06 VITALS
HEIGHT: 70 IN | WEIGHT: 147 LBS | SYSTOLIC BLOOD PRESSURE: 102 MMHG | TEMPERATURE: 98.1 F | DIASTOLIC BLOOD PRESSURE: 60 MMHG | BODY MASS INDEX: 21.05 KG/M2 | HEART RATE: 86 BPM

## 2020-07-06 DIAGNOSIS — F90.0 ATTENTION DEFICIT HYPERACTIVITY DISORDER (ADHD), PREDOMINANTLY INATTENTIVE TYPE: ICD-10-CM

## 2020-07-06 PROCEDURE — 99214 OFFICE O/P EST MOD 30 MIN: CPT | Mod: 95,CR | Performed by: FAMILY MEDICINE

## 2020-07-06 RX ORDER — METHYLPHENIDATE HYDROCHLORIDE 54 MG/1
54 TABLET ORAL EVERY MORNING
Qty: 30 TAB | Refills: 0 | Status: SHIPPED | OUTPATIENT
Start: 2020-07-06 | End: 2020-07-06 | Stop reason: SDUPTHER

## 2020-07-06 RX ORDER — METHYLPHENIDATE HYDROCHLORIDE 54 MG/1
54 TABLET ORAL EVERY MORNING
Qty: 30 TAB | Refills: 0 | Status: SHIPPED | OUTPATIENT
Start: 2020-08-06 | End: 2020-07-06 | Stop reason: SDUPTHER

## 2020-07-06 RX ORDER — METHYLPHENIDATE HYDROCHLORIDE 54 MG/1
54 TABLET ORAL EVERY MORNING
Qty: 30 TAB | Refills: 0 | Status: SHIPPED | OUTPATIENT
Start: 2020-09-06 | End: 2020-10-14 | Stop reason: SDUPTHER

## 2020-07-06 ASSESSMENT — FIBROSIS 4 INDEX: FIB4 SCORE: 0.51

## 2020-07-06 NOTE — PROGRESS NOTES
Telemedicine Visit: Established Patient     This encounter was conducted via Zoom .   Verbal consent was obtained. Patient's identity was verified.    Subjective:   CC: ADHD  Rosita Bowles is a 29 y.o. female presenting for evaluation and management of:    ADHD:  Chronic stable issue. She is compliant with her medications, Concerta 54 mg daily.  She denies any issues with appetite, insomnia, or weight loss.  Patient denies any side effects understands the benefits, risks, and contraindication of medication.    ROS   Denies any recent fevers or chills. No nausea or vomiting. No chest pains or shortness of breath.     Allergies   Allergen Reactions   • Other Drug Shortness of Breath and Swelling     Eye dye for dilation         Current medicines (including changes today)  Current Outpatient Medications   Medication Sig Dispense Refill   • [START ON 9/6/2020] methylphenidate (CONCERTA) 54 MG CR tablet Take 1 Tab by mouth every morning for 30 days. 30 Tab 0   • triamcinolone acetonide (KENALOG) 0.1 % Ointment Apply twice daily to affected areas of body until the skin is smooth. Repeat as needed. Do not use on face or genitals. 30 g 2   • tretinoin (RETIN-A) 0.025 % gel Pea-sized amount to the entire face at night. Start q2-3nights a week, increase to nightly as tolerated 1 Tube 3   • Clindamycin Phos-Benzoyl Perox (ONEXTON) 1.2-3.75 % Gel 1 Application by Apply externally route every morning. To entire face 30 g 3   • buPROPion (WELLBUTRIN XL) 300 MG XL tablet Take 1 Tab by mouth every morning. 90 Tab 3   • hydrocortisone 2.5 % Cream topical cream APPLY 1 GRAM TO AFFECTED AREA(S) 2 TIMES A DAY. (Patient not taking: Reported on 5/20/2020) 28.35 g 1   • fluconazole (DIFLUCAN) 150 MG tablet Take 1 Tab by mouth every day. 2 Tab 0   • natalizumab (TYSABRI) 300 MG/15ML Conc by Intravenous route.     • valacyclovir (VALTREX) 1 GM Tab Take 1 Tab by mouth 2 times a day as needed (cold sores). 180 Tab 3   • temazepam  "(RESTORIL) 15 MG Cap Take 15 mg by mouth at bedtime as needed for Sleep.     • Biotin 5000 MCG Cap Take 5,000 mcg by mouth every day at 6 PM. supplement     • melatonin 3 MG Tab Take 3 mg by mouth 1 time daily as needed (insomnia).     • Cyanocobalamin (VITAMIN B-12) 1000 MCG Tab Take 1,000 mcg by mouth every day. supplement     • Cholecalciferol (VITAMIN D3) 5000 units Tab Take 5,000 Units by mouth every day. supplement     • Levonorgestrel (KYLEENA) 19.5 MG IUD 1 Each by Intrauterine route See Admin Instructions. Every 5 years  birth control       No current facility-administered medications for this visit.        Patient Active Problem List    Diagnosis Date Noted   • Other viral warts 04/02/2019   • Plantar wart 01/09/2019   • Cervical disc disorder at C6-C7 level with radiculopathy 07/10/2018   • Primary insomnia 10/08/2017   • HSV-1 (herpes simplex virus 1) infection 06/23/2017   • Sinus pressure 05/03/2017   • Multiple sclerosis (HCC) 11/02/2016   • Seasonal allergies 05/31/2013   • Depression 08/07/2012   • ADHD 08/07/2012       Family History   Problem Relation Age of Onset   • Thyroid Mother    • Thyroid Brother    • Cancer Neg Hx    • Diabetes Neg Hx        She  has a past medical history of Acne (8/7/2012), Acne vulgaris (6/23/2017), ADHD (8/7/2012), Depression (8/7/2012), Multiple sclerosis (HCC) (11/2/2016), and Plantar wart (1/9/2019).  She  has a past surgical history that includes ankle orif; dental extraction(s); and cervical disk and fusion anterior (08/07/2018).       Objective:   /60   Pulse 86   Temp 36.7 °C (98.1 °F) (Temporal)   Ht 1.778 m (5' 10\")   Wt 66.7 kg (147 lb)   LMP 06/13/2020   BMI 21.09 kg/m²     Physical Exam:  Constitutional: Alert, no distress, well-groomed.  Skin: No rashes in visible areas.  Eye: Round. Conjunctiva clear, lids normal. No icterus.   ENMT: Lips pink without lesions, good dentition, moist mucous membranes. Phonation normal.  Neck: No masses, no " "thyromegaly. Moves freely without pain.  CV: Pulse as reported by patient  Respiratory: Unlabored respiratory effort, no cough or audible wheeze  Psych: Alert and oriented x3, normal affect and mood.       Assessment and Plan:   The following treatment plan was discussed:     1. Attention deficit hyperactivity disorder (ADHD), predominantly inattentive type  - methylphenidate (CONCERTA) 54 MG CR tablet; Take 1 Tab by mouth every morning for 30 days.  Dispense: 30 Tab; Refill: 0  Patient understands this prescription is a controlled substance which is potentially habit-forming and its use is regulated by the [facility].  We also discussed the new \"black box\" warning regarding the lethal combination of opioids and benzodiazepines.  Refills are subject to terms of a controlled substance agreement and patient has an updated one on file.  Most recent UDS is appropriate.  They are aware that any refill requires an office visit.  Narcotics have may adverse effects and the risks of addiction, accidental overdose and death were emphasized.  Provided prescriptions for the next three months.      Follow-up: No follow-ups on file.         "

## 2020-08-04 ENCOUNTER — HOSPITAL ENCOUNTER (OUTPATIENT)
Facility: MEDICAL CENTER | Age: 30
End: 2020-08-04
Attending: OBSTETRICS & GYNECOLOGY
Payer: COMMERCIAL

## 2020-08-04 ENCOUNTER — GYNECOLOGY VISIT (OUTPATIENT)
Dept: OBGYN | Facility: CLINIC | Age: 30
End: 2020-08-04
Payer: COMMERCIAL

## 2020-08-04 VITALS — SYSTOLIC BLOOD PRESSURE: 121 MMHG | BODY MASS INDEX: 21.24 KG/M2 | WEIGHT: 148 LBS | DIASTOLIC BLOOD PRESSURE: 62 MMHG

## 2020-08-04 DIAGNOSIS — B37.31 VULVAL CANDIDIASIS: ICD-10-CM

## 2020-08-04 DIAGNOSIS — N90.89 VULVAR FISSURE: ICD-10-CM

## 2020-08-04 PROCEDURE — 87510 GARDNER VAG DNA DIR PROBE: CPT

## 2020-08-04 PROCEDURE — 87480 CANDIDA DNA DIR PROBE: CPT

## 2020-08-04 PROCEDURE — 87660 TRICHOMONAS VAGIN DIR PROBE: CPT

## 2020-08-04 PROCEDURE — 99203 OFFICE O/P NEW LOW 30 MIN: CPT | Performed by: OBSTETRICS & GYNECOLOGY

## 2020-08-04 RX ORDER — FLUCONAZOLE 150 MG/1
150 TABLET ORAL PRN
Qty: 2 TAB | Refills: 4 | Status: SHIPPED | OUTPATIENT
Start: 2020-08-04 | End: 2021-01-13 | Stop reason: SDUPTHER

## 2020-08-04 ASSESSMENT — FIBROSIS 4 INDEX: FIB4 SCORE: 0.51

## 2020-08-04 NOTE — PROGRESS NOTES
GYN Visit  CC: vulvar fissues     Rosita Bowles is a 29 y.o.  who presents today for vaginal tears.  Patient reports that she intermittently has been getting really small tears of her skin surrounding her vagina.  Reports that sometimes they are associated with sex but also occur randomly on their own.  When she has them she notices white chunky discharge and some itching.  She has had lots of yeast infections in the past.  She also has a past medical history significant for multiple sclerosis.  Has not noticed any change in the skin color of her vulva, change in the architecture, ulcerations or other issues.  She is sexually active in a monogamous relationship with same partner of 2 years.  They have not had intercourse in quite some time due to these fissures.  She has not tried any treatments for them as she just gives a time and then heal on their own.  Denies any other skin rashes or oral lesions.  Denies any other concerns today.    GYN History:  Patient's last menstrual period was 2020.. Menarche @12.  Menses regular, lasting 7 days, heavy and painful.  Last pap this year,  no h/o abnormal pap.  No history of cone biopsy, LEEP or any other cervical, uterine or gynecologic surgery. No history of sexually transmitted diseases.  Currently sexually active with one male partner.  Utilizing progesterone IUD for contraception and has used condoms and OCPs in the past.     OB History:    OB History    Para Term  AB Living   0             SAB TAB Ectopic Molar Multiple Live Births                   Review of Systems:   Pertinent positives documented in HPI and all other systems reviewed & are negative.    All PMH, PSH, allergies, social history and FH reviewed and updated today:  Past Medical History:  Past Medical History:   Diagnosis Date   • Acne 2012   • Acne vulgaris 2017   • ADHD 2012   • Depression 2012   • Multiple sclerosis (HCC) 2016   • Plantar wart  1/9/2019     Past Surgical History:  Past Surgical History:   Procedure Laterality Date   • CERVICAL DISK AND FUSION ANTERIOR  08/07/2018   • ANKLE ORIF     • DENTAL EXTRACTION(S)         Medications:   Current Outpatient Medications Ordered in Epic   Medication Sig Dispense Refill   • fluconazole (DIFLUCAN) 150 MG tablet Take 1 Tab by mouth as needed (vaginal itching, fissure, discharge). 2 Tab 4   • miconazole (MICOTIN) 2 % Cream Apply 1 Application to affected area(s) 2 times a day for 5 days. 1 Packet 2   • [START ON 9/6/2020] methylphenidate (CONCERTA) 54 MG CR tablet Take 1 Tab by mouth every morning for 30 days. 30 Tab 0   • triamcinolone acetonide (KENALOG) 0.1 % Ointment Apply twice daily to affected areas of body until the skin is smooth. Repeat as needed. Do not use on face or genitals. 30 g 2   • tretinoin (RETIN-A) 0.025 % gel Pea-sized amount to the entire face at night. Start q2-3nights a week, increase to nightly as tolerated 1 Tube 3   • Clindamycin Phos-Benzoyl Perox (ONEXTON) 1.2-3.75 % Gel 1 Application by Apply externally route every morning. To entire face 30 g 3   • buPROPion (WELLBUTRIN XL) 300 MG XL tablet Take 1 Tab by mouth every morning. 90 Tab 3   • hydrocortisone 2.5 % Cream topical cream APPLY 1 GRAM TO AFFECTED AREA(S) 2 TIMES A DAY. (Patient not taking: Reported on 5/20/2020) 28.35 g 1   • fluconazole (DIFLUCAN) 150 MG tablet Take 1 Tab by mouth every day. 2 Tab 0   • natalizumab (TYSABRI) 300 MG/15ML Conc by Intravenous route.     • valacyclovir (VALTREX) 1 GM Tab Take 1 Tab by mouth 2 times a day as needed (cold sores). 180 Tab 3   • temazepam (RESTORIL) 15 MG Cap Take 15 mg by mouth at bedtime as needed for Sleep.     • Biotin 5000 MCG Cap Take 5,000 mcg by mouth every day at 6 PM. supplement     • melatonin 3 MG Tab Take 3 mg by mouth 1 time daily as needed (insomnia).     • Cyanocobalamin (VITAMIN B-12) 1000 MCG Tab Take 1,000 mcg by mouth every day. supplement     •  Cholecalciferol (VITAMIN D3) 5000 units Tab Take 5,000 Units by mouth every day. supplement     • Levonorgestrel (KYLEENA) 19.5 MG IUD 1 Each by Intrauterine route See Admin Instructions. Every 5 years  birth control       No current Epic-ordered facility-administered medications on file.        Allergies: Other drug    Social History:  Social History     Socioeconomic History   • Marital status: Single     Spouse name: Not on file   • Number of children: Not on file   • Years of education: Not on file   • Highest education level: Not on file   Occupational History   • Not on file   Social Needs   • Financial resource strain: Not on file   • Food insecurity     Worry: Not on file     Inability: Not on file   • Transportation needs     Medical: Not on file     Non-medical: Not on file   Tobacco Use   • Smoking status: Never Smoker   • Smokeless tobacco: Never Used   Substance and Sexual Activity   • Alcohol use: Yes     Alcohol/week: 0.0 oz     Comment: occasional   • Drug use: No   • Sexual activity: Yes     Partners: Male     Birth control/protection: Pill     Comment: OCP   Lifestyle   • Physical activity     Days per week: Not on file     Minutes per session: Not on file   • Stress: Not on file   Relationships   • Social connections     Talks on phone: Not on file     Gets together: Not on file     Attends Judaism service: Not on file     Active member of club or organization: Not on file     Attends meetings of clubs or organizations: Not on file     Relationship status: Not on file   • Intimate partner violence     Fear of current or ex partner: Not on file     Emotionally abused: Not on file     Physically abused: Not on file     Forced sexual activity: Not on file   Other Topics Concern   •  Service No   • Blood Transfusions No   • Caffeine Concern No   • Occupational Exposure No   • Hobby Hazards No   • Sleep Concern Yes   • Stress Concern No   • Weight Concern No   • Special Diet No   • Back Care  No   • Exercise Yes   • Bike Helmet Yes   • Seat Belt Yes   • Self-Exams Yes   Social History Narrative    2013: BF in [place]. Attends [employer].    : was living in [place]. Now back in [place].     : working in [employer]. BF broke up with her Sept. No friends in [place].     2015: working 2 jobs. Has BF.        Family History:  Family History   Problem Relation Age of Onset   • Thyroid Mother    • Thyroid Brother    • Cancer Neg Hx    • Diabetes Neg Hx            Objective:   Vitals:  /62   Wt 67.1 kg (148 lb)   Body mass index is 21.24 kg/m². (Goal BM I>18 <25)    Physical Exam:   Nursing note and vitals reviewed.  GENERAL: No acute distress  HENT: Atraumatic, normocephalic  EYES: Extraocular movements intact, pupils equal and reactive to light  NECK: Supple, Full ROM  CHEST: No deformities, Equal chest expansion  RESP: Unlabored, no stridor or audible wheeze  ABD: Soft, Nontender, Non-Distended  Extremities: No Clubbing, Cyanosis, or Edema  Skin: Warm/dry, without rases  Neuro: A/O x 4, CN 2-12 Grossly intact, Motor/sensory grossly intact  Psych: Normal mood, behavior, and affect    Genitourinary:  Normal appearing external female genitalia.  Small fissure present at 2:00 near perineum.  Erythema of vulva and white discharge present.  No loss of labial architecture, hypo-or hyper pigmentation of skin.  Vagina is pink moist and well rugated.Physiologic discharge present within vaginal vault.  Cervix well visualized without masses or lesions.  On bimanual exam there is no cervical motion tenderness, uterus is anteverted, not enlarged, fixed, or tender.  No adnexal masses or tenderness.      Assessment/Plan:   Rosita Bowles is a 29 y.o.  female who presents for:    1. Vulvar fissure  VAGINAL PATHOGENS DNA PANEL    fluconazole (DIFLUCAN) 150 MG tablet   2. Vulval candidiasis  VAGINAL PATHOGENS DNA PANEL    fluconazole (DIFLUCAN) 150 MG tablet    miconazole (MICOTIN) 2 % Cream     #Vulvar fissure.   Discussed differential diagnosis with patient today and that she could have a vulvar dermatitis however examination today is most consistent with vulvar candidiasis.  Oral Diflucan prescription sent to pharmacy.  If patient does not notice improvement of symptoms after 1 dose she is instructed to take a second dose after 72 hours.  Also gave her topical cream to place on vulva.  We will have patient return in 6 weeks to further evaluate if this treatment is successful or if we need to look at other etiologies of vulvar dermatitis.  Discussed possibility of vulvar biopsy to which patient is amenable.  #Follow up.  RTC in 6w or sooner if concerns or questions arise.    Patient was seen for 30 minutes of which > 50% of appointment time was spent on face-to-face counseling and coordination of care regarding the above.

## 2020-08-05 LAB
CANDIDA DNA VAG QL PROBE+SIG AMP: POSITIVE
G VAGINALIS DNA VAG QL PROBE+SIG AMP: NEGATIVE
T VAGINALIS DNA VAG QL PROBE+SIG AMP: NEGATIVE

## 2020-08-10 ENCOUNTER — OFFICE VISIT (OUTPATIENT)
Dept: NEUROLOGY | Facility: MEDICAL CENTER | Age: 30
End: 2020-08-10
Payer: COMMERCIAL

## 2020-08-10 VITALS
DIASTOLIC BLOOD PRESSURE: 60 MMHG | HEIGHT: 70 IN | OXYGEN SATURATION: 99 % | BODY MASS INDEX: 20.83 KG/M2 | SYSTOLIC BLOOD PRESSURE: 118 MMHG | WEIGHT: 145.5 LBS | HEART RATE: 72 BPM

## 2020-08-10 DIAGNOSIS — M50.123 CERVICAL DISC DISORDER AT C6-C7 LEVEL WITH RADICULOPATHY: ICD-10-CM

## 2020-08-10 DIAGNOSIS — G35 MULTIPLE SCLEROSIS (HCC): Primary | ICD-10-CM

## 2020-08-10 PROCEDURE — 99214 OFFICE O/P EST MOD 30 MIN: CPT | Performed by: PSYCHIATRY & NEUROLOGY

## 2020-08-10 ASSESSMENT — ENCOUNTER SYMPTOMS
SPEECH CHANGE: 0
TINGLING: 1
NECK PAIN: 0
FOCAL WEAKNESS: 0
LOSS OF CONSCIOUSNESS: 0
MEMORY LOSS: 0
INSOMNIA: 0
HEADACHES: 0

## 2020-08-10 ASSESSMENT — FIBROSIS 4 INDEX: FIB4 SCORE: 0.51

## 2020-08-10 NOTE — PROGRESS NOTES
Subjective:      Rosita Bowles is a 29 y.o. female who presents for follow-up, last seen in February, 2020, with a history of MS, now on Ocrevus infusion, and history of cervical stenosis status post decompression.    HPI    Rosita has not had a disease relapse in almost 2 years.  On Tysabri at the time, her MICHELLE viral titers had become elevated, she was taken off Tysabri and started on Ocrevus this last March.  She has tolerated the infusions well, liver profile and hepatitis screens were negative.    Symptomatically, she still has the visual distortion of the left eye with an inferior altitudinal deficit, some slight gait ataxia and occasional paresthesias in the feet.  She has had some occasional bouts of retro-orbital pain which are brief in duration, not associated with visual distortions, always limited with no sequelae.  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  Bowel and bladder function are stable.  There is no issue with focal motor or sensory distortions otherwise.    Though there is some neck pain, there is no radiation down the spinal axis or into the upper extremities.  Imaging of the cervical spine, as mentioned above, revealed the postoperative changes only, no significant central or lateral foraminal stenosis/narrowing at any level, no spinal cord signal abnormality.    Medical, surgical and family history is reviewed, there are no new drug allergies.  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  Her father 75th birthday is coming up, she is concerned about the social circumstance itself and her own exposure risk.  She is on Concerta 54 mg every morning, Valtrex as needed, Wellbutrin  mg daily, Diflucan 150 mg as needed, IUD with estrogen, Restoril, the rest as per the electronic health record, noncontributory from my  "standpoint.    Review of Systems   Constitutional: Negative for malaise/fatigue.   Musculoskeletal: Negative for neck pain.   Neurological: Positive for tingling. Negative for speech change, focal weakness, loss of consciousness and headaches.   Psychiatric/Behavioral: Negative for memory loss. The patient does not have insomnia.    All other systems reviewed and are negative.       Objective:     /60 (BP Location: Left arm, Patient Position: Sitting, BP Cuff Size: Adult)   Pulse 72   Ht 1.778 m (5' 10\")   Wt 66 kg (145 lb 8.1 oz)   LMP 07/11/2020   SpO2 99%   BMI 20.88 kg/m²      Physical Exam    She appears in no acute distress.  As always, very pleasant in spirit and demeanor, always a great energy about her.  Her vital signs are stable.  There is no malar rash or jaw claudication.  The neck is supple, range of motion is constrained because of her surgery.  Cardiac evaluation is unremarkable.    Cognition is intact, there is no aphasia or inattention.    PERRLA/EOMI, visual field testing on the right is full to finger counting on confrontation, on the left there is a horizontal altitudinal deficit.  There are no nystagmus, facial movements are symmetric, there is no bulbar dysfunction and the tongue and uvula are midline.  Sensory exam is intact to temperature bilaterally.  Shoulder shrug and head rotation are intact.    Musculoskeletal exam reveals normal tone bilaterally, there is no tremor, asterixis, or drift.  Strength is intact in all 4 extremities.  Reflexes are brisk throughout, more so in the lower extremities.  The right toe is equivocal, the left downgoing on plantar stimulation.    She stands easily, heel, toe, and tandem walking are all done without major issue.  Repetitive movements are also intact and symmetric in all 4 extremities.  There is no appendicular dystaxia with any of the extremities.    Sensory exam is intact to vibration and temperature.  There is no spinal cord sensory " level.     Assessment/Plan:     1. Multiple sclerosis (HCC)  Clinically stable, radiographically stable, I could not be happier for her.  She seems to be doing well with the transition into using Ocrevus infusions every 6 months.  We will continue imaging on a regular basis, typically on an annual basis assuming she is clinically stable.  Thus, we will follow-up with her in March of next year, 2021 after imaging of the brain and cervical spine.    We spent some time talking about COVID-19 risk in relationship to her disease as well as being on Ocrevus.  Given the nature of her training as a radiology technician, her exposure and risk is even a little higher, so at home she needs to be more cautious.  This may mean she is going to have to miss her father's 75th birthday party.    - MR-BRAIN-WITH & W/O; Future  - MR-CERVICAL SPINE-WITH & W/O; Future  - Comp Metabolic Panel; Future    2. Cervical disc disorder at C6-C7 level with radiculopathy  Stable, she is doing well, she has had no recurrences of the right upper extremity radicular symptoms.  MRI scans of the cervical spine following her procedure in August, 2018, revealed no recurrence of any lateral or central stenosis, and only transient myelomalacia seen on the initial postop scans; her most recent study this last April revealed no spinal cord signal abnormality.    We will follow-up in May of next year, she will call us if there are any issues in the interim.  She will be following up with BRIAN Snowden, our nurse practitioner who will be working with me in the MS clinic.  .  - MR-CERVICAL SPINE-WITH & W/O; Future    Time: 25-minute spent face-to-face for exam, review, discussion, and education, of this over 60% of the time spent counseling and coordinating care.

## 2020-08-12 ENCOUNTER — OFFICE VISIT (OUTPATIENT)
Dept: DERMATOLOGY | Facility: IMAGING CENTER | Age: 30
End: 2020-08-12
Payer: COMMERCIAL

## 2020-08-12 DIAGNOSIS — L30.9 DERMATITIS: ICD-10-CM

## 2020-08-12 DIAGNOSIS — L70.0 ACNE VULGARIS: ICD-10-CM

## 2020-08-12 PROCEDURE — 99213 OFFICE O/P EST LOW 20 MIN: CPT | Performed by: DERMATOLOGY

## 2020-08-12 NOTE — PROGRESS NOTES
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      Also here for a new spots that show up on body    HPI/location: little red spots, come and go  Time present: this year  Painful lesion: No  Itching lesion: No  Enlarging lesion: No  Anything make it better or worse?    History of skin cancer: No  History of precancers/actinic keratoses: No  History of biopsies:No  History of blistering/severe sunburns: yes severe once  Family history of skin cancer:No  Family history of atypical moles:No    Past Medical History:   Diagnosis Date   • Acne 8/7/2012   • Acne vulgaris 6/23/2017   • ADHD 8/7/2012   • Depression 8/7/2012   • Multiple sclerosis (HCC) 11/2/2016   • Plantar wart 1/9/2019        Family History   Problem Relation Age of Onset   • Thyroid Mother    • Thyroid Brother    • Cancer Neg Hx    • Diabetes Neg Hx         Social History     Socioeconomic History   • Marital status: Single     Spouse name: Not on file   • Number of children: Not on file   • Years of education: Not on file   • Highest education level: Not on file   Occupational History   • Not on file   Social Needs   • Financial resource strain: Not on file   • Food insecurity     Worry: Not on file     Inability: Not on file   • Transportation needs     Medical: Not on file     Non-medical: Not on file   Tobacco Use   • Smoking status: Never Smoker   • Smokeless tobacco: Never Used   Substance and Sexual Activity   • Alcohol use: Yes     Alcohol/week: 0.0 oz     Comment: occasional   • Drug use: No   • Sexual activity: Yes     Partners: Male     Birth control/protection: Pill     Comment: OCP   Lifestyle   • Physical activity     Days per week: Not on file     Minutes per session: Not on file   • Stress: Not on file   Relationships   • Social connections     Talks on phone: Not on file      Gets together: Not on file     Attends Orthodoxy service: Not on file     Active member of club or organization: Not on file     Attends meetings of clubs or organizations: Not on file     Relationship status: Not on file   • Intimate partner violence     Fear of current or ex partner: Not on file     Emotionally abused: Not on file     Physically abused: Not on file     Forced sexual activity: Not on file   Other Topics Concern   •  Service No   • Blood Transfusions No   • Caffeine Concern No   • Occupational Exposure No   • Hobby Hazards No   • Sleep Concern Yes   • Stress Concern No   • Weight Concern No   • Special Diet No   • Back Care No   • Exercise Yes   • Bike Helmet Yes   • Seat Belt Yes   • Self-Exams Yes   Social History Narrative    2013: BF in [place]. Attends [employer].    2014: was living in [place]. Now back in [place].     2014: working in [place]. BF broke up with her Sept. No friends in [place].     2015: working 2 jobs. Has BF.         Allergies   Allergen Reactions   • Other Drug Shortness of Breath and Swelling     Eye dye for dilation          MEDICATIONS:  Medications relevant to specialty reviewed.    Current Outpatient Medications:   •  fluconazole (DIFLUCAN) 150 MG tablet, Take 1 Tab by mouth as needed (vaginal itching, fissure, discharge)., Disp: 2 Tab, Rfl: 4  •  [START ON 9/6/2020] methylphenidate (CONCERTA) 54 MG CR tablet, Take 1 Tab by mouth every morning for 30 days., Disp: 30 Tab, Rfl: 0  •  triamcinolone acetonide (KENALOG) 0.1 % Ointment, Apply twice daily to affected areas of body until the skin is smooth. Repeat as needed. Do not use on face or genitals., Disp: 30 g, Rfl: 2  •  tretinoin (RETIN-A) 0.025 % gel, Pea-sized amount to the entire face at night. Start q2-3nights a week, increase to nightly as tolerated, Disp: 1 Tube, Rfl: 3  •  Clindamycin Phos-Benzoyl Perox (ONEXTON) 1.2-3.75 % Gel, 1 Application by Apply externally route every morning. To entire face, Disp: 30 g,  Rfl: 3  •  buPROPion (WELLBUTRIN XL) 300 MG XL tablet, Take 1 Tab by mouth every morning., Disp: 90 Tab, Rfl: 3  •  valacyclovir (VALTREX) 1 GM Tab, Take 1 Tab by mouth 2 times a day as needed (cold sores)., Disp: 180 Tab, Rfl: 3  •  temazepam (RESTORIL) 15 MG Cap, Take 15 mg by mouth at bedtime as needed for Sleep., Disp: , Rfl:   •  Biotin 5000 MCG Cap, Take 5,000 mcg by mouth every day at 6 PM. supplement, Disp: , Rfl:   •  melatonin 3 MG Tab, Take 3 mg by mouth 1 time daily as needed (insomnia)., Disp: , Rfl:   •  Cyanocobalamin (VITAMIN B-12) 1000 MCG Tab, Take 1,000 mcg by mouth every day. supplement, Disp: , Rfl:   •  Cholecalciferol (VITAMIN D3) 5000 units Tab, Take 5,000 Units by mouth every day. supplement, Disp: , Rfl:   •  Levonorgestrel (KYLEENA) 19.5 MG IUD, 1 Each by Intrauterine route See Admin Instructions. Every 5 years birth control, Disp: , Rfl:      REVIEW OF SYSTEMS:   Positive for skin (see HPI)  Negative for fevers, chills and cough       EXAM:  There were no vitals taken for this visit.  Constitutional: Well-developed, well-nourished, and in no distress.   HENT: Head normocephalic and atraumatic.   Neurological: Alert and oriented.    A focused skin exam was performed including the affected areas of the head (including face), neck, chest, abdomen and bilateral upper extremities. Notable findings on exam today listed below. Remaining above-listed examined areas within normal limits / negative for rashes or lesions.  - right flank with pink scaly thin papule  - face with scattered open and closed comedones, few eroded inflammatory papules       IMPRESSION / PLAN:    1. Dermatitis  - We reviewed dry skin care in detail, including short showers or baths with luke warm water, limited use of fragrance-free soap, generous use of bland fragrance-free emollients within 3 min of exiting the shower or bath, and fragrance free laundry products.  - restart triamcinolone 0.1%  ointment BID to affected  areas of the body until the skin is smooth.  - Application of topical steroid should be followed immediately by a thick layer of vaseline, hydrolatum, or aquaphor.  - We reviewed risks/benefits/expectations of treatment in detail, including side effects of long term topical steroid use (such as striae, atrophy and telangiectasias).    2. Acne vulgaris  - educated patient about diagnosis, management options, and expectations of treatment  - recommended OTC daily face wash (cetaphil or cerave)  - start daily oil-free moisturizer in the morning (Aveeno positively radiant, Cerave AM or PM lotion - AM has SPF)  - no make up under the mask  - wash face with gentle cleanser after removing mask, wash face with sensitive skin wipes during day when mask is removed  - cont tretinoin 0.025% cream -- apply a thin film to the entire face. To start every other night for the first 2-3 weeks, then can increase to nightly as tolerated, side effect of irritation discussed. Apply to completely dry skin and may use noncomedogenic moisturizer on top (such as Cerave PM) if too drying.  - cont clindamycin/benzoyl peroxide 1.2-3.75% gel q AM. Warned that benzoyl-peroxide can bleach fabrics.  - Do not pick lesions  - patient counseled to stop all acne medications if planning to get pregnant  - call if flaring badly (deep or cystic lesions) and can send rx for oral doxycycline     Return to clinic in: Return if symptoms worsen or fail to improve or yearly for med refils. and as needed for any new or changing skin lesions.    Felicitas Judge M.D.

## 2020-09-06 ENCOUNTER — APPOINTMENT (OUTPATIENT)
Dept: ONCOLOGY | Facility: MEDICAL CENTER | Age: 30
End: 2020-09-06
Attending: PSYCHIATRY & NEUROLOGY
Payer: COMMERCIAL

## 2020-09-09 ENCOUNTER — TELEPHONE (OUTPATIENT)
Dept: ADMISSIONS | Facility: MEDICAL CENTER | Age: 30
End: 2020-09-09

## 2020-09-10 ENCOUNTER — OUTPATIENT INFUSION SERVICES (OUTPATIENT)
Dept: ONCOLOGY | Facility: MEDICAL CENTER | Age: 30
End: 2020-09-10
Attending: PSYCHIATRY & NEUROLOGY
Payer: COMMERCIAL

## 2020-09-10 VITALS
OXYGEN SATURATION: 99 % | TEMPERATURE: 97.6 F | SYSTOLIC BLOOD PRESSURE: 127 MMHG | HEIGHT: 69 IN | HEART RATE: 97 BPM | RESPIRATION RATE: 18 BRPM | BODY MASS INDEX: 22.5 KG/M2 | DIASTOLIC BLOOD PRESSURE: 71 MMHG | WEIGHT: 151.9 LBS

## 2020-09-10 DIAGNOSIS — G35 MULTIPLE SCLEROSIS (HCC): ICD-10-CM

## 2020-09-10 PROCEDURE — 306780 HCHG STAT FOR TRANSFUSION PER CASE

## 2020-09-10 PROCEDURE — 700102 HCHG RX REV CODE 250 W/ 637 OVERRIDE(OP): Performed by: PSYCHIATRY & NEUROLOGY

## 2020-09-10 PROCEDURE — 96415 CHEMO IV INFUSION ADDL HR: CPT

## 2020-09-10 PROCEDURE — 96413 CHEMO IV INFUSION 1 HR: CPT

## 2020-09-10 PROCEDURE — 96375 TX/PRO/DX INJ NEW DRUG ADDON: CPT

## 2020-09-10 PROCEDURE — 96365 THER/PROPH/DIAG IV INF INIT: CPT

## 2020-09-10 PROCEDURE — A9270 NON-COVERED ITEM OR SERVICE: HCPCS | Performed by: PSYCHIATRY & NEUROLOGY

## 2020-09-10 PROCEDURE — 96366 THER/PROPH/DIAG IV INF ADDON: CPT

## 2020-09-10 PROCEDURE — 700111 HCHG RX REV CODE 636 W/ 250 OVERRIDE (IP): Performed by: PSYCHIATRY & NEUROLOGY

## 2020-09-10 PROCEDURE — 700105 HCHG RX REV CODE 258: Performed by: PSYCHIATRY & NEUROLOGY

## 2020-09-10 RX ORDER — ACETAMINOPHEN 325 MG/1
650 TABLET ORAL ONCE
Status: COMPLETED | OUTPATIENT
Start: 2020-09-10 | End: 2020-09-10

## 2020-09-10 RX ORDER — METHYLPREDNISOLONE SODIUM SUCCINATE 125 MG/2ML
100 INJECTION, POWDER, LYOPHILIZED, FOR SOLUTION INTRAMUSCULAR; INTRAVENOUS ONCE
Status: COMPLETED | OUTPATIENT
Start: 2020-09-10 | End: 2020-09-10

## 2020-09-10 RX ADMIN — ACETAMINOPHEN 650 MG: 325 TABLET ORAL at 10:32

## 2020-09-10 RX ADMIN — DIPHENHYDRAMINE HYDROCHLORIDE 25 MG: 50 INJECTION, SOLUTION INTRAMUSCULAR; INTRAVENOUS at 10:32

## 2020-09-10 RX ADMIN — OCRELIZUMAB 600 MG: 300 INJECTION INTRAVENOUS at 11:17

## 2020-09-10 RX ADMIN — METHYLPREDNISOLONE SODIUM SUCCINATE 100 MG: 125 INJECTION, POWDER, FOR SOLUTION INTRAMUSCULAR; INTRAVENOUS at 10:32

## 2020-09-10 ASSESSMENT — FIBROSIS 4 INDEX: FIB4 SCORE: 0.51

## 2020-09-10 NOTE — PROGRESS NOTES
Rosita received her Q6 month Ocrevus without issue. Premedicated with PO tylenol, IV methyl prednisone, and IV benadryl. Infusion started at 40ml/hr, then titrated by 40ml/hr every 30min until a max rate of 200ml/hr was reached. Blood noted in R FA PIV before and after infusion. Observed for 1hr after infusion. Patient discharged stable and ambulatory.

## 2020-09-15 ENCOUNTER — GYNECOLOGY VISIT (OUTPATIENT)
Dept: OBGYN | Facility: CLINIC | Age: 30
End: 2020-09-15
Payer: COMMERCIAL

## 2020-09-15 ENCOUNTER — HOSPITAL ENCOUNTER (OUTPATIENT)
Facility: MEDICAL CENTER | Age: 30
End: 2020-09-15
Attending: OBSTETRICS & GYNECOLOGY
Payer: COMMERCIAL

## 2020-09-15 VITALS — WEIGHT: 150 LBS | SYSTOLIC BLOOD PRESSURE: 103 MMHG | DIASTOLIC BLOOD PRESSURE: 60 MMHG | BODY MASS INDEX: 21.84 KG/M2

## 2020-09-15 DIAGNOSIS — B37.31 VULVAL CANDIDIASIS: ICD-10-CM

## 2020-09-15 DIAGNOSIS — Z30.9 ENCOUNTER FOR CONTRACEPTIVE MANAGEMENT, UNSPECIFIED TYPE: ICD-10-CM

## 2020-09-15 LAB
AMBIGUOUS DTTM AMBI4: NORMAL
CANDIDA DNA VAG QL PROBE+SIG AMP: NEGATIVE
G VAGINALIS DNA VAG QL PROBE+SIG AMP: NEGATIVE
SIGNIFICANT IND 70042: NORMAL
SITE SITE: NORMAL
SOURCE SOURCE: NORMAL
T VAGINALIS DNA VAG QL PROBE+SIG AMP: NEGATIVE

## 2020-09-15 PROCEDURE — 87660 TRICHOMONAS VAGIN DIR PROBE: CPT

## 2020-09-15 PROCEDURE — 99213 OFFICE O/P EST LOW 20 MIN: CPT | Performed by: OBSTETRICS & GYNECOLOGY

## 2020-09-15 PROCEDURE — 87480 CANDIDA DNA DIR PROBE: CPT

## 2020-09-15 PROCEDURE — 87510 GARDNER VAG DNA DIR PROBE: CPT

## 2020-09-15 ASSESSMENT — FIBROSIS 4 INDEX: FIB4 SCORE: 0.51

## 2020-09-15 NOTE — NON-PROVIDER
Pt here gyn exam  Pt states still having some white milky discharge  Good#[phone]  Pharmacy verified

## 2020-09-15 NOTE — PROGRESS NOTES
GYN Visit  CC: follow up vulvar candidiasis     Rosita Bowles is a 29 y.o.  who presents today for follow-up after treatment of external vulvar yeast infection with fissures.  Patient reports that she has been using the cream and no longer is having any pain with sex or any external itching.  She is satisfied with this treatment.  Reports that she does have some vaginal discharge but nothing that seems unusual.  No malodorous discharge, itching, pain, or other complaints today.      She has recently gotten engaged.  Unsure when the wedding will be given the pandemic.    OB History:    OB History    Para Term  AB Living   0             SAB TAB Ectopic Molar Multiple Live Births                     Review of Systems:   Pertinent positives documented in HPI and all other systems reviewed & are negative.    All PMH, PSH, allergies, social history and FH reviewed and updated today:  Past Medical History:  Past Medical History:   Diagnosis Date   • Acne 2012   • Acne vulgaris 2017   • ADHD 2012   • Depression 2012   • Multiple sclerosis (HCC) 2016   • Plantar wart 2019       Past Surgical History:  Past Surgical History:   Procedure Laterality Date   • CERVICAL DISK AND FUSION ANTERIOR  2018   • ANKLE ORIF     • DENTAL EXTRACTION(S)         Medications:   Current Outpatient Medications Ordered in Epic   Medication Sig Dispense Refill   • fluconazole (DIFLUCAN) 150 MG tablet Take 1 Tab by mouth as needed (vaginal itching, fissure, discharge). 2 Tab 4   • methylphenidate (CONCERTA) 54 MG CR tablet Take 1 Tab by mouth every morning for 30 days. 30 Tab 0   • triamcinolone acetonide (KENALOG) 0.1 % Ointment Apply twice daily to affected areas of body until the skin is smooth. Repeat as needed. Do not use on face or genitals. 30 g 2   • tretinoin (RETIN-A) 0.025 % gel Pea-sized amount to the entire face at night. Start q2-3nights a week, increase to nightly as  tolerated 1 Tube 3   • Clindamycin Phos-Benzoyl Perox (ONEXTON) 1.2-3.75 % Gel 1 Application by Apply externally route every morning. To entire face 30 g 3   • buPROPion (WELLBUTRIN XL) 300 MG XL tablet Take 1 Tab by mouth every morning. 90 Tab 3   • valacyclovir (VALTREX) 1 GM Tab Take 1 Tab by mouth 2 times a day as needed (cold sores). 180 Tab 3   • temazepam (RESTORIL) 15 MG Cap Take 15 mg by mouth at bedtime as needed for Sleep.     • Biotin 5000 MCG Cap Take 5,000 mcg by mouth every day at 6 PM. supplement     • melatonin 3 MG Tab Take 3 mg by mouth 1 time daily as needed (insomnia).     • Cyanocobalamin (VITAMIN B-12) 1000 MCG Tab Take 1,000 mcg by mouth every day. supplement     • Cholecalciferol (VITAMIN D3) 5000 units Tab Take 5,000 Units by mouth every day. supplement     • Levonorgestrel (KYLEENA) 19.5 MG IUD 1 Each by Intrauterine route See Admin Instructions. Every 5 years  birth control       No current Epic-ordered facility-administered medications on file.        Allergies: Other drug    Social History:  Social History     Socioeconomic History   • Marital status: Single     Spouse name: Not on file   • Number of children: Not on file   • Years of education: Not on file   • Highest education level: Not on file   Occupational History   • Not on file   Social Needs   • Financial resource strain: Not on file   • Food insecurity     Worry: Not on file     Inability: Not on file   • Transportation needs     Medical: Not on file     Non-medical: Not on file   Tobacco Use   • Smoking status: Never Smoker   • Smokeless tobacco: Never Used   Substance and Sexual Activity   • Alcohol use: Yes     Alcohol/week: 0.0 oz     Comment: occasional   • Drug use: No   • Sexual activity: Yes     Partners: Male     Birth control/protection: Pill     Comment: OCP   Lifestyle   • Physical activity     Days per week: Not on file     Minutes per session: Not on file   • Stress: Not on file   Relationships   • Social  connections     Talks on phone: Not on file     Gets together: Not on file     Attends Evangelical service: Not on file     Active member of club or organization: Not on file     Attends meetings of clubs or organizations: Not on file     Relationship status: Not on file   • Intimate partner violence     Fear of current or ex partner: Not on file     Emotionally abused: Not on file     Physically abused: Not on file     Forced sexual activity: Not on file   Other Topics Concern   •  Service No   • Blood Transfusions No   • Caffeine Concern No   • Occupational Exposure No   • Hobby Hazards No   • Sleep Concern Yes   • Stress Concern No   • Weight Concern No   • Special Diet No   • Back Care No   • Exercise Yes   • Bike Helmet Yes   • Seat Belt Yes   • Self-Exams Yes   Social History Narrative    2013: BF in [employer]. Attends [employer].    2014: was living in [place]. Now back in [place].     2014: working in [employer]. BF broke up with her Sept. No friends in [place].     2015: working 2 jobs. Has BF.        Family History:  Family History   Problem Relation Age of Onset   • Thyroid Mother    • Thyroid Brother    • Cancer Neg Hx    • Diabetes Neg Hx            Objective:   Vitals:  /60   Wt 68 kg (150 lb)   Body mass index is 21.84 kg/m². (Goal BM I>18 <25)    Physical Exam:   Nursing note and vitals reviewed.  GENERAL: No acute distress  HENT: Atraumatic, normocephalic  EYES: Extraocular movements intact, pupils equal and reactive to light  NECK: Supple, Full ROM  CHEST: No deformities, Equal chest expansion  RESP: Unlabored, no stridor or audible wheeze  ABD: Soft, Nontender, Non-Distended  Extremities: No Clubbing, Cyanosis, or Edema  Skin: Warm/dry, without rases  Neuro: A/O x 4, CN 2-12 Grossly intact, Motor/sensory grossly intact  Psych: Normal mood, behavior, and affect    Genitourinary:  Normal appearing external female genitalia without any rashes, lesions, labial fusion or tenderness.  No fissures. Vagina  is pink moist and well rugated. Physiologic discharge present within vaginal vault.  Cervix well visualized without masses or lesions.       Assessment/Plan:   Rosita Bowles is a 29 y.o.  female who presents for:    1. Vulval candidiasis     2. Encounter for contraceptive management, unspecified type       #Vulvar candidiasis.  No evidence of continued infection on exam.  Vaginal DNA swab obtained to ensure resolution of infection per patient's preference.  If swab is positive will contact patient and prescribe treatment otherwise patient will return for follow-up in 1 year.  #Contraception.  Patient has Kyleena and this was placed in 2017.  If she wants it out sooner she will return otherwise she understands it is good for 5 years.  #Follow up.  RTC annually or sooner if concerns or questions arise.    Patient was seen for 15 minutes of which > 50% of appointment time was spent on face-to-face counseling and coordination of care regarding the above.

## 2020-10-14 ENCOUNTER — TELEMEDICINE (OUTPATIENT)
Dept: MEDICAL GROUP | Facility: LAB | Age: 30
End: 2020-10-14
Payer: COMMERCIAL

## 2020-10-14 VITALS
WEIGHT: 148.3 LBS | HEIGHT: 70 IN | SYSTOLIC BLOOD PRESSURE: 100 MMHG | TEMPERATURE: 98.5 F | DIASTOLIC BLOOD PRESSURE: 60 MMHG | HEART RATE: 96 BPM | BODY MASS INDEX: 21.23 KG/M2

## 2020-10-14 DIAGNOSIS — Z11.59 SCREENING FOR VIRAL DISEASE: ICD-10-CM

## 2020-10-14 DIAGNOSIS — F90.0 ATTENTION DEFICIT HYPERACTIVITY DISORDER (ADHD), PREDOMINANTLY INATTENTIVE TYPE: ICD-10-CM

## 2020-10-14 PROCEDURE — 99213 OFFICE O/P EST LOW 20 MIN: CPT | Mod: 95,CS,CR | Performed by: FAMILY MEDICINE

## 2020-10-14 RX ORDER — METHYLPHENIDATE HYDROCHLORIDE 54 MG/1
54 TABLET ORAL EVERY MORNING
Qty: 30 TAB | Refills: 0 | Status: SHIPPED | OUTPATIENT
Start: 2020-11-13 | End: 2020-12-13

## 2020-10-14 RX ORDER — METHYLPHENIDATE HYDROCHLORIDE 54 MG/1
54 TABLET ORAL EVERY MORNING
Qty: 30 TAB | Refills: 0 | Status: SHIPPED | OUTPATIENT
Start: 2020-10-14 | End: 2020-11-13

## 2020-10-14 RX ORDER — METHYLPHENIDATE HYDROCHLORIDE 54 MG/1
54 TABLET ORAL EVERY MORNING
Qty: 30 TAB | Refills: 0 | Status: SHIPPED | OUTPATIENT
Start: 2020-12-13 | End: 2021-01-12

## 2020-10-14 ASSESSMENT — FIBROSIS 4 INDEX: FIB4 SCORE: 0.51

## 2020-10-14 NOTE — PROGRESS NOTES
Virtual Visit: Established Patient   This visit was conducted via Zoom using secure and encrypted videoconferencing technology. The patient was in a private location in the state of [place].    The patient's identity was confirmed and verbal consent was obtained for this virtual visit.    Subjective:   CC:   Chief Complaint   Patient presents with   • Medication Refill   • Cough     joint pain, fatigue x 2-3 days       Rosita Bowles is a 29 y.o. female presenting for evaluation and management of:    ADHD  Stable on methylphenidate for some time. No issues with sleep, appetite, or agitation.  She is in [employer] school at [employer].    Ilness  Body aches/joint aches, cough, mild sore throat for 3 days.  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  No fever or trouble breathing.    ROS See HPI    Allergies   Allergen Reactions   • Other Drug Shortness of Breath and Swelling     Eye dye for dilation         Current medicines (including changes today)  Current Outpatient Medications   Medication Sig Dispense Refill   • fluconazole (DIFLUCAN) 150 MG tablet Take 1 Tab by mouth as needed (vaginal itching, fissure, discharge). 2 Tab 4   • triamcinolone acetonide (KENALOG) 0.1 % Ointment Apply twice daily to affected areas of body until the skin is smooth. Repeat as needed. Do not use on face or genitals. 30 g 2   • tretinoin (RETIN-A) 0.025 % gel Pea-sized amount to the entire face at night. Start q2-3nights a week, increase to nightly as tolerated 1 Tube 3   • Clindamycin Phos-Benzoyl Perox (ONEXTON) 1.2-3.75 % Gel 1 Application by Apply externally route every morning. To entire face 30 g 3   • buPROPion (WELLBUTRIN XL) 300 MG XL tablet Take 1 Tab by mouth every morning. 90 Tab 3   • valacyclovir (VALTREX) 1 GM Tab Take 1 Tab by mouth 2 times a day as needed (cold sores). 180 Tab 3   • temazepam (RESTORIL) 15 MG Cap Take 15 mg by mouth at bedtime as needed for Sleep.     • Biotin 5000 MCG Cap Take 5,000 mcg  "by mouth every day at 6 PM. supplement     • melatonin 3 MG Tab Take 3 mg by mouth 1 time daily as needed (insomnia).     • Cyanocobalamin (VITAMIN B-12) 1000 MCG Tab Take 1,000 mcg by mouth every day. supplement     • Cholecalciferol (VITAMIN D3) 5000 units Tab Take 5,000 Units by mouth every day. supplement     • Levonorgestrel (KYLEENA) 19.5 MG IUD 1 Each by Intrauterine route See Admin Instructions. Every 5 years  birth control       No current facility-administered medications for this visit.        Patient Active Problem List    Diagnosis Date Noted   • Other viral warts 04/02/2019   • Plantar wart 01/09/2019   • Cervical disc disorder at C6-C7 level with radiculopathy 07/10/2018   • Primary insomnia 10/08/2017   • HSV-1 (herpes simplex virus 1) infection 06/23/2017   • Sinus pressure 05/03/2017   • Multiple sclerosis (HCC) 11/02/2016   • Seasonal allergies 05/31/2013   • Depression 08/07/2012   • ADHD 08/07/2012       Family History   Problem Relation Age of Onset   • Thyroid Mother    • Thyroid Brother    • Cancer Neg Hx    • Diabetes Neg Hx        She  has a past medical history of Acne (8/7/2012), Acne vulgaris (6/23/2017), ADHD (8/7/2012), Depression (8/7/2012), Multiple sclerosis (HCC) (11/2/2016), and Plantar wart (1/9/2019).  She  has a past surgical history that includes ankle orif; dental extraction(s); and cervical disk and fusion anterior (08/07/2018).       Objective:   /60 Comment: Verbal  Pulse 96 Comment: Verbal  Temp 36.9 °C (98.5 °F) (Oral) Comment: Verbal  Ht 1.778 m (5' 10\") Comment: Verbal  Wt 67.3 kg (148 lb 4.8 oz) Comment: Verbal  BMI 21.28 kg/m²     Physical Exam:  Constitutional: Alert, no distress, well-groomed.  Skin: No rashes in visible areas.  Eye: Round. Conjunctiva clear, lids normal. No icterus.   ENMT: Lips pink without lesions, good dentition, moist mucous membranes. Phonation normal.  Neck: No masses, no thyromegaly. Moves freely without pain.  Respiratory: " Unlabored respiratory effort, no cough or audible wheeze  Psych: Alert and oriented x3, normal affect and mood.       Assessment and Plan:   The following treatment plan was discussed:     1. Attention deficit hyperactivity disorder (ADHD), predominantly inattentive type  Chronic issue, stable on Concerta.  Not seeing significant side effects.  This is refilled for 3 months today.  PDMP reviewed.  - methylphenidate (CONCERTA) 54 MG CR tablet; Take 1 Tab by mouth every morning for 30 days.  Dispense: 30 Tab; Refill: 0  - methylphenidate (CONCERTA) 54 MG CR tablet; Take 1 Tab by mouth every morning for 30 days.  Dispense: 30 Tab; Refill: 0  - methylphenidate (CONCERTA) 54 MG CR tablet; Take 1 Tab by mouth every morning for 30 days.  Dispense: 30 Tab; Refill: 0    2. Screening for viral disease  Body aches, mild cough, and sore throat for the last 3 days.  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  - COVID/SARS CoV-2 PCR; Future      Follow-up: Return in about 3 months (around 1/14/2021).

## 2020-10-15 ENCOUNTER — HOSPITAL ENCOUNTER (OUTPATIENT)
Dept: LAB | Facility: MEDICAL CENTER | Age: 30
End: 2020-10-15
Attending: FAMILY MEDICINE
Payer: COMMERCIAL

## 2020-10-15 ENCOUNTER — PATIENT MESSAGE (OUTPATIENT)
Dept: DERMATOLOGY | Facility: IMAGING CENTER | Age: 30
End: 2020-10-15

## 2020-10-15 DIAGNOSIS — Z11.59 SCREENING FOR VIRAL DISEASE: ICD-10-CM

## 2020-10-15 LAB
COVID ORDER STATUS COVID19: NORMAL
SARS-COV-2 RNA RESP QL NAA+PROBE: NOTDETECTED
SPECIMEN SOURCE: NORMAL

## 2020-10-15 PROCEDURE — U0003 INFECTIOUS AGENT DETECTION BY NUCLEIC ACID (DNA OR RNA); SEVERE ACUTE RESPIRATORY SYNDROME CORONAVIRUS 2 (SARS-COV-2) (CORONAVIRUS DISEASE [COVID-19]), AMPLIFIED PROBE TECHNIQUE, MAKING USE OF HIGH THROUGHPUT TECHNOLOGIES AS DESCRIBED BY CMS-2020-01-R: HCPCS

## 2020-10-15 PROCEDURE — C9803 HOPD COVID-19 SPEC COLLECT: HCPCS

## 2020-10-15 NOTE — TELEPHONE ENCOUNTER
----- Message from Rosita Bowles sent at 10/15/2020  9:38 AM PDT -----  Regarding: Procedure Question  Contact: [phone]  Hi Dr. Judge,    Hope all is going well with your pregnancy!    I had another spot appear on my back, feels rougher than skin in terms of texture, and is somewhat raised. I was wondering if you wanted to potentially biopsy this or if we are happy with the eczema diagnosis.    Best,    Rosita Bowles

## 2020-10-20 ENCOUNTER — TELEPHONE (OUTPATIENT)
Dept: DERMATOLOGY | Facility: IMAGING CENTER | Age: 30
End: 2020-10-20

## 2020-10-20 NOTE — TELEPHONE ENCOUNTER
----- Message from Rosita Bowles sent at 10/19/2020  9:41 PM PDT -----  Regarding: Non-Urgent Medical Question  Contact: [phone]  Sorry, the photo didn't attach! This is regards to my previous email.    Best,    Rosita Bowles

## 2020-10-20 NOTE — TELEPHONE ENCOUNTER
----- Message from Rosita Bowles sent at 10/19/2020  9:41 PM PDT -----  Regarding: Non-Urgent Medical Question  Contact: [phone]  Sorry, the photo didn't attach! This is regards to my previous email.    Best,    Rosita Bowles

## 2020-10-21 ENCOUNTER — TELEPHONE (OUTPATIENT)
Dept: DERMATOLOGY | Facility: IMAGING CENTER | Age: 30
End: 2020-10-21

## 2020-10-21 NOTE — TELEPHONE ENCOUNTER
----- Message from Rosita Bowles sent at 10/19/2020  9:41 PM PDT -----  Regarding: Non-Urgent Medical Question  Contact: [phone]  Sorry, the photo didn't attach! This is regards to my previous email.    Best,    Rosita Bowles

## 2020-10-29 NOTE — PROGRESS NOTES
Caroline Nuñez Fall Risk Assessment:     Last Known Fall: No falls  Mobility: Use of assistive device/requires assist of two people  Medications: No meds  Mental Status/LOC/Awareness: Awake, alert, and oriented to date, place, and person  Toileting Needs: No needs  Volume/Electrolyte Status: No problems  Communication/Sensory: No deficits  Behavior: Appropriate behavior  Caroline Nuñez Fall Risk Total: 4  Fall Risk Level: NO RISK    Universal Fall Precautions:  bed in low position and locked, call light/belongings in reach, wheelchairs and assistive devices out of sight, siderails up x 2, use non-slip footwear, adequate lighting, clutter free and spill free environment, educate on level of risk, educate to call for assistance    Fall Risk Level Interventions:          Patient Specific Interventions:     Medication: review medications with patient and family  Mental Status/LOC/Awareness: not applicable  Toileting: instruct patient/family on the use of grab bars and instruct patient/family on the need to call for assistance when toileting  Volume/Electrolyte Status: not applicable  Communication/Sensory: not applicable  Behavioral: not applicable  Mobility: ensure bed is locked and in lowest position and provide appropriate assistive device   Erythromycin Counseling:  I discussed with the patient the risks of erythromycin including but not limited to GI upset, allergic reaction, drug rash, diarrhea, increase in liver enzymes, and yeast infections.

## 2020-11-09 ENCOUNTER — TELEPHONE (OUTPATIENT)
Dept: MEDICAL GROUP | Facility: LAB | Age: 30
End: 2020-11-09

## 2020-11-09 DIAGNOSIS — Z01.00 EYE EXAM, ROUTINE: ICD-10-CM

## 2020-11-09 NOTE — TELEPHONE ENCOUNTER
Pt has appt today at [facility]  Can you sign referral  [phone]

## 2020-11-10 ENCOUNTER — TELEMEDICINE (OUTPATIENT)
Dept: MEDICAL GROUP | Facility: LAB | Age: 30
End: 2020-11-10
Payer: COMMERCIAL

## 2020-11-10 VITALS
DIASTOLIC BLOOD PRESSURE: 60 MMHG | TEMPERATURE: 98.6 F | SYSTOLIC BLOOD PRESSURE: 116 MMHG | BODY MASS INDEX: 20.9 KG/M2 | WEIGHT: 146 LBS | HEIGHT: 70 IN | HEART RATE: 88 BPM

## 2020-11-10 DIAGNOSIS — M25.869 PATELLOFEMORAL DYSFUNCTION, UNSPECIFIED LATERALITY: ICD-10-CM

## 2020-11-10 DIAGNOSIS — G89.29 CHRONIC PAIN OF RIGHT THUMB: ICD-10-CM

## 2020-11-10 DIAGNOSIS — M79.644 CHRONIC PAIN OF RIGHT THUMB: ICD-10-CM

## 2020-11-10 PROCEDURE — 99213 OFFICE O/P EST LOW 20 MIN: CPT | Mod: 95,CR | Performed by: FAMILY MEDICINE

## 2020-11-10 ASSESSMENT — FIBROSIS 4 INDEX: FIB4 SCORE: 0.51

## 2020-11-10 NOTE — PROGRESS NOTES
Virtual Visit: Established Patient   This visit was conducted via Zoom using secure and encrypted videoconferencing technology. The patient was in a private location in the state of [place].    The patient's identity was confirmed and verbal consent was obtained for this virtual visit.    Subjective:   CC:   Chief Complaint   Patient presents with   • Arthritis     right thumb, right wrist, knees        Rosita Bowles is a 29 y.o. female presenting for evaluation and management of:    Knee pain  Bilateral, worse with flexing knees, walking up stairs, or lunges. Medial, anterior under the patella.  They have a diagnosed with arthritis in the past.    Right thumb pain  Previous injury that seems to have flared.  She has some pain at the base of the right thumb she also reports some pain along the tendons of the right thumb.  Does have a thumb spica splint from prior injury.       ROS   Denies any recent fevers or chills. No nausea or vomiting. No chest pains or shortness of breath.     Allergies   Allergen Reactions   • Other Drug Shortness of Breath and Swelling     Eye dye for dilation         Current medicines (including changes today)  Current Outpatient Medications   Medication Sig Dispense Refill   • methylphenidate (CONCERTA) 54 MG CR tablet Take 1 Tab by mouth every morning for 30 days. 30 Tab 0   • [START ON 11/13/2020] methylphenidate (CONCERTA) 54 MG CR tablet Take 1 Tab by mouth every morning for 30 days. 30 Tab 0   • [START ON 12/13/2020] methylphenidate (CONCERTA) 54 MG CR tablet Take 1 Tab by mouth every morning for 30 days. 30 Tab 0   • fluconazole (DIFLUCAN) 150 MG tablet Take 1 Tab by mouth as needed (vaginal itching, fissure, discharge). 2 Tab 4   • triamcinolone acetonide (KENALOG) 0.1 % Ointment Apply twice daily to affected areas of body until the skin is smooth. Repeat as needed. Do not use on face or genitals. 30 g 2   • tretinoin (RETIN-A) 0.025 % gel Pea-sized amount to the entire  face at night. Start q2-3nights a week, increase to nightly as tolerated 1 Tube 3   • Clindamycin Phos-Benzoyl Perox (ONEXTON) 1.2-3.75 % Gel 1 Application by Apply externally route every morning. To entire face 30 g 3   • buPROPion (WELLBUTRIN XL) 300 MG XL tablet Take 1 Tab by mouth every morning. 90 Tab 3   • valacyclovir (VALTREX) 1 GM Tab Take 1 Tab by mouth 2 times a day as needed (cold sores). 180 Tab 3   • temazepam (RESTORIL) 15 MG Cap Take 15 mg by mouth at bedtime as needed for Sleep.     • Biotin 5000 MCG Cap Take 5,000 mcg by mouth every day at 6 PM. supplement     • melatonin 3 MG Tab Take 3 mg by mouth 1 time daily as needed (insomnia).     • Cyanocobalamin (VITAMIN B-12) 1000 MCG Tab Take 1,000 mcg by mouth every day. supplement     • Cholecalciferol (VITAMIN D3) 5000 units Tab Take 5,000 Units by mouth every day. supplement     • Levonorgestrel (KYLEENA) 19.5 MG IUD 1 Each by Intrauterine route See Admin Instructions. Every 5 years  birth control       No current facility-administered medications for this visit.        Patient Active Problem List    Diagnosis Date Noted   • Other viral warts 04/02/2019   • Plantar wart 01/09/2019   • Cervical disc disorder at C6-C7 level with radiculopathy 07/10/2018   • Primary insomnia 10/08/2017   • HSV-1 (herpes simplex virus 1) infection 06/23/2017   • Sinus pressure 05/03/2017   • Multiple sclerosis (HCC) 11/02/2016   • Seasonal allergies 05/31/2013   • Depression 08/07/2012   • ADHD 08/07/2012       Family History   Problem Relation Age of Onset   • Thyroid Mother    • Thyroid Brother    • Cancer Neg Hx    • Diabetes Neg Hx        She  has a past medical history of Acne (8/7/2012), Acne vulgaris (6/23/2017), ADHD (8/7/2012), Depression (8/7/2012), Multiple sclerosis (HCC) (11/2/2016), and Plantar wart (1/9/2019).  She  has a past surgical history that includes ankle orif; dental extraction(s); and cervical disk and fusion anterior (08/07/2018).        "Objective:   /60 Comment: Verbal  Pulse 88 Comment: Verbal  Temp 37 °C (98.6 °F) Comment: Verbal  Ht 1.778 m (5' 10\") Comment: Verbal  Wt 66.2 kg (146 lb) Comment: Verbal  BMI 20.95 kg/m²     Physical Exam:  Constitutional: Alert, no distress, well-groomed.  Skin: No rashes in visible areas.  Eye: Round. Conjunctiva clear, lids normal. No icterus.   ENMT: Lips pink without lesions, good dentition, moist mucous membranes. Phonation normal.  Neck: No masses, no thyromegaly. Moves freely without pain.  Respiratory: Unlabored respiratory effort, no cough or audible wheeze  Psych: Alert and oriented x3, normal affect and mood.       Assessment and Plan:   The following treatment plan was discussed:     1. Patellofemoral dysfunction, unspecified laterality  Bilateral anterior knee pain just under the medial patella border that worsens with flexion and walking up stairs.  Suspect patellofemoral syndrome.  Discussed conservative management including rest, ice, NSAIDs.  She has been sent a handout on stretches and exercises to try at home.  Follow-up if not improving and would do x-rays and referral for formal PT.    2. Chronic pain of right thumb   She reports previous injury to right thumb that seems to have flared.  She does have thumb spica splint from his injury and she will try this.  Limited info today with no in person examination.  Follow-up if not improving.    Follow-up: Return if symptoms worsen or fail to improve.         "

## 2020-11-10 NOTE — PATIENT INSTRUCTIONS
Patellofemoral Pain Syndrome    Patellofemoral pain syndrome is a condition in which the tissue (cartilage) on the underside of the kneecap (patella) softens or breaks down. This causes pain in the front of the knee. The condition is also called runner's knee or chondromalacia patella. Patellofemoral pain syndrome is most common in young adults who are active in sports.  The knee is the largest joint in the body. The patella covers the front of the knee and is attached to muscles above and below the knee. The underside of the patella is covered with a smooth type of cartilage (synovium). The smooth surface helps the patella to glide easily when you move your knee. Patellofemoral pain syndrome causes swelling in the joint linings and bone surfaces in the knee.  What are the causes?  This condition may be caused by:  · Overuse of the knee.  · Poor alignment of your knee joints.  · Weak leg muscles.  · A direct blow to your kneecap.  What increases the risk?  You are more likely to develop this condition if:  · You do a lot of activities that can wear down your kneecap. These include:  ? Running.  ? Squatting.  ? Climbing stairs.  · You start a new physical activity or exercise program.  · You wear shoes that do not fit well.  · You do not have good leg strength.  · You are overweight.  What are the signs or symptoms?  The main symptom of this condition is knee pain. This may feel like a dull, aching pain underneath your patella, in the front of your knee. There may be a popping or cracking sound when you move your knee. Pain may get worse with:  · Exercise.  · Climbing stairs.  · Running.  · Jumping.  · Squatting.  · Kneeling.  · Sitting for a long time.  · Moving or pushing on your patella.  How is this diagnosed?  This condition may be diagnosed based on:  · Your symptoms and medical history. You may be asked about your recent physical activities and which ones cause knee pain.  · A physical exam. This may  include:  ? Moving your patella back and forth.  ? Checking your range of knee motion.  ? Having you squat or jump to see if you have pain.  ? Checking the strength of your leg muscles.  · Imaging tests to confirm the diagnosis. These may include an MRI of your knee.  How is this treated?  This condition may be treated at home with rest, ice, compression, and elevation (RICE).   Other treatments may include:  · Nonsteroidal anti-inflammatory drugs (NSAIDs).  · Physical therapy to stretch and strengthen your leg muscles.  · Shoe inserts (orthotics) to take stress off your knee.  · A knee brace or knee support.  · Adhesive tapes to the skin.  · Surgery to remove damaged cartilage or move the patella to a better position. This is rare.  Follow these instructions at home:  If you have a shoe or brace:  · Wear the shoe or brace as told by your health care provider. Remove it only as told by your health care provider.  · Loosen the shoe or brace if your toes tingle, become numb, or turn cold and blue.  · Keep the shoe or brace clean.  · If the shoe or brace is not waterproof:  ? Do not let it get wet.  ? Cover it with a watertight covering when you take a bath or a shower.  Managing pain, stiffness, and swelling  · If directed, put ice on the painful area.  ? If you have a removable shoe or brace, remove it as told by your health care provider.  ? Put ice in a plastic bag.  ? Place a towel between your skin and the bag.  ? Leave the ice on for 20 minutes, 2-3 times a day.  · Move your toes often to avoid stiffness and to lessen swelling.  · Rest your knee:  ? Avoid activities that cause knee pain.  ? When sitting or lying down, raise (elevate) the injured area above the level of your heart, whenever possible.  General instructions  · Take over-the-counter and prescription medicines only as told by your health care provider.  · Use splints, braces, knee supports, or walking aids as directed by your health care  provider.  · Perform stretching and strengthening exercises as told by your health care provider or physical therapist.  · Do not use any products that contain nicotine or tobacco, such as cigarettes and e-cigarettes. These can delay healing. If you need help quitting, ask your health care provider.  · Return to your normal activities as told by your health care provider. Ask your health care provider what activities are safe for you.  · Keep all follow-up visits as told by your health care provider. This is important.  Contact a health care provider if:  · Your symptoms get worse.  · You are not improving with home care.  Summary  · Patellofemoral pain syndrome is a condition in which the tissue (cartilage) on the underside of the kneecap (patella) softens or breaks down.  · This condition causes swelling in the joint linings and bone surfaces in the knee. This leads to pain in the front of the knee.  · This condition may be treated at home with rest, ice, compression, and elevation (RICE).  · Use splints, braces, knee supports, or walking aids as directed by your health care provider.  This information is not intended to replace advice given to you by your health care provider. Make sure you discuss any questions you have with your health care provider.  Document Released: 12/06/2010 Document Revised: 01/28/2019 Document Reviewed: 01/28/2019  [employer] Patient Education © 2020 [employer]

## 2020-11-23 ENCOUNTER — PATIENT MESSAGE (OUTPATIENT)
Dept: MEDICAL GROUP | Facility: LAB | Age: 30
End: 2020-11-23

## 2020-11-23 DIAGNOSIS — Z20.828 EXPOSURE TO SARS-ASSOCIATED CORONAVIRUS: ICD-10-CM

## 2020-12-02 ENCOUNTER — HOSPITAL ENCOUNTER (OUTPATIENT)
Dept: LAB | Facility: MEDICAL CENTER | Age: 30
End: 2020-12-02
Attending: FAMILY MEDICINE
Payer: COMMERCIAL

## 2020-12-02 LAB — COVID ORDER STATUS COVID19: NORMAL

## 2020-12-02 PROCEDURE — C9803 HOPD COVID-19 SPEC COLLECT: HCPCS

## 2020-12-02 PROCEDURE — U0003 INFECTIOUS AGENT DETECTION BY NUCLEIC ACID (DNA OR RNA); SEVERE ACUTE RESPIRATORY SYNDROME CORONAVIRUS 2 (SARS-COV-2) (CORONAVIRUS DISEASE [COVID-19]), AMPLIFIED PROBE TECHNIQUE, MAKING USE OF HIGH THROUGHPUT TECHNOLOGIES AS DESCRIBED BY CMS-2020-01-R: HCPCS

## 2020-12-03 LAB
SARS-COV-2 RNA RESP QL NAA+PROBE: NOTDETECTED
SPECIMEN SOURCE: NORMAL

## 2020-12-22 ENCOUNTER — TELEPHONE (OUTPATIENT)
Dept: PHYSICAL THERAPY | Facility: REHABILITATION | Age: 30
End: 2020-12-22

## 2020-12-22 NOTE — OP THERAPY DISCHARGE SUMMARY
Outpatient Physical Therapy  DISCHARGE SUMMARY NOTE      [facility] [address].  [address]  [place] [address]  Phone:  [phone]  Fax:  [phone]    Date of Visit: 12/22/2020    Patient: Rosita Bowles  YOB: 1990  MRN: [id number]     Referring Provider: Blanca Garcia M.D.   Referring Diagnosis Multiple sclerosis (HCC) [G35]         Functional Assessment Used        Your patient is being discharged from Physical Therapy with the following comments:   · Pt was put on hold due to COVID concerns    Comments:  Pt was put on hold due to COVID concerns back in March 2020, but had not rescheduled appointments. Pt last seen in PT on 03/17/2020. Pt to be d/c from this episode of care of PT.    Limitations Remaining:  Unable to reassess    Recommendations:  Pt to be d/c from this episode of care of PT    Dia Ngo, PT, DPT    Date: 12/22/2020

## 2021-01-05 ENCOUNTER — HOSPITAL ENCOUNTER (OUTPATIENT)
Dept: RADIOLOGY | Facility: MEDICAL CENTER | Age: 31
End: 2021-01-05
Attending: PSYCHIATRY & NEUROLOGY
Payer: COMMERCIAL

## 2021-01-05 ENCOUNTER — TELEPHONE (OUTPATIENT)
Dept: NEUROLOGY | Facility: MEDICAL CENTER | Age: 31
End: 2021-01-05

## 2021-01-05 DIAGNOSIS — G35 MULTIPLE SCLEROSIS (HCC): ICD-10-CM

## 2021-01-05 DIAGNOSIS — M50.123 CERVICAL DISC DISORDER AT C6-C7 LEVEL WITH RADICULOPATHY: ICD-10-CM

## 2021-01-05 PROCEDURE — 72156 MRI NECK SPINE W/O & W/DYE: CPT

## 2021-01-05 PROCEDURE — A9576 INJ PROHANCE MULTIPACK: HCPCS | Performed by: PSYCHIATRY & NEUROLOGY

## 2021-01-05 PROCEDURE — 700117 HCHG RX CONTRAST REV CODE 255: Performed by: PSYCHIATRY & NEUROLOGY

## 2021-01-05 PROCEDURE — 70553 MRI BRAIN STEM W/O & W/DYE: CPT

## 2021-01-05 RX ADMIN — GADOTERIDOL 12 ML: 279.3 INJECTION, SOLUTION INTRAVENOUS at 11:19

## 2021-01-05 NOTE — TELEPHONE ENCOUNTER
Please call Rosita and let her know that the scans of her brain and neck are stable, in fact the lesions in her cervical spine are almost gone.  Totally psyched!!

## 2021-01-13 ENCOUNTER — TELEMEDICINE (OUTPATIENT)
Dept: MEDICAL GROUP | Facility: LAB | Age: 31
End: 2021-01-13
Payer: COMMERCIAL

## 2021-01-13 VITALS
DIASTOLIC BLOOD PRESSURE: 58 MMHG | HEIGHT: 70 IN | BODY MASS INDEX: 20.5 KG/M2 | WEIGHT: 143.2 LBS | SYSTOLIC BLOOD PRESSURE: 105 MMHG

## 2021-01-13 DIAGNOSIS — B37.31 VULVAL CANDIDIASIS: ICD-10-CM

## 2021-01-13 DIAGNOSIS — F90.0 ATTENTION DEFICIT HYPERACTIVITY DISORDER (ADHD), PREDOMINANTLY INATTENTIVE TYPE: ICD-10-CM

## 2021-01-13 DIAGNOSIS — N90.89 VULVAR FISSURE: ICD-10-CM

## 2021-01-13 PROCEDURE — 99213 OFFICE O/P EST LOW 20 MIN: CPT | Mod: 95,CR | Performed by: FAMILY MEDICINE

## 2021-01-13 RX ORDER — METHYLPHENIDATE HYDROCHLORIDE 54 MG/1
54 TABLET ORAL EVERY MORNING
Qty: 30 TAB | Refills: 0 | Status: SHIPPED | OUTPATIENT
Start: 2021-02-13 | End: 2021-01-13 | Stop reason: SDUPTHER

## 2021-01-13 RX ORDER — METHYLPHENIDATE HYDROCHLORIDE 54 MG/1
54 TABLET ORAL EVERY MORNING
Qty: 30 TAB | Refills: 0 | Status: SHIPPED | OUTPATIENT
Start: 2021-01-13 | End: 2021-01-13 | Stop reason: SDUPTHER

## 2021-01-13 RX ORDER — FLUCONAZOLE 150 MG/1
150 TABLET ORAL PRN
Qty: 2 TAB | Refills: 4 | Status: SHIPPED | OUTPATIENT
Start: 2021-01-13 | End: 2022-10-04

## 2021-01-13 RX ORDER — METHYLPHENIDATE HYDROCHLORIDE 54 MG/1
54 TABLET ORAL EVERY MORNING
Qty: 30 TAB | Refills: 0 | Status: SHIPPED | OUTPATIENT
Start: 2021-01-13 | End: 2021-02-11 | Stop reason: SDUPTHER

## 2021-01-13 RX ORDER — METHYLPHENIDATE HYDROCHLORIDE 54 MG/1
54 TABLET ORAL EVERY MORNING
Qty: 30 TAB | Refills: 0 | Status: SHIPPED | OUTPATIENT
Start: 2021-03-13 | End: 2021-01-13 | Stop reason: SDUPTHER

## 2021-01-13 NOTE — PROGRESS NOTES
Virtual Visit: Established Patient   This visit was conducted via Zoom using secure and encrypted videoconferencing technology. The patient was in a private location in the state of [place].    The patient's identity was confirmed and verbal consent was obtained for this virtual visit.    Subjective:   CC:   Chief Complaint   Patient presents with   • Medication Refill       Rosita Bowles is a 30 y.o. female presenting for evaluation and management of:    ADHD:  This is a chronic stable issue.  Patient with a diagnosis of ADHD stable on methylphenidate 54 mg daily.  She does do drug holidays every so often.  She is currently in school and using it for this.  She denies any insomnia, palpitations, weight loss, or anorexia.    ROS   Denies any recent fevers or chills. No nausea or vomiting. No chest pains or shortness of breath.     Allergies   Allergen Reactions   • Other Drug Shortness of Breath and Swelling     Eye dye for dilation         Current medicines (including changes today)  Current Outpatient Medications   Medication Sig Dispense Refill   • methylphenidate (CONCERTA) 54 MG CR tablet Take 1 Tab by mouth every morning for 30 days. 30 Tab 0   • fluconazole (DIFLUCAN) 150 MG tablet Take 1 Tab by mouth as needed (vaginal itching, fissure, discharge). 2 Tab 4   • tretinoin (RETIN-A) 0.025 % gel Pea-sized amount to the entire face at night. Start q2-3nights a week, increase to nightly as tolerated 1 Tube 3   • Clindamycin Phos-Benzoyl Perox (ONEXTON) 1.2-3.75 % Gel 1 Application by Apply externally route every morning. To entire face 30 g 3   • buPROPion (WELLBUTRIN XL) 300 MG XL tablet Take 1 Tab by mouth every morning. 90 Tab 3   • valacyclovir (VALTREX) 1 GM Tab Take 1 Tab by mouth 2 times a day as needed (cold sores). 180 Tab 3   • temazepam (RESTORIL) 15 MG Cap Take 15 mg by mouth at bedtime as needed for Sleep.     • Biotin 5000 MCG Cap Take 5,000 mcg by mouth every day at 6 PM. supplement     •  "melatonin 3 MG Tab Take 3 mg by mouth 1 time daily as needed (insomnia).     • Cyanocobalamin (VITAMIN B-12) 1000 MCG Tab Take 1,000 mcg by mouth every day. supplement     • Cholecalciferol (VITAMIN D3) 5000 units Tab Take 5,000 Units by mouth every day. supplement     • Levonorgestrel (KYLEENA) 19.5 MG IUD 1 Each by Intrauterine route See Admin Instructions. Every 5 years  birth control     • triamcinolone acetonide (KENALOG) 0.1 % Ointment Apply twice daily to affected areas of body until the skin is smooth. Repeat as needed. Do not use on face or genitals. (Patient not taking: Reported on 1/13/2021) 30 g 2     No current facility-administered medications for this visit.        Patient Active Problem List    Diagnosis Date Noted   • Other viral warts 04/02/2019   • Plantar wart 01/09/2019   • Cervical disc disorder at C6-C7 level with radiculopathy 07/10/2018   • Primary insomnia 10/08/2017   • HSV-1 (herpes simplex virus 1) infection 06/23/2017   • Sinus pressure 05/03/2017   • Multiple sclerosis (HCC) 11/02/2016   • Seasonal allergies 05/31/2013   • Depression 08/07/2012   • ADHD 08/07/2012       Family History   Problem Relation Age of Onset   • Thyroid Mother    • Thyroid Brother    • Cancer Neg Hx    • Diabetes Neg Hx        She  has a past medical history of Acne (8/7/2012), Acne vulgaris (6/23/2017), ADHD (8/7/2012), Depression (8/7/2012), Multiple sclerosis (HCC) (11/2/2016), and Plantar wart (1/9/2019).  She  has a past surgical history that includes ankle orif; dental extraction(s); and cervical disk and fusion anterior (08/07/2018).       Objective:   Ht 1.778 m (5' 10\")   Wt 65 kg (143 lb 3.2 oz)   BMI 20.55 kg/m²     Physical Exam:  Constitutional: Alert, no distress, well-groomed.  Skin: No rashes in visible areas.  Eye: Round. Conjunctiva clear, lids normal. No icterus.   ENMT: Lips pink without lesions, good dentition, moist mucous membranes. Phonation normal.  Neck: No masses, no thyromegaly. " "Moves freely without pain.  Respiratory: Unlabored respiratory effort, no cough or audible wheeze  Psych: Alert and oriented x3, normal affect and mood.       Assessment and Plan:   The following treatment plan was discussed:     1. Attention deficit hyperactivity disorder (ADHD), predominantly inattentive type  Patient understands this prescription is a controlled substance which is potentially habit-forming and its use is regulated by the [facility].  We also discussed the new \"black box\" warning regarding the lethal combination of opioids and benzodiazepines.  Refills are subject to terms of a controlled substance agreement and patient has an updated one on file.  Most recent UDS is appropriate.  They are aware that any refill requires an office visit.  Narcotics have may adverse effects and the risks of addiction, accidental overdose and death were emphasized.  Provided prescriptions for the next month.  - methylphenidate (CONCERTA) 54 MG CR tablet; Take 1 Tab by mouth every morning for 30 days.  Dispense: 30 Tab; Refill: 0        Follow-up: No follow-ups on file.         "

## 2021-02-03 DIAGNOSIS — Z23 NEED FOR VACCINATION: ICD-10-CM

## 2021-02-03 DIAGNOSIS — F90.0 ATTENTION DEFICIT HYPERACTIVITY DISORDER (ADHD), PREDOMINANTLY INATTENTIVE TYPE: ICD-10-CM

## 2021-02-03 RX ORDER — METHYLPHENIDATE HYDROCHLORIDE 54 MG/1
54 TABLET ORAL EVERY MORNING
Qty: 30 TAB | Refills: 0 | OUTPATIENT
Start: 2021-02-03 | End: 2021-03-05

## 2021-02-03 NOTE — TELEPHONE ENCOUNTER
----- Message from Rosita Bowles sent at 2/3/2021 11:51 AM PST -----  Regarding: Non-Urgent Medical Question  Contact: [phone]  Hi Dr. Garcia!    Sorry to hear you are leaving [facility] :(     I was wondering if you could possibly write the remaining 2 prescriptions for my methylphenidate so that I can have them filled this/next month - would that be possible? I remember there was some error with filling all 3 months through [facility] last time.    Regards,    Rosita Bowles

## 2021-02-03 NOTE — TELEPHONE ENCOUNTER
Received request via: Patient    Was the patient seen in the last year in this department? Yes  1/13/21  Does the patient have an active prescription (recently filled or refills available) for medication(s) requested? No

## 2021-02-11 ENCOUNTER — TELEMEDICINE (OUTPATIENT)
Dept: MEDICAL GROUP | Facility: LAB | Age: 31
End: 2021-02-11
Payer: COMMERCIAL

## 2021-02-11 VITALS
DIASTOLIC BLOOD PRESSURE: 68 MMHG | SYSTOLIC BLOOD PRESSURE: 102 MMHG | BODY MASS INDEX: 20.9 KG/M2 | HEART RATE: 99 BPM | TEMPERATURE: 98 F | WEIGHT: 146 LBS | HEIGHT: 70 IN

## 2021-02-11 DIAGNOSIS — N89.8 VAGINAL DISCHARGE: ICD-10-CM

## 2021-02-11 DIAGNOSIS — F90.0 ATTENTION DEFICIT HYPERACTIVITY DISORDER (ADHD), PREDOMINANTLY INATTENTIVE TYPE: ICD-10-CM

## 2021-02-11 PROCEDURE — 99214 OFFICE O/P EST MOD 30 MIN: CPT | Mod: 95,CR | Performed by: FAMILY MEDICINE

## 2021-02-11 RX ORDER — SODIUM CHLORIDE 9 MG/ML
INJECTION, SOLUTION INTRAVENOUS CONTINUOUS
Status: CANCELLED | OUTPATIENT
Start: 2021-02-11

## 2021-02-11 RX ORDER — METHYLPREDNISOLONE SODIUM SUCCINATE 125 MG/2ML
100 INJECTION, POWDER, LYOPHILIZED, FOR SOLUTION INTRAMUSCULAR; INTRAVENOUS ONCE
Status: CANCELLED | OUTPATIENT
Start: 2021-02-11

## 2021-02-11 RX ORDER — ACETAMINOPHEN 325 MG/1
650 TABLET ORAL ONCE
Status: CANCELLED | OUTPATIENT
Start: 2021-02-11

## 2021-02-11 RX ORDER — OCRELIZUMAB 300 MG/10ML
INJECTION INTRAVENOUS
COMMUNITY

## 2021-02-11 RX ORDER — METHYLPHENIDATE HYDROCHLORIDE 54 MG/1
54 TABLET ORAL EVERY MORNING
Qty: 30 TABLET | Refills: 0 | Status: SHIPPED | OUTPATIENT
Start: 2021-02-11 | End: 2021-03-03 | Stop reason: SDUPTHER

## 2021-02-11 RX ORDER — DIPHENHYDRAMINE HYDROCHLORIDE 50 MG/ML
25 INJECTION INTRAMUSCULAR; INTRAVENOUS PRN
Status: CANCELLED | OUTPATIENT
Start: 2021-02-11

## 2021-02-12 NOTE — PROGRESS NOTES
Telemedicine Video Visit: Established Patient   This Remote Face to Face encounter was conducted via Zoom. Given the importance of social distancing and other strategies recommended to reduce the risk of COVID-19 transmission, I am providing medical care to this patient via audio/video visit in place of an in person visit at the request of the patient. Verbal consent to telehealth, risks, benefits, and consequences were discussed. Patient retains the right to withdraw at any time. All existing confidentiality protections apply. The patient has access to all transmitted medical information. No dissemination of any patient images or information to other entities without further written consent.  Subjective:     Chief Complaint   Patient presents with   • Medication Refill   Established patient new to me    Rosita Bowles is a 30 y.o. female presenting for evaluation and management of:  1. Attention deficit hyperactivity disorder (ADHD), predominantly inattentive type  For refill of her methylphenidate, patient denies side effects.  She said she has been using it for long time and it controls her ADHD symptoms perfectly.  Would like refill today    2. Vaginal discharge  Reports for the past 1 week or so she has been experiencing yeast like vaginal infection that she experiences from before.  She described her discharge as thick and with mild blood streaks.  Denies active bleeding or spotting at this time.  Reports some burning sensation and discomfort in the vaginal area.  Patient gives history of recurrent yeast infection.  She said she took one of her refills from Diflucan systemic pill and it has been improving her symptoms but not resolved completely especially the burning sensation.  Not using any local antifungal at this time.      ROS   Denies any recent fevers or chills. No nausea or vomiting. No chest pains or shortness of breath.     Allergies   Allergen Reactions   • Other Drug Shortness of Breath  and Swelling     Eye dye for dilation         Current medicines (including changes today)  Current Outpatient Medications   Medication Sig Dispense Refill   • ocrelizumab (OCREVUS) 300 MG/10ML Solution injection Infuse  into a venous catheter.     • methylphenidate (CONCERTA) 54 MG CR tablet Take 1 tablet by mouth every morning for 30 days. 30 tablet 0   • clotrimazole (GYNE-LOTRIMIN) 2 % Cream Insert 1 Application into the vagina every bedtime. 21 g 1   • fluconazole (DIFLUCAN) 150 MG tablet Take 1 Tab by mouth as needed (vaginal itching, fissure, discharge). 2 Tab 4   • Clindamycin Phos-Benzoyl Perox (ONEXTON) 1.2-3.75 % Gel 1 Application by Apply externally route every morning. To entire face 30 g 3   • buPROPion (WELLBUTRIN XL) 300 MG XL tablet Take 1 Tab by mouth every morning. 90 Tab 3   • valacyclovir (VALTREX) 1 GM Tab Take 1 Tab by mouth 2 times a day as needed (cold sores). 180 Tab 3   • temazepam (RESTORIL) 15 MG Cap Take 15 mg by mouth at bedtime as needed for Sleep.     • Biotin 5000 MCG Cap Take 5,000 mcg by mouth every day at 6 PM. supplement     • melatonin 3 MG Tab Take 3 mg by mouth 1 time daily as needed (insomnia).     • Cyanocobalamin (VITAMIN B-12) 1000 MCG Tab Take 1,000 mcg by mouth every day. supplement     • Cholecalciferol (VITAMIN D3) 5000 units Tab Take 5,000 Units by mouth every day. supplement     • Levonorgestrel (KYLEENA) 19.5 MG IUD 1 Each by Intrauterine route See Admin Instructions. Every 5 years  birth control     • triamcinolone acetonide (KENALOG) 0.1 % Ointment Apply twice daily to affected areas of body until the skin is smooth. Repeat as needed. Do not use on face or genitals. (Patient not taking: Reported on 1/13/2021) 30 g 2   • tretinoin (RETIN-A) 0.025 % gel Pea-sized amount to the entire face at night. Start q2-3nights a week, increase to nightly as tolerated (Patient not taking: Reported on 2/11/2021) 1 Tube 3     No current facility-administered medications for this  "visit.       Patient Active Problem List    Diagnosis Date Noted   • Other viral warts 04/02/2019   • Plantar wart 01/09/2019   • Cervical disc disorder at C6-C7 level with radiculopathy 07/10/2018   • Primary insomnia 10/08/2017   • HSV-1 (herpes simplex virus 1) infection 06/23/2017   • Sinus pressure 05/03/2017   • Multiple sclerosis (HCC) 11/02/2016   • Seasonal allergies 05/31/2013   • Depression 08/07/2012   • ADHD 08/07/2012       Family History   Problem Relation Age of Onset   • Thyroid Mother    • Thyroid Brother    • Cancer Neg Hx    • Diabetes Neg Hx        She  has a past medical history of Acne (8/7/2012), Acne vulgaris (6/23/2017), ADHD (8/7/2012), Depression (8/7/2012), Multiple sclerosis (HCC) (11/2/2016), and Plantar wart (1/9/2019).  She  has a past surgical history that includes ankle orif; dental extraction(s); and cervical disk and fusion anterior (08/07/2018).       Objective:   Vitals obtained by patient:  /68   Pulse 99   Temp 36.7 °C (98 °F) (Temporal)   Ht 1.778 m (5' 10\")   Wt 66.2 kg (146 lb)   BMI 20.95 kg/m²     Physical Exam:  Constitutional: Alert, no distress, well-groomed.  Skin: No rashes in visible areas.  Eye: Round. Conjunctiva clear, lids normal. No icterus.   ENMT: Lips pink without lesions, good dentition, moist mucous membranes. Phonation normal.  Neck: No masses, no thyromegaly. Moves freely without pain.  CV: Pulse as reported by patient  Respiratory: Unlabored respiratory effort, no cough or audible wheeze  Psych: Alert and oriented x3, normal affect and mood.       Assessment and Plan:   The following treatment plan was discussed:     1. Attention deficit hyperactivity disorder (ADHD), predominantly inattentive type  Chronic, stable well-controlled on medication refilled her medication today.  Advised to call and establish care with a new provider since her PCP is leaving  - methylphenidate (CONCERTA) 54 MG CR tablet; Take 1 tablet by mouth every morning for " 30 days.  Dispense: 30 tablet; Refill: 0    2. Vaginal discharge  Recurrent with acute symptoms at this time, advised to also add clotrimazole cream since she is improving on systemic Diflucan.  If not resolved patient needs to schedule appointment and further evaluate for cultures and assessment.  - clotrimazole (GYNE-LOTRIMIN) 2 % Cream; Insert 1 Application into the vagina every bedtime.  Dispense: 21 g; Refill: 1    Other orders  - ocrelizumab (OCREVUS) 300 MG/10ML Solution injection; Infuse  into a venous catheter.        Follow-up: No follow-ups on file.    Face to Face Video Visit:   I spent 30 minutes with patient/guardian and I conducted this visit with audio and video present.  Demarco Brian M.D.

## 2021-03-02 ENCOUNTER — APPOINTMENT (OUTPATIENT)
Dept: URGENT CARE | Facility: PHYSICIAN GROUP | Age: 31
End: 2021-03-02
Payer: COMMERCIAL

## 2021-03-03 ENCOUNTER — TELEMEDICINE (OUTPATIENT)
Dept: MEDICAL GROUP | Facility: LAB | Age: 31
End: 2021-03-03
Payer: COMMERCIAL

## 2021-03-03 VITALS
HEIGHT: 70 IN | WEIGHT: 145 LBS | SYSTOLIC BLOOD PRESSURE: 96 MMHG | TEMPERATURE: 98.3 F | BODY MASS INDEX: 20.76 KG/M2 | DIASTOLIC BLOOD PRESSURE: 54 MMHG | HEART RATE: 68 BPM

## 2021-03-03 DIAGNOSIS — F90.0 ATTENTION DEFICIT HYPERACTIVITY DISORDER (ADHD), PREDOMINANTLY INATTENTIVE TYPE: ICD-10-CM

## 2021-03-03 DIAGNOSIS — N92.6 MISSED PERIOD: ICD-10-CM

## 2021-03-03 PROCEDURE — 99214 OFFICE O/P EST MOD 30 MIN: CPT | Mod: 95,CR | Performed by: FAMILY MEDICINE

## 2021-03-03 RX ORDER — METHYLPHENIDATE HYDROCHLORIDE 54 MG/1
54 TABLET ORAL EVERY MORNING
Qty: 30 TABLET | Refills: 0 | Status: SHIPPED | OUTPATIENT
Start: 2021-03-10 | End: 2021-03-18 | Stop reason: SDUPTHER

## 2021-03-03 NOTE — PROGRESS NOTES
Virtual Visit: Established Patient   This visit was conducted via Zoom using secure and encrypted videoconferencing technology. The patient was in a private location in the state of [place].    The patient's identity was confirmed and verbal consent was obtained for this virtual visit.    Subjective:   CC:   Chief Complaint   Patient presents with   • Medication Refill   • Menstrual Problem     12 days late, cramping        Rosita Bowles is a 30 y.o. female presenting for evaluation and management of:    Med refill  Needs refill of Concerta for ADHD.  Has been stable on this dosage for some time.  Working well for her, no issues with sleep, agitation, or appetite.  Has appointment to establish with new PCP this month.    Late period  12 days late, negative test at home.  Has had Kyleena for 5 years, does plan to schedule appointment with OB for removal and replacement.  Prior to this had regular periods.    ROS See HPI    Allergies   Allergen Reactions   • Other Drug Shortness of Breath and Swelling     Eye dye for dilation         Current medicines (including changes today)  Current Outpatient Medications   Medication Sig Dispense Refill   • [START ON 3/10/2021] methylphenidate (CONCERTA) 54 MG CR tablet Take 1 tablet by mouth every morning for 30 days. 30 tablet 0   • ocrelizumab (OCREVUS) 300 MG/10ML Solution injection Infuse  into a venous catheter.     • clotrimazole (GYNE-LOTRIMIN) 2 % Cream Insert 1 Application into the vagina every bedtime. 21 g 1   • fluconazole (DIFLUCAN) 150 MG tablet Take 1 Tab by mouth as needed (vaginal itching, fissure, discharge). 2 Tab 4   • Clindamycin Phos-Benzoyl Perox (ONEXTON) 1.2-3.75 % Gel 1 Application by Apply externally route every morning. To entire face 30 g 3   • buPROPion (WELLBUTRIN XL) 300 MG XL tablet Take 1 Tab by mouth every morning. 90 Tab 3   • valacyclovir (VALTREX) 1 GM Tab Take 1 Tab by mouth 2 times a day as needed (cold sores). 180 Tab 3   •  temazepam (RESTORIL) 15 MG Cap Take 15 mg by mouth at bedtime as needed for Sleep.     • Biotin 5000 MCG Cap Take 5,000 mcg by mouth every day at 6 PM. supplement     • melatonin 3 MG Tab Take 3 mg by mouth 1 time daily as needed (insomnia).     • Cyanocobalamin (VITAMIN B-12) 1000 MCG Tab Take 1,000 mcg by mouth every day. supplement     • Cholecalciferol (VITAMIN D3) 5000 units Tab Take 5,000 Units by mouth every day. supplement     • Levonorgestrel (KYLEENA) 19.5 MG IUD 1 Each by Intrauterine route See Admin Instructions. Every 5 years  birth control     • triamcinolone acetonide (KENALOG) 0.1 % Ointment Apply twice daily to affected areas of body until the skin is smooth. Repeat as needed. Do not use on face or genitals. (Patient not taking: Reported on 1/13/2021) 30 g 2   • tretinoin (RETIN-A) 0.025 % gel Pea-sized amount to the entire face at night. Start q2-3nights a week, increase to nightly as tolerated (Patient not taking: Reported on 2/11/2021) 1 Tube 3     No current facility-administered medications for this visit.       Patient Active Problem List    Diagnosis Date Noted   • Other viral warts 04/02/2019   • Plantar wart 01/09/2019   • Cervical disc disorder at C6-C7 level with radiculopathy 07/10/2018   • Primary insomnia 10/08/2017   • HSV-1 (herpes simplex virus 1) infection 06/23/2017   • Sinus pressure 05/03/2017   • Multiple sclerosis (HCC) 11/02/2016   • Seasonal allergies 05/31/2013   • Depression 08/07/2012   • ADHD 08/07/2012       Family History   Problem Relation Age of Onset   • Thyroid Mother    • Thyroid Brother    • Cancer Neg Hx    • Diabetes Neg Hx        She  has a past medical history of Acne (8/7/2012), Acne vulgaris (6/23/2017), ADHD (8/7/2012), Depression (8/7/2012), Multiple sclerosis (HCC) (11/2/2016), and Plantar wart (1/9/2019).  She  has a past surgical history that includes ankle orif; dental extraction(s); and cervical disk and fusion anterior (08/07/2018).       Objective:  "  BP (!) 96/54 Comment: Verbal  Pulse 68 Comment: Verbal  Temp 36.8 °C (98.3 °F) Comment: Verbal  Ht 1.778 m (5' 10\") Comment: Verbal  Wt 65.8 kg (145 lb) Comment: Verbal  BMI 20.81 kg/m²     Physical Exam:  Constitutional: Alert, no distress, well-groomed.  Skin: No rashes in visible areas.  Eye: Round. Conjunctiva clear, lids normal. No icterus.   ENMT: Lips pink without lesions, good dentition, moist mucous membranes. Phonation normal.  Neck: No masses, no thyromegaly. Moves freely without pain.  Respiratory: Unlabored respiratory effort, no cough or audible wheeze  Psych: Alert and oriented x3, normal affect and mood.       Assessment and Plan:   The following treatment plan was discussed:     1. Attention deficit hyperactivity disorder (ADHD), predominantly inattentive type  Chronic, stable on methylphenidate without adverse effects.  PDMP reviewed.  Refill provided for 1 month to fill on 3/10.  She has follow-up to establish with new PCP.  - methylphenidate (CONCERTA) 54 MG CR tablet; Take 1 tablet by mouth every morning for 30 days.  Dispense: 30 tablet; Refill: 0    2. Missed period  Menstrual period 12 days late, she does have Kyleena in place.  Regular periods prior to this.  Negative home pregnancy test.  Discussed that this could just be amenorrhea secondary to the Kyleena but would continue to check home pregnancy tests, she does plan to schedule follow-up with her OB.    Follow-up: Return if symptoms worsen or fail to improve.         "

## 2021-03-11 ENCOUNTER — OUTPATIENT INFUSION SERVICES (OUTPATIENT)
Dept: ONCOLOGY | Facility: MEDICAL CENTER | Age: 31
End: 2021-03-11
Attending: PSYCHIATRY & NEUROLOGY
Payer: COMMERCIAL

## 2021-03-11 VITALS
SYSTOLIC BLOOD PRESSURE: 115 MMHG | HEIGHT: 70 IN | RESPIRATION RATE: 18 BRPM | OXYGEN SATURATION: 99 % | WEIGHT: 147.05 LBS | TEMPERATURE: 97 F | HEART RATE: 84 BPM | BODY MASS INDEX: 21.05 KG/M2 | DIASTOLIC BLOOD PRESSURE: 59 MMHG

## 2021-03-11 DIAGNOSIS — G35 MULTIPLE SCLEROSIS (HCC): ICD-10-CM

## 2021-03-11 PROCEDURE — 700102 HCHG RX REV CODE 250 W/ 637 OVERRIDE(OP): Performed by: PSYCHIATRY & NEUROLOGY

## 2021-03-11 PROCEDURE — A9270 NON-COVERED ITEM OR SERVICE: HCPCS | Performed by: PSYCHIATRY & NEUROLOGY

## 2021-03-11 PROCEDURE — 96375 TX/PRO/DX INJ NEW DRUG ADDON: CPT

## 2021-03-11 PROCEDURE — 700105 HCHG RX REV CODE 258: Performed by: PSYCHIATRY & NEUROLOGY

## 2021-03-11 PROCEDURE — 96415 CHEMO IV INFUSION ADDL HR: CPT

## 2021-03-11 PROCEDURE — 306780 HCHG STAT FOR TRANSFUSION PER CASE

## 2021-03-11 PROCEDURE — 96413 CHEMO IV INFUSION 1 HR: CPT

## 2021-03-11 PROCEDURE — 700111 HCHG RX REV CODE 636 W/ 250 OVERRIDE (IP): Performed by: PSYCHIATRY & NEUROLOGY

## 2021-03-11 RX ORDER — DIPHENHYDRAMINE HYDROCHLORIDE 50 MG/ML
25 INJECTION INTRAMUSCULAR; INTRAVENOUS PRN
Status: CANCELLED | OUTPATIENT
Start: 2021-04-08

## 2021-03-11 RX ORDER — SODIUM CHLORIDE 9 MG/ML
INJECTION, SOLUTION INTRAVENOUS CONTINUOUS
Status: CANCELLED | OUTPATIENT
Start: 2021-04-08

## 2021-03-11 RX ORDER — ACETAMINOPHEN 325 MG/1
650 TABLET ORAL ONCE
Status: COMPLETED | OUTPATIENT
Start: 2021-03-11 | End: 2021-03-11

## 2021-03-11 RX ORDER — ACETAMINOPHEN 325 MG/1
650 TABLET ORAL ONCE
Status: CANCELLED | OUTPATIENT
Start: 2021-04-08

## 2021-03-11 RX ORDER — METHYLPREDNISOLONE SODIUM SUCCINATE 125 MG/2ML
100 INJECTION, POWDER, LYOPHILIZED, FOR SOLUTION INTRAMUSCULAR; INTRAVENOUS ONCE
Status: COMPLETED | OUTPATIENT
Start: 2021-03-11 | End: 2021-03-11

## 2021-03-11 RX ORDER — METHYLPREDNISOLONE SODIUM SUCCINATE 125 MG/2ML
100 INJECTION, POWDER, LYOPHILIZED, FOR SOLUTION INTRAMUSCULAR; INTRAVENOUS ONCE
Status: CANCELLED | OUTPATIENT
Start: 2021-04-08

## 2021-03-11 RX ORDER — SODIUM CHLORIDE 9 MG/ML
INJECTION, SOLUTION INTRAVENOUS CONTINUOUS
Status: DISCONTINUED | OUTPATIENT
Start: 2021-03-11 | End: 2021-03-11 | Stop reason: HOSPADM

## 2021-03-11 RX ADMIN — ACETAMINOPHEN 650 MG: 325 TABLET ORAL at 09:15

## 2021-03-11 RX ADMIN — OCRELIZUMAB 600 MG: 300 INJECTION INTRAVENOUS at 09:55

## 2021-03-11 RX ADMIN — METHYLPREDNISOLONE SODIUM SUCCINATE 100 MG: 125 INJECTION, POWDER, FOR SOLUTION INTRAMUSCULAR; INTRAVENOUS at 09:15

## 2021-03-11 RX ADMIN — DIPHENHYDRAMINE HYDROCHLORIDE 25 MG: 50 INJECTION INTRAMUSCULAR; INTRAVENOUS at 09:16

## 2021-03-11 NOTE — PROGRESS NOTES
"Pt arrived ambulatory to hospitals for q 6 month Ocrevus.  Pt verbalized experiencing vertigo on March 1 for a couple of days, but has since resolved.  Denies current acute illness/ infections.  PIV placed, brisk blood return observed.  Pre-medications administered per MAR.  Ocrevus administered at previous rate per patient preference, followed by 1 hour observation.  Pt verbalized feeling vertigo as well as feeling \"wobbly\" after infusion.  PIV flushed and removed, gauze and coban to site.  Pt discharged in NAD, next appointment scheduled and confirmed.    "

## 2021-03-18 ENCOUNTER — TELEMEDICINE (OUTPATIENT)
Dept: MEDICAL GROUP | Facility: LAB | Age: 31
End: 2021-03-18
Payer: COMMERCIAL

## 2021-03-18 VITALS
SYSTOLIC BLOOD PRESSURE: 96 MMHG | HEIGHT: 70 IN | TEMPERATURE: 98.8 F | DIASTOLIC BLOOD PRESSURE: 50 MMHG | WEIGHT: 146 LBS | HEART RATE: 92 BPM | BODY MASS INDEX: 20.9 KG/M2 | RESPIRATION RATE: 12 BRPM

## 2021-03-18 DIAGNOSIS — G35 MULTIPLE SCLEROSIS (HCC): ICD-10-CM

## 2021-03-18 DIAGNOSIS — Z00.00 HEALTH CARE MAINTENANCE: ICD-10-CM

## 2021-03-18 DIAGNOSIS — F90.0 ATTENTION DEFICIT HYPERACTIVITY DISORDER (ADHD), PREDOMINANTLY INATTENTIVE TYPE: ICD-10-CM

## 2021-03-18 DIAGNOSIS — Z76.89 ESTABLISHING CARE WITH NEW DOCTOR, ENCOUNTER FOR: ICD-10-CM

## 2021-03-18 DIAGNOSIS — F33.42 RECURRENT MAJOR DEPRESSIVE DISORDER, IN FULL REMISSION (HCC): ICD-10-CM

## 2021-03-18 PROCEDURE — 99214 OFFICE O/P EST MOD 30 MIN: CPT | Performed by: FAMILY MEDICINE

## 2021-03-18 RX ORDER — BUPROPION HYDROCHLORIDE 300 MG/1
300 TABLET ORAL EVERY MORNING
Qty: 90 TABLET | Refills: 3 | Status: SHIPPED | OUTPATIENT
Start: 2021-03-18 | End: 2021-06-17 | Stop reason: SDUPTHER

## 2021-03-18 RX ORDER — METHYLPHENIDATE HYDROCHLORIDE 54 MG/1
54 TABLET ORAL EVERY MORNING
Qty: 30 TABLET | Refills: 0 | Status: SHIPPED | OUTPATIENT
Start: 2021-03-18 | End: 2021-04-22 | Stop reason: SDUPTHER

## 2021-03-18 NOTE — PROGRESS NOTES
Telemedicine Video Visit: New Patient   This Remote Face to Face encounter for a new patient was conducted via Zoom. Given the importance of social distancing and other strategies recommended to reduce the risk of COVID-19 transmission, I am providing medical care to this patient via audio/video visit in place of an in person visit at the request of the patient. Verbal consent to telehealth, risks, benefits, and consequences were discussed. Patient retains the right to withdraw at any time. All existing confidentiality protections apply. The patient has access to all transmitted medical information. No dissemination of any patient images or information to other entities without further written consent.  Subjective:     Chief Complaint   Patient presents with   • New Patient     med refill     Established patient, new to me  Rosita Bowles is a 30 y.o. female presenting for evaluation and management of:    1. Establishing care with new doctor, encounter for  As part of her establishing care visit reviewed past medical problems, past surgical history, family/social history and medications today, studying full-time x-ray technician    2. Health care maintenance  Reviewed health maintenance, patient does her Pap through gynecology otherwise immunizations up-to-date    3. Multiple sclerosis (HCC)  History of MS, on every 6 months infusion through neurology.  Stable no problems or concerns at this time, reports occasional vertigo.  She said she will address it with her neurologist next visit    4. Attention deficit hyperactivity disorder (ADHD), predominantly inattentive type  Long history of ADHD since childhood, along term use of methylphenidate, site history of depression on Wellbutrin.  Reports stable symptoms on medication use.  No recent check with behavioral health  No red flag symptoms like anxiety or other red flags like intentions to hurt self or others.    ROS  Constitutional: Negative for fever,  chills and malaise/fatigue.   HENT: Negative for congestion.    Eyes: Negative for pain.   Respiratory: Negative for cough and shortness of breath.    Cardiovascular: Negative for leg swelling.   Gastrointestinal: Negative for nausea, vomiting, abdominal pain and diarrhea.   Genitourinary: Negative for dysuria and hematuria.   Skin: Negative for rash.   Neurological: Negative for dizziness, focal weakness and headaches.   Endo/Heme/Allergies: Does not bruise/bleed easily.   Psychiatric/Behavioral: Negative for depression.  The patient is not nervous/anxious.    Allergies   Allergen Reactions   • Other Drug Shortness of Breath and Swelling     Eye dye for dilation         Current medicines (including changes today)  Current Outpatient Medications   Medication Sig Dispense Refill   • methylphenidate (CONCERTA) 54 MG CR tablet Take 1 tablet by mouth every morning for 30 days. 30 tablet 0   • ocrelizumab (OCREVUS) 300 MG/10ML Solution injection Infuse  into a venous catheter.     • clotrimazole (GYNE-LOTRIMIN) 2 % Cream Insert 1 Application into the vagina every bedtime. 21 g 1   • fluconazole (DIFLUCAN) 150 MG tablet Take 1 Tab by mouth as needed (vaginal itching, fissure, discharge). 2 Tab 4   • tretinoin (RETIN-A) 0.025 % gel Pea-sized amount to the entire face at night. Start q2-3nights a week, increase to nightly as tolerated 1 Tube 3   • Clindamycin Phos-Benzoyl Perox (ONEXTON) 1.2-3.75 % Gel 1 Application by Apply externally route every morning. To entire face 30 g 3   • buPROPion (WELLBUTRIN XL) 300 MG XL tablet Take 1 Tab by mouth every morning. 90 Tab 3   • valacyclovir (VALTREX) 1 GM Tab Take 1 Tab by mouth 2 times a day as needed (cold sores). 180 Tab 3   • temazepam (RESTORIL) 15 MG Cap Take 15 mg by mouth at bedtime as needed for Sleep.     • Biotin 5000 MCG Cap Take 5,000 mcg by mouth every day at 6 PM. supplement     • melatonin 3 MG Tab Take 3 mg by mouth 1 time daily as needed (insomnia).     •  Cyanocobalamin (VITAMIN B-12) 1000 MCG Tab Take 1,000 mcg by mouth every day. supplement     • Cholecalciferol (VITAMIN D3) 5000 units Tab Take 5,000 Units by mouth every day. supplement     • Levonorgestrel (KYLEENA) 19.5 MG IUD 1 Each by Intrauterine route See Admin Instructions. Every 5 years  birth control     • triamcinolone acetonide (KENALOG) 0.1 % Ointment Apply twice daily to affected areas of body until the skin is smooth. Repeat as needed. Do not use on face or genitals. (Patient not taking: Reported on 1/13/2021) 30 g 2     No current facility-administered medications for this visit.       Patient Active Problem List    Diagnosis Date Noted   • Depression 08/07/2012     Priority: High   • ADHD 08/07/2012     Priority: High   • Establishing care with new doctor, encounter for 03/18/2021   • Health care maintenance 03/18/2021   • Other viral warts 04/02/2019   • Plantar wart 01/09/2019   • Cervical disc disorder at C6-C7 level with radiculopathy 07/10/2018   • Primary insomnia 10/08/2017   • HSV-1 (herpes simplex virus 1) infection 06/23/2017   • Sinus pressure 05/03/2017   • Multiple sclerosis (HCC) 11/02/2016   • Seasonal allergies 05/31/2013       Family History   Problem Relation Age of Onset   • Thyroid Mother    • Arthritis Mother         Rheumatoid arthritis    • Psychiatric Illness Mother    • Diabetes Father         prediabetes    • Heart Disease Father    • Thyroid Brother    • Psychiatric Illness Maternal Aunt         ADHD   • Cancer Maternal Aunt         breast ca        She  has a past medical history of Acne (8/7/2012), Acne vulgaris (6/23/2017), ADHD (8/7/2012), Depression (8/7/2012), Multiple sclerosis (HCC) (11/2/2016), and Plantar wart (1/9/2019).  She  has a past surgical history that includes ankle orif; dental extraction(s); cervical disk and fusion anterior (08/07/2018); and other orthopedic surgery.       Objective:   Vitals obtained by patient:  BP (!) 96/50 (BP Location: Left arm,  "Patient Position: Sitting, BP Cuff Size: Adult) Comment: verbal  Pulse 92   Temp 37.1 °C (98.8 °F)   Resp 12   Ht 1.778 m (5' 10\")   Wt 66.2 kg (146 lb)   BMI 20.95 kg/m²     Physical Exam:  Constitutional: Alert, no distress, well-groomed.  Skin: No rashes in visible areas.  Eye: Round. Conjunctiva clear, lids normal. No icterus.   ENMT: Lips pink without lesions, good dentition, moist mucous membranes. Phonation normal.  Neck: No masses, no thyromegaly. Moves freely without pain.  CV: Pulse as reported by patient  Respiratory: Unlabored respiratory effort, no cough or audible wheeze  Psych: Alert and oriented x3, normal affect and mood.     Assessment and Plan:   The following treatment plan was discussed:     1. Establishing care with new doctor, encounter for  Reviewed medical history today  2. Health care maintenance  Updated health maintenance topic  3. Multiple sclerosis (HCC)  Chronic stable, follow-up with neurology as directed no red flags continue medication as directed  4. Attention deficit hyperactivity disorder (ADHD), predominantly inattentive type  Chronic, on methylphenidate, refilled medication today advised also to follow-up with behavioral health  - REFERRAL TO BEHAVIORAL HEALTH    Needs face-to-face visit next time    Follow-up: No follow-ups on file.    Face to Face Video Visit:   I spent 30 minutes with patient/guardian and I conducted this visit with audio and video present.  Demarco Brian M.D.   "

## 2021-04-22 ENCOUNTER — OFFICE VISIT (OUTPATIENT)
Dept: MEDICAL GROUP | Facility: LAB | Age: 31
End: 2021-04-22
Payer: COMMERCIAL

## 2021-04-22 VITALS
OXYGEN SATURATION: 98 % | TEMPERATURE: 98.4 F | DIASTOLIC BLOOD PRESSURE: 54 MMHG | BODY MASS INDEX: 21.9 KG/M2 | HEIGHT: 70 IN | SYSTOLIC BLOOD PRESSURE: 104 MMHG | RESPIRATION RATE: 12 BRPM | WEIGHT: 153 LBS | HEART RATE: 86 BPM

## 2021-04-22 DIAGNOSIS — M54.50 ACUTE LEFT-SIDED LOW BACK PAIN WITHOUT SCIATICA: ICD-10-CM

## 2021-04-22 DIAGNOSIS — F90.0 ATTENTION DEFICIT HYPERACTIVITY DISORDER (ADHD), PREDOMINANTLY INATTENTIVE TYPE: ICD-10-CM

## 2021-04-22 PROCEDURE — 99214 OFFICE O/P EST MOD 30 MIN: CPT | Performed by: FAMILY MEDICINE

## 2021-04-22 RX ORDER — METHYLPHENIDATE HYDROCHLORIDE 54 MG/1
54 TABLET ORAL EVERY MORNING
Qty: 30 TABLET | Refills: 0 | Status: SHIPPED | OUTPATIENT
Start: 2021-04-22 | End: 2021-06-14 | Stop reason: SDUPTHER

## 2021-04-22 RX ORDER — METHYLPREDNISOLONE 4 MG/1
TABLET ORAL
Qty: 21 TABLET | Refills: 0 | Status: SHIPPED | OUTPATIENT
Start: 2021-04-22 | End: 2022-09-07

## 2021-04-22 NOTE — PROGRESS NOTES
Chief Complaint:   Chief Complaint   Patient presents with   • Back Pain     back pain starting Tuesday       HPI: Established patient  Rosita Bowles is a 30 y.o. female who presents for evaluation of acute back pain    1. Acute left-sided low back pain without sciatica  Reports 1 week and a half ago she started to have low back pain on the left side specifically after bending to get something and all of a sudden she felt sharp pain on the muscle area, she has been experiencing the pain for the past 1 week and a half although she said today she feels much better and the pain is around 2-3 over 10, patient said initially the pain was around 7/10, it was sharp, she was unable to do her daily functional activity because of the pain.  She started using over-the-counter Motrin and did some stretching exercise, now she is feeling a little bit better but not completely resolved and sometimes she feels if she does not take the lidocaine patches she will feel the pain coming back.  Reports no saddle area numbness or sphincter control issues, reports no lower extremity weakness or tingling or numbness sensation.    2. Attention deficit hyperactivity disorder (ADHD), predominantly inattentive type  Has an appointment to establish with behavioral health, would like to refill the medication until she is able to see the provider.  Her symptoms are well controlled on medication denies side effects          Past medical history, family history, social history and medications reviewed and updated in the record.  Today  Current medications, problem list and allergies reviewed in [facility] today  Health maintenance topics are reviewed and updated.    Patient Active Problem List    Diagnosis Date Noted   • Depression 08/07/2012   • ADHD 08/07/2012   • Establishing care with new doctor, encounter for 03/18/2021   • Health care maintenance 03/18/2021   • Other viral warts 04/02/2019   • Plantar wart 01/09/2019   • Cervical disc  disorder at C6-C7 level with radiculopathy 07/10/2018   • Primary insomnia 10/08/2017   • HSV-1 (herpes simplex virus 1) infection 06/23/2017   • Sinus pressure 05/03/2017   • Multiple sclerosis (HCC) 11/02/2016   • Seasonal allergies 05/31/2013     Family History   Problem Relation Age of Onset   • Thyroid Mother    • Arthritis Mother         Rheumatoid arthritis    • Psychiatric Illness Mother    • Diabetes Father         prediabetes    • Heart Disease Father    • Thyroid Brother    • Psychiatric Illness Maternal Aunt         ADHD   • Cancer Maternal Aunt         breast ca      Social History     Socioeconomic History   • Marital status: Single     Spouse name: Not on file   • Number of children: Not on file   • Years of education: Not on file   • Highest education level: Not on file   Occupational History     Comment: [employer] ,    Tobacco Use   • Smoking status: Never Smoker   • Smokeless tobacco: Never Used   Substance and Sexual Activity   • Alcohol use: Yes     Alcohol/week: 0.0 oz     Comment: occasional   • Drug use: No   • Sexual activity: Yes     Partners: Male     Birth control/protection: Pill     Comment: OCP   Other Topics Concern   •  Service No   • Blood Transfusions No   • Caffeine Concern No   • Occupational Exposure No   • Hobby Hazards No   • Sleep Concern Yes   • Stress Concern No   • Weight Concern No   • Special Diet No   • Back Care No   • Exercise Yes   • Bike Helmet Yes   • Seat Belt Yes   • Self-Exams Yes   Social History Narrative    2013: BF in [employer]. Attends [employer].    2014: was living in [place]. Now back in [place].     2014: working in [employer]. BF broke up with her Sept. No friends in "[employer]     2015: working 2 jobs. Has BF.      Social Determinants of Health     Financial Resource Strain:    • Difficulty of Paying Living Expenses:    Food Insecurity:    • Worried About Running Out of Food in the Last Year:    • Ran Out of Food in the Last Year:    Transportation Needs:     • Lack of Transportation (Medical):    • Lack of Transportation (Non-Medical):    Physical Activity:    • Days of Exercise per Week:    • Minutes of Exercise per Session:    Stress:    • Feeling of Stress :    Social Connections:    • Frequency of Communication with Friends and Family:    • Frequency of Social Gatherings with Friends and Family:    • Attends Denominational Services:    • Active Member of Clubs or Organizations:    • Attends Club or Organization Meetings:    • Marital Status:    Intimate Partner Violence:    • Fear of Current or Ex-Partner:    • Emotionally Abused:    • Physically Abused:    • Sexually Abused:        Current Outpatient Medications   Medication Sig Dispense Refill   • methylPREDNISolone (MEDROL DOSEPAK) 4 MG Tablet Therapy Pack As directed on the packaging label. 21 tablet 0   • methylphenidate (CONCERTA) 54 MG CR tablet Take 1 tablet by mouth every morning for 30 days. 30 tablet 0   • buPROPion (WELLBUTRIN XL) 300 MG XL tablet Take 1 tablet by mouth every morning. 90 tablet 3   • ocrelizumab (OCREVUS) 300 MG/10ML Solution injection Infuse  into a venous catheter.     • fluconazole (DIFLUCAN) 150 MG tablet Take 1 Tab by mouth as needed (vaginal itching, fissure, discharge). 2 Tab 4   • tretinoin (RETIN-A) 0.025 % gel Pea-sized amount to the entire face at night. Start q2-3nights a week, increase to nightly as tolerated 1 Tube 3   • Clindamycin Phos-Benzoyl Perox (ONEXTON) 1.2-3.75 % Gel 1 Application by Apply externally route every morning. To entire face 30 g 3   • valacyclovir (VALTREX) 1 GM Tab Take 1 Tab by mouth 2 times a day as needed (cold sores). 180 Tab 3   • temazepam (RESTORIL) 15 MG Cap Take 15 mg by mouth at bedtime as needed for Sleep.     • Biotin 5000 MCG Cap Take 5,000 mcg by mouth every day at 6 PM. supplement     • melatonin 3 MG Tab Take 3 mg by mouth 1 time daily as needed (insomnia).     • Cyanocobalamin (VITAMIN B-12) 1000 MCG Tab Take 1,000 mcg by mouth every day.  "supplement     • Cholecalciferol (VITAMIN D3) 5000 units Tab Take 5,000 Units by mouth every day. supplement     • Levonorgestrel (KYLEENA) 19.5 MG IUD 1 Each by Intrauterine route See Admin Instructions. Every 5 years  birth control     • clotrimazole (GYNE-LOTRIMIN) 2 % Cream Insert 1 Application into the vagina every bedtime. (Patient not taking: Reported on 4/22/2021) 21 g 1   • triamcinolone acetonide (KENALOG) 0.1 % Ointment Apply twice daily to affected areas of body until the skin is smooth. Repeat as needed. Do not use on face or genitals. (Patient not taking: Reported on 1/13/2021) 30 g 2     No current facility-administered medications for this visit.         Review Of Systems  As documented in HPI above  PHYSICAL EXAMINATION:    /54   Pulse 86   Temp 36.9 °C (98.4 °F) (Temporal)   Resp 12   Ht 1.778 m (5' 10\")   Wt 69.4 kg (153 lb)   SpO2 98%   BMI 21.95 kg/m²   Gen.: Well-developed, well-nourished, no apparent distress, pleasant and cooperative with the examination  HEENT: Normocephalic/atraumatic,  Neck: No JVD or bruits, no adenopathy  Cor: Regular rate and rhythm without murmur gallop or rub      Extremities: No cyanosis, clubbing or edema  Back exam: There is no deformity noted no tenderness in the spine area, most of the discomfort and tenderness on the left side of the paravertebral muscles in the low back area        ASSESSMENT/Plan:  1. Acute left-sided low back pain without sciatica   new problem, most likely muscular pain, discussed with the patient to use Medrol Dosepak if problem did not resolve completely, increase hydration for better muscle recovery, stretching exercise discussed with the patient today, follow-up if not resolved for further evaluation  methylPREDNISolone (MEDROL DOSEPAK) 4 MG Tablet Therapy Pack   2. Attention deficit hyperactivity disorder (ADHD), predominantly inattentive type   chronic stable on medication, awaiting for her behavioral health appointment, " refilled medication today.  methylphenidate (CONCERTA) 54 MG CR tablet     Please note that this dictation was created using voice recognition software. I have made every reasonable attempt to correct obvious errors but there may be errors of grammar and content that I may have overlooked prior to finalization of this note.

## 2021-05-10 ENCOUNTER — TELEPHONE (OUTPATIENT)
Dept: MEDICAL GROUP | Facility: LAB | Age: 31
End: 2021-05-10

## 2021-05-10 NOTE — TELEPHONE ENCOUNTER
----- Message from Rosita Bowles sent at 5/9/2021  8:47 AM PDT -----  Regarding: Prescription Question  Contact: [phone]  Hi Dr. Brian,    I am just about out of my bupropion HCl xl 300mg medicine and am requesting a refill, please.     Thank you!    Regards,    Rosita Bowles

## 2021-05-26 ENCOUNTER — GYNECOLOGY VISIT (OUTPATIENT)
Dept: OBGYN | Facility: CLINIC | Age: 31
End: 2021-05-26
Payer: COMMERCIAL

## 2021-05-26 VITALS
SYSTOLIC BLOOD PRESSURE: 125 MMHG | BODY MASS INDEX: 21.09 KG/M2 | WEIGHT: 147.3 LBS | DIASTOLIC BLOOD PRESSURE: 76 MMHG | HEIGHT: 70 IN

## 2021-05-26 VITALS — SYSTOLIC BLOOD PRESSURE: 138 MMHG | DIASTOLIC BLOOD PRESSURE: 75 MMHG | BODY MASS INDEX: 21.09 KG/M2 | WEIGHT: 147 LBS

## 2021-05-26 DIAGNOSIS — Z30.9 ENCOUNTER FOR CONTRACEPTIVE MANAGEMENT, UNSPECIFIED TYPE: ICD-10-CM

## 2021-05-26 DIAGNOSIS — Z31.69 ENCOUNTER FOR PRECONCEPTION CONSULTATION: ICD-10-CM

## 2021-05-26 PROCEDURE — 99213 OFFICE O/P EST LOW 20 MIN: CPT | Performed by: OBSTETRICS & GYNECOLOGY

## 2021-05-26 NOTE — PROGRESS NOTES
GYN Visit  CC: Family-planning    Rosita Bowles is a 30 y.o.  who presents today for family-planning.  Patient has a Kyleena IUD in place which has been in place since 2016.  She is engaged and plans to get  next year.  She is not ready for children yet but thinks that she may be ready for them in about 2 to 3 years.  Is interested in having her IUD removed and a new one replaced.   Reports that otherwise she has been doing well and does not have any acute concerns today.     OB History:    OB History    Para Term  AB Living   0 0       0   SAB TAB Ectopic Molar Multiple Live Births                     Review of Systems:   Pertinent positives documented in HPI and all other systems reviewed & are negative.    All PMH, PSH, allergies, social history and FH reviewed and updated today:  Past Medical History:  Past Medical History:   Diagnosis Date   • Acne 2012   • Acne vulgaris 2017   • ADHD 2012   • Depression 2012   • Multiple sclerosis (HCC) 2016   • Plantar wart 2019       Past Surgical History:  Past Surgical History:   Procedure Laterality Date   • CERVICAL DISK AND FUSION ANTERIOR  2018   • ANKLE ORIF     • DENTAL EXTRACTION(S)     • OTHER ORTHOPEDIC SURGERY         Medications:   Current Outpatient Medications Ordered in Epic   Medication Sig Dispense Refill   • methylPREDNISolone (MEDROL DOSEPAK) 4 MG Tablet Therapy Pack As directed on the packaging label. 21 tablet 0   • buPROPion (WELLBUTRIN XL) 300 MG XL tablet Take 1 tablet by mouth every morning. 90 tablet 3   • ocrelizumab (OCREVUS) 300 MG/10ML Solution injection Infuse  into a venous catheter.     • fluconazole (DIFLUCAN) 150 MG tablet Take 1 Tab by mouth as needed (vaginal itching, fissure, discharge). 2 Tab 4   • tretinoin (RETIN-A) 0.025 % gel Pea-sized amount to the entire face at night. Start q2-3nights a week, increase to nightly as tolerated 1 Tube 3   • Clindamycin  Phos-Benzoyl Perox (ONEXTON) 1.2-3.75 % Gel 1 Application by Apply externally route every morning. To entire face 30 g 3   • valacyclovir (VALTREX) 1 GM Tab Take 1 Tab by mouth 2 times a day as needed (cold sores). 180 Tab 3   • temazepam (RESTORIL) 15 MG Cap Take 15 mg by mouth at bedtime as needed for Sleep.     • Biotin 5000 MCG Cap Take 5,000 mcg by mouth every day at 6 PM. supplement     • melatonin 3 MG Tab Take 3 mg by mouth 1 time daily as needed (insomnia).     • Cyanocobalamin (VITAMIN B-12) 1000 MCG Tab Take 1,000 mcg by mouth every day. supplement     • Cholecalciferol (VITAMIN D3) 5000 units Tab Take 5,000 Units by mouth every day. supplement     • Levonorgestrel (KYLEENA) 19.5 MG IUD 1 Each by Intrauterine route See Admin Instructions. Every 5 years  birth control       No current Epic-ordered facility-administered medications on file.       Allergies: Other drug    Social History:  Social History     Socioeconomic History   • Marital status: Single     Spouse name: Not on file   • Number of children: Not on file   • Years of education: Not on file   • Highest education level: Not on file   Occupational History     Comment: [employer] ,    Tobacco Use   • Smoking status: Never Smoker   • Smokeless tobacco: Never Used   Vaping Use   • Vaping Use: Never used   Substance and Sexual Activity   • Alcohol use: Yes     Alcohol/week: 0.0 oz     Comment: occasional   • Drug use: No   • Sexual activity: Yes     Partners: Male     Birth control/protection: Pill     Comment: OCP   Other Topics Concern   •  Service No   • Blood Transfusions No   • Caffeine Concern No   • Occupational Exposure No   • Hobby Hazards No   • Sleep Concern Yes   • Stress Concern No   • Weight Concern No   • Special Diet No   • Back Care No   • Exercise Yes   • Bike Helmet Yes   • Seat Belt Yes   • Self-Exams Yes   Social History Narrative    2013: BF in [place]. Attends [employer].    2014: was living in [place]. Now back in [place].      2014: working in [employer]. BF broke up with her Sept. No friends in [place].     2015: working 2 jobs. Has BF.      Social Determinants of Health     Financial Resource Strain:    • Difficulty of Paying Living Expenses:    Food Insecurity:    • Worried About Running Out of Food in the Last Year:    • Ran Out of Food in the Last Year:    Transportation Needs:    • Lack of Transportation (Medical):    • Lack of Transportation (Non-Medical):    Physical Activity:    • Days of Exercise per Week:    • Minutes of Exercise per Session:    Stress:    • Feeling of Stress :    Social Connections:    • Frequency of Communication with Friends and Family:    • Frequency of Social Gatherings with Friends and Family:    • Attends Jewish Services:    • Active Member of Clubs or Organizations:    • Attends Club or Organization Meetings:    • Marital Status:    Intimate Partner Violence:    • Fear of Current or Ex-Partner:    • Emotionally Abused:    • Physically Abused:    • Sexually Abused:        Family History:  Family History   Problem Relation Age of Onset   • Thyroid Mother    • Arthritis Mother         Rheumatoid arthritis    • Psychiatric Illness Mother    • Diabetes Father         prediabetes    • Heart Disease Father    • Thyroid Brother    • Psychiatric Illness Maternal Aunt         ADHD   • Cancer Maternal Aunt         breast ca            Objective:   Vitals:  /75   Wt 66.7 kg (147 lb)   Body mass index is 21.09 kg/m². (Goal BM I>18 <25)    Physical Exam:   Nursing note and vitals reviewed.  GENERAL: No acute distress  HENT: Atraumatic, normocephalic  EYES: Extraocular movements intact, pupils equal and reactive to light  NECK: Supple, Full ROM  CHEST: No deformities, Equal chest expansion  RESP: Unlabored, no stridor or audible wheeze  ABD: Soft, Nontender, Non-Distended  Extremities: No Clubbing, Cyanosis, or Edema  Skin: Warm/dry, without rashes  Neuro: A/O x 4, CN 2-12 Grossly intact, Motor/sensory  grossly intact  Psych: Normal mood, behavior, and affect       Assessment/Plan:   Rosita Bowles is a 30 y.o.  female who presents for:    1. Encounter for preconception consultation     2. Encounter for contraceptive management, unspecified type       #Contraceptive management.  Discussed timing of pregnancy in the setting of IUD use.  Questions were addressed.  Following our conversation patient decides that she would like her IUD removed and another one replaced.  She will return 6-10 for this procedural appointment.    #Preconception counseling.  Advised once she decides she would like to try for pregnancy to ensure that she avoids teratogenic substances and initiate prenatal vitamins.  Discussed timing of IUD removal in relation to trying to get pregnant.  #Follow up.  RTC for IUD removal and reinsertion or sooner if concerns or questions arise.    This encounter took 15 minutes of which > 50% of time was spent on face-to-face counseling and coordination of care regarding the above.    Please note that this note was created using voice recognition software. I have made every reasonable attempt to correct obvious errors, but expect that there are errors of grammar and possibly of content that I did not discover before finalizing note.

## 2021-05-26 NOTE — PROGRESS NOTES
Patient here c/o Discuss family planning, removal of IUD   LMP= 5/18/2021  BCM: IUD placed 2016   Last pap date/result: 02/12/2020 WNL  Phone number:[phone]  Pharmacy confirmed.

## 2021-06-10 ENCOUNTER — GYNECOLOGY VISIT (OUTPATIENT)
Dept: OBGYN | Facility: CLINIC | Age: 31
End: 2021-06-10
Payer: COMMERCIAL

## 2021-06-10 VITALS — SYSTOLIC BLOOD PRESSURE: 117 MMHG | BODY MASS INDEX: 21.52 KG/M2 | DIASTOLIC BLOOD PRESSURE: 82 MMHG | WEIGHT: 150 LBS

## 2021-06-10 DIAGNOSIS — B37.31 VAGINAL CANDIDIASIS: ICD-10-CM

## 2021-06-10 DIAGNOSIS — Z30.432 ENCOUNTER FOR IUD REMOVAL: ICD-10-CM

## 2021-06-10 DIAGNOSIS — Z30.430 ENCOUNTER FOR INSERTION OF INTRAUTERINE CONTRACEPTIVE DEVICE (IUD): ICD-10-CM

## 2021-06-10 PROCEDURE — 58301 REMOVE INTRAUTERINE DEVICE: CPT | Mod: 59 | Performed by: OBSTETRICS & GYNECOLOGY

## 2021-06-10 PROCEDURE — 58300 INSERT INTRAUTERINE DEVICE: CPT | Performed by: OBSTETRICS & GYNECOLOGY

## 2021-06-10 RX ORDER — IBUPROFEN 800 MG/1
800 TABLET ORAL ONCE
Qty: 1 TABLET | Refills: 0 | COMMUNITY
Start: 2021-06-10 | End: 2021-06-10

## 2021-06-10 RX ORDER — FLUCONAZOLE 150 MG/1
150 TABLET ORAL DAILY
Qty: 10 TABLET | Refills: 0 | Status: SHIPPED | OUTPATIENT
Start: 2021-06-10 | End: 2021-07-07

## 2021-06-10 NOTE — PROGRESS NOTES
Patient here for GYN exam.  C/o IUD removal and reinsertion   LMP=  05/18/2021  BCM: IUD  Last pap:02/12/2020 WNL  Phone number:  Pharmacy verified

## 2021-06-10 NOTE — PROCEDURES
IUD Removal    Date/Time: 6/10/2021 1:20 PM  Performed by: Allie Zheng D.O.  Authorized by: Allie Zheng D.O.     Consent:     Consent obtained:  Written    Consent given by:  Patient    Procedure risks and benefits discussed: yes      Patient questions answered: yes      Patient agrees, verbalizes understanding, and wants to proceed: yes      Educational handouts given: yes      Instructions and paperwork completed: yes    Pre-procedure details:     Reason for removal: abnormal bleeding and  IUD      IUD placed at this facility: no      Length of time IUD in place:  5 years  Procedure:     Pelvic exam performed: yes      Speculum placed: yes      IUD strings visualized in external os: yes      Removal mechanism:  Ring forceps    IUD removed intact: yes      IUD type removed:  Kyleena    Removal complications: no    Post-procedure:     New birth control prescribed: yes      Counseling regarding contraception given: yes    IUD Insertion    Date/Time: 6/10/2021 1:21 PM  Performed by: Allie Zheng D.O.  Authorized by: Allie Zheng D.O.     Consent:     Consent obtained:  Verbal    Consent given by:  Patient    Procedure risks and benefits discussed: yes      Patient questions answered: yes      Patient agrees, verbalizes understanding, and wants to proceed: yes      Educational handouts given: yes      Instructions and paperwork completed: yes    Pre-procedure details:     Negative GC/chlamydia test: no      Negative urine pregnancy test: yes      Negative serum pregnancy test: no    Procedure:     Pelvic exam performed: yes      Sterile speculum placed in vagina: yes      Cervix visualized: yes      Cervix cleaned and prepped in sterile fashion: yes      Tenaculum applied to cervix: yes      Dilation needed: no      Uterus sounded: yes      Uterus sound depth (cm):  7    IUD inserted with no complications: yes      IUD type:  Mirena    Strings trimmed: yes     Post-procedure:     Patient tolerated procedure well: yes      Patient will follow up after next period: yes    Comments:      [id number]  Exp Oct 2023

## 2021-06-14 DIAGNOSIS — F90.0 ATTENTION DEFICIT HYPERACTIVITY DISORDER (ADHD), PREDOMINANTLY INATTENTIVE TYPE: ICD-10-CM

## 2021-06-14 NOTE — TELEPHONE ENCOUNTER
Received request via: Patient    Was the patient seen in the last year in this department? Yes  4/22/2021  Does the patient have an active prescription (recently filled or refills available) for medication(s) requested? No    ----- Message from Rosita Bowles sent at 6/13/2021  8:00 PM PDT -----  Regarding: Prescription Question  Contact: [phone]  Hi Dr. Brian,    I need another refill for my methylphenidate, unfortunately. The behavioral health appointment is soon, though! Is there any chance you could refill the prescription before Wednesday?    Best,    Rosita Bowles

## 2021-06-15 ENCOUNTER — TELEPHONE (OUTPATIENT)
Dept: MEDICAL GROUP | Facility: LAB | Age: 31
End: 2021-06-15

## 2021-06-15 RX ORDER — METHYLPHENIDATE HYDROCHLORIDE 54 MG/1
54 TABLET ORAL EVERY MORNING
Qty: 30 TABLET | Refills: 0 | Status: SHIPPED | OUTPATIENT
Start: 2021-06-15 | End: 2021-07-15

## 2021-06-17 ENCOUNTER — TELEMEDICINE (OUTPATIENT)
Dept: BEHAVIORAL HEALTH | Facility: CLINIC | Age: 31
End: 2021-06-17
Payer: COMMERCIAL

## 2021-06-17 DIAGNOSIS — F33.1 MODERATE EPISODE OF RECURRENT MAJOR DEPRESSIVE DISORDER (HCC): ICD-10-CM

## 2021-06-17 DIAGNOSIS — F33.42 RECURRENT MAJOR DEPRESSIVE DISORDER, IN FULL REMISSION (HCC): ICD-10-CM

## 2021-06-17 DIAGNOSIS — F41.1 GAD (GENERALIZED ANXIETY DISORDER): ICD-10-CM

## 2021-06-17 DIAGNOSIS — F90.0 ATTENTION DEFICIT HYPERACTIVITY DISORDER (ADHD), PREDOMINANTLY INATTENTIVE TYPE: ICD-10-CM

## 2021-06-17 PROCEDURE — 99205 OFFICE O/P NEW HI 60 MIN: CPT | Mod: 95 | Performed by: PSYCHIATRY & NEUROLOGY

## 2021-06-17 RX ORDER — METHYLPHENIDATE HYDROCHLORIDE 54 MG/1
54 TABLET ORAL EVERY MORNING
Qty: 30 TABLET | Refills: 0 | Status: SHIPPED | OUTPATIENT
Start: 2021-07-16 | End: 2021-08-15

## 2021-06-17 RX ORDER — BUPROPION HYDROCHLORIDE 300 MG/1
300 TABLET ORAL EVERY MORNING
Qty: 90 TABLET | Refills: 1 | Status: SHIPPED | OUTPATIENT
Start: 2021-06-17 | End: 2021-11-04 | Stop reason: SDUPTHER

## 2021-06-17 RX ORDER — METHYLPHENIDATE HYDROCHLORIDE 54 MG/1
54 TABLET ORAL EVERY MORNING
Qty: 30 TABLET | Refills: 0 | Status: SHIPPED | OUTPATIENT
Start: 2021-08-16 | End: 2021-09-15

## 2021-06-17 ASSESSMENT — PATIENT HEALTH QUESTIONNAIRE - PHQ9
SUM OF ALL RESPONSES TO PHQ QUESTIONS 1-9: 10
CLINICAL INTERPRETATION OF PHQ2 SCORE: 1
5. POOR APPETITE OR OVEREATING: 1 - SEVERAL DAYS

## 2021-06-17 ASSESSMENT — ANXIETY QUESTIONNAIRES
5. BEING SO RESTLESS THAT IT IS HARD TO SIT STILL: SEVERAL DAYS
GAD7 TOTAL SCORE: 9
4. TROUBLE RELAXING: MORE THAN HALF THE DAYS
6. BECOMING EASILY ANNOYED OR IRRITABLE: NOT AT ALL
2. NOT BEING ABLE TO STOP OR CONTROL WORRYING: SEVERAL DAYS
1. FEELING NERVOUS, ANXIOUS, OR ON EDGE: MORE THAN HALF THE DAYS
3. WORRYING TOO MUCH ABOUT DIFFERENT THINGS: MORE THAN HALF THE DAYS
7. FEELING AFRAID AS IF SOMETHING AWFUL MIGHT HAPPEN: SEVERAL DAYS
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
## 2021-07-07 ENCOUNTER — HOSPITAL ENCOUNTER (OUTPATIENT)
Dept: LAB | Facility: MEDICAL CENTER | Age: 31
End: 2021-07-07
Attending: PSYCHIATRY & NEUROLOGY
Payer: COMMERCIAL

## 2021-07-07 ENCOUNTER — OFFICE VISIT (OUTPATIENT)
Dept: NEUROLOGY | Facility: MEDICAL CENTER | Age: 31
End: 2021-07-07
Attending: PSYCHIATRY & NEUROLOGY
Payer: COMMERCIAL

## 2021-07-07 VITALS
WEIGHT: 147.71 LBS | BODY MASS INDEX: 21.15 KG/M2 | SYSTOLIC BLOOD PRESSURE: 108 MMHG | HEART RATE: 95 BPM | TEMPERATURE: 97.8 F | HEIGHT: 70 IN | OXYGEN SATURATION: 97 % | DIASTOLIC BLOOD PRESSURE: 60 MMHG

## 2021-07-07 DIAGNOSIS — G35 MULTIPLE SCLEROSIS (HCC): ICD-10-CM

## 2021-07-07 DIAGNOSIS — G35 MULTIPLE SCLEROSIS (HCC): Primary | ICD-10-CM

## 2021-07-07 LAB
ALBUMIN SERPL BCP-MCNC: 4.7 G/DL (ref 3.2–4.9)
ALBUMIN/GLOB SERPL: 2.2 G/DL
ALP SERPL-CCNC: 62 U/L (ref 30–99)
ALT SERPL-CCNC: 7 U/L (ref 2–50)
ANION GAP SERPL CALC-SCNC: 7 MMOL/L (ref 7–16)
AST SERPL-CCNC: 12 U/L (ref 12–45)
BASOPHILS # BLD AUTO: 0.8 % (ref 0–1.8)
BASOPHILS # BLD: 0.04 K/UL (ref 0–0.12)
BILIRUB SERPL-MCNC: 0.4 MG/DL (ref 0.1–1.5)
BUN SERPL-MCNC: 10 MG/DL (ref 8–22)
CALCIUM SERPL-MCNC: 9.2 MG/DL (ref 8.5–10.5)
CHLORIDE SERPL-SCNC: 107 MMOL/L (ref 96–112)
CO2 SERPL-SCNC: 27 MMOL/L (ref 20–33)
CREAT SERPL-MCNC: 0.95 MG/DL (ref 0.5–1.4)
EOSINOPHIL # BLD AUTO: 0.18 K/UL (ref 0–0.51)
EOSINOPHIL NFR BLD: 3.4 % (ref 0–6.9)
ERYTHROCYTE [DISTWIDTH] IN BLOOD BY AUTOMATED COUNT: 45.1 FL (ref 35.9–50)
GLOBULIN SER CALC-MCNC: 2.1 G/DL (ref 1.9–3.5)
GLUCOSE SERPL-MCNC: 86 MG/DL (ref 65–99)
HCT VFR BLD AUTO: 44.8 % (ref 37–47)
HGB BLD-MCNC: 14.5 G/DL (ref 12–16)
IMM GRANULOCYTES # BLD AUTO: 0.01 K/UL (ref 0–0.11)
IMM GRANULOCYTES NFR BLD AUTO: 0.2 % (ref 0–0.9)
LYMPHOCYTES # BLD AUTO: 2.22 K/UL (ref 1–4.8)
LYMPHOCYTES NFR BLD: 41.9 % (ref 22–41)
MCH RBC QN AUTO: 28.8 PG (ref 27–33)
MCHC RBC AUTO-ENTMCNC: 32.4 G/DL (ref 33.6–35)
MCV RBC AUTO: 89.1 FL (ref 81.4–97.8)
MONOCYTES # BLD AUTO: 0.42 K/UL (ref 0–0.85)
MONOCYTES NFR BLD AUTO: 7.9 % (ref 0–13.4)
NEUTROPHILS # BLD AUTO: 2.43 K/UL (ref 2–7.15)
NEUTROPHILS NFR BLD: 45.8 % (ref 44–72)
NRBC # BLD AUTO: 0 K/UL
NRBC BLD-RTO: 0 /100 WBC
PLATELET # BLD AUTO: 279 K/UL (ref 164–446)
PMV BLD AUTO: 10.2 FL (ref 9–12.9)
POTASSIUM SERPL-SCNC: 4.3 MMOL/L (ref 3.6–5.5)
PROT SERPL-MCNC: 6.8 G/DL (ref 6–8.2)
RBC # BLD AUTO: 5.03 M/UL (ref 4.2–5.4)
SODIUM SERPL-SCNC: 141 MMOL/L (ref 135–145)
WBC # BLD AUTO: 5.3 K/UL (ref 4.8–10.8)

## 2021-07-07 PROCEDURE — 36415 COLL VENOUS BLD VENIPUNCTURE: CPT

## 2021-07-07 PROCEDURE — 99213 OFFICE O/P EST LOW 20 MIN: CPT | Performed by: PSYCHIATRY & NEUROLOGY

## 2021-07-07 PROCEDURE — 85025 COMPLETE CBC W/AUTO DIFF WBC: CPT

## 2021-07-07 PROCEDURE — 99212 OFFICE O/P EST SF 10 MIN: CPT | Performed by: PSYCHIATRY & NEUROLOGY

## 2021-07-07 PROCEDURE — 80053 COMPREHEN METABOLIC PANEL: CPT

## 2021-07-07 ASSESSMENT — ENCOUNTER SYMPTOMS
CONSTIPATION: 0
MEMORY LOSS: 0
BLURRED VISION: 1
TINGLING: 1
NECK PAIN: 0

## 2021-07-08 NOTE — PROGRESS NOTES
Subjective:      Rosita Bowles is a 30 y.o. female who presents for follow-up, with a history of MS.     HPI    Last seen in August, 2020, Rosita has continued to do well.  She denies anything suggestive of disease activity or relapse.  She still has the monocular visual distortion of the left eye, this is not worsened, there is no diplopia.  The paresthesias in the feet also are unchanged.    He denies any new issues with cognitive impairment, diplopia, bulbar dysfunction, focal motor or coordination deficits, changes in bowel or bladder function, constrictive sensations around the midriff, Lhermitte's phenomena, or fatigue.  She is remained on Ocrevus 600 mg infusions every 6 months, her next infusion coming in September.  Her ALC counts have always been normal, her last drawn in January, 2020.    Repeat imaging of the brain and cervical spine was done in January, 2021, compared to the study of 1 year before, the next scan revealed no intraparenchymal cord signal abnormality, only the postoperative changes, head scan revealing the asymmetric left hemispheric lesion burden without change in number or evidence of active disease with enhancement or diffusion weighting abnormality.  The left hemispheric hemiatrophy is also unchanged.    Right cervical radiculopathy is unchanged and stable, she no longer has the pain in the shoulder or focal weakness in the arm with paresthesias.    Medical, surgical and family histories are reviewed, there are no new drug allergies.  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  She is very happy in this regard.  As above, she is on Ocrevus, now Concerta 54 mg daily, Wellbutrin  mg daily, Valtrex and Diflucan as needed, Restoril 15 mg nightly as needed, Mirena, vitamin B12, calcium and vitamin D, the rest as per the electronic health record, noncontributory from my standpoint.    Review of  "Systems   Constitutional: Negative for malaise/fatigue.   Eyes: Positive for blurred vision.   Gastrointestinal: Negative for constipation.   Genitourinary: Negative for frequency.   Musculoskeletal: Negative for neck pain.   Neurological: Positive for tingling.   Psychiatric/Behavioral: Negative for memory loss.   All other systems reviewed and are negative.       Objective:     /60 (BP Location: Right arm, Patient Position: Sitting)   Pulse 95   Temp 36.6 °C (97.8 °F) (Temporal)   Ht 1.778 m (5' 10\")   Wt 67 kg (147 lb 11.3 oz)   SpO2 97%   BMI 21.19 kg/m²      Physical Exam    She appears in no acute distress.  Vital signs are stable.  There is no malar rash.  The neck is supple, range of motion is full, compression maneuvers are negative, Lhermitte's phenomena is absent.  Cardiac evaluation is unremarkable.    Neurological Exam    She is fully oriented, there is no aphasia, apraxia, or inattention.    PERRLA/EOMI, facial movements are symmetric, sensory exam is intact to temperature bilaterally.  To finger counting, visual fields are full OD, there is the inferior altitudinal deficit OS, extending upwards to the mid temporal visual field.  Shoulder shrug and head rotation are intact.  The tongue and uvula are midline, there is no bulbar dysfunction.    Musculoskeletal exam reveals normal tone, there is some cupping with the right hand, but no drift otherwise.  Strength is intact in all 4 extremities.  Reflexes are still brisker on the right side, though the right triceps is diminished relatively speaking.  The toes are downgoing.    She stands easily, armswing is symmetric, repetitive movements with the hands, fingers and feet are normal with symmetric amplitude and frequencies throughout.  Gait is normal and station and stride length.  Heel, toe, and tandem walking remain normal.    Sensory exam is intact to vibration and temperature, Romberg is absent.    I reviewed the images of her brain with  " Erlin Howell MD, our neuro immunology specialist, who agreed that the lesions in her brain are unchanged from the study of of the year before.     Assessment/Plan:     1. Multiple sclerosis (HCC)  Clinically and radiographically stable, I am quite pleased with her present circumstance.  We will continue with annual imaging, this can be set up for January of next year, but CBC and CMP do need to be ordered on an annual basis.  We will call her with these results if unremarkable.  Otherwise she follows up at the end of this year, her next Ocrevus infusion in September.    - CBC WITH DIFFERENTIAL; Future  - Comp Metabolic Panel; Future    Time: 20-minute spent face-to-face for exam, review, discussion, and education, of this over 50% of the time spent counseling and coordinating care.

## 2021-07-15 ENCOUNTER — TELEMEDICINE (OUTPATIENT)
Dept: BEHAVIORAL HEALTH | Facility: CLINIC | Age: 31
End: 2021-07-15
Payer: COMMERCIAL

## 2021-07-15 DIAGNOSIS — F41.1 GAD (GENERALIZED ANXIETY DISORDER): ICD-10-CM

## 2021-07-15 DIAGNOSIS — F90.8 ATTENTION DEFICIT HYPERACTIVITY DISORDER (ADHD), OTHER TYPE: ICD-10-CM

## 2021-07-15 DIAGNOSIS — F33.1 MODERATE EPISODE OF RECURRENT MAJOR DEPRESSIVE DISORDER (HCC): ICD-10-CM

## 2021-07-15 PROCEDURE — 90791 PSYCH DIAGNOSTIC EVALUATION: CPT | Performed by: MARRIAGE & FAMILY THERAPIST

## 2021-07-15 RX ORDER — FERROUS SULFATE 7.5 MG/0.5
6 SYRINGE (EA) ORAL
COMMUNITY
End: 2021-11-04
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
## 2021-09-08 ENCOUNTER — OUTPATIENT INFUSION SERVICES (OUTPATIENT)
Dept: ONCOLOGY | Facility: MEDICAL CENTER | Age: 31
End: 2021-09-08
Attending: PSYCHIATRY & NEUROLOGY
Payer: COMMERCIAL

## 2021-09-08 VITALS
HEIGHT: 70 IN | TEMPERATURE: 98 F | BODY MASS INDEX: 20.77 KG/M2 | OXYGEN SATURATION: 98 % | RESPIRATION RATE: 18 BRPM | WEIGHT: 145.06 LBS | SYSTOLIC BLOOD PRESSURE: 112 MMHG | DIASTOLIC BLOOD PRESSURE: 71 MMHG | HEART RATE: 85 BPM

## 2021-09-08 DIAGNOSIS — G35 MULTIPLE SCLEROSIS (HCC): ICD-10-CM

## 2021-09-08 PROCEDURE — 700111 HCHG RX REV CODE 636 W/ 250 OVERRIDE (IP): Performed by: PSYCHIATRY & NEUROLOGY

## 2021-09-08 PROCEDURE — 700105 HCHG RX REV CODE 258: Performed by: PSYCHIATRY & NEUROLOGY

## 2021-09-08 PROCEDURE — A9270 NON-COVERED ITEM OR SERVICE: HCPCS | Performed by: PSYCHIATRY & NEUROLOGY

## 2021-09-08 PROCEDURE — 96365 THER/PROPH/DIAG IV INF INIT: CPT

## 2021-09-08 PROCEDURE — 306780 HCHG STAT FOR TRANSFUSION PER CASE

## 2021-09-08 PROCEDURE — 96413 CHEMO IV INFUSION 1 HR: CPT

## 2021-09-08 PROCEDURE — 700102 HCHG RX REV CODE 250 W/ 637 OVERRIDE(OP): Performed by: PSYCHIATRY & NEUROLOGY

## 2021-09-08 PROCEDURE — 96375 TX/PRO/DX INJ NEW DRUG ADDON: CPT

## 2021-09-08 PROCEDURE — 96366 THER/PROPH/DIAG IV INF ADDON: CPT

## 2021-09-08 PROCEDURE — 96415 CHEMO IV INFUSION ADDL HR: CPT

## 2021-09-08 RX ORDER — DIPHENHYDRAMINE HYDROCHLORIDE 50 MG/ML
25 INJECTION INTRAMUSCULAR; INTRAVENOUS PRN
Status: CANCELLED | OUTPATIENT
Start: 2021-10-05

## 2021-09-08 RX ORDER — FERROUS SULFATE 325(65) MG
325 TABLET ORAL DAILY
COMMUNITY
End: 2023-12-16

## 2021-09-08 RX ORDER — SODIUM CHLORIDE 9 MG/ML
INJECTION, SOLUTION INTRAVENOUS CONTINUOUS
Status: CANCELLED | OUTPATIENT
Start: 2021-10-05

## 2021-09-08 RX ORDER — METHYLPREDNISOLONE SODIUM SUCCINATE 125 MG/2ML
100 INJECTION, POWDER, LYOPHILIZED, FOR SOLUTION INTRAMUSCULAR; INTRAVENOUS ONCE
Status: COMPLETED | OUTPATIENT
Start: 2021-09-08 | End: 2021-09-08

## 2021-09-08 RX ORDER — METHYLPREDNISOLONE SODIUM SUCCINATE 125 MG/2ML
100 INJECTION, POWDER, LYOPHILIZED, FOR SOLUTION INTRAMUSCULAR; INTRAVENOUS ONCE
Status: CANCELLED | OUTPATIENT
Start: 2021-10-05

## 2021-09-08 RX ORDER — ACETAMINOPHEN 325 MG/1
650 TABLET ORAL ONCE
Status: COMPLETED | OUTPATIENT
Start: 2021-09-08 | End: 2021-09-08

## 2021-09-08 RX ORDER — ACETAMINOPHEN 325 MG/1
650 TABLET ORAL ONCE
Status: CANCELLED | OUTPATIENT
Start: 2021-10-05

## 2021-09-08 RX ADMIN — DIPHENHYDRAMINE HYDROCHLORIDE 25 MG: 50 INJECTION, SOLUTION INTRAMUSCULAR; INTRAVENOUS at 09:24

## 2021-09-08 RX ADMIN — ACETAMINOPHEN 650 MG: 325 TABLET ORAL at 09:24

## 2021-09-08 RX ADMIN — OCRELIZUMAB 600 MG: 300 INJECTION INTRAVENOUS at 09:43

## 2021-09-08 RX ADMIN — METHYLPREDNISOLONE SODIUM SUCCINATE 100 MG: 125 INJECTION, POWDER, FOR SOLUTION INTRAMUSCULAR; INTRAVENOUS at 09:24

## 2021-09-08 ASSESSMENT — FIBROSIS 4 INDEX: FIB4 SCORE: 0.47

## 2021-09-08 NOTE — PROGRESS NOTES
Pt presents to [facility] for ocrelizumab. Established PIV in R forearm; brisk blood return observed and pt tolerated well. Pre meds administered and ocrelizumab infused with filter per titration and with 1 hour of observation with no s/s of adverse reactions. PIV flushed and brisk blood return observed before removing; gauze/coband dressing applied. Next appointment confirmed and education provided. Pt discharged to self care with all personal belongings and in NAD.

## 2021-09-14 ENCOUNTER — TELEPHONE (OUTPATIENT)
Dept: NEUROLOGY | Facility: MEDICAL CENTER | Age: 31
End: 2021-09-14

## 2021-09-23 ENCOUNTER — TELEPHONE (OUTPATIENT)
Dept: NEUROLOGY | Facility: MEDICAL CENTER | Age: 31
End: 2021-09-23

## 2021-09-23 NOTE — TELEPHONE ENCOUNTER
Alexandra from [facility] states that new auth Ocrevus for 2022 will need to submitted again in January. She will notify us again in January.

## 2021-10-07 ENCOUNTER — TELEMEDICINE (OUTPATIENT)
Dept: MEDICAL GROUP | Facility: LAB | Age: 31
End: 2021-10-07
Payer: COMMERCIAL

## 2021-10-07 VITALS — WEIGHT: 142.8 LBS | HEART RATE: 78 BPM | BODY MASS INDEX: 20.44 KG/M2 | HEIGHT: 70 IN

## 2021-10-07 DIAGNOSIS — F90.0 ATTENTION DEFICIT HYPERACTIVITY DISORDER (ADHD), PREDOMINANTLY INATTENTIVE TYPE: ICD-10-CM

## 2021-10-07 DIAGNOSIS — R10.2 VAGINAL PAIN: ICD-10-CM

## 2021-10-07 DIAGNOSIS — Z30.431 IUD CHECK UP: ICD-10-CM

## 2021-10-07 PROCEDURE — 99214 OFFICE O/P EST MOD 30 MIN: CPT | Mod: 95 | Performed by: FAMILY MEDICINE

## 2021-10-07 RX ORDER — METHYLPHENIDATE HYDROCHLORIDE 54 MG/1
54 TABLET ORAL DAILY
Qty: 30 TABLET | Refills: 0 | Status: SHIPPED | OUTPATIENT
Start: 2021-10-07 | End: 2021-11-06

## 2021-10-07 RX ORDER — METHYLPHENIDATE HYDROCHLORIDE 54 MG/1
TABLET ORAL
COMMUNITY
Start: 2021-09-16 | End: 2021-10-07 | Stop reason: SDUPTHER

## 2021-10-07 ASSESSMENT — FIBROSIS 4 INDEX: FIB4 SCORE: 0.47

## 2021-10-07 NOTE — PROGRESS NOTES
Telemedicine Video Visit: Established Patient   This Remote Face to Face encounter was conducted via Zoom. Given the importance of social distancing and other strategies recommended to reduce the risk of COVID-19 transmission, I am providing medical care to this patient via audio/video visit in place of an in person visit at the request of the patient. Verbal consent to telehealth, risks, benefits, and consequences were discussed. Patient retains the right to withdraw at any time. All existing confidentiality protections apply. The patient has access to all transmitted medical information. No dissemination of any patient images or information to other entities without further written consent.  Subjective:     Chief Complaint   Patient presents with   • Follow-Up   • Medication Refill       Rosita Bowles is a 30 y.o. female presenting for evaluation and management of:    1. Attention deficit hyperactivity disorder (ADHD), predominantly inattentive type  History of ADHD, patient has an appointment to establish with psychiatry to take over her ADHD medications, requesting 1 month refill of her methylphenidate for her to be able to take it until she is able to establish with psychiatry denies side effects from medication.    2. IUD check up/ Vaginal pain  Patient reports vaginal and pelvic pain that she describes as poking sensation whenever she has sexual intercourse after she had insertion of her IUD around couple of months ago.  Denies abnormal discharge or bleeding.  Patient reports her period was around 2 weeks ago.  Would like her IUD to be checked,      ROS   Denies any recent fevers or chills. No nausea or vomiting. No chest pains or shortness of breath.     No Active Allergies    Current medicines (including changes today)  Current Outpatient Medications   Medication Sig Dispense Refill   • methylphenidate (CONCERTA) 54 MG CR tablet Take 1 Tablet by mouth every day for 30 days. 30 Tablet 0   •  ferrous sulfate 325 (65 Fe) MG tablet Take 325 mg by mouth every day.     • ferrous sulfate (DILLON-IN-SOL) 15 mg FE/mL Solution Take 6 mg by mouth every day. (Patient not taking: Reported on 9/8/2021)     • levonorgestrel (MIRENA, 52 MG,) 52 mg (20 mcg/24 hr) IUD 1 Each by Intrauterine route one time.     • buPROPion (WELLBUTRIN XL) 300 MG XL tablet Take 1 tablet by mouth every morning. 90 tablet 1   • methylPREDNISolone (MEDROL DOSEPAK) 4 MG Tablet Therapy Pack As directed on the packaging label. 21 tablet 0   • ocrelizumab (OCREVUS) 300 MG/10ML Solution injection Infuse  into a venous catheter.     • fluconazole (DIFLUCAN) 150 MG tablet Take 1 Tab by mouth as needed (vaginal itching, fissure, discharge). 2 Tab 4   • Clindamycin Phos-Benzoyl Perox (ONEXTON) 1.2-3.75 % Gel 1 Application by Apply externally route every morning. To entire face 30 g 3   • valacyclovir (VALTREX) 1 GM Tab Take 1 Tab by mouth 2 times a day as needed (cold sores). 180 Tab 3   • temazepam (RESTORIL) 15 MG Cap Take 15 mg by mouth at bedtime as needed for Sleep.     • Biotin 5000 MCG Cap Take 5,000 mcg by mouth every day at 6 PM. supplement     • melatonin 3 MG Tab Take 3 mg by mouth 1 time daily as needed (insomnia).     • Cyanocobalamin (VITAMIN B-12) 1000 MCG Tab Take 1,000 mcg by mouth every day. supplement     • Cholecalciferol (VITAMIN D3) 5000 units Tab Take 5,000 Units by mouth every day. supplement       No current facility-administered medications for this visit.       Patient Active Problem List    Diagnosis Date Noted   • JOSE (generalized anxiety disorder) 06/17/2021   • Establishing care with new doctor, encounter for 03/18/2021   • Health care maintenance 03/18/2021   • Other viral warts 04/02/2019   • Plantar wart 01/09/2019   • Cervical disc disorder at C6-C7 level with radiculopathy 07/10/2018   • Primary insomnia 10/08/2017   • HSV-1 (herpes simplex virus 1) infection 06/23/2017   • Sinus pressure 05/03/2017   • Multiple  "sclerosis (HCC) 11/02/2016   • Seasonal allergies 05/31/2013   • Moderate episode of recurrent major depressive disorder (HCC) 08/07/2012   • ADHD 08/07/2012       Family History   Problem Relation Age of Onset   • Thyroid Mother    • Arthritis Mother         Rheumatoid arthritis    • Psychiatric Illness Mother    • Diabetes Father         prediabetes    • Heart Disease Father    • Thyroid Brother    • Psychiatric Illness Maternal Aunt         ADHD   • Cancer Maternal Aunt         breast ca        She  has a past medical history of Acne (8/7/2012), Acne vulgaris (6/23/2017), ADHD (8/7/2012), Depression (8/7/2012), Multiple sclerosis (HCC) (11/2/2016), and Plantar wart (1/9/2019).  She  has a past surgical history that includes ankle orif; dental extraction(s); cervical disk and fusion anterior (08/07/2018); and other orthopedic surgery.       Objective:   Vitals obtained by patient:  Pulse 78   Ht 1.778 m (5' 10\")   Wt 64.8 kg (142 lb 12.8 oz)   BMI 20.49 kg/m²     Physical Exam  Constitutional: Alert, no distress, well-groomed.  Skin: No rashes in visible areas.  Eye: Round. Conjunctiva clear, lids normal. No icterus.   ENMT: Lips pink without lesions, good dentition, moist mucous membranes. Phonation normal.  Neck: No masses, no thyromegaly. Moves freely without pain.  CV: Pulse as reported by patient  Respiratory: Unlabored respiratory effort, no cough or audible wheeze  Psych: Alert and oriented x3, normal affect and mood.       Assessment and Plan:   The following treatment plan was discussed:     1. Attention deficit hyperactivity disorder (ADHD), predominantly inattentive type  Chronic controlled on medications refilled medication 1 time before she is able to establish with a psychiatrist  - methylphenidate (CONCERTA) 54 MG CR tablet; Take 1 Tablet by mouth every day for 30 days.  Dispense: 30 Tablet; Refill: 0    2. IUD check up  New concern, will do an ultrasound to check on the IUD status.  Advised if " the pain continues to follow-up with her gynecologist for possible removal  - US-PELVIC COMPLETE (TRANSABDOMINAL/TRANSVAGINAL) (COMBO); Future    3. Vaginal pain  New concern, pain with sexual intercourse after her IUD has been inserted, will check IUD status.  - US-PELVIC COMPLETE (TRANSABDOMINAL/TRANSVAGINAL) (COMBO); Future        Follow-up: No follow-ups on file.    Face to Face Video Visit:   I spent 25 minutes with patient/guardian and I conducted this visit with audio and video present.  Demarco Brian M.D.

## 2021-10-20 ENCOUNTER — HOSPITAL ENCOUNTER (OUTPATIENT)
Dept: RADIOLOGY | Facility: MEDICAL CENTER | Age: 31
End: 2021-10-20
Attending: FAMILY MEDICINE
Payer: COMMERCIAL

## 2021-10-20 DIAGNOSIS — R10.2 VAGINAL PAIN: ICD-10-CM

## 2021-10-20 DIAGNOSIS — Z30.431 IUD CHECK UP: ICD-10-CM

## 2021-10-20 PROCEDURE — 76830 TRANSVAGINAL US NON-OB: CPT

## 2021-10-21 DIAGNOSIS — N53.12 DYSPAREUNIA, MALE: ICD-10-CM

## 2021-10-21 DIAGNOSIS — N94.10 DYSPAREUNIA IN FEMALE: ICD-10-CM

## 2021-10-21 DIAGNOSIS — N94.10 DYSPAREUNIA, FEMALE: ICD-10-CM

## 2021-11-04 ENCOUNTER — TELEMEDICINE (OUTPATIENT)
Dept: BEHAVIORAL HEALTH | Facility: CLINIC | Age: 31
End: 2021-11-04
Payer: COMMERCIAL

## 2021-11-04 DIAGNOSIS — F90.8 ATTENTION DEFICIT HYPERACTIVITY DISORDER (ADHD), OTHER TYPE: ICD-10-CM

## 2021-11-04 DIAGNOSIS — F41.1 GAD (GENERALIZED ANXIETY DISORDER): ICD-10-CM

## 2021-11-04 DIAGNOSIS — F33.1 MODERATE EPISODE OF RECURRENT MAJOR DEPRESSIVE DISORDER (HCC): ICD-10-CM

## 2021-11-04 DIAGNOSIS — F33.41 RECURRENT MAJOR DEPRESSIVE DISORDER, IN PARTIAL REMISSION (HCC): ICD-10-CM

## 2021-11-04 PROCEDURE — 99214 OFFICE O/P EST MOD 30 MIN: CPT | Mod: 95 | Performed by: PSYCHIATRY & NEUROLOGY

## 2021-11-04 RX ORDER — METHYLPHENIDATE HYDROCHLORIDE 54 MG/1
54 TABLET ORAL EVERY MORNING
Qty: 30 TABLET | Refills: 0 | Status: SHIPPED | OUTPATIENT
Start: 2021-11-16 | End: 2021-12-16

## 2021-11-04 RX ORDER — METHYLPHENIDATE HYDROCHLORIDE 54 MG/1
54 TABLET ORAL EVERY MORNING
Qty: 30 TABLET | Refills: 0 | Status: SHIPPED | OUTPATIENT
Start: 2021-12-27 | End: 2021-11-04

## 2021-11-04 RX ORDER — METHYLPHENIDATE HYDROCHLORIDE 54 MG/1
54 TABLET ORAL EVERY MORNING
Qty: 30 TABLET | Refills: 0 | Status: SHIPPED | OUTPATIENT
Start: 2021-12-17 | End: 2022-01-11 | Stop reason: SDUPTHER

## 2021-11-04 RX ORDER — METHYLPHENIDATE HYDROCHLORIDE 54 MG/1
54 TABLET ORAL EVERY MORNING
Qty: 30 TABLET | Refills: 0 | Status: SHIPPED | OUTPATIENT
Start: 2022-01-17 | End: 2022-01-20

## 2021-11-04 RX ORDER — BUPROPION HYDROCHLORIDE 300 MG/1
300 TABLET ORAL EVERY MORNING
Qty: 90 TABLET | Refills: 1 | Status: SHIPPED | OUTPATIENT
Start: 2021-11-04 | End: 2022-05-05 | Stop reason: SDUPTHER

## 2021-11-04 RX ORDER — METHYLPHENIDATE HYDROCHLORIDE 54 MG/1
54 TABLET ORAL EVERY MORNING
Qty: 30 TABLET | Refills: 0 | Status: SHIPPED | OUTPATIENT
Start: 2022-01-27 | End: 2021-11-04
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
## 2021-11-17 ENCOUNTER — GYNECOLOGY VISIT (OUTPATIENT)
Dept: OBGYN | Facility: CLINIC | Age: 31
End: 2021-11-17
Payer: COMMERCIAL

## 2021-11-17 VITALS — WEIGHT: 148 LBS | DIASTOLIC BLOOD PRESSURE: 72 MMHG | BODY MASS INDEX: 21.24 KG/M2 | SYSTOLIC BLOOD PRESSURE: 114 MMHG

## 2021-11-17 DIAGNOSIS — N94.10 DYSPAREUNIA IN FEMALE: ICD-10-CM

## 2021-11-17 PROCEDURE — 99214 OFFICE O/P EST MOD 30 MIN: CPT | Performed by: OBSTETRICS & GYNECOLOGY

## 2021-11-17 ASSESSMENT — FIBROSIS 4 INDEX: FIB4 SCORE: 0.47

## 2021-11-17 NOTE — NON-PROVIDER
Pt here IUD issues   Pt states painful during intercourse   Good#[phone]   Pharmacy verified

## 2021-11-17 NOTE — PROGRESS NOTES
Chief complaint;    Rosita Bowles is a 30 y.o.  who presents complaining of pain during intercourse.  The patient is not been able to have intercourse 4 to 5 months she was recently engaged 1 year ago.  Patient currently is nulliparous but would like to have kids in the next 2 to 3 years.    Patient has been having pain during intercourse ever since Mirena IUD was placed    Pelvic ultrasound performed on 10/20/2021 shows good placement of Mirena IUD with fundal placement of crossbar      Review of systems; denies fever chills abdominal pain, denies chest pain shortness of breath or urinary symptoms  Past medical history-  Past Medical History:   Diagnosis Date   • Acne 2012   • Acne vulgaris 2017   • ADHD 2012   • Depression 2012   • Multiple sclerosis (HCC) 2016   • Plantar wart 2019     Past surgical history-  Past Surgical History:   Procedure Laterality Date   • CERVICAL DISK AND FUSION ANTERIOR  2018   • ANKLE ORIF     • DENTAL EXTRACTION(S)     • OTHER ORTHOPEDIC SURGERY       Allergies-Patient has no active allergies.  Medications-  Current Outpatient Medications on File Prior to Visit   Medication Sig Dispense Refill   • buPROPion (WELLBUTRIN XL) 300 MG XL tablet Take 1 Tablet by mouth every morning. 90 Tablet 1   • methylphenidate (CONCERTA) 54 MG CR tablet Take 1 Tablet by mouth every morning for 30 days. 30 Tablet 0   • [START ON 2021] methylphenidate (CONCERTA) 54 MG CR tablet Take 1 Tablet by mouth every morning for 30 days. 30 Tablet 0   • [START ON 2022] methylphenidate (CONCERTA) 54 MG CR tablet Take 1 Tablet by mouth every morning for 30 days. 30 Tablet 0   • ferrous sulfate 325 (65 Fe) MG tablet Take 325 mg by mouth every day.     • levonorgestrel (MIRENA, 52 MG,) 52 mg (20 mcg/24 hr) IUD 1 Each by Intrauterine route one time.     • methylPREDNISolone (MEDROL DOSEPAK) 4 MG Tablet Therapy Pack As directed on the packaging label. 21 tablet 0    • ocrelizumab (OCREVUS) 300 MG/10ML Solution injection Infuse  into a venous catheter.     • fluconazole (DIFLUCAN) 150 MG tablet Take 1 Tab by mouth as needed (vaginal itching, fissure, discharge). 2 Tab 4   • Clindamycin Phos-Benzoyl Perox (ONEXTON) 1.2-3.75 % Gel 1 Application by Apply externally route every morning. To entire face 30 g 3   • valacyclovir (VALTREX) 1 GM Tab Take 1 Tab by mouth 2 times a day as needed (cold sores). 180 Tab 3   • Biotin 5000 MCG Cap Take 5,000 mcg by mouth every day at 6 PM. supplement     • melatonin 3 MG Tab Take 3 mg by mouth 1 time daily as needed (insomnia).     • Cyanocobalamin (VITAMIN B-12) 1000 MCG Tab Take 1,000 mcg by mouth every day. supplement     • Cholecalciferol (VITAMIN D3) 5000 units Tab Take 5,000 Units by mouth every day. supplement       No current facility-administered medications on file prior to visit.     Social history-  Social History     Socioeconomic History   • Marital status: Single     Spouse name: Not on file   • Number of children: Not on file   • Years of education: Not on file   • Highest education level: Not on file   Occupational History     Comment: [employer] ,    Tobacco Use   • Smoking status: Never Smoker   • Smokeless tobacco: Never Used   Vaping Use   • Vaping Use: Never used   Substance and Sexual Activity   • Alcohol use: Yes     Alcohol/week: 0.0 oz     Comment: occasional   • Drug use: No   • Sexual activity: Yes     Partners: Male     Birth control/protection: Pill     Comment: OCP   Other Topics Concern   •  Service No   • Blood Transfusions No   • Caffeine Concern No   • Occupational Exposure No   • Hobby Hazards No   • Sleep Concern Yes   • Stress Concern No   • Weight Concern No   • Special Diet No   • Back Care No   • Exercise Yes   • Bike Helmet Yes   • Seat Belt Yes   • Self-Exams Yes   Social History Narrative    2013: BF in [place]. Attends [employer].    2014: was living in [place]. Now back in [place].     2014:  working in [employer]. BF broke up with her Sept. No friends in [place].     2015: working 2 jobs. Has BF.      Social Determinants of Health     Financial Resource Strain:    • Difficulty of Paying Living Expenses: Not on file   Food Insecurity:    • Worried About Running Out of Food in the Last Year: Not on file   • Ran Out of Food in the Last Year: Not on file   Transportation Needs:    • Lack of Transportation (Medical): Not on file   • Lack of Transportation (Non-Medical): Not on file   Physical Activity:    • Days of Exercise per Week: Not on file   • Minutes of Exercise per Session: Not on file   Stress:    • Feeling of Stress : Not on file   Social Connections:    • Frequency of Communication with Friends and Family: Not on file   • Frequency of Social Gatherings with Friends and Family: Not on file   • Attends Orthodoxy Services: Not on file   • Active Member of Clubs or Organizations: Not on file   • Attends Club or Organization Meetings: Not on file   • Marital Status: Not on file   Intimate Partner Violence:    • Fear of Current or Ex-Partner: Not on file   • Emotionally Abused: Not on file   • Physically Abused: Not on file   • Sexually Abused: Not on file   Housing Stability:    • Unable to Pay for Housing in the Last Year: Not on file   • Number of Places Lived in the Last Year: Not on file   • Unstable Housing in the Last Year: Not on file     Past Family History-no history of breast or ovarian cancer    Physical examination;  Alert and oriented x3  General a thin well-developed well-nourished female in no apparent distress  Vitals:    11/17/21 1140   BP: 114/72   BP Location: Right arm   Patient Position: Sitting   Weight: 67.1 kg (148 lb)     Skin is warm and dry  Neck-supple  HEENT-head-atraumatic, normocephalic, EOMI, PERRLA  Cardiovascular-regular rate and rhythm, normal S1-S2, no murmurs or gallops  Lungs-clear to auscultation bilaterally  Back-negative CVA tenderness  Abdomen-nondistended positive  bowel sounds soft nontender no masses or hepatosplenomegaly  Pelvic examination;  External genitalia-no visible lesions   Vagina-no blood or discharge  Cervix-no gross lesions, IUD strings 8 mm long visualized, IUD tip not palpable  Uterus-normal size and shape, nontender  Adnexa without mass or tenderness  Extremities without cyanosis clubbing or edema  Neurologic exam grossly intact    Impression;  Dyspareunia-patient previously had Kyleena IUD for 5 years for which she was comfortable with this was replaced with a Mirena IUD and is likely that a smaller IUD may make the patient more comfortable and that the larger IUD is causing her dyspareunia.    Plan;  Referral placed to remove Mirena IUD and replace with Kyleena IUD        25  Minutes spent with the patient in face-to-face contact, greater than 50% of the time spent on counseling and coordination of care. All questions answered in detail.    Female chaperone present for entire examination and history

## 2021-12-13 DIAGNOSIS — G35 MULTIPLE SCLEROSIS (HCC): ICD-10-CM

## 2021-12-17 ENCOUNTER — TELEPHONE (OUTPATIENT)
Dept: OBGYN | Facility: CLINIC | Age: 31
End: 2021-12-17

## 2022-01-10 ENCOUNTER — OFFICE VISIT (OUTPATIENT)
Dept: MEDICAL GROUP | Facility: LAB | Age: 32
End: 2022-01-10
Payer: COMMERCIAL

## 2022-01-10 ENCOUNTER — HOSPITAL ENCOUNTER (OUTPATIENT)
Facility: MEDICAL CENTER | Age: 32
End: 2022-01-10
Attending: PHYSICIAN ASSISTANT
Payer: COMMERCIAL

## 2022-01-10 VITALS
SYSTOLIC BLOOD PRESSURE: 110 MMHG | BODY MASS INDEX: 20.76 KG/M2 | HEIGHT: 70 IN | TEMPERATURE: 97.9 F | WEIGHT: 145 LBS | HEART RATE: 110 BPM | DIASTOLIC BLOOD PRESSURE: 52 MMHG | RESPIRATION RATE: 20 BRPM | OXYGEN SATURATION: 98 %

## 2022-01-10 DIAGNOSIS — Z20.822 CONTACT WITH AND (SUSPECTED) EXPOSURE TO COVID-19: ICD-10-CM

## 2022-01-10 DIAGNOSIS — R05.9 COUGH: ICD-10-CM

## 2022-01-10 LAB
FLUAV+FLUBV AG SPEC QL IA: NEGATIVE
INT CON NEG: NEGATIVE
INT CON POS: POSITIVE

## 2022-01-10 PROCEDURE — U0005 INFEC AGEN DETEC AMPLI PROBE: HCPCS

## 2022-01-10 PROCEDURE — 99213 OFFICE O/P EST LOW 20 MIN: CPT | Mod: CS | Performed by: PHYSICIAN ASSISTANT

## 2022-01-10 PROCEDURE — 87804 INFLUENZA ASSAY W/OPTIC: CPT | Performed by: PHYSICIAN ASSISTANT

## 2022-01-10 PROCEDURE — U0003 INFECTIOUS AGENT DETECTION BY NUCLEIC ACID (DNA OR RNA); SEVERE ACUTE RESPIRATORY SYNDROME CORONAVIRUS 2 (SARS-COV-2) (CORONAVIRUS DISEASE [COVID-19]), AMPLIFIED PROBE TECHNIQUE, MAKING USE OF HIGH THROUGHPUT TECHNOLOGIES AS DESCRIBED BY CMS-2020-01-R: HCPCS

## 2022-01-10 RX ORDER — ALBUTEROL SULFATE 90 UG/1
2 AEROSOL, METERED RESPIRATORY (INHALATION) EVERY 4 HOURS PRN
Qty: 1 EACH | Refills: 0 | Status: SHIPPED | OUTPATIENT
Start: 2022-01-10 | End: 2022-02-01

## 2022-01-10 RX ORDER — BENZONATATE 100 MG/1
100 CAPSULE ORAL 3 TIMES DAILY PRN
Qty: 21 CAPSULE | Refills: 0 | Status: SHIPPED | OUTPATIENT
Start: 2022-01-10 | End: 2022-01-17

## 2022-01-10 ASSESSMENT — FIBROSIS 4 INDEX: FIB4 SCORE: 0.49

## 2022-01-10 NOTE — LETTER
[facility]  [id number]     January 10, 2022    Patient: Rosita Bowles   YOB: 1990   Date of Visit: 1/10/2022       To Whom It May Concern:    Rosita Bowles was seen and treated in our department on 1/10/2022.     Please excuse her from work through 01/12/2022.     Sincerely,     Rina Corona P.A.-C.

## 2022-01-10 NOTE — PROGRESS NOTES
Subjective:     CC: Covid exposure, URI symptoms    HPI:   Rosita here today with   Exposure:friend tested positive 1/5/2022  Date of Exposure: 1/1/2022  Symptoms: cough with thin mucus, sneezing, sinus congestion, fatigue, hoarseness, joint soreness, headache    Previous Negative tests?: yes, 1/6/2021 - PCR, antigen neg 1/9/2022  Vaccinated? UTD on covid and flu    OTC treatments: none     Symptoms started 1/7/2022      ROS:  Negative except as indicated above      Current Outpatient Medications Ordered in Epic   Medication Sig Dispense Refill   • benzonatate (TESSALON) 100 MG Cap Take 1 Capsule by mouth 3 times a day as needed for up to 7 days. 21 Capsule 0   • albuterol 108 (90 Base) MCG/ACT Aero Soln inhalation aerosol Inhale 2 Puffs every four hours as needed for Shortness of Breath. 1 Each 0   • buPROPion (WELLBUTRIN XL) 300 MG XL tablet Take 1 Tablet by mouth every morning. 90 Tablet 1   • methylphenidate (CONCERTA) 54 MG CR tablet Take 1 Tablet by mouth every morning for 30 days. 30 Tablet 0   • [START ON 1/17/2022] methylphenidate (CONCERTA) 54 MG CR tablet Take 1 Tablet by mouth every morning for 30 days. 30 Tablet 0   • ferrous sulfate 325 (65 Fe) MG tablet Take 325 mg by mouth every day.     • levonorgestrel (MIRENA, 52 MG,) 52 mg (20 mcg/24 hr) IUD 1 Each by Intrauterine route one time.     • methylPREDNISolone (MEDROL DOSEPAK) 4 MG Tablet Therapy Pack As directed on the packaging label. 21 tablet 0   • ocrelizumab (OCREVUS) 300 MG/10ML Solution injection Infuse  into a venous catheter.     • fluconazole (DIFLUCAN) 150 MG tablet Take 1 Tab by mouth as needed (vaginal itching, fissure, discharge). 2 Tab 4   • Clindamycin Phos-Benzoyl Perox (ONEXTON) 1.2-3.75 % Gel 1 Application by Apply externally route every morning. To entire face 30 g 3   • valacyclovir (VALTREX) 1 GM Tab Take 1 Tab by mouth 2 times a day as needed (cold sores). 180 Tab 3   • Biotin 5000 MCG Cap Take 5,000 mcg by mouth every day at 6  "PM. supplement     • melatonin 3 MG Tab Take 3 mg by mouth 1 time daily as needed (insomnia).     • Cyanocobalamin (VITAMIN B-12) 1000 MCG Tab Take 1,000 mcg by mouth every day. supplement     • Cholecalciferol (VITAMIN D3) 5000 units Tab Take 5,000 Units by mouth every day. supplement       No current Epic-ordered facility-administered medications on file.         Objective:     Exam:  /52   Pulse (!) 110   Temp 36.6 °C (97.9 °F)   Resp 20   Ht 1.778 m (5' 10\")   Wt 65.8 kg (145 lb)   SpO2 98%   BMI 20.81 kg/m²  Body mass index is 20.81 kg/m².    Gen: Alert and oriented, No apparent distress.  HEENT: pupils PERRLA, The pinna, tragus, and ear canal are non-tender and without swelling. The ear canal is clear without discharge. The tympanic membrane is normal in appearance with a good cone of light. Oral mucosa is pink and moist with good dentition. Tongue normal in appearance without lesions and with good symmetrical movement. No buccal nodules or lesions are noted. The pharynx is normal in appearance without tonsillar swelling or exudates.   Neck: Neck is supple without lymphadenopathy.  Lungs: Normal effort, CTA bilaterally, no wheezes, rhonchi, or rales  CV: Regular rate and rhythm. No murmurs, rubs, or gallops.  Ext: No clubbing, cyanosis, edema.    POCT influenza test: Negative    Assessment & Plan:     31 y.o. female with the following -     1. Contact with and (suspected) exposure to covid-19  2. Cough    ASSESSMENT:SARS-CoV-2 exposure, patient stable and appropriate for testing today.    PLAN:Labs/tests:-- SARS-CoV-2 PCR test collected    Counseling/Patient Education:--   Symptomatic: Isolation precautions until swab results (and if unvaccinated, quarantine for 10 days from exposure)  Reviewed expected course/prognosis of COVID-19-- Advised wearing a mask in public-- Encouraged hand hygiene  Continue OTC treatments  Trial of Tessalon Perles 100 mg 3 times daily as needed for 7 days  Trial of " albuterol inhaler 2 puffs every 4 hours as needed  Red Flags discussed. I conducted the encounter wearing the following PPE: N95, face shield, gloves, The patient was wearing a mask.      I spent a total of 15 minutes with record review, exam, communication with the patient, communication with other providers, and documentation of this encounter.      Return if symptoms worsen or fail to improve.    Please note that this dictation was created using voice recognition software. I have made every reasonable attempt to correct obvious errors, but there may be errors of grammar and possibly content that I did not discover before finalizing the note.

## 2022-01-11 DIAGNOSIS — F90.8 ATTENTION DEFICIT HYPERACTIVITY DISORDER (ADHD), OTHER TYPE: ICD-10-CM

## 2022-01-11 DIAGNOSIS — Z20.822 CONTACT WITH AND (SUSPECTED) EXPOSURE TO COVID-19: ICD-10-CM

## 2022-01-11 LAB — COVID ORDER STATUS COVID19: NORMAL

## 2022-01-11 RX ORDER — METHYLPHENIDATE HYDROCHLORIDE 54 MG/1
54 TABLET ORAL EVERY MORNING
Qty: 30 TABLET | Refills: 0 | Status: SHIPPED | OUTPATIENT
Start: 2022-03-22 | End: 2022-04-18 | Stop reason: SDUPTHER

## 2022-01-11 RX ORDER — METHYLPHENIDATE HYDROCHLORIDE 54 MG/1
54 TABLET ORAL EVERY MORNING
Qty: 30 TABLET | Refills: 0 | Status: SHIPPED | OUTPATIENT
Start: 2022-02-19 | End: 2022-03-21

## 2022-01-11 RX ORDER — METHYLPHENIDATE HYDROCHLORIDE 54 MG/1
54 TABLET ORAL EVERY MORNING
Qty: 30 TABLET | Refills: 0 | Status: SHIPPED | OUTPATIENT
Start: 2022-01-19 | End: 2022-02-18

## 2022-01-11 NOTE — PROGRESS NOTES
Following 30-day prescription for Concerta was sent to the pharmacy:  · 1/19-2/18  · 2/19-3/21  · 3/22-4/21

## 2022-01-11 NOTE — TELEPHONE ENCOUNTER
Patient has appointment 5/5/22.        Received request via: Patient    Was the patient seen in the last year in this department? Yes    Does the patient have an active prescription (recently filled or refills available) for medication(s) requested? No

## 2022-01-12 ENCOUNTER — PATIENT MESSAGE (OUTPATIENT)
Dept: MEDICAL GROUP | Facility: LAB | Age: 32
End: 2022-01-12

## 2022-01-12 LAB
SARS-COV-2 RNA RESP QL NAA+PROBE: DETECTED
SPECIMEN SOURCE: ABNORMAL

## 2022-01-12 NOTE — LETTER
[facility]  [id number]     January 12, 2022    Patient: Rosita Bowles   YOB: 1990   Date of Visit: 1/12/2022       To Whom It May Concern:    Rosita Bowles was seen and evaluated in our department on 1/10/2022.     Please excuse her from work through 01/19/2022.     Sincerely,     Rina Corona P.A.-C.

## 2022-01-14 NOTE — LETTER
January 14, 2022       Patient: Rosita Bowles   YOB: 1990   Date of Visit: 1/14/2022         To Whom It May Concern:    Date of Covid-19 Positive Test: 01/10/2021  Date of Covid-19 Symptom Onset: 01/07/2021    Per CDC Conventional Guidelines for Healthcare Workers With Symptomatic Covid-19 infection, patient may return to work after 7 days (after 01/14/2021) with a negative test if they are asymptomatic or mildly symptomatic with improving symptoms.     If you have any questions or concerns, please don't hesitate to call [phone].          Sincerely,          Rina Corona P.A.-C.  Electronically Signed

## 2022-01-19 ENCOUNTER — HOSPITAL ENCOUNTER (OUTPATIENT)
Dept: LAB | Facility: MEDICAL CENTER | Age: 32
End: 2022-01-19
Attending: PSYCHIATRY & NEUROLOGY
Payer: COMMERCIAL

## 2022-01-19 ENCOUNTER — HOSPITAL ENCOUNTER (OUTPATIENT)
Dept: RADIOLOGY | Facility: MEDICAL CENTER | Age: 32
End: 2022-01-19
Attending: PSYCHIATRY & NEUROLOGY
Payer: COMMERCIAL

## 2022-01-19 DIAGNOSIS — G35 MULTIPLE SCLEROSIS (HCC): ICD-10-CM

## 2022-01-19 LAB
ALBUMIN SERPL BCP-MCNC: 4.3 G/DL (ref 3.2–4.9)
ALBUMIN/GLOB SERPL: 2.2 G/DL
ALP SERPL-CCNC: 62 U/L (ref 30–99)
ALT SERPL-CCNC: 9 U/L (ref 2–50)
ANION GAP SERPL CALC-SCNC: 8 MMOL/L (ref 7–16)
AST SERPL-CCNC: 8 U/L (ref 12–45)
BASOPHILS # BLD AUTO: 0.8 % (ref 0–1.8)
BASOPHILS # BLD: 0.03 K/UL (ref 0–0.12)
BILIRUB SERPL-MCNC: 0.3 MG/DL (ref 0.1–1.5)
BUN SERPL-MCNC: 14 MG/DL (ref 8–22)
CALCIUM SERPL-MCNC: 9 MG/DL (ref 8.5–10.5)
CHLORIDE SERPL-SCNC: 108 MMOL/L (ref 96–112)
CO2 SERPL-SCNC: 25 MMOL/L (ref 20–33)
CREAT SERPL-MCNC: 0.8 MG/DL (ref 0.5–1.4)
EOSINOPHIL # BLD AUTO: 0.11 K/UL (ref 0–0.51)
EOSINOPHIL NFR BLD: 3 % (ref 0–6.9)
ERYTHROCYTE [DISTWIDTH] IN BLOOD BY AUTOMATED COUNT: 42.7 FL (ref 35.9–50)
GLOBULIN SER CALC-MCNC: 2 G/DL (ref 1.9–3.5)
GLUCOSE SERPL-MCNC: 88 MG/DL (ref 65–99)
HCT VFR BLD AUTO: 41.6 % (ref 37–47)
HGB BLD-MCNC: 13.6 G/DL (ref 12–16)
IMM GRANULOCYTES # BLD AUTO: 0.01 K/UL (ref 0–0.11)
IMM GRANULOCYTES NFR BLD AUTO: 0.3 % (ref 0–0.9)
LYMPHOCYTES # BLD AUTO: 1.79 K/UL (ref 1–4.8)
LYMPHOCYTES NFR BLD: 48.6 % (ref 22–41)
MCH RBC QN AUTO: 28.3 PG (ref 27–33)
MCHC RBC AUTO-ENTMCNC: 32.7 G/DL (ref 33.6–35)
MCV RBC AUTO: 86.7 FL (ref 81.4–97.8)
MONOCYTES # BLD AUTO: 0.37 K/UL (ref 0–0.85)
MONOCYTES NFR BLD AUTO: 10.1 % (ref 0–13.4)
NEUTROPHILS # BLD AUTO: 1.37 K/UL (ref 2–7.15)
NEUTROPHILS NFR BLD: 37.2 % (ref 44–72)
NRBC # BLD AUTO: 0 K/UL
NRBC BLD-RTO: 0 /100 WBC
PLATELET # BLD AUTO: 297 K/UL (ref 164–446)
PMV BLD AUTO: 10.4 FL (ref 9–12.9)
POTASSIUM SERPL-SCNC: 4.3 MMOL/L (ref 3.6–5.5)
PROT SERPL-MCNC: 6.3 G/DL (ref 6–8.2)
RBC # BLD AUTO: 4.8 M/UL (ref 4.2–5.4)
SODIUM SERPL-SCNC: 141 MMOL/L (ref 135–145)
WBC # BLD AUTO: 3.7 K/UL (ref 4.8–10.8)

## 2022-01-19 PROCEDURE — A9576 INJ PROHANCE MULTIPACK: HCPCS | Performed by: PSYCHIATRY & NEUROLOGY

## 2022-01-19 PROCEDURE — 80053 COMPREHEN METABOLIC PANEL: CPT

## 2022-01-19 PROCEDURE — 700117 HCHG RX CONTRAST REV CODE 255: Performed by: PSYCHIATRY & NEUROLOGY

## 2022-01-19 PROCEDURE — 85025 COMPLETE CBC W/AUTO DIFF WBC: CPT

## 2022-01-19 PROCEDURE — 72156 MRI NECK SPINE W/O & W/DYE: CPT

## 2022-01-19 PROCEDURE — 70553 MRI BRAIN STEM W/O & W/DYE: CPT

## 2022-01-19 PROCEDURE — 36415 COLL VENOUS BLD VENIPUNCTURE: CPT

## 2022-01-19 RX ADMIN — GADOTERIDOL 10 ML: 279.3 INJECTION, SOLUTION INTRAVENOUS at 08:39

## 2022-01-20 ENCOUNTER — OFFICE VISIT (OUTPATIENT)
Dept: NEUROLOGY | Facility: MEDICAL CENTER | Age: 32
End: 2022-01-20
Attending: PSYCHIATRY & NEUROLOGY
Payer: COMMERCIAL

## 2022-01-20 VITALS
BODY MASS INDEX: 20.52 KG/M2 | OXYGEN SATURATION: 100 % | TEMPERATURE: 98.1 F | SYSTOLIC BLOOD PRESSURE: 118 MMHG | HEART RATE: 97 BPM | DIASTOLIC BLOOD PRESSURE: 64 MMHG | WEIGHT: 143.3 LBS | RESPIRATION RATE: 12 BRPM | HEIGHT: 70 IN

## 2022-01-20 DIAGNOSIS — G35 MULTIPLE SCLEROSIS (HCC): Primary | ICD-10-CM

## 2022-01-20 DIAGNOSIS — M50.123 CERVICAL DISC DISORDER AT C6-C7 LEVEL WITH RADICULOPATHY: ICD-10-CM

## 2022-01-20 PROCEDURE — 99214 OFFICE O/P EST MOD 30 MIN: CPT | Performed by: PSYCHIATRY & NEUROLOGY

## 2022-01-20 PROCEDURE — 99212 OFFICE O/P EST SF 10 MIN: CPT | Performed by: PSYCHIATRY & NEUROLOGY

## 2022-01-20 ASSESSMENT — PATIENT HEALTH QUESTIONNAIRE - PHQ9: CLINICAL INTERPRETATION OF PHQ2 SCORE: 0

## 2022-01-20 ASSESSMENT — ENCOUNTER SYMPTOMS
TINGLING: 0
SPEECH CHANGE: 0
CONSTIPATION: 0
MEMORY LOSS: 0
SENSORY CHANGE: 0
TREMORS: 0
SEIZURES: 0
LOSS OF CONSCIOUSNESS: 0

## 2022-01-20 ASSESSMENT — FIBROSIS 4 INDEX: FIB4 SCORE: 0.27

## 2022-01-20 NOTE — PROGRESS NOTES
Candice Bowles is a 31 y.o. female who presents for follow-up, with a history of MS and cervical radiculopathy.    DEYSI Becker states that her disease has remained quiescent.  She has not had a relapse in beyond the last year.  Still on Ocrevus infusions every 6 months, March and September, she tolerates them well.  CBC and CMP values have always been unremarkable.  Immunoglobulin status has not been repeated as has CD20 counts.    Cognitively she is doing well.  On Concerta, her ADD and anxiety are well controlled.  She still has the left inferior altitudinal field cut, visual fields on the right are intact.  She denies any facial sensory distortions, difficulty swallowing or slurred speech.  There is no weakness throughout, no loss of dexterity with the hands and fingers.  She walks in stable fashion, there is no incoordination with the lower extremities.  Bowel and bladder function are maintained.  She denies Lhermitte's phenomena or persistent sensory distortions in any of the extremities.  Fatigue is not an issue.    The cervical radiculopathy has been stable for several years since her decompressive surgery.  The right upper extremity involvement at the time, has hadno motor or sensory sequelae.    MRI scan of the brain and neck done last month, December, 2021, revealed no evidence of spinal cord active disease, no central stenosis, only postoperative changes.  The brain scan revealed a stable burden of disease, still more of a left hemispheric issue for her, with a slight asymmetry and atrophy ipsilaterally.  There is no evidence of enhancing lesions or other signs of active disease.  CBC and CMP from January 19, 2022 were both unremarkable.    Medical, surgical and family histories are reviewed, there are no new drug allergies.  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  She and her fiancé, Manjeet, are scheduled to  "be  this coming October 14, 2022.  She has the Ocrevus 600 mg infusions every 6 months, she is on Concerta 54 mg daily, Wellbutrin  mg daily, Mirena, Diflucan and Valtrex as needed, along with biotin, vitamin D with calcium, vitamin B12, and melatonin.    Review of Systems   Constitutional: Negative for malaise/fatigue.   Gastrointestinal: Negative for constipation.   Genitourinary: Negative for frequency and urgency.   Neurological: Negative for tingling, tremors, sensory change, speech change, seizures and loss of consciousness.   Psychiatric/Behavioral: Negative for memory loss.   All other systems reviewed and are negative.    Objective     /64 (BP Location: Right arm, Patient Position: Sitting, BP Cuff Size: Adult)   Pulse 97   Temp 36.7 °C (98.1 °F) (Temporal)   Resp 12   Ht 1.778 m (5' 10\")   Wt 65 kg (143 lb 4.8 oz)   SpO2 100%   BMI 20.56 kg/m²      Physical Exam    She appears in no acute distress.  As always, she is very pleasant in spirit and demeanor, always cooperative.  Vital signs are stable.  There is no malar rash, jaw claudication, or allodynia.  The neck is supple, range of motion slightly curtailed postoperatively. Lhermitte's phenomena is absent.  Cardiac evaluation reveals a regular rhythm.  Straight leg raising is negative bilaterally.     Neurological Exam     Fully oriented, there is no aphasia, apraxia, or inattention.    PERRLA/EOMI, visual field testing does reveal the le eye, monocular inferior altitudinal loss to finger counting.  Visual fields are otherwise full with the right eye.  Facial movements are symmetric, sensory exam is intact to temperature bilaterally.  The tongue and uvula are midline, there is no bulbar dysfunction.  Jaw jerk is absent.  Shoulder shrug and head rotation are intact.    Musculoskeletal exam reveals normal tone bilaterally, there is no tremor, asterixis, or drift.  Strength is intact in all 4 extremities.  Reflexes are present " throughout, brisk in the upper extremities, the right triceps jerk again slightly diminished when compared to the left.  Reflexes are symmetric in the lower extremities. Both toes are downgoing.    She stands easily, armswing is symmetric, gait is normal and station and stride length.  Heel, toe, and tandem walking are all normal.  There is no appendicular dystaxia with any of the extremities.  Repetitive movements are intact and symmetric with normal amplitude and frequencies bilaterally.    Sensory exam is intact to vibration and temperature, Romberg is absent.    Assessment & Plan     1. Multiple sclerosis (HCC)  As before 6 months ago, is stable.  There is no reason to change anything.  She does not require symptomatic relief at this time.  We will continue the Ocrevus 600 mg infusions every 6 months.  Prior to her next infusion, CD20 and immunoglobulin titers will be obtained.  There is no reason to check a MICHELLE viral PCR.  She knows signs and symptoms suggestive of disease relapse and what to do in the circumstances.  She and I can follow-up in 6 months otherwise.    2. Cervical disc disorder at C6-C7 level with radiculopathy  Also stable, other than a slightly diminished right triceps jerk, there has been no new focal deficit.  She has not required neurosurgical follow-up in a few years.    Time: 30 minutes in total spent on patient care including precharting, record review, discussions with healthcare staff and documentation.  This includes face-to-face time with the patient for exam, review, discussion, as well as counseling and coordinating care.

## 2022-01-26 ENCOUNTER — GYNECOLOGY VISIT (OUTPATIENT)
Dept: OBGYN | Facility: CLINIC | Age: 32
End: 2022-01-26
Payer: COMMERCIAL

## 2022-01-26 VITALS — WEIGHT: 144 LBS | BODY MASS INDEX: 20.66 KG/M2 | DIASTOLIC BLOOD PRESSURE: 62 MMHG | SYSTOLIC BLOOD PRESSURE: 105 MMHG

## 2022-01-26 DIAGNOSIS — Z30.433 ENCOUNTER FOR REMOVAL AND REINSERTION OF INTRAUTERINE CONTRACEPTIVE DEVICE (IUD): ICD-10-CM

## 2022-01-26 DIAGNOSIS — Z30.432 ENCOUNTER FOR IUD REMOVAL: ICD-10-CM

## 2022-01-26 PROCEDURE — 58300 INSERT INTRAUTERINE DEVICE: CPT | Performed by: OBSTETRICS & GYNECOLOGY

## 2022-01-26 PROCEDURE — 58301 REMOVE INTRAUTERINE DEVICE: CPT | Performed by: OBSTETRICS & GYNECOLOGY

## 2022-01-26 ASSESSMENT — FIBROSIS 4 INDEX: FIB4 SCORE: 0.27

## 2022-01-27 NOTE — PROGRESS NOTES
Chief complaint: IUD removal with reinsertion     S: Patient presents for IUD removal due to cramping with intercourse    O: VSS, afebrile  Gen - NAD, comfortable  PELVIC EXAM -   Normal external female genitalia  BUS within normal limits, no lesions  Spec: cervix has no lesions, normal physiologic white discharge, IUD strings visualized from cervix  Vaginal wall: pink and moist, normal rugae, no lesions    Procedure - IUD removal:  Pt counseled on IUD removal. Risk for cramping, bleeding discussed.  Consent form signed.  Speculum placed in the vagina and cervix visualized. IUD strings seen. IUD strings grasped with ring forceps and removed intact. Hemostasis noted. Pt tolerated procedure well.    Today the patient is counseled on the risks of IUD insertion. I also discussed with the patient the risk of infection on insertion, and had asked the patient to remain on pelvic rest for one week following the insertion. We also discussed the risk of IUD expulsion, the risk of uterine perforation and IUD migration. If the IUD does migrate the patient may require a separate procedure such as a laparoscopy to retrieve the migrated IUD. I also discussed the 1% risk of pregnancy with IUD use. Also discussed with patient today increased risk of ectopic pregnancy with IUD use.  Also discussed today the possibility that IUD may need to be removed secondary to bleeding profile or pain. Also discussed were the possibility that partner can feel the IUD during intercourse. I also discussed the side effects of Mirena which can be amenorrhea or dysfunctional uterine bleeding or spotting.  Patient had the opportunity to ask questions regarding insertion, risks and benefits, all questions are answered in their entirety.  Informed consent is signed.    Procedure note  Urine pregnancy test is negative, informed consent was previously signed  The bimanual exam is performed the uterus is noted to be 7 weeks in size and is mid position  A  speculum was inserted into the vagina, the cervix was cleansed with Betadine swabs x3  Tenaculum was placed on the anterior lip of the cervix  The uterus was sounded to a depth of 7 centimeters  The Olinda IUD is placed under sterile conditions,   The strings trimmed to approximately 3 cm  Tenaculum was removed from the cervix and hemostasis was achieved with silver nitrate  The patient tolerated the procedure well    Patient is asked to followup in 4 to 6 weeks for IUD check. The patient is asked to remain on pelvic rest for one week. She is asked to return to office sooner as needed for heavy vaginal bleeding, uncontrolled pain, fever, or any other concerns.

## 2022-02-08 ENCOUNTER — PATIENT MESSAGE (OUTPATIENT)
Dept: NEUROLOGY | Facility: MEDICAL CENTER | Age: 32
End: 2022-02-08

## 2022-02-10 ENCOUNTER — EH NON-PROVIDER (OUTPATIENT)
Dept: OCCUPATIONAL MEDICINE | Facility: CLINIC | Age: 32
End: 2022-02-10

## 2022-02-10 ENCOUNTER — EMPLOYEE HEALTH (OUTPATIENT)
Dept: OCCUPATIONAL MEDICINE | Facility: CLINIC | Age: 32
End: 2022-02-10

## 2022-02-10 ENCOUNTER — HOSPITAL ENCOUNTER (OUTPATIENT)
Facility: MEDICAL CENTER | Age: 32
End: 2022-02-10
Attending: NURSE PRACTITIONER
Payer: COMMERCIAL

## 2022-02-10 DIAGNOSIS — Z02.1 PRE-EMPLOYMENT DRUG SCREENING: Primary | ICD-10-CM

## 2022-02-10 DIAGNOSIS — Z02.1 PRE-EMPLOYMENT DRUG SCREENING: ICD-10-CM

## 2022-02-10 DIAGNOSIS — Z02.1 PRE-EMPLOYMENT HEALTH SCREENING EXAMINATION: ICD-10-CM

## 2022-02-10 LAB
AMP AMPHETAMINE: NORMAL
BAR BARBITURATES: NORMAL
BZO BENZODIAZEPINES: NORMAL
COC COCAINE: NORMAL
INT CON NEG: NORMAL
INT CON POS: NORMAL
MDMA ECSTASY: NORMAL
MET METHAMPHETAMINES: NORMAL
MTD METHADONE: NORMAL
OPI OPIATES: NORMAL
OXY OXYCODONE: NORMAL
PCP PHENCYCLIDINE: NORMAL
POC URINE DRUG SCREEN OCDRS: NEGATIVE
THC: NORMAL

## 2022-02-10 PROCEDURE — 86480 TB TEST CELL IMMUN MEASURE: CPT | Performed by: NURSE PRACTITIONER

## 2022-02-10 PROCEDURE — 80305 DRUG TEST PRSMV DIR OPT OBS: CPT | Performed by: NURSE PRACTITIONER

## 2022-02-10 PROCEDURE — 8915 PR COMPREHENSIVE PHYSICAL: Performed by: NURSE PRACTITIONER

## 2022-02-14 LAB
GAMMA INTERFERON BACKGROUND BLD IA-ACNC: 0.03 IU/ML
M TB IFN-G BLD-IMP: NEGATIVE
M TB IFN-G CD4+ BCKGRND COR BLD-ACNC: 0 IU/ML
MITOGEN IGNF BCKGRD COR BLD-ACNC: >10 IU/ML
QFT TB2 - NIL TBQ2: 0.02 IU/ML

## 2022-03-25 ENCOUNTER — EH NON-PROVIDER (OUTPATIENT)
Dept: OCCUPATIONAL MEDICINE | Facility: CLINIC | Age: 32
End: 2022-03-25

## 2022-04-07 ENCOUNTER — OUTPATIENT INFUSION SERVICES (OUTPATIENT)
Dept: ONCOLOGY | Facility: MEDICAL CENTER | Age: 32
End: 2022-04-07
Attending: PSYCHIATRY & NEUROLOGY
Payer: COMMERCIAL

## 2022-04-07 VITALS
DIASTOLIC BLOOD PRESSURE: 76 MMHG | WEIGHT: 149.91 LBS | HEART RATE: 86 BPM | TEMPERATURE: 97.3 F | OXYGEN SATURATION: 96 % | RESPIRATION RATE: 18 BRPM | SYSTOLIC BLOOD PRESSURE: 112 MMHG | HEIGHT: 70 IN | BODY MASS INDEX: 21.46 KG/M2

## 2022-04-07 DIAGNOSIS — G35 MULTIPLE SCLEROSIS (HCC): ICD-10-CM

## 2022-04-07 LAB
HBV CORE AB SERPL QL IA: NONREACTIVE
HBV SURFACE AG SER QL: NORMAL

## 2022-04-07 PROCEDURE — 700102 HCHG RX REV CODE 250 W/ 637 OVERRIDE(OP): Performed by: PSYCHIATRY & NEUROLOGY

## 2022-04-07 PROCEDURE — 96366 THER/PROPH/DIAG IV INF ADDON: CPT

## 2022-04-07 PROCEDURE — 96365 THER/PROPH/DIAG IV INF INIT: CPT

## 2022-04-07 PROCEDURE — A9270 NON-COVERED ITEM OR SERVICE: HCPCS | Performed by: PSYCHIATRY & NEUROLOGY

## 2022-04-07 PROCEDURE — 96375 TX/PRO/DX INJ NEW DRUG ADDON: CPT

## 2022-04-07 PROCEDURE — 86704 HEP B CORE ANTIBODY TOTAL: CPT

## 2022-04-07 PROCEDURE — 700105 HCHG RX REV CODE 258: Performed by: PSYCHIATRY & NEUROLOGY

## 2022-04-07 PROCEDURE — 700111 HCHG RX REV CODE 636 W/ 250 OVERRIDE (IP): Performed by: PSYCHIATRY & NEUROLOGY

## 2022-04-07 PROCEDURE — 87340 HEPATITIS B SURFACE AG IA: CPT

## 2022-04-07 RX ORDER — SODIUM CHLORIDE 9 MG/ML
INJECTION, SOLUTION INTRAVENOUS CONTINUOUS
Status: DISCONTINUED | OUTPATIENT
Start: 2022-04-07 | End: 2022-04-07 | Stop reason: HOSPADM

## 2022-04-07 RX ORDER — METHYLPREDNISOLONE SODIUM SUCCINATE 125 MG/2ML
100 INJECTION, POWDER, LYOPHILIZED, FOR SOLUTION INTRAMUSCULAR; INTRAVENOUS ONCE
Status: CANCELLED | OUTPATIENT
Start: 2022-09-30

## 2022-04-07 RX ORDER — METHYLPREDNISOLONE SODIUM SUCCINATE 125 MG/2ML
100 INJECTION, POWDER, LYOPHILIZED, FOR SOLUTION INTRAMUSCULAR; INTRAVENOUS ONCE
Status: COMPLETED | OUTPATIENT
Start: 2022-04-07 | End: 2022-04-07

## 2022-04-07 RX ORDER — SODIUM CHLORIDE 9 MG/ML
INJECTION, SOLUTION INTRAVENOUS CONTINUOUS
Status: CANCELLED | OUTPATIENT
Start: 2022-09-30

## 2022-04-07 RX ORDER — ACETAMINOPHEN 325 MG/1
650 TABLET ORAL ONCE
Status: CANCELLED | OUTPATIENT
Start: 2022-09-30

## 2022-04-07 RX ORDER — ACETAMINOPHEN 325 MG/1
650 TABLET ORAL ONCE
Status: COMPLETED | OUTPATIENT
Start: 2022-04-07 | End: 2022-04-07

## 2022-04-07 RX ORDER — DIPHENHYDRAMINE HYDROCHLORIDE 50 MG/ML
25 INJECTION INTRAMUSCULAR; INTRAVENOUS PRN
Status: CANCELLED | OUTPATIENT
Start: 2022-09-30

## 2022-04-07 RX ADMIN — OCRELIZUMAB 600 MG: 300 INJECTION INTRAVENOUS at 08:04

## 2022-04-07 RX ADMIN — DIPHENHYDRAMINE HYDROCHLORIDE 25 MG: 50 INJECTION, SOLUTION INTRAMUSCULAR; INTRAVENOUS at 07:41

## 2022-04-07 RX ADMIN — METHYLPREDNISOLONE SODIUM SUCCINATE 100 MG: 125 INJECTION, POWDER, FOR SOLUTION INTRAMUSCULAR; INTRAVENOUS at 07:40

## 2022-04-07 RX ADMIN — ACETAMINOPHEN 650 MG: 325 TABLET ORAL at 07:40

## 2022-04-07 ASSESSMENT — FIBROSIS 4 INDEX: FIB4 SCORE: 0.27

## 2022-04-07 NOTE — PROGRESS NOTES
Pt is here today for Ocrevus infusion. Voices no new concerns or complaints. PIV  to back of left forearm 24g, flushed well with good blood return. Hep B lab drawn and sent. Ocrevus infusion tolerated well, titrated per MR. Removed PIV intact. Pt waited one hour for observation. Pt denies any s/s of adverse reaction. Discharged home in stable condition. Confirmed next appointment.

## 2022-04-18 DIAGNOSIS — F90.8 ATTENTION DEFICIT HYPERACTIVITY DISORDER (ADHD), OTHER TYPE: ICD-10-CM

## 2022-04-18 NOTE — TELEPHONE ENCOUNTER
Upcoming appt. 05/05/22    Received request via: Patient    Was the patient seen in the last year in this department? Yes    Does the patient have an active prescription (recently filled or refills available) for medication(s) requested? No

## 2022-04-19 RX ORDER — METHYLPHENIDATE HYDROCHLORIDE 54 MG/1
54 TABLET ORAL EVERY MORNING
Qty: 30 TABLET | Refills: 0 | Status: SHIPPED | OUTPATIENT
Start: 2022-04-21 | End: 2022-04-22 | Stop reason: SDUPTHER

## 2022-04-22 DIAGNOSIS — F90.8 ATTENTION DEFICIT HYPERACTIVITY DISORDER (ADHD), OTHER TYPE: ICD-10-CM

## 2022-04-22 RX ORDER — METHYLPHENIDATE HYDROCHLORIDE 54 MG/1
54 TABLET ORAL EVERY MORNING
Qty: 30 TABLET | Refills: 0 | Status: SHIPPED | OUTPATIENT
Start: 2022-04-22 | End: 2022-04-22 | Stop reason: SDUPTHER

## 2022-04-22 RX ORDER — METHYLPHENIDATE HYDROCHLORIDE 54 MG/1
54 TABLET ORAL EVERY MORNING
Qty: 30 TABLET | Refills: 0 | Status: SHIPPED | OUTPATIENT
Start: 2022-04-22 | End: 2022-05-22

## 2022-04-22 NOTE — TELEPHONE ENCOUNTER
Patient called saying she needs [facility] sent to the [facility]. Please advise.

## 2022-05-05 ENCOUNTER — TELEMEDICINE (OUTPATIENT)
Dept: BEHAVIORAL HEALTH | Facility: CLINIC | Age: 32
End: 2022-05-05
Payer: COMMERCIAL

## 2022-05-05 DIAGNOSIS — F90.8 ATTENTION DEFICIT HYPERACTIVITY DISORDER (ADHD), OTHER TYPE: ICD-10-CM

## 2022-05-05 DIAGNOSIS — F41.1 GAD (GENERALIZED ANXIETY DISORDER): ICD-10-CM

## 2022-05-05 DIAGNOSIS — F33.41 RECURRENT MAJOR DEPRESSIVE DISORDER, IN PARTIAL REMISSION (HCC): ICD-10-CM

## 2022-05-05 PROCEDURE — 99215 OFFICE O/P EST HI 40 MIN: CPT | Mod: 95 | Performed by: PSYCHIATRY & NEUROLOGY

## 2022-05-05 RX ORDER — METHYLPHENIDATE HYDROCHLORIDE 54 MG/1
54 TABLET ORAL EVERY MORNING
Qty: 30 TABLET | Refills: 0 | Status: SHIPPED | OUTPATIENT
Start: 2022-05-23 | End: 2022-06-22

## 2022-05-05 RX ORDER — METHYLPHENIDATE HYDROCHLORIDE 54 MG/1
54 TABLET ORAL EVERY MORNING
Qty: 30 TABLET | Refills: 0 | Status: SHIPPED | OUTPATIENT
Start: 2022-07-24 | End: 2022-08-23

## 2022-05-05 RX ORDER — METHYLPHENIDATE HYDROCHLORIDE 54 MG/1
54 TABLET ORAL EVERY MORNING
Qty: 30 TABLET | Refills: 0 | Status: SHIPPED | OUTPATIENT
Start: 2022-06-23 | End: 2022-07-23

## 2022-05-05 RX ORDER — BUPROPION HYDROCHLORIDE 300 MG/1
300 TABLET ORAL EVERY MORNING
Qty: 90 TABLET | Refills: 1 | Status: SHIPPED | OUTPATIENT
Start: 2022-05-05 | End: 2022-08-16 | Stop reason: SDUPTHER
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
## 2022-06-14 ENCOUNTER — TELEMEDICINE (OUTPATIENT)
Dept: BEHAVIORAL HEALTH | Facility: CLINIC | Age: 32
End: 2022-06-14
Payer: COMMERCIAL

## 2022-06-14 DIAGNOSIS — F41.1 GAD (GENERALIZED ANXIETY DISORDER): ICD-10-CM

## 2022-06-14 DIAGNOSIS — F33.41 RECURRENT MAJOR DEPRESSIVE DISORDER, IN PARTIAL REMISSION (HCC): ICD-10-CM

## 2022-06-14 PROCEDURE — 90837 PSYTX W PT 60 MINUTES: CPT | Mod: 95 | Performed by: MARRIAGE & FAMILY THERAPIST

## 2022-06-14 NOTE — PROGRESS NOTES
[facility]   Therapy Progress Note      This visit was conducted via Zoom using secure and encrypted videoconferencing technology.  The patient was in a private location in the [facility].  The patient's identity was confirmed and verbal consent was obtained for this virtual visit.  Place of Service: POS 10 -The patient is at Home during their visit       Name: Rosita Bowles  MRN: [id number]  : 1990  Age: 31 y.o.  Date of assessment: 2022  PCP: Demarco Brian M.D.  Persons in attendance: Patient  Total session time: 55 minutes    Objective Observations:   Participation:Active verbal participation, Attentive and Engaged   Grooming:Casual   Cognition:Alert and Fully Oriented   Eye Contact:Good   Mood:Euthymic   Affect:Flexible and Full range   Thought Process:Logical and Goal-directed   Speech:Rate within normal limits and Volume within normal limits    Current Risk:   Suicide: low   Homicide: low   Self-Harm: low   Relapse: low   Safety Plan Reviewed: not applicable    Topics addressed in psychotherapy include: Met the patient via zoom for an individual counseling session.  The patient was last seen on July 15, 2021. The patient reported that she is having increased stress due to needing to move at the end of the month.  She reported that she recently had covid along with her fiancé.  Due to concerns regarding covid they have limited their travel however they are working to try to make plans again.  Patient reported that she is not trying to be an adrenaline junkie.  Worked with the patient on normalizing experiences related to increase stress.    This dictation has been created using voice recognition software and/or scribes. The accuracy of the dictation is limited by the abilities of the software and the expertise of the scribes. I expect there may be some errors of grammar and possibly content. I made every attempt to manually correct the errors within my dictation.  However, errors related to voice recognition software and/or scribes may still exist and should be interpreted within the appropriate context.        Care Plan Updated: No    Does patient express agreement with the above plan? Yes     Diagnosis:  1. JOSE (generalized anxiety disorder)    2. Recurrent major depressive disorder, in partial remission (HCC)        Referral appointment(s) scheduled? No       MARIBEL Reis.

## 2022-06-16 DIAGNOSIS — G47.09 OTHER INSOMNIA: ICD-10-CM

## 2022-06-16 RX ORDER — DOXEPIN HYDROCHLORIDE 10 MG/1
CAPSULE ORAL
Qty: 60 CAPSULE | Refills: 1 | Status: SHIPPED | OUTPATIENT
Start: 2022-06-16 | End: 2022-07-08

## 2022-06-28 ENCOUNTER — PATIENT MESSAGE (OUTPATIENT)
Dept: MEDICAL GROUP | Facility: LAB | Age: 32
End: 2022-06-28
Payer: COMMERCIAL

## 2022-06-30 DIAGNOSIS — L98.9 SKIN LESION: ICD-10-CM

## 2022-07-08 DIAGNOSIS — G47.09 OTHER INSOMNIA: ICD-10-CM

## 2022-07-08 RX ORDER — DOXEPIN HYDROCHLORIDE 10 MG/1
CAPSULE ORAL
Qty: 60 CAPSULE | Refills: 1 | Status: SHIPPED | OUTPATIENT
Start: 2022-07-08 | End: 2022-08-16

## 2022-07-25 ENCOUNTER — APPOINTMENT (OUTPATIENT)
Dept: NEUROLOGY | Facility: MEDICAL CENTER | Age: 32
End: 2022-07-25
Attending: PSYCHIATRY & NEUROLOGY
Payer: COMMERCIAL

## 2022-07-25 ENCOUNTER — HOSPITAL ENCOUNTER (OUTPATIENT)
Dept: LAB | Facility: MEDICAL CENTER | Age: 32
End: 2022-07-25
Attending: PSYCHIATRY & NEUROLOGY
Payer: COMMERCIAL

## 2022-07-25 DIAGNOSIS — G35 MULTIPLE SCLEROSIS (HCC): ICD-10-CM

## 2022-07-25 PROCEDURE — 88185 FLOWCYTOMETRY/TC ADD-ON: CPT

## 2022-07-25 PROCEDURE — 82784 ASSAY IGA/IGD/IGG/IGM EACH: CPT

## 2022-07-25 PROCEDURE — 36415 COLL VENOUS BLD VENIPUNCTURE: CPT

## 2022-07-25 PROCEDURE — 88184 FLOWCYTOMETRY/ TC 1 MARKER: CPT

## 2022-07-26 LAB
IGA SERPL-MCNC: 72 MG/DL (ref 68–408)
IGG SERPL-MCNC: 739 MG/DL (ref 768–1632)
IGM SERPL-MCNC: 52 MG/DL (ref 35–263)
PATH INTERP SPEC-IMP: NORMAL

## 2022-07-28 NOTE — PROGRESS NOTES
Subjective:   Rosita Bowles is a 26 y.o. female here today for   Chief Complaint   Patient presents with   • Follow-Up     ADHD       1. Anal lesion  This is a new problem. Patient reports anal lesions for the last 2 weeks. She has been on high-dose steroids including IV infusions. She has not started any other medications. She is sexually active. She denies any anal intercourse. She was recently screened for STDs which were all negative including HSV-2 antibodies. The lesions feel raw. She has not noticed any blistering.    2. Attention deficit hyperactivity disorder (ADHD), unspecified ADHD type  This is chronic. Patient is currently on Concerta 54 mg daily. She denies any weight loss, poor appetite, heart racing, difficulty sleeping. She needs a refill. Her  was checked.    3. Rib pain  This is a new problem that started today. She has pain on bilateral lower ribs. She denies any recent injury or change in exercise routine. She has been on high-dose steroids recently.    Current medicines (including changes today)  Current Outpatient Prescriptions   Medication Sig Dispense Refill   • [START ON 6/6/2017] methylphenidate (CONCERTA) 54 MG CR tablet Take 1 Tab by mouth every day. 30 Tab 0   • MethylPREDNISolone (MEDROL DOSEPAK) 4 MG Tablet Therapy Pack Take as directed in kit 1 Kit 0   • PAULO 0.35 MG tablet Take 1 Tab by mouth.  3   • fluticasone (FLONASE) 50 MCG/ACT nasal spray Spray 1 Spray in nose every day. 16 g    • cetirizine (ZYRTEC) 10 MG Tab Take 1 Tab by mouth every day. 30 Tab    • Dimethyl Fumarate (TECFIDERA) 240 MG CAPSULE DELAYED RELEASE Take 240 mg by mouth 2 Times a Day. 60 Cap 11   • NECON 1/50, 28, 1-50 MG-MCG Tab Take 1 tab PO qday as directed 84 Tab 3   • Adapalene-Benzoyl Peroxide (EPIDUO) 0.1-2.5 % Gel 1 Application by Apply externally route every day. 1 Tube 5   • buPROPion (WELLBUTRIN XL) 300 MG XL tablet Take 1 Tab by mouth every morning. 90 Tab 3     No current  "facility-administered medications for this visit.     She  has a past medical history of Depression (8/7/2012); Acne (8/7/2012); and ADHD (attention deficit hyperactivity disorder) (8/7/2012).    ROS   No fevers  No bowel changes  No LE edema       Objective:     Blood pressure 114/62, pulse 100, temperature 36.9 °C (98.5 °F), resp. rate 12, height 1.753 m (5' 9.02\"), weight 67.4 kg (148 lb 9.4 oz), SpO2 98 %. Body mass index is 21.93 kg/(m^2).   Physical Exam:  Constitutional: Alert, no distress.  Skin: Warm, dry, good turgor, there is a rash in the perianal area that is macular, open and raw, and consist of 0.5 cm Asians around the entire circumference of the penis. There is no vesicular lesions..  Eye: Equal, round and reactive, conjunctiva clear, lids normal.  ENMT: Lips without lesions, good dentition, oropharynx clear.  Neck: Trachea midline, no masses, no thyromegaly. No cervical or supraclavicular lymphadenopathy  Respiratory: Unlabored respiratory effort, lungs clear to auscultation, no wheezes, no ronchi.  Cardiovascular: Normal S1, S2, RRR, no murmur, no edema.  Abdomen: Soft, non-tender, no masses, no hepatosplenomegaly.  Psych: Alert and oriented x3, normal affect and mood.  MSK: Tenderness to palpation of the inferior ribs bilaterally      Assessment and Plan:   The following treatment plan was discussed    1. Anal lesion  New, etiology unclear. This may be a rash secondary to her recent steroid dosing. On examination this does not appear herpetic, however, a swab has been collected  Treatment with Bactroban and cortisone cream  Follow-up if no improvement  - HERPES SCREEN VIRAL CULTURE    2. Attention deficit hyperactivity disorder (ADHD), unspecified ADHD type  Chronic, stable  No side effects from medications  Continue Concerta 54 mg daily, 3 refills given starting on 4/10/17  - methylphenidate (CONCERTA) 54 MG CR tablet; Take 1 Tab by mouth every day.  Dispense: 30 Tab; Refill: 0    3. Rib " pain  New, stable, etiology unclear. We did discuss possible etiologies including costochondritis, sore muscles. If pain persists, we will proceed with an x-ray but at this time rest and NSAIDs were recommended      Followup: Return in about 3 months (around 6/17/2017), or if symptoms worsen or fail to improve, for ADHD.       This note was created using voice recognition software. I have made every reasonable attempt to correct errors, however, I do anticipate some grammatical errors.      LR

## 2022-08-13 DIAGNOSIS — G47.09 OTHER INSOMNIA: ICD-10-CM

## 2022-08-16 DIAGNOSIS — F90.8 ATTENTION DEFICIT HYPERACTIVITY DISORDER (ADHD), OTHER TYPE: ICD-10-CM

## 2022-08-16 RX ORDER — METHYLPHENIDATE HYDROCHLORIDE 54 MG/1
54 TABLET ORAL EVERY MORNING
Qty: 30 TABLET | Refills: 0 | Status: SHIPPED | OUTPATIENT
Start: 2022-09-22 | End: 2022-09-07

## 2022-08-16 RX ORDER — METHYLPHENIDATE HYDROCHLORIDE 54 MG/1
54 TABLET ORAL EVERY MORNING
Qty: 30 TABLET | Refills: 0 | Status: SHIPPED | OUTPATIENT
Start: 2022-10-23 | End: 2022-10-04

## 2022-08-16 RX ORDER — METHYLPHENIDATE HYDROCHLORIDE 54 MG/1
54 TABLET ORAL EVERY MORNING
Qty: 30 TABLET | Refills: 0 | Status: SHIPPED | OUTPATIENT
Start: 2022-08-22 | End: 2022-09-21

## 2022-08-16 RX ORDER — DOXEPIN HYDROCHLORIDE 10 MG/1
CAPSULE ORAL
Qty: 60 CAPSULE | Refills: 1 | Status: SHIPPED | OUTPATIENT
Start: 2022-08-16 | End: 2022-09-13

## 2022-08-17 ENCOUNTER — TELEPHONE (OUTPATIENT)
Dept: MEDICAL GROUP | Facility: LAB | Age: 32
End: 2022-08-17
Payer: COMMERCIAL

## 2022-08-17 DIAGNOSIS — Z01.00 EYE EXAM, ROUTINE: ICD-10-CM

## 2022-08-22 ENCOUNTER — OFFICE VISIT (OUTPATIENT)
Dept: MEDICAL GROUP | Facility: LAB | Age: 32
End: 2022-08-22
Payer: COMMERCIAL

## 2022-08-22 ENCOUNTER — HOSPITAL ENCOUNTER (OUTPATIENT)
Dept: LAB | Facility: MEDICAL CENTER | Age: 32
End: 2022-08-22
Attending: PHYSICIAN ASSISTANT
Payer: COMMERCIAL

## 2022-08-22 VITALS
DIASTOLIC BLOOD PRESSURE: 58 MMHG | HEIGHT: 70 IN | WEIGHT: 154 LBS | RESPIRATION RATE: 16 BRPM | TEMPERATURE: 97.7 F | SYSTOLIC BLOOD PRESSURE: 116 MMHG | OXYGEN SATURATION: 97 % | HEART RATE: 100 BPM | BODY MASS INDEX: 22.05 KG/M2

## 2022-08-22 DIAGNOSIS — R21 RASH: ICD-10-CM

## 2022-08-22 LAB
APTT PPP: 31.8 SEC (ref 24.7–36)
BASOPHILS # BLD AUTO: 0.8 % (ref 0–1.8)
BASOPHILS # BLD: 0.06 K/UL (ref 0–0.12)
EOSINOPHIL # BLD AUTO: 0.19 K/UL (ref 0–0.51)
EOSINOPHIL NFR BLD: 2.6 % (ref 0–6.9)
ERYTHROCYTE [DISTWIDTH] IN BLOOD BY AUTOMATED COUNT: 43.9 FL (ref 35.9–50)
HCT VFR BLD AUTO: 39.5 % (ref 37–47)
HGB BLD-MCNC: 13.2 G/DL (ref 12–16)
IMM GRANULOCYTES # BLD AUTO: 0.01 K/UL (ref 0–0.11)
IMM GRANULOCYTES NFR BLD AUTO: 0.1 % (ref 0–0.9)
INR PPP: 1.03 (ref 0.87–1.13)
LYMPHOCYTES # BLD AUTO: 3.43 K/UL (ref 1–4.8)
LYMPHOCYTES NFR BLD: 47.2 % (ref 22–41)
MCH RBC QN AUTO: 28.9 PG (ref 27–33)
MCHC RBC AUTO-ENTMCNC: 33.4 G/DL (ref 33.6–35)
MCV RBC AUTO: 86.4 FL (ref 81.4–97.8)
MONOCYTES # BLD AUTO: 0.74 K/UL (ref 0–0.85)
MONOCYTES NFR BLD AUTO: 10.2 % (ref 0–13.4)
NEUTROPHILS # BLD AUTO: 2.84 K/UL (ref 2–7.15)
NEUTROPHILS NFR BLD: 39.1 % (ref 44–72)
NRBC # BLD AUTO: 0 K/UL
NRBC BLD-RTO: 0 /100 WBC
PLATELET # BLD AUTO: 305 K/UL (ref 164–446)
PMV BLD AUTO: 10 FL (ref 9–12.9)
PROTHROMBIN TIME: 13.4 SEC (ref 12–14.6)
RBC # BLD AUTO: 4.57 M/UL (ref 4.2–5.4)
WBC # BLD AUTO: 7.3 K/UL (ref 4.8–10.8)

## 2022-08-22 PROCEDURE — 85730 THROMBOPLASTIN TIME PARTIAL: CPT

## 2022-08-22 PROCEDURE — 36415 COLL VENOUS BLD VENIPUNCTURE: CPT

## 2022-08-22 PROCEDURE — 85025 COMPLETE CBC W/AUTO DIFF WBC: CPT

## 2022-08-22 PROCEDURE — 99213 OFFICE O/P EST LOW 20 MIN: CPT | Performed by: PHYSICIAN ASSISTANT

## 2022-08-22 PROCEDURE — 85610 PROTHROMBIN TIME: CPT

## 2022-08-22 ASSESSMENT — FIBROSIS 4 INDEX: FIB4 SCORE: 0.27

## 2022-08-22 NOTE — PROGRESS NOTES
Subjective:   CC: Rosita Bowles is a 31 y.o. female here today for Rash     HPI:  Rash  New to me; patient reports that she started noticing erythematous and itchy skin lesions starting on August 19.  No new soaps, detergents.  Denies any new medications.  States that she did have a new meal from  [employer], unsure if she has any allergies to basil??    Now she has very small pinpoint erythematous lesions spreading throughout her body.     Current medicines (including changes today)  Current Outpatient Medications   Medication Sig Dispense Refill    doxepin (SINEQUAN) 10 MG Cap TAKE 1-2 TABS AT BEDTIME AS NEEDED FOR SLEEP 60 Capsule 1    buPROPion (WELLBUTRIN XL) 300 MG XL tablet Take 1 Tablet by mouth every morning. 90 Tablet 1    methylphenidate (CONCERTA) 54 MG CR tablet Take 1 Tablet by mouth every morning for 30 days. 30 Tablet 0    [START ON 9/22/2022] methylphenidate (CONCERTA) 54 MG CR tablet Take 1 Tablet by mouth every morning for 30 days. 30 Tablet 0    [START ON 10/23/2022] methylphenidate (CONCERTA) 54 MG CR tablet Take 1 Tablet by mouth every morning for 30 days. 30 Tablet 0    methylphenidate (CONCERTA) 54 MG CR tablet Take 1 Tablet by mouth every morning for 30 days. 30 Tablet 0    albuterol 108 (90 Base) MCG/ACT Aero Soln inhalation aerosol INHALE 2 PUFFS EVERY FOUR HOURS AS NEEDED FOR SHORTNESS OF BREATH. 6.7 Each 3    ferrous sulfate 325 (65 Fe) MG tablet Take 325 mg by mouth every day.      levonorgestrel (MIRENA, 52 MG,) 52 mg (20 mcg/24 hr) IUD 1 Each by Intrauterine route one time.      methylPREDNISolone (MEDROL DOSEPAK) 4 MG Tablet Therapy Pack As directed on the packaging label. 21 tablet 0    ocrelizumab (OCREVUS) 300 MG/10ML Solution injection Infuse  into a venous catheter.      fluconazole (DIFLUCAN) 150 MG tablet Take 1 Tab by mouth as needed (vaginal itching, fissure, discharge). 2 Tab 4    Clindamycin Phos-Benzoyl Perox (ONEXTON) 1.2-3.75 % Gel 1 Application by Apply  "externally route every morning. To entire face 30 g 3    valacyclovir (VALTREX) 1 GM Tab Take 1 Tab by mouth 2 times a day as needed (cold sores). 180 Tab 3    Biotin 5000 MCG Cap Take 5,000 mcg by mouth every day at 6 PM. supplement      melatonin 3 MG Tab Take 3 mg by mouth 1 time daily as needed (insomnia).      Cyanocobalamin (VITAMIN B-12) 1000 MCG Tab Take 1,000 mcg by mouth every day. supplement      Cholecalciferol (VITAMIN D3) 5000 units Tab Take 5,000 Units by mouth every day. supplement       No current facility-administered medications for this visit.     She  has a past medical history of Acne (8/7/2012), Acne vulgaris (6/23/2017), ADHD (8/7/2012), Depression (8/7/2012), Multiple sclerosis (HCC) (11/2/2016), and Plantar wart (1/9/2019).    ROS   No chest pain, no shortness of breath, no abdominal pain       Objective:     /58 (BP Location: Left arm, Patient Position: Sitting, BP Cuff Size: Adult)   Pulse 100   Temp 36.5 °C (97.7 °F) (Temporal)   Resp 16   Ht 1.778 m (5' 10\")   Wt 69.9 kg (154 lb)   SpO2 97%  Body mass index is 22.1 kg/m².   Physical Exam:  Constitutional: Alert, no distress.  Skin: Warm, dry, good turgor, many very small, nonblanching pinpoint lesions on her back, abdomen and legs.  Lesions are also present on her fingers, no lesions present on the plantar surface of feet, bilaterally.  There is a small lesion on her bottom lip.  Eye: Equal, round, conjunctiva clear, lids normal.  Neck: Trachea midline, no masses  Respiratory: Unlabored respiratory effort,  Psych: Alert and oriented x3, normal affect and mood.    Assessment and Plan:   The following treatment plan was discussed    1. Rash  New to me, unclear etiology at this time.  To me, this seems most consistent with a viral exanthem.  Seems somewhat consistent with HFM though the lesions are not painful.  Given that these lesions are nonblanching, there is concern for ITP.  Will check CBC, PT and PTT.  We did discuss " possibly using a Medrol Dosepak, will obtain blood work first and if no improvement in the next 2 to 3 days, patient is to reach out to me via MyChart to further discuss use of steroids.  - CBC WITH DIFFERENTIAL; Future  - Prothrombin Time; Future  - APTT; Future      Followup: Return in about 3 days (around 8/25/2022).       Shabana Matamoros P.A.-C.  Supervising MD: Dr. Yrn Ortega MD  08/22/22

## 2022-08-22 NOTE — ASSESSMENT & PLAN NOTE
New to me; patient reports that she started noticing erythematous and itchy skin lesions starting on August 19.  No new soaps, detergents.  Denies any new medications.  States that she did have a new meal from  [employer], unsure if she has any allergies to basil??    Now she has very small pinpoint erythematous lesions spreading throughout her body.

## 2022-09-07 ENCOUNTER — OCCUPATIONAL MEDICINE (OUTPATIENT)
Dept: URGENT CARE | Facility: CLINIC | Age: 32
End: 2022-09-07
Payer: COMMERCIAL

## 2022-09-07 VITALS
HEART RATE: 88 BPM | TEMPERATURE: 97.2 F | HEIGHT: 70 IN | BODY MASS INDEX: 20.76 KG/M2 | RESPIRATION RATE: 16 BRPM | OXYGEN SATURATION: 99 % | SYSTOLIC BLOOD PRESSURE: 114 MMHG | DIASTOLIC BLOOD PRESSURE: 60 MMHG | WEIGHT: 145 LBS
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
## 2022-09-07 DIAGNOSIS — M77.12 LEFT TENNIS ELBOW: ICD-10-CM

## 2022-09-07 LAB
AMP AMPHETAMINE: NORMAL
BAR BARBITURATES: NORMAL
BREATH ALCOHOL COMMENT: NORMAL
BZO BENZODIAZEPINES: NORMAL
COC COCAINE: NORMAL
INT CON NEG: NORMAL
INT CON POS: NORMAL
MDMA ECSTASY: NORMAL
MET METHAMPHETAMINES: NORMAL
MTD METHADONE: NORMAL
OPI OPIATES: NORMAL
OXY OXYCODONE: NORMAL
PCP PHENCYCLIDINE: NORMAL
POC BREATHALIZER: 0 PERCENT (ref 0–0.01)
POC URINE DRUG SCREEN OCDRS: NEGATIVE
THC: NORMAL

## 2022-09-07 PROCEDURE — 80305 DRUG TEST PRSMV DIR OPT OBS: CPT | Performed by: FAMILY MEDICINE

## 2022-09-07 PROCEDURE — 99213 OFFICE O/P EST LOW 20 MIN: CPT | Performed by: FAMILY MEDICINE

## 2022-09-07 PROCEDURE — 82075 ASSAY OF BREATH ETHANOL: CPT | Performed by: FAMILY MEDICINE

## 2022-09-07 ASSESSMENT — FIBROSIS 4 INDEX: FIB4 SCORE: .2622950819672131148

## 2022-09-07 NOTE — LETTER
"EMPLOYEE’S CLAIM FOR COMPENSATION/ REPORT OF INITIAL TREATMENT  FORM C-4    EMPLOYEE’S CLAIM - PROVIDE ALL INFORMATION REQUESTED   First Name  Rosita Last Name  Wilbur Birthdate                    1990                Sex  female Claim Number (Insurer’s Use Only)    Home Address  [address]  [address] Age  31 y.o. Height  1.778 m (5' 10\") Weight  65.8 kg (145 lb) [facility]     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 (if applicable)  MOVING TRAUMA BAY XRAY DEVICE    How did this injury or occupational disease occur? (Be specific an answer in detail. Use additional sheet if necessary)  MOVING TRAUMA BAY XRAY MACHIE, LEFT ELBO STRAINED   If you believe that you have an occupational disease, when did you first have knowledge of the disability and it relationship to your employment?   Witnesses to the Accident  N/A      Nature of Injury or Occupational Disease  Workers' Compensation  Part(s) of Body Injured or Affected  Elbow (L), N/A, N/A    I certify that the above is true and correct to the best of my knowledge and that I have provided this information in order to obtain the " benefits of [place]  I hereby authorize any physician, chiropractor, surgeon, practitioner, or other person, any hospital, including [facility] or [facility], any medical service organization, any insurance company, or other institution or organization to release to each other, any medical or other information, including benefits paid or payable, pertinent to this injury or disease, except information relative to diagnosis, treatment and/or counseling for AIDS, psychological conditions, alcohol or controlled substances, for which I must give specific authorization.  A Photostat of this authorization shall be as valid as the original.     Date 09/07/2022   Place [facility] Employee’s Original or  *Electronic Signature   THIS REPORT MUST BE COMPLETED AND MAILED WITHIN 3 WORKING DAYS OF TREATMENT   Place  [facility]  Name of Facility  [facility]   Date  9/7/2022 Diagnosis and Description of Injury or Occupational Disease  (Y99.0) Work related injury  (X50.3XXA) Overuse injury  (M77.12) Left tennis elbow Is there evidence the injured employee was under the influence of alcohol and/or another controlled substance at the time of accident?  ? No ? Yes (if yes, please explain)    Hour  3:38 PM   Diagnoses of Work related injury, Overuse injury, and Left tennis elbow were pertinent to this visit. No   Treatment  -Work restrictions include maximum use of left upper extremity 6 hours daily.  Maximum lifting 10 pounds  -Wear the tennis elbow brace while at work  -Ice, heat, Tylenol, and ibuprofen as needed for symptomatic relief  -Perform the exercises provided 2 times daily  Have you advised the patient to remain off work five days or     more?    X-Ray Findings      ? Yes Indicate dates:   From   To      From information given by the employee, together with medical evidence, can         "you directly connect this injury or occupational disease as job incurred?  Yes  Comments:Indeterminate ? No If no, is the injured employee capable of:  ? full duty  No ? modified duty  Yes   Is additional medical care by a physician indicated?  Yes If Modified Duty, Specify any Limitations / Restrictions  Work restrictions include maximum use of left upper extremity 6 hours daily.  Maximum lifting 10 pounds   Do you know of any previous injury or disease contributing to this condition or occupational disease?  ? Yes ? No (Explain if yes)                          No   Date  9/7/2022 Print Health Care Provider's   Gonzalo Torres M.D. I certify the employer’s copy of  this form was mailed on:   Address  [address] Insurer’s Use Only     [facility] Zip  [address]    Provider’s Tax ID Number  [id number] Telephone  Dept: [phone]             Health Care Provider’s Original or Electronic Signature  e-GONZALO Jackson M.D. Degree (MD,DO, DC,PA-C,APRN)   M.D.      * Complete and attach Release of Information (Form C-4A) when injured employee signs C-4 Form electronically  ORIGINAL - TREATING HEALTHCARE PROVIDER PAGE 2 - INSURER/TPA PAGE 3 - EMPLOYER PAGE 4 - EMPLOYEE             Form C-4 (rev.08/21)           BRIEF DESCRIPTION OF RIGHTS AND BENEFITS  (Pursuant to [place] 616C.[place])    Notice of Injury or Occupational Disease (Incident Report Form C-1): If an injury or occupational disease (OD) arises out of and in the course of employment, you must provide written notice to your employer as soon as practicable, but no later than 7 days after the accident or OD. Your employer shall maintain a sufficient supply of the required forms.    Claim for Compensation (Form C-4): If medical treatment is sought, the form C-4 is available at the place of initial treatment. A completed \"Claim for Compensation\" (Form C-4) must be filed within 90 days after an accident or OD. The treating physician or " chiropractor must, within 3 working days after treatment, complete and mail to the employer, the employer's insurer and third-party , the Claim for Compensation.    Medical Treatment: If you require medical treatment for your on-the-job injury or OD, you may be required to select a physician or chiropractor from a list provided by your workers’ compensation insurer, if it has contracted with an Organization for Managed Care (MCO) or Preferred Provider Organization (PPO) or providers of health care. If your employer has not entered into a contract with an MCO or PPO, you may select a physician or chiropractor from the Panel of Physicians and Chiropractors. Any medical costs related to your industrial injury or OD will be paid by your insurer.    Temporary Total Disability (TTD): If your doctor has certified that you are unable to work for a period of at least 5 consecutive days, or 5 cumulative days in a 20-day period, or places restrictions on you that your employer does not accommodate, you may be entitled to TTD compensation.    Temporary Partial Disability (TPD): If the wage you receive upon reemployment is less than the compensation for TTD to which you are entitled, the insurer may be required to pay you TPD compensation to make up the difference. TPD can only be paid for a maximum of 24 months.    Permanent Partial Disability (PPD): When your medical condition is stable and there is an indication of a PPD as a result of your injury or OD, within 30 days, your insurer must arrange for an evaluation by a rating physician or chiropractor to determine the degree of your PPD. The amount of your PPD award depends on the date of injury, the results of the PPD evaluation, your age and wage.    Permanent Total Disability (PTD): If you are medically certified by a treating physician or chiropractor as permanently and totally disabled and have been granted a PTD status by your insurer, you are entitled to  receive monthly benefits not to exceed 66 2/3% of your average monthly wage. The amount of your PTD payments is subject to reduction if you previously received a lump-sum PPD award.    Vocational Rehabilitation Services: You may be eligible for vocational rehabilitation services if you are unable to return to the job due to a permanent physical impairment or permanent restrictions as a result of your injury or occupational disease.    Transportation and Per Mirna Reimbursement: You may be eligible for travel expenses and per mirna associated with medical treatment.    Reopening: You may be able to reopen your claim if your condition worsens after claim closure.     Appeal Process: If you disagree with a written determination issued by the insurer or the insurer does not respond to your request, you may appeal to the Department of Administration, , by following the instructions contained in your determination letter. You must appeal the determination within 70 days from the date of the determination letter at [address], [address], [place], [place] [address], or [address], [place] [address]. If you disagree with the  decision, you may appeal to the Department of Administration, . You must file your appeal within 30 days from the date of the  decision letter at [address], [address], [place], [place] [address], or [address], [place] [address]. If you disagree with a decision of an , you may file a petition for judicial review with the District Court. You must do so within 30 days of the Appeal Officer’s decision. You may be represented by an  at your own expense or you may contact the [facility] for possible representation.    [place]  for Injured Workers (NAIW): If you disagree with a  decision, you may request that NAIW represent you without  charge at an  Hearing. For information regarding denial of benefits, you may contact the [facility] at: [address], [address], [place] [address], [phone], or [address] [facility], [address], [place] [address], [phone]    To File a Complaint with the Division: If you wish to file a complaint with the  of the Division of Industrial Relations (DIR),  please contact the Workers’ Compensation Section, [address], [address], [place], [place] [address], telephone [phone], or [address], [place], [place] [address], telephone [phone].    For assistance with Workers’ Compensation Issues: You may contact the [facility] Office for Consumer Health Assistance, [address], [place], [place] [address], Toll Free 1-327.612.7398, Web site: http://[facility].[place].gov/Programs/GARCIA E-mail: garcia@[email or web address].nv.[facility]              __________________________________________________________________                                    _________________            Employee Name / Signature                                                                                                                            Date                                                                                                                                                                                                                              D-2 (rev. 10/20)

## 2022-09-07 NOTE — LETTER
[facility]  [address] - [place] [address]  Phone:  [phone] - Fax:  [phone]   [facility] Progress Report and Disability Certification  Date of Service: 9/7/2022   No Show:  No  Date / Time of Next Visit: 9/12/2022 5:30 PM    Claim Information   Patient Name: Rosita Bowles  Claim Number:     Employer: [employer]  Date of Injury: 8/29/2022     Insurer / TPA: Workers Choice  ID / SSN:     Occupation: [employer]  Diagnosis: Diagnoses of Work related injury, Overuse injury, and Left tennis elbow were pertinent to this visit.    Medical Information   Related to Industrial Injury? 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  There was no specific trauma or injury, but she just developed sudden left elbow pain.  The pain is constant, described as sharp, currently rated 1/10.  She has not tried anything for the pain.  She is right-hand dominant.  She denies prior left elbow injury.  No secondary employment.   Objective Findings: No discolorations or deformities noted to inspection of left upper extremity.  Equal strength and sensation to elbows and wrists bilaterally.  She is point tender to palpation of the lateral side of her left elbow, however this is a moving target.   Pre-Existing Condition(s):     Assessment:   Initial Visit    Status: Additional Care Required  Permanent Disability:No    Plan:      Diagnostics:      Comments:  -Work restrictions include maximum use of left upper extremity 6 hours daily.  Maximum lifting 10 pounds.  -Wear the tennis elbow brace while at work  -Ice, heat, Tylenol, and ibuprofen as needed for symptomatic relief  -Perform the exercises provided 2 times daily    Disability Information   Status: Released to Restricted Duty    From:  9/7/2022  Through:  9/12/2022 Restrictions are: Temporary   Physical Restrictions   Sitting:    Standing:    Stooping:    Bending:      Squatting:    Walking:    Climbing:    Pushing:      Pulling:    Other:    Reaching Above Shoulder (L):   Reaching Above Shoulder (R):       Reaching Below Shoulder (L):    Reaching Below Shoulder (R):      Not to exceed Weight Limits   Carrying(hrs):   Weight Limit(lb): < or = to 10 pounds Lifting(hrs):   Weight  Limit(lb): < or = to 10 pounds   Comments: -Maximum left upper arm use 6 hours daily    Repetitive Actions   Hands: i.e. Fine Manipulations from Grasping:     Feet: i.e. Operating Foot Controls:     Driving / Operate Machinery:     Health Care Provider’s Original or Electronic Signature  Keri Torres M.D. Health Care Provider’s Original or Electronic Signature    Gaston Zabala MD         Clinic Name / Location: [address]  [place] [address] Clinic Phone Number: Dept: [phone]   Appointment Time: 2:15 Pm Visit Start Time: 3:38 PM   Check-In Time:  2:35 Pm Visit Discharge Time:     Original-Treating Physician or Chiropractor    Page 2-Insurer/TPA    Page 3-Employer    Page 4-Employee

## 2022-09-07 NOTE — PROGRESS NOTES
"  Subjective:     31 y.o. 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  There was no specific trauma or injury, but she just developed sudden left elbow pain.  The pain is constant, described as sharp, currently rated 1/10.  She has not tried anything for the pain.  She is right-hand dominant.  She denies prior left elbow injury.  No secondary employment.    PMH:   No pertinent past medical history to this problem  MEDS:  Medications were reviewed in EMR  ALLERGIES:  Allergies were reviewed in EMR  SOCHX:  Works as a x-ray technician  FH:   No pertinent family history to this problem     Objective:     /60   Pulse 88   Temp 36.2 °C (97.2 °F) (Temporal)   Resp 16   Ht 1.778 m (5' 10\")   Wt 65.8 kg (145 lb)   LMP 09/04/2022 (Exact Date)   SpO2 99%   Breastfeeding No   BMI 20.81 kg/m²     No discolorations or deformities noted to inspection of left upper extremity.  Equal strength and sensation to elbows and wrists bilaterally.  She is point tender to palpation of the lateral side of her left elbow, however this is a moving target.    Assessment/Plan:     1. Work related injury    2. Overuse injury    3. Left tennis elbow  Released to Restricted Duty FROM 9/7/2022 TO 9/12/2022  -Maximum left upper arm use 6 hours daily  -Work restrictions include maximum use of left upper extremity 6 hours daily.  Maximum lifting 10 pounds.  -Wear the tennis elbow brace while at work  -Ice, heat, Tylenol, and ibuprofen as needed for symptomatic relief  -Perform the exercises provided 2 times daily    Differential diagnosis, natural history, supportive care, and indications for immediate follow-up discussed.  "

## 2022-09-13 ENCOUNTER — OCCUPATIONAL MEDICINE (OUTPATIENT)
Dept: URGENT CARE | Facility: CLINIC | Age: 32
End: 2022-09-13
Payer: COMMERCIAL

## 2022-09-13 VITALS
TEMPERATURE: 98.5 F | DIASTOLIC BLOOD PRESSURE: 64 MMHG | HEART RATE: 83 BPM | HEIGHT: 70 IN | OXYGEN SATURATION: 98 % | RESPIRATION RATE: 18 BRPM | WEIGHT: 145 LBS | BODY MASS INDEX: 20.76 KG/M2 | SYSTOLIC BLOOD PRESSURE: 106 MMHG
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
## 2022-09-13 DIAGNOSIS — M77.12 LEFT TENNIS ELBOW: ICD-10-CM

## 2022-09-13 DIAGNOSIS — G47.09 OTHER INSOMNIA: ICD-10-CM

## 2022-09-13 DIAGNOSIS — M54.50 BILATERAL LOW BACK PAIN WITHOUT SCIATICA, UNSPECIFIED CHRONICITY: ICD-10-CM
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
## 2022-09-13 PROCEDURE — 99213 OFFICE O/P EST LOW 20 MIN: CPT

## 2022-09-13 RX ORDER — DOXEPIN HYDROCHLORIDE 10 MG/1
CAPSULE ORAL
Qty: 60 CAPSULE | Refills: 1 | Status: SHIPPED | OUTPATIENT
Start: 2022-09-13 | End: 2022-09-22

## 2022-09-13 ASSESSMENT — FIBROSIS 4 INDEX: FIB4 SCORE: .2622950819672131148

## 2022-09-13 NOTE — LETTER
[facility]  [address] - [place] [address]  Phone:  [phone] - Fax:  [phone]   [facility] Progress Report and Disability Certification  Date of Service: 9/13/2022   No Show:  No  Date / Time of Next Visit: 9/16/2022   Claim Information   Patient Name: Rosita Bowles  Claim Number:     Employer: [employer]  Date of Injury: 8/29/2022     Insurer / TPA: Workers Choice  ID / SSN:     Occupation: [employer]  Diagnosis: Diagnoses of Work related injury, Overuse injury, Left tennis elbow, and Bilateral low back pain without sciatica, unspecified chronicity were pertinent to this visit.    Medical Information   Related to Industrial Injury? Yes    Subjective Complaints:     9\13\22 Second visit:patient denies any improvement in symptoms to left elbow.  She states that she is unable to tolerate 10 pound restrictions as this exacerbates pain to left elbow.  She has been using brace daily, using Tylenol and ibuprofen, using range of motion exercises.  No improvement in pain.  Patient reports pain elicited with movement.  Patient also reports acute on chronic low back pain.  She reports DOI 9/9/22.  Patient denies any specific injury, began experiencing low back pain on 9/9/22.  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  She denies loss of control of bowel and bladder, denies saddle anesthesia, denies pre-existing back injury.  Low back pain without sciatica.  She does report chronic low back pain.   Objective Findings: Physical Exam  Vitals and nursing note reviewed.   Constitutional:       General: She is awake. She is not in acute distress.     Appearance: Normal appearance. She is normal weight. She is not ill-appearing, toxic-appearing or diaphoretic.   Cardiovascular:      Rate and Rhythm: Normal rate.   Pulmonary:      Effort: Pulmonary effort is normal.   Musculoskeletal:        Back:     Comments: Left  elbow: +TTP lateral aspect of left elbow.  No obvious deformity, no swelling, no erythema, no increased warmth.  Good strength.  Neurovascular intact.  Low back: Area of subjective tenderness as diagrammed. +TTP. No midline tenderness.  No bony step-off, erythema, swelling.  Patient ambulatory with steady gait.  Good strength to lower extremities.   Skin:     General: Skin is warm and dry.      Capillary Refill: Capillary refill takes less than 2 seconds.   Neurological:      General: No focal deficit present.      Mental Status: She is alert and oriented to person, place, and time. Mental status is at baseline.      Cranial Nerves: No cranial nerve deficit.      Sensory: No sensory deficit.      Motor: No weakness.      Coordination: Coordination normal.      Gait: Gait normal.   Psychiatric:         Mood and Affect: Mood normal.         Behavior: Behavior normal. Behavior is cooperative.         Thought Content: Thought content normal.         Judgment: Judgment normal.    Pre-Existing Condition(s):     Assessment:   Condition Worsened    Status: Discharged / Care Transfer  Comments:occ med  Permanent Disability:No    Plan:   Comments:Continue use of elbow brace, alternate Tylenol ibuprofen, lidocaine to low back, range of motion exercises    Diagnostics:      Comments:       Disability Information   Status: Released to Restricted Duty    From:  2022  Through: 2022 Restrictions are: Temporary   Physical Restrictions   Sitting:    Standing:    Stooping:    Bendin hrs/day   Squatting:    Walking:    Climbing:    Pushing:      Pulling:    Other:    Reaching Above Shoulder (L):   Reaching Above Shoulder (R):       Reaching Below Shoulder (L):    Reaching Below Shoulder (R):      Not to exceed Weight Limits   Carrying(hrs):   Weight Limit(lb):   Lifting(hrs):   Weight  Limit(lb):     Comments: No bending, no lifting greater than 5 lbs. Follow up with occupation medicine in 3 days.     Repetitive Actions    Hands: i.e. Fine Manipulations from Grasping:     Feet: i.e. Operating Foot Controls:     Driving / Operate Machinery:     Health Care Provider’s Original or Electronic Signature  ISABEL Reyes. Health Care Provider’s Original or Electronic Signature    Gaston Zabala MD         Clinic Name / Location: [facility] [address] [place] [address] Clinic Phone Number: Dept: [phone]   Appointment Time: 6:00 Pm Visit Start Time: 6:19 PM   Check-In Time:  5:47 Pm Visit Discharge Time: 6:51 PM   Original-Treating Physician or Chiropractor    Page 2-Insurer/TPA    Page 3-Employer    Page 4-Employee

## 2022-09-14 NOTE — PROGRESS NOTES
"Subjective:   Rosita Bowles is a 31 y.o. 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  There was no specific trauma or injury, but she just developed sudden left elbow pain.  The pain is constant, described as sharp, currently rated 1/10.  She has not tried anything for the pain.  She is right-hand dominant.  She denies prior left elbow injury.  No secondary employment.    9\13\22 Second visit:patient denies any improvement in symptoms to left elbow.  She states that she is unable to tolerate 10 pound restrictions as this exacerbates pain to left elbow.  She has been using brace daily, using Tylenol and ibuprofen, using range of motion exercises.  No improvement in pain.  Patient reports pain elicited with movement.  Patient also reports acute on chronic low back pain.  She reports DOI 9/9/22.  Patient denies any specific injury, began experiencing low back pain on 9/9/22.  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  She denies loss of control of bowel and bladder, denies saddle anesthesia, denies pre-existing back injury.  Low back pain without sciatica.  She does report chronic low back pain.        ROS per HPI      Problem list, medications, and allergies reviewed by myself today in Epic, not pertinent to today's visit.     Objective:     /64   Pulse 83   Temp 36.9 °C (98.5 °F) (Temporal)   Resp 18   Ht 1.778 m (5' 10\")   Wt 65.8 kg (145 lb)   LMP 09/04/2022 (Exact Date)   SpO2 98%   BMI 20.81 kg/m²     Physical Exam  Vitals and nursing note reviewed.   Constitutional:       General: She is awake. She is not in acute distress.     Appearance: Normal appearance. She is normal weight. She is not ill-appearing, toxic-appearing or diaphoretic.   Cardiovascular:      " Rate and Rhythm: Normal rate.   Pulmonary:      Effort: Pulmonary effort is normal.   Musculoskeletal:        Back:       Comments: Left elbow: +TTP lateral aspect of left elbow.  No obvious deformity, no swelling, no erythema, no increased warmth.  Good strength.  Neurovascular intact.  Low back: Area of subjective tenderness as diagrammed. +TTP.  No bony tenderness. No bony step-off, erythema, swelling.  Patient ambulatory with steady gait.  Good strength to lower extremities.   Skin:     General: Skin is warm and dry.      Capillary Refill: Capillary refill takes less than 2 seconds.   Neurological:      General: No focal deficit present.      Mental Status: She is alert and oriented to person, place, and time. Mental status is at baseline.      Cranial Nerves: No cranial nerve deficit.      Sensory: No sensory deficit.      Motor: No weakness.      Coordination: Coordination normal.      Gait: Gait normal.   Psychiatric:         Mood and Affect: Mood normal.         Behavior: Behavior normal. Behavior is cooperative.         Thought Content: Thought content normal.         Judgment: Judgment normal.       Assessment/Plan:     Diagnosis and associated orders:   1. Work related injury        2. Overuse injury        3. Left tennis elbow        4. Bilateral low back pain without sciatica, unspecified chronicity               Comments/MDM:   Patient reports no improvement in symptoms and worsening symptoms with current 10 pound restriction.  Restrictions will be reduced to 5 pound weight limit.  I advised patient to continue use of brace, Tylenol and ibuprofen.  Patient states that she cannot tolerate steroids, this therapy will not be used at this time.  Advised patient to continue using over-the-counter lidocaine patches to lower back.  Follow-up with occupational health for recheck             Please note that this dictation was created using voice recognition software. I have made a reasonable attempt to correct  obvious errors, but I expect that there are errors of grammar and possibly content that I did not discover before finalizing the note.    This note was electronically signed by DRAKE Milian

## 2022-09-19 ENCOUNTER — OFFICE VISIT (OUTPATIENT)
Dept: DERMATOLOGY | Facility: IMAGING CENTER | Age: 32
End: 2022-09-19
Payer: COMMERCIAL

## 2022-09-19 DIAGNOSIS — Z12.83 SKIN CANCER SCREENING: ICD-10-CM

## 2022-09-19 DIAGNOSIS — L70.0 ACNE VULGARIS: ICD-10-CM

## 2022-09-19 DIAGNOSIS — L81.4 LENTIGINES: ICD-10-CM

## 2022-09-19 DIAGNOSIS — D18.01 CHERRY ANGIOMA: ICD-10-CM

## 2022-09-19 DIAGNOSIS — D22.9 MULTIPLE NEVI: ICD-10-CM

## 2022-09-19 PROCEDURE — 99213 OFFICE O/P EST LOW 20 MIN: CPT | Performed by: NURSE PRACTITIONER

## 2022-09-19 RX ORDER — CLINDAMYCIN PHOSPHATE AND BENZOYL PEROXIDE 10; 50 MG/G; MG/G
GEL TOPICAL
Qty: 50 G | Refills: 3 | Status: SHIPPED | OUTPATIENT
Start: 2022-09-19 | End: 2022-10-04

## 2022-09-19 NOTE — PROGRESS NOTES
DERMATOLOGY NOTE  FOLLOW UP VISIT       Chief complaint: Follow-Up (VALERIA)    Pt hx of eczema      History of skin cancer: No  History of precancers/actinic keratoses: No  History of biopsies:No  History of blistering/severe sunburns: yes severe once  Family history of skin cancer:No  Family history of atypical moles:No      No Known Allergies     MEDICATIONS:  Medications relevant to specialty reviewed.     REVIEW OF SYSTEMS:   Positive for skin (see HPI)  Negative for fevers and chills       EXAM:  LMP 09/04/2022 (Exact Date)   Constitutional: Well-developed, well-nourished, and in no distress.     A total body skin exam was performed excluding the genitals per patient preference and including the following areas: head (including face), neck, chest, abdomen, groin/buttocks, back, bilateral upper extremities, and bilateral lower extremities with the following pertinent findings listed below and/or in assessment/plan.     -Very very few exposed skin of trunk and b/l upper, lower extremities and face with scattered clinically benign light brown reticulated macules all of which were morphologically similar and none of which were suspicious for skin cancer today on exam    -Very few scattered 1-3mm bright red macules and thin papules on the trunk and extremities    -Multiple tan medium brown macules papules scattered over the trunk, face and extremities    -several erythematous papules in various stages of healing of face--several erythematous papules, zero pustules, few closed comedones        IMPRESSION / PLAN:    1. Lentigines  - Benign-appearing nature of lesions discussed during exam.   - Advised to continue to monitor for any return to clinic for new or concerning changes.      2. Multiple nevi  - Benign-appearing nature of lesions discussed during exam.   - Advised to continue to monitor for any return to clinic for new or concerning changes.  - ABCDE's of melanoma discussed      3. Cherry angioma  -  Benign-appearing nature of lesions discussed during exam.   - Advised to continue to monitor for any return to clinic for new or concerning changes.      4. Acne vulgaris, comedonal and inflammatory , mild to moderated  - educated patient about diagnosis, management options, and expectations of treatment  - recommended consistent use of OTC daily face wash (cedaphil or cerave)  - start clindamycin/Benzoyl peroxide 1.2-3.75%% gel daily to as a thin film to cover areas on the face/neck. Side effect of irritation, bleaching of clothes/towels discussed  - OTC moisturizers strongly recommended  - S/E's: bleaching of clothes/towels are associated with benzoyl peroxide use, disussed  Had had below Rx before    - Clindamycin Phos-Benzoyl Perox 1.2-3.75 % Gel; AAA twice a day between wash and moisturizer  Dispense: 50 g; Refill: 3    5. Skin cancer screening  Skin cancer education  discussed importance of sun protective clothing, eyewear in addition to the use of broad spectrum sunscreen with SPF 30 or greater, as well as need for reapplication ~every 2 hours when exposed to UVR  discussed importance following up for any new or changing lesions as noted in handout given, but every 12 months exams in clinic in the setting of dermatologic history  ABCDE's of melanoma discussed/handout given        Discussed risks, benefits, alternative treatments as well as common side effects associated with prescribed treatment, Patient verbalized understanding and agrees with plan regarding the above               Please note that this dictation was created using voice recognition software. I have made every reasonable attempt to correct obvious errors, but I expect that there are errors of grammar and possibly content that I did not discover before finalizing the note.      Return to clinic in: Return in about 1 year (around 9/19/2023) for VALERIA. and as needed for any new or changing skin lesions.

## 2022-09-20 ENCOUNTER — OCCUPATIONAL MEDICINE (OUTPATIENT)
Dept: OCCUPATIONAL MEDICINE | Facility: CLINIC | Age: 32
End: 2022-09-20
Payer: COMMERCIAL

## 2022-09-20 VITALS
DIASTOLIC BLOOD PRESSURE: 68 MMHG | OXYGEN SATURATION: 100 % | RESPIRATION RATE: 14 BRPM | BODY MASS INDEX: 20.76 KG/M2 | HEIGHT: 70 IN | HEART RATE: 113 BPM | WEIGHT: 145 LBS | SYSTOLIC BLOOD PRESSURE: 118 MMHG | TEMPERATURE: 98.4 F

## 2022-09-20 DIAGNOSIS — M77.8 LEFT ELBOW TENDONITIS: ICD-10-CM

## 2022-09-20 DIAGNOSIS — M54.50 BILATERAL LOW BACK PAIN, UNSPECIFIED CHRONICITY, UNSPECIFIED WHETHER SCIATICA PRESENT: ICD-10-CM

## 2022-09-20 PROCEDURE — 99213 OFFICE O/P EST LOW 20 MIN: CPT | Performed by: NURSE PRACTITIONER

## 2022-09-20 ASSESSMENT — FIBROSIS 4 INDEX: FIB4 SCORE: .2622950819672131148

## 2022-09-20 ASSESSMENT — PAIN SCALES - GENERAL: PAINLEVEL: 3=SLIGHT PAIN

## 2022-09-20 NOTE — LETTER
[address],   [address] [place] [address]  Phone:  [phone] - Fax:  [phone]   Occupational Health [facility] Progress Report and Disability Certification  Date of Service: 9/20/2022   No Show:  No  Date / Time of Next Visit: 10/4/2022@   Claim Information   Patient Name: Rosita Bowles  Claim Number:     Employer: [employer]  Date of Injury: 8/29/2022     Insurer / TPA: Workers [employer]  ID / SSN:     Occupation: [employer]  Diagnosis: Diagnoses of Left elbow tendonitis and Bilateral low back pain, unspecified chronicity, unspecified whether sciatica present were pertinent to this visit.    Medical Information   Related to Industrial Injury? 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  There was no specific trauma or injury, but she just developed sudden left elbow pain.  Patient seen in urgent care x3 for this injury.  She states that her elbow pain has not improved she is using heel inserts and a strap to put pressure on her elbow.  She denies numbness or tingling.  She did have 1 incident of tingling and which was triggered by wearing the brace too tight.  She also requesting information about low back strain.  She has been taking ibuprofen 200 frequently,lidocaine patches, icing, and trying to do stretching exercises.  She is currently on light duty, but did not return to work due to increased symptoms.  Occupational therapy referral placed at this visit.  Plan of care discussed with patient.   Objective Findings: Left elbow: No gross deformity or discoloration noted.  Mild TTP noted to the lateral epicondyle.  No soft tissue swelling noted.  Distal strength and sensation intact.  Pulses 2+ and intact.    Low back: Subjective tenderness to the lower paraspinal musculature. Mild TTP.  No bony tenderness. No bony step-off,  erythema, swelling.  Patient ambulatory with steady gait.  Good strength to lower extremities.    Pre-Existing Condition(s):     Assessment:   Condition Same    Status: Additional Care Required  Permanent Disability:No    Plan: OTPTMedication    Diagnostics:      Comments:  Follow-up in 2 weeks  Restricted duty  Physical therapy referral placed  Recommend continue with OTC Tylenol/ibuprofen, ice/heat application, OTC topical ointments i.e. Tiger balm, Voltaren gel, or Epson salt soaks  Recommend gentle range of motion and strengthening exercises as tolerated  Use of elbow sleeve and compression brace as tolerated    Disability Information   Status: Released to Restricted Duty    From:  9/20/2022  Through: 10/4/2022 Restrictions are: Temporary   Physical Restrictions   Sitting:    Standing:    Stooping:    Bending:  < or = to 1 hr/day   Squatting:    Walking:    Climbing:    Pushing:      Pulling:    Other:    Reaching Above Shoulder (L):   Reaching Above Shoulder (R):       Reaching Below Shoulder (L):    Reaching Below Shoulder (R):      Not to exceed Weight Limits   Carrying(hrs):   Weight Limit(lb):   Lifting(hrs):   Weight  Limit(lb):     Comments: Limit bending and no pushing, pulling, or lifting greater than 5 lbs.     Repetitive Actions   Hands: i.e. Fine Manipulations from Grasping:     Feet: i.e. Operating Foot Controls:     Driving / Operate Machinery:     Health Care Provider’s Original or Electronic Signature  ISABEL Guillaume. Health Care Provider’s Original or Electronic Signature    Gaston Zabala MD         Clinic Name / Location: [address],   [address] [place] [address] Clinic Phone Number: Dept: [phone]   Appointment Time: 3:30 Pm Visit Start Time: 2:41 PM   Check-In Time:  2:39 Pm Visit Discharge Time:  3:25PM   Original-Treating Physician or Chiropractor    Page 2-Insurer/TPA    Page 3-Employer    Page 4-Employee

## 2022-09-20 NOTE — PROGRESS NOTES
"Subjective:     Rosita Bowles is a 31 y.o. 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  There was no specific trauma or injury, but she just developed sudden left elbow pain.  Patient seen in urgent care x3 for this injury.  She states that her elbow pain has not improved she is using heel inserts and a strap to put pressure on her elbow.  She denies numbness or tingling.  She did have 1 incident of tingling and which was triggered by wearing the brace too tight.  She also requesting information about low back strain.  She has been taking ibuprofen 200 frequently,lidocaine patches, icing, and trying to do stretching exercises.  She is currently on light duty, but did not return to work due to increased symptoms.  Occupational therapy referral placed at this visit.  Plan of care discussed with patient.    ROS: All systems were reviewed on intake form, form was reviewed and signed. See scanned documents in media. Pertinent positives and negatives included in HPI.    PMH: No pertinent past medical history to this problem  MEDS: Medications were reviewed in Epic  ALLERGIES: No Known Allergies  SOCHX: Works as Radiology Tech at [employer]  FH: No pertinent family history to this problem       Objective:     /68   Pulse (!) 113   Temp 36.9 °C (98.4 °F) (Temporal)   Resp 14   Ht 1.778 m (5' 10\")   Wt 65.8 kg (145 lb)   LMP 09/04/2022 (Exact Date)   SpO2 100%   BMI 20.81 kg/m²     [unfilled]    Left elbow: No gross deformity or discoloration noted.  Mild TTP noted to the lateral epicondyle.  No soft tissue swelling noted.  Distal strength and sensation intact.  Pulses 2+ and intact.    Low back: Subjective tenderness to the lower paraspinal musculature. Mild TTP.  No bony tenderness. No bony step-off, erythema, swelling.  Patient ambulatory with steady gait. "  Good strength to lower extremities.     Assessment/Plan:       1. Left elbow tendonitis  - Referral to Occupational Therapy    2. Bilateral low back pain, unspecified chronicity, unspecified whether sciatica present  - Referral to Physical Therapy    Released to Restricted Duty FROM 9/20/2022 TO 10/4/2022  Limit bending and no pushing, pulling, or lifting greater than 5 lbs.   Follow-up in 2 weeks  Restricted duty  Physical therapy referral placed  Recommend continue with OTC Tylenol/ibuprofen, ice/heat application, OTC topical ointments i.e. Tiger balm, Voltaren gel, or Epson salt soaks  Recommend gentle range of motion and strengthening exercises as tolerated  Use of elbow sleeve and compression brace as tolerated    Differential diagnosis, natural history, supportive care, and indications for immediate follow-up discussed.    Approximately 25 minutes were spent in reviewing notes, preparing for visit, obtaining history, exam and evaluation, patient counseling/education and post visit documentation/orders.

## 2022-09-21 DIAGNOSIS — F90.8 ATTENTION DEFICIT HYPERACTIVITY DISORDER (ADHD), OTHER TYPE: ICD-10-CM

## 2022-09-21 RX ORDER — METHYLPHENIDATE HYDROCHLORIDE 54 MG/1
54 TABLET ORAL EVERY MORNING
Qty: 30 TABLET | Refills: 0 | Status: SHIPPED | OUTPATIENT
Start: 2022-09-21 | End: 2022-10-21

## 2022-09-21 RX ORDER — METHYLPHENIDATE HYDROCHLORIDE 54 MG/1
54 TABLET ORAL EVERY MORNING
Qty: 30 TABLET | Refills: 0 | Status: SHIPPED | OUTPATIENT
Start: 2022-11-22 | End: 2022-10-04

## 2022-09-21 RX ORDER — METHYLPHENIDATE HYDROCHLORIDE 54 MG/1
54 TABLET ORAL EVERY MORNING
Qty: 30 TABLET | Refills: 0 | Status: SHIPPED | OUTPATIENT
Start: 2022-10-22 | End: 2022-10-04

## 2022-09-22 DIAGNOSIS — G47.09 OTHER INSOMNIA: ICD-10-CM

## 2022-09-22 RX ORDER — DOXEPIN HYDROCHLORIDE 10 MG/1
CAPSULE ORAL
Qty: 180 CAPSULE | Refills: 1 | Status: SHIPPED | OUTPATIENT
Start: 2022-09-22 | End: 2023-02-24

## 2022-09-25 DIAGNOSIS — G35 MULTIPLE SCLEROSIS (HCC): ICD-10-CM

## 2022-09-28 ENCOUNTER — OCCUPATIONAL MEDICINE (OUTPATIENT)
Dept: OCCUPATIONAL MEDICINE | Facility: CLINIC | Age: 32
End: 2022-09-28
Payer: COMMERCIAL

## 2022-09-28 VITALS
OXYGEN SATURATION: 96 % | SYSTOLIC BLOOD PRESSURE: 114 MMHG | RESPIRATION RATE: 14 BRPM | HEART RATE: 101 BPM | HEIGHT: 70 IN | TEMPERATURE: 98.4 F | BODY MASS INDEX: 20.81 KG/M2 | DIASTOLIC BLOOD PRESSURE: 62 MMHG

## 2022-09-28 DIAGNOSIS — M54.50 BILATERAL LOW BACK PAIN, UNSPECIFIED CHRONICITY, UNSPECIFIED WHETHER SCIATICA PRESENT: ICD-10-CM

## 2022-09-28 DIAGNOSIS — M77.8 LEFT ELBOW TENDONITIS: ICD-10-CM

## 2022-09-28 PROCEDURE — 99213 OFFICE O/P EST LOW 20 MIN: CPT | Performed by: NURSE PRACTITIONER

## 2022-09-28 ASSESSMENT — ENCOUNTER SYMPTOMS
MYALGIAS: 1
RESPIRATORY NEGATIVE: 1
CARDIOVASCULAR NEGATIVE: 1
BACK PAIN: 1
PSYCHIATRIC NEGATIVE: 1
WEAKNESS: 0
CONSTITUTIONAL NEGATIVE: 1
TINGLING: 0
SENSORY CHANGE: 0

## 2022-09-28 NOTE — LETTER
[address],   [address] [place] [address]  Phone:  [phone] - Fax:  [phone]   Occupational Health [facility] Progress Report and Disability Certification  Date of Service: 9/28/2022   No Show:  No  Date / Time of Next Visit: 10/11/2022@   Claim Information   Patient Name: Rosita Bowles  Claim Number:     Employer: [employer]  Date of Injury: 8/29/2022     Insurer / TPA: Workers Choice  ID / SSN:     Occupation: Kiosked  Diagnosis: Diagnoses of Left elbow tendonitis and Bilateral low back pain, unspecified chronicity, unspecified whether sciatica present were pertinent to this visit.    Medical Information   Related to Industrial Injury? 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  There was no specific trauma or injury, but she just developed sudden left elbow pain.  Today patient states symptoms are gradually improving.  She continues to have some discomfort in the elbow and intermittent pain in the low back.  She has attended occupational therapy and physical therapy for initial visits.  She states that the TENS unit and the steroid patches have been helpful.  She also purchased a brace for tennis elbow and this has been very helpful for her symptoms.  She has been doing exercises and massaging with some relief of symptoms.  She has not been back to work since her last visit.  She feels she would like to try with the light duty restrictions in place.  Plan of care discussed with patient.   Objective Findings: Left elbow: No gross deformity or discoloration noted.  Mild TTP noted to the lateral epicondyle.  No soft tissue swelling noted.  Distal strength and sensation intact.  Pulses 2+ and intact.     Low back: Intermittent tenderness to the lower paraspinal musculature. Mild TTP.  No bony tenderness. No bony step-off,  erythema, swelling.  Patient ambulatory with steady gait.  Good strength to lower extremities.    Pre-Existing Condition(s): Reports MS   Assessment:   Condition Improved    Status: Additional Care Required  Permanent Disability:No    Plan: PTOT    Diagnostics:      Comments:  Follow-up in 2 weeks  Restricted duty  Physical therapy and occupational therapy appointments as scheduled  Recommend continue with OTC Tylenol/ibuprofen, ice/heat application, OTC topical ointments i.e. Tiger balm, Voltaren gel, or Epson salt soaks  Recommend gentle range of motion and strengthening exercises as tolerated  Use of elbow sleeve and compression brace as tolerated    Disability Information   Status: Released to Restricted Duty    From:  9/28/2022  Through: 10/11/2022 Restrictions are: Temporary   Physical Restrictions   Sitting:    Standing:    Stooping:    Bending:  < or = to 2 hrs/day   Squatting:    Walking:    Climbing:    Pushing:      Pulling:    Other:    Reaching Above Shoulder (L):   Reaching Above Shoulder (R):       Reaching Below Shoulder (L):    Reaching Below Shoulder (R):      Not to exceed Weight Limits   Carrying(hrs):   Weight Limit(lb): < or = to 10 pounds Lifting(hrs):   Weight  Limit(lb): < or = to 10 pounds   Comments: Recommend taking breaks as needed no pushing, pulling, or lifting more than 10 pounds left upper extremity only.    Repetitive Actions   Hands: i.e. Fine Manipulations from Grasping:     Feet: i.e. Operating Foot Controls:     Driving / Operate Machinery:     Health Care Provider’s Original or Electronic Signature  BRANDEN Guillaume Health Care Provider’s Original or Electronic Signature    Gaston Zabala MD         Clinic Name / Location: [address],   [address] [address] Clinic Phone Number: Dept: [phone]   Appointment Time: 3:30 Pm Visit Start Time: 3:14 PM   Check-In Time:  3:04 Pm Visit Discharge Time:  4:03pm   Original-Treating  Physician or Chiropractor    Page 2-Insurer/TPA    Page 3-Employer    Page 4-Employee

## 2022-09-28 NOTE — PROGRESS NOTES
"Subjective:     Rosita Bowles is a 31 y.o. 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  There was no specific trauma or injury, but she just developed sudden left elbow pain.  Today patient states symptoms are gradually improving.  She continues to have some discomfort in the elbow and intermittent pain in the low back.  She has attended occupational therapy and physical therapy for initial visits.  She states that the TENS unit and the steroid patches have been helpful.  She also purchased a brace for tennis elbow and this has been very helpful for her symptoms.  She has been doing exercises and massaging with some relief of symptoms.  She has not been back to work since her last visit.  She feels she would like to try with the light duty restrictions in place.  Plan of care discussed with patient.    Review of Systems   Constitutional: Negative.    Respiratory: Negative.     Cardiovascular: Negative.    Musculoskeletal:  Positive for back pain, joint pain and myalgias.   Skin: Negative.    Neurological:  Negative for tingling, sensory change and weakness.   Psychiatric/Behavioral: Negative.       [employer]: Works as [employer] at [employer]  FH: No pertinent family history to this problem       Objective:     /62   Pulse (!) 101   Temp 36.9 °C (98.4 °F) (Temporal)   Resp 14   Ht 1.778 m (5' 10\")   LMP 09/04/2022 (Exact Date)   SpO2 96%   BMI 20.81 kg/m²     Constitutional: Patient is in no acute distress. Appears well-developed and well-nourished.   Cardiovascular: Normal rate.    Pulmonary/Chest: Effort normal. No respiratory distress.   Neurological: Patient is alert and oriented to person, place, and time.   Skin: Skin is warm and dry.   Psychiatric: Normal mood and affect. Behavior is normal.     Left elbow: No gross deformity or discoloration " noted.  Mild TTP noted to the lateral epicondyle.  No soft tissue swelling noted.  Distal strength and sensation intact.  Pulses 2+ and intact.     Low back: Intermittent tenderness to the lower paraspinal musculature. Mild TTP.  No bony tenderness. No bony step-off, erythema, swelling.  Patient ambulatory with steady gait.  Good strength to lower extremities.     Assessment/Plan:       1. Left elbow tendonitis    2. Bilateral low back pain, unspecified chronicity, unspecified whether sciatica present    Released to Restricted Duty FROM 9/28/2022 TO 10/11/2022  Recommend taking breaks as needed no pushing, pulling, or lifting more than 10 pounds left upper extremity only.    Follow-up in 2 weeks  Restricted duty  Physical therapy and occupational therapy appointments as scheduled  Recommend continue with OTC Tylenol/ibuprofen, ice/heat application, OTC topical ointments i.e. Tiger balm, Voltaren gel, or Epson salt soaks  Recommend gentle range of motion and strengthening exercises as tolerated  Use of elbow sleeve and compression brace as tolerated    Differential diagnosis, natural history, supportive care, and indications for immediate follow-up discussed.    Approximately 25 minutes was spent in preparing for visit, obtaining history, exam and evaluation, patient counseling/education and post visit documentation/orders.

## 2022-10-04 ENCOUNTER — OFFICE VISIT (OUTPATIENT)
Dept: NEUROLOGY | Facility: MEDICAL CENTER | Age: 32
End: 2022-10-04
Attending: PSYCHIATRY & NEUROLOGY
Payer: COMMERCIAL

## 2022-10-04 VITALS
HEIGHT: 70 IN | HEART RATE: 82 BPM | OXYGEN SATURATION: 97 % | RESPIRATION RATE: 15 BRPM | WEIGHT: 153.88 LBS | SYSTOLIC BLOOD PRESSURE: 102 MMHG | TEMPERATURE: 98.3 F | DIASTOLIC BLOOD PRESSURE: 64 MMHG | BODY MASS INDEX: 22.03 KG/M2

## 2022-10-04 DIAGNOSIS — G35 MULTIPLE SCLEROSIS (HCC): ICD-10-CM

## 2022-10-04 PROCEDURE — 99212 OFFICE O/P EST SF 10 MIN: CPT | Performed by: PSYCHIATRY & NEUROLOGY

## 2022-10-04 PROCEDURE — 99215 OFFICE O/P EST HI 40 MIN: CPT | Performed by: PSYCHIATRY & NEUROLOGY

## 2022-10-04 ASSESSMENT — ENCOUNTER SYMPTOMS
DOUBLE VISION: 0
BLURRED VISION: 1
TREMORS: 0
SPEECH CHANGE: 0
EYE PAIN: 0
TINGLING: 0
PHOTOPHOBIA: 0
FOCAL WEAKNESS: 0
MEMORY LOSS: 0
HEADACHES: 0
SENSORY CHANGE: 0

## 2022-10-04 ASSESSMENT — FIBROSIS 4 INDEX: FIB4 SCORE: .2622950819672131148

## 2022-10-04 NOTE — PROGRESS NOTES
Subjective     Rosita Bowles is a 31 y.o. female who presents for follow-up, with a history of MS, but who suffer from a recent onset of bilateral visual obscuration suggestive of disease relapse, beginning September 15, 2022.    HPI    Rosita has been doing well, stable on Ocrevus infusions, in fact her next infusion is scheduled for October 24.  Planning for her wedding, stress levels had increased, and she noted a sudden onset of bilateral visual obscuration.  Already with monocular, left eye altitudinal visual loss, this was different.  Involved both eyes, it was simply a blurring of vision without color desaturation or worsening of the left eye visual field deficit.  There was nothing new on the right.  She denied any headache, periorbital pain, conjunctival injection, lacrimation, other cranial nerve pathology, sensory distortion below the neck, Lhermitte phenomena, or focal motor, cerebellar, or bowel or bladder dysfunction.  Cognition has been intact, she denies a sudden worsening of fatigue.    She had not been started on new medications, had not received any recent vaccinations nor has she suffered from recent flulike illnesses.  Appetite and weight had otherwise been stable.  Other than the stress in her life, life is actually pretty good.  She contacted the office, I ordered MRI of the brain, neck and also the orbits, she has yet to have these done.    She states the symptoms have probably hit a kelly, things beginning to improve slowly over the last week or more.    Imaging studies of the brain and neck from January of this year were stable when compared to those of the year before.  She has a singular lesion on the cervical spine, postoperative changes from her decompression and stabilization were unchanged.  Brain imaging still has a left hemispheric predominance including atrophy.    Medical, surgical and family histories are reviewed, there are no new drug allergies.  She is still on her  "Wellbutrin XL, Sinequan, albuterol inhaler, melatonin, Mirena, ferrous sulfate, vitamin B12, vitamin D and biotin.  Her Concerta was discontinued, Ocrevus infusion scheduled later this month.    Review of Systems   Constitutional:  Negative for malaise/fatigue.   Eyes:  Positive for blurred vision. Negative for double vision, photophobia and pain.   Neurological:  Negative for tingling, tremors, sensory change, speech change, focal weakness and headaches.   Psychiatric/Behavioral:  Negative for memory loss.    All other systems reviewed and are negative.    Objective     /64 (BP Location: Right arm, Patient Position: Sitting, BP Cuff Size: Adult)   Pulse 82   Temp 36.8 °C (98.3 °F) (Temporal)   Resp 15   Ht 1.778 m (5' 10\")   Wt 69.8 kg (153 lb 14.1 oz)   LMP 09/04/2022 (Exact Date)   SpO2 97%   BMI 22.08 kg/m²      Physical Exam    She appears in no acute distress.  Vital signs are stable.  There is no malar rash or jaw claudication.  There is no temporal or jaw tenderness.  The neck is supple, range of motion is full, Lhermitte's phenomena is absent.  Cardiac evaluation reveals a regular rhythm.  There is no evidence of rash, joint swelling or tenderness, there is no bruising.     Neurological Exam    Fully oriented, there is no aphasia, apraxia, or inattention.    PERRLA/EOMI, visual field testing OS still reveals the inferior altitudinal visual loss, visual fields are full OD, all in finger counting.  I could not visualize the fundus well on either side.  Facial movements are symmetric, there is no sensory loss to temperature or pinprick on either side of the face.  The tongue and uvula are midline, there is no bulbar dysfunction.  Jaw jerk is absent.  Shoulder shrug and head rotation are normal.    Musculoskeletal exam reveals normal tone without tremor, asterixis, or drift.  Strength is intact in all 4 extremities.  Reflexes are brisk throughout, the left leg still slightly brisker than right.  " Both toes are downgoing.    She stands easily, the left leg may drag slightly, but heel, toe, and tandem walking are all normal.  Armswing is symmetric.  Repetitive movements in all 4 extremities are still symmetric with maintained amplitude and frequencies.  There is no appendicular dystaxia with any of the extremities.    Sensory exam is intact to temperature, vibration and pinprick throughout.  There is no spinal cord sensory level.    Assessment & Plan     1. Multiple sclerosis (HCC)  Unfortunately, it sounds as if her disease may have activated related to the elevated stress level surrounding her upcoming marriage.  Subjectively she states things are plateauing, she seems to have hit a kelly and subjectively is on the mend.  MRI imaging of the brain, orbits and cervical spine all need to be obtained.  Her examination today is otherwise unchanged, but if the MRI shows active disease, either consistent with optic neuritis or elsewhere asymptomatically, then IV methylprednisolone will be ordered.  She has had a rough time tolerating the drug because of weakness, but hopefully the IV treatments can be performed quickly and before her wedding.  Still she understands the need regardless of her matrimonial status.  We will contact her with test results as they come in, if things are quiescent on imaging, we may be able to get away with simple observation.  I am of the inclination that IV steroids should be used since she has already lost vision partially in the left eye, and she is truly dependent with vision on the right.  We will follow-up otherwise in 2-3 months.    - MR-ORBITS,FACE,NECK-WITH&W/O & SEQUENCES; Future    Time: 40 minutes in total spent on patient care including precharting, record review, discussion with healthcare staff and documentation.  This includes face-to-face time for exam, review, discussion, as well as counseling and coordinating care.

## 2022-10-11 ENCOUNTER — OCCUPATIONAL MEDICINE (OUTPATIENT)
Dept: OCCUPATIONAL MEDICINE | Facility: CLINIC | Age: 32
End: 2022-10-11
Payer: COMMERCIAL

## 2022-10-11 VITALS
HEIGHT: 70 IN | DIASTOLIC BLOOD PRESSURE: 56 MMHG | HEART RATE: 88 BPM | OXYGEN SATURATION: 100 % | TEMPERATURE: 97.6 F | WEIGHT: 151.6 LBS | BODY MASS INDEX: 21.7 KG/M2 | RESPIRATION RATE: 16 BRPM | SYSTOLIC BLOOD PRESSURE: 118 MMHG

## 2022-10-11 DIAGNOSIS — M77.8 LEFT ELBOW TENDONITIS: ICD-10-CM

## 2022-10-11 DIAGNOSIS — M54.50 BILATERAL LOW BACK PAIN, UNSPECIFIED CHRONICITY, UNSPECIFIED WHETHER SCIATICA PRESENT: ICD-10-CM

## 2022-10-11 PROCEDURE — 99213 OFFICE O/P EST LOW 20 MIN: CPT | Performed by: NURSE PRACTITIONER

## 2022-10-11 ASSESSMENT — ENCOUNTER SYMPTOMS
WEAKNESS: 1
CARDIOVASCULAR NEGATIVE: 1
RESPIRATORY NEGATIVE: 1
MYALGIAS: 1
PSYCHIATRIC NEGATIVE: 1
CONSTITUTIONAL NEGATIVE: 1
TINGLING: 0
BACK PAIN: 1

## 2022-10-11 ASSESSMENT — FIBROSIS 4 INDEX: FIB4 SCORE: .2622950819672131148

## 2022-10-11 NOTE — LETTER
[address],   [address] [place] [address]  Phone:  [phone] - Fax:  [phone]   Occupational Health [facility] Progress Report and Disability Certification  Date of Service: 10/11/2022   No Show:  No  Date / Time of Next Visit: 11/2/2022 @730am   Claim Information   Patient Name: Rosita Bowles  Claim Number:     Employer: [employer]  Date of Injury: 8/29/2022     Insurer / TPA: Workers Choice  ID / SSN:     Occupation: [employer]  Diagnosis: Diagnoses of Left elbow tendonitis and Bilateral low back pain, unspecified chronicity, unspecified whether sciatica present were pertinent to this visit.    Medical Information   Related to Industrial Injury? 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  There was no specific trauma or injury, but she just developed sudden left elbow pain.  Today feels like symptoms are worse especially in elbow.   Continued intermittent pain in the low back, did have a bad day.  She feels like occupational therapy and physical therapy fare helping. She states that the TENS unit is very helpful especially for the low back.   She noticed a reaction from using [employer] brand tiger balm. Continued use of elbow brace. Continue with light duty restrictions.  Plan of care discussed with patient.   Objective Findings: Left elbow: No gross deformity or discoloration noted.  Mild TTP noted to the lateral epicondyle.  No soft tissue swelling noted.  Distal strength and sensation intact.  Pulses 2+ and intact.     Low back: Intermittent tenderness to the lower paraspinal musculature. Mild TTP.  No bony tenderness. No bony step-off, erythema, swelling.  Patient ambulatory with steady gait.  Good strength to lower extremities.       Pre-Existing Condition(s): MS being treated by provider    Assessment:   Condition  Worsened    Status: Additional Care Required  Permanent Disability:No    Plan: PTOT    Diagnostics:      Comments:  Follow-up in 3 weeks  Restricted duty  Physical therapy and occupational therapy appointments as scheduled  Recommend continue with OTC Tylenol/ibuprofen, ice/heat application, OTC topical ointments i.e. Tiger balm, Voltaren gel, or Epson salt soaks  Recommend gentle range of motion and strengthening exercises as tolerated  Use of elbow sleeve and compression brace as tolerated    Disability Information   Status: Released to Restricted Duty    From:  10/11/2022  Through: 11/2/2022 Restrictions are: Temporary   Physical Restrictions   Sitting:    Standing:    Stooping:    Bending:  < or = to 2 hrs/day   Squatting:    Walking:    Climbing:    Pushing:      Pulling:    Other:    Reaching Above Shoulder (L):   Reaching Above Shoulder (R):       Reaching Below Shoulder (L):    Reaching Below Shoulder (R):      Not to exceed Weight Limits   Carrying(hrs):   Weight Limit(lb): < or = to 10 pounds Lifting(hrs):   Weight  Limit(lb): < or = to 10 pounds   Comments: Recommend taking breaks as needed no pushing, pulling, or lifting more than 10 pounds left upper extremity only.    Repetitive Actions   Hands: i.e. Fine Manipulations from Grasping:     Feet: i.e. Operating Foot Controls:     Driving / Operate Machinery:     Health Care Provider’s Original or Electronic Signature  BRANDEN Guillaume Health Care Provider’s Original or Electronic Signature    Gaston Zabala MD         Clinic Name / Location: [address],   [address] [place] [address] Clinic Phone Number: Dept: [phone]   Appointment Time: 10:45 Am Visit Start Time: 10:20 AM   Check-In Time:  10:14 Am Visit Discharge Time:  1043am   Original-Treating Physician or Chiropractor    Page 2-Insurer/TPA    Page 3-Employer    Page 4-Employee

## 2022-10-11 NOTE — PROGRESS NOTES
"Subjective:     Rosita Bowles is a 31 y.o. 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  There was no specific trauma or injury, but she just developed sudden left elbow pain.  Today feels like symptoms are worse especially in elbow.   Continued intermittent pain in the low back, did have a bad day.  She feels like occupational therapy and physical therapy fare helping. She states that the TENS unit is very helpful especially for the low back.   She noticed a reaction from using [employer] brand tiger balm. Continued use of elbow brace. Continue with light duty restrictions.  Plan of care discussed with patient.    Review of Systems   Constitutional: Negative.    Respiratory: Negative.     Cardiovascular: Negative.    Musculoskeletal:  Positive for back pain, joint pain and myalgias.   Skin: Negative.    Neurological:  Positive for weakness. Negative for tingling.   Psychiatric/Behavioral: Negative.       [employer]: Works as [employer] at [employer]  FH: No pertinent family history to this problem       Objective:     /56 (BP Location: Right arm, Patient Position: Sitting, BP Cuff Size: Large adult)   Pulse 88   Temp 36.4 °C (97.6 °F) (Temporal)   Resp 16   Ht 1.778 m (5' 10\")   Wt 68.8 kg (151 lb 9.6 oz)   SpO2 100%   BMI 21.75 kg/m²     Constitutional: Patient is in no acute distress. Appears well-developed and well-nourished.   Cardiovascular: Normal rate.    Pulmonary/Chest: Effort normal. No respiratory distress.   Neurological: Patient is alert and oriented to person, place, and time.   Skin: Skin is warm and dry.   Psychiatric: Normal mood and affect. Behavior is normal.     Left elbow: No gross deformity or discoloration noted.  Mild TTP noted to the lateral epicondyle.  No soft tissue swelling noted.  Distal strength " and sensation intact.  Pulses 2+ and intact.     Low back: Intermittent tenderness to the lower paraspinal musculature. Mild TTP.  No bony tenderness. No bony step-off, erythema, swelling.  Patient ambulatory with steady gait.  Good strength to lower extremities.        Assessment/Plan:       1. Left elbow tendonitis    2. Bilateral low back pain, unspecified chronicity, unspecified whether sciatica present    Released to Restricted Duty FROM 10/11/2022 TO 11/2/2022  Recommend taking breaks as needed no pushing, pulling, or lifting more than 10 pounds left upper extremity only.    Follow-up in 3 weeks  Restricted duty  Physical therapy and occupational therapy appointments as scheduled  Recommend continue with OTC Tylenol/ibuprofen, ice/heat application, OTC topical ointments i.e. Tiger balm, Voltaren gel, or Epson salt soaks  Recommend gentle range of motion and strengthening exercises as tolerated  Use of elbow sleeve and compression brace as tolerated    Differential diagnosis, natural history, supportive care, and indications for immediate follow-up discussed.    Approximately 25 minutes was spent in preparing for visit, obtaining history, exam and evaluation, patient counseling/education and post visit documentation/orders.

## 2022-10-13 DIAGNOSIS — B37.31 YEAST VAGINITIS: ICD-10-CM

## 2022-10-13 RX ORDER — FLUCONAZOLE 150 MG/1
150 TABLET ORAL DAILY
Qty: 2 TABLET | Refills: 0 | Status: SHIPPED | OUTPATIENT
Start: 2022-10-13

## 2022-10-25 ENCOUNTER — OUTPATIENT INFUSION SERVICES (OUTPATIENT)
Dept: ONCOLOGY | Facility: MEDICAL CENTER | Age: 32
End: 2022-10-25
Attending: PSYCHIATRY & NEUROLOGY
Payer: COMMERCIAL

## 2022-10-25 VITALS
TEMPERATURE: 97.5 F | DIASTOLIC BLOOD PRESSURE: 67 MMHG | WEIGHT: 152.12 LBS | RESPIRATION RATE: 18 BRPM | HEART RATE: 84 BPM | SYSTOLIC BLOOD PRESSURE: 112 MMHG | OXYGEN SATURATION: 95 % | BODY MASS INDEX: 21.78 KG/M2 | HEIGHT: 70 IN

## 2022-10-25 DIAGNOSIS — G35 MULTIPLE SCLEROSIS (HCC): ICD-10-CM

## 2022-10-25 PROCEDURE — 700105 HCHG RX REV CODE 258: Performed by: PSYCHIATRY & NEUROLOGY

## 2022-10-25 PROCEDURE — A9270 NON-COVERED ITEM OR SERVICE: HCPCS | Performed by: PSYCHIATRY & NEUROLOGY

## 2022-10-25 PROCEDURE — 96415 CHEMO IV INFUSION ADDL HR: CPT

## 2022-10-25 PROCEDURE — 96375 TX/PRO/DX INJ NEW DRUG ADDON: CPT

## 2022-10-25 PROCEDURE — 700111 HCHG RX REV CODE 636 W/ 250 OVERRIDE (IP): Performed by: PSYCHIATRY & NEUROLOGY

## 2022-10-25 PROCEDURE — 700102 HCHG RX REV CODE 250 W/ 637 OVERRIDE(OP): Performed by: PSYCHIATRY & NEUROLOGY

## 2022-10-25 PROCEDURE — 96413 CHEMO IV INFUSION 1 HR: CPT

## 2022-10-25 RX ORDER — METHYLPREDNISOLONE SODIUM SUCCINATE 125 MG/2ML
100 INJECTION, POWDER, LYOPHILIZED, FOR SOLUTION INTRAMUSCULAR; INTRAVENOUS ONCE
Status: CANCELLED | OUTPATIENT
Start: 2023-04-02

## 2022-10-25 RX ORDER — ACETAMINOPHEN 325 MG/1
650 TABLET ORAL ONCE
Status: COMPLETED | OUTPATIENT
Start: 2022-10-25 | End: 2022-10-25

## 2022-10-25 RX ORDER — DIPHENHYDRAMINE HYDROCHLORIDE 50 MG/ML
25 INJECTION INTRAMUSCULAR; INTRAVENOUS PRN
Status: CANCELLED | OUTPATIENT
Start: 2023-04-02

## 2022-10-25 RX ORDER — METHYLPREDNISOLONE SODIUM SUCCINATE 125 MG/2ML
100 INJECTION, POWDER, LYOPHILIZED, FOR SOLUTION INTRAMUSCULAR; INTRAVENOUS ONCE
Status: COMPLETED | OUTPATIENT
Start: 2022-10-25 | End: 2022-10-25

## 2022-10-25 RX ORDER — SODIUM CHLORIDE 9 MG/ML
INJECTION, SOLUTION INTRAVENOUS CONTINUOUS
Status: CANCELLED | OUTPATIENT
Start: 2023-04-02

## 2022-10-25 RX ORDER — ACETAMINOPHEN 325 MG/1
650 TABLET ORAL ONCE
Status: CANCELLED | OUTPATIENT
Start: 2023-04-02

## 2022-10-25 RX ADMIN — DIPHENHYDRAMINE HYDROCHLORIDE 25 MG: 50 INJECTION, SOLUTION INTRAMUSCULAR; INTRAVENOUS at 07:42

## 2022-10-25 RX ADMIN — METHYLPREDNISOLONE SODIUM SUCCINATE 100 MG: 125 INJECTION, POWDER, FOR SOLUTION INTRAMUSCULAR; INTRAVENOUS at 07:37

## 2022-10-25 RX ADMIN — ACETAMINOPHEN 650 MG: 325 TABLET ORAL at 07:37

## 2022-10-25 RX ADMIN — OCRELIZUMAB 600 MG: 300 INJECTION INTRAVENOUS at 08:06

## 2022-10-25 ASSESSMENT — FIBROSIS 4 INDEX: FIB4 SCORE: .2622950819672131148

## 2022-10-25 ASSESSMENT — PAIN DESCRIPTION - PAIN TYPE: TYPE: ACUTE PAIN

## 2022-10-25 NOTE — PROGRESS NOTES
Pt arrived ambulatory to hospitals for q 6 month Ocrevus.  Pt denies current acute illness/ infections.  PIV placed, brisk blood return observed.  Pre-medications administered per MAR.  Ocrevus administered at previous rate per patient preference, followed by 1 hour observation.  PIV flushed and removed, gauze and coban to site.  Pt discharged in NAD, next appointment scheduled and confirmed.

## 2022-10-31 ENCOUNTER — HOSPITAL ENCOUNTER (OUTPATIENT)
Dept: RADIOLOGY | Facility: MEDICAL CENTER | Age: 32
End: 2022-10-31
Attending: PSYCHIATRY & NEUROLOGY
Payer: COMMERCIAL

## 2022-10-31 DIAGNOSIS — G35 MULTIPLE SCLEROSIS (HCC): ICD-10-CM

## 2022-10-31 PROCEDURE — 700117 HCHG RX CONTRAST REV CODE 255: Performed by: PSYCHIATRY & NEUROLOGY

## 2022-10-31 PROCEDURE — 70543 MRI ORBT/FAC/NCK W/O &W/DYE: CPT

## 2022-10-31 PROCEDURE — 70553 MRI BRAIN STEM W/O & W/DYE: CPT

## 2022-10-31 PROCEDURE — A9576 INJ PROHANCE MULTIPACK: HCPCS | Performed by: PSYCHIATRY & NEUROLOGY

## 2022-10-31 RX ADMIN — GADOTERIDOL 15 ML: 279.3 INJECTION, SOLUTION INTRAVENOUS at 13:43

## 2022-11-02 ENCOUNTER — OCCUPATIONAL MEDICINE (OUTPATIENT)
Dept: OCCUPATIONAL MEDICINE | Facility: CLINIC | Age: 32
End: 2022-11-02
Payer: COMMERCIAL

## 2022-11-02 VITALS
TEMPERATURE: 98.6 F | HEART RATE: 89 BPM | RESPIRATION RATE: 16 BRPM | HEIGHT: 70 IN | WEIGHT: 151 LBS | OXYGEN SATURATION: 97 % | DIASTOLIC BLOOD PRESSURE: 86 MMHG | SYSTOLIC BLOOD PRESSURE: 124 MMHG | BODY MASS INDEX: 21.62 KG/M2

## 2022-11-02 DIAGNOSIS — M77.8 LEFT ELBOW TENDONITIS: ICD-10-CM

## 2022-11-02 DIAGNOSIS — M54.50 BILATERAL LOW BACK PAIN, UNSPECIFIED CHRONICITY, UNSPECIFIED WHETHER SCIATICA PRESENT: ICD-10-CM

## 2022-11-02 PROCEDURE — 99213 OFFICE O/P EST LOW 20 MIN: CPT | Performed by: NURSE PRACTITIONER

## 2022-11-02 ASSESSMENT — ENCOUNTER SYMPTOMS
RESPIRATORY NEGATIVE: 1
TINGLING: 0
MYALGIAS: 1
WEAKNESS: 1
PSYCHIATRIC NEGATIVE: 1
CONSTITUTIONAL NEGATIVE: 1
CARDIOVASCULAR NEGATIVE: 1
BACK PAIN: 1
SENSORY CHANGE: 0

## 2022-11-02 ASSESSMENT — FIBROSIS 4 INDEX: FIB4 SCORE: .2622950819672131148

## 2022-11-02 NOTE — PROGRESS NOTES
"Subjective:     Rosita Bowles is a 31 y.o. 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  There was no specific trauma or injury, but she just developed sudden left elbow pain.  Today feels like symptoms are not improving, increased swelling in the elbow.   Continued intermittent pain in the low back.  She feels like occupational therapy and physical therapy are helping, recommend cortisone injection. She states that the TENS unit is very helpful especially for the low back.   Continued use of elbow brace. Continue with light duty restrictions.  Plan of care discussed with patient.    Review of Systems   Constitutional: Negative.    Respiratory: Negative.     Cardiovascular: Negative.    Musculoskeletal:  Positive for back pain, joint pain and myalgias.   Skin:         Swelling left elbow   Neurological:  Positive for weakness. Negative for tingling and sensory change.   Psychiatric/Behavioral: Negative.         [employer]: Works as Radiology Tech at [employer]  FH: No pertinent family history to this problem       Objective:     /86   Pulse 89   Temp 37 °C (98.6 °F) (Temporal)   Resp 16   Ht 1.778 m (5' 10\")   Wt 68.5 kg (151 lb)   SpO2 97%   BMI 21.67 kg/m²     Constitutional: Patient is in no acute distress. Appears well-developed and well-nourished.   Cardiovascular: Normal rate.    Pulmonary/Chest: Effort normal. No respiratory distress.   Neurological: Patient is alert and oriented to person, place, and time.   Skin: Skin is warm and dry.   Psychiatric: Normal mood and affect. Behavior is normal.     Left elbow: No gross deformity or discoloration noted.  Mild TTP noted to the lateral epicondyle. Soft tissue swelling noted.  Distal strength and sensation intact.  Pulses 2+ and intact.     Low back: Intermittent tenderness to " the lower paraspinal musculature. Mild TTP.  No bony tenderness. No bony step-off, erythema, swelling.  Patient ambulatory with steady gait.  Good strength to lower extremities.     Assessment/Plan:       1. Left elbow tendonitis  - Referral to Pain Clinic    2. Bilateral low back pain, unspecified chronicity, unspecified whether sciatica present  - Referral to Pain Clinic    Released to Restricted Duty FROM 11/2/2022 TO 11/30/2022  Recommend taking breaks as needed no pushing, pulling, or lifting more than 10 pounds left upper extremity only.    Follow-up in 3 weeks, unless seen by Physiatry  Restricted duty, per Physiatry   Physiatry referral placed, transfer care   Physical therapy and occupational therapy appointments as scheduled  Recommend continue with OTC Tylenol/ibuprofen, ice/heat application, OTC topical ointments i.e. Tiger balm, Voltaren gel, or Epson salt soaks  Recommend gentle range of motion and strengthening exercises as tolerated  Use of elbow sleeve and compression brace as tolerated    Differential diagnosis, natural history, supportive care, and indications for immediate follow-up discussed.    Approximately 25 minutes was spent in preparing for visit, obtaining history, exam and evaluation, patient counseling/education and post visit documentation/orders.

## 2022-11-02 NOTE — LETTER
[facility] [place]  [address],   [address] [address]  Phone:  [phone] - Fax:  [phone]   Occupational Health [facility] Progress Report and Disability Certification  Date of Service: 11/2/2022   No Show:  No  Date / Time of Next Visit: 11/30/2022   Claim Information   Patient Name: Rosita Bowles  Claim Number:     Employer: [employer]  Date of Injury: 8/29/2022     Insurer / TPA: Workers Choice  ID / SSN:     Occupation: [employer]  Diagnosis: Diagnoses of Left elbow tendonitis and Bilateral low back pain, unspecified chronicity, unspecified whether sciatica present were pertinent to this visit.    Medical Information   Related to Industrial Injury? 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  There was no specific trauma or injury, but she just developed sudden left elbow pain.  Today feels like symptoms are not improving, increased swelling in the elbow.   Continued intermittent pain in the low back.  She feels like occupational therapy and physical therapy are helping, recommend cortisone injection. She states that the TENS unit is very helpful especially for the low back.   Continued use of elbow brace. Continue with light duty restrictions.  Plan of care discussed with patient.   Objective Findings: Left elbow: No gross deformity or discoloration noted.  Mild TTP noted to the lateral epicondyle. Soft tissue swelling noted.  Distal strength and sensation intact.  Pulses 2+ and intact.     Low back: Intermittent tenderness to the lower paraspinal musculature. Mild TTP.  No bony tenderness. No bony step-off, erythema, swelling.  Patient ambulatory with steady gait.  Good strength to lower extremities.    Pre-Existing Condition(s):     Assessment:   Condition Same    Status: Discharged / Care Transfer  Permanent Disability:No    Plan:  PTOTTransfer Care    Diagnostics:      Comments:  Follow-up in 3 weeks, unless seen by Physiatry  Restricted duty, per Physiatry   Physiatry referral placed, transfer care   Physical therapy and occupational therapy appointments as scheduled  Recommend continue with OTC Tylenol/ibuprofen, ice/heat application, OTC topical ointments i.e. Tiger balm, Voltaren gel, or Epson salt soaks  Recommend gentle range of motion and strengthening exercises as tolerated  Use of elbow sleeve and compression brace as tolerated    Disability Information   Status: Released to Restricted Duty    From:  11/2/2022  Through: 11/30/2022 Restrictions are: Temporary   Physical Restrictions   Sitting:    Standing:    Stooping:    Bending:  < or = to 2 hrs/day   Squatting:    Walking:    Climbing:    Pushing:      Pulling:    Other:    Reaching Above Shoulder (L):   Reaching Above Shoulder (R):       Reaching Below Shoulder (L):    Reaching Below Shoulder (R):      Not to exceed Weight Limits   Carrying(hrs):   Weight Limit(lb): < or = to 10 pounds Lifting(hrs):   Weight  Limit(lb): < or = to 10 pounds   Comments: Recommend taking breaks as needed no pushing, pulling, or lifting more than 10 pounds left upper extremity only.    Repetitive Actions   Hands: i.e. Fine Manipulations from Grasping:     Feet: i.e. Operating Foot Controls:     Driving / Operate Machinery:     Health Care Provider’s Original or Electronic Signature  BRANDEN Guillaume Health Care Provider’s Original or Electronic Signature    Gaston Zabala MD         Clinic Name / Location: [address],   [address] [address] Clinic Phone Number: Dept: [phone]   Appointment Time: 7:30 Am Visit Start Time: 7:36 AM   Check-In Time:  7:31 Am Visit Discharge Time:  7:59am   Original-Treating Physician or Chiropractor    Page 2-Insurer/TPA    Page 3-Employer    Page 4-Employee

## 2022-11-10 ENCOUNTER — OFFICE VISIT (OUTPATIENT)
Dept: MEDICAL GROUP | Facility: LAB | Age: 32
End: 2022-11-10
Payer: COMMERCIAL

## 2022-11-10 VITALS
SYSTOLIC BLOOD PRESSURE: 130 MMHG | RESPIRATION RATE: 16 BRPM | OXYGEN SATURATION: 96 % | TEMPERATURE: 96 F | HEART RATE: 93 BPM | BODY MASS INDEX: 22.05 KG/M2 | WEIGHT: 154 LBS | HEIGHT: 70 IN | DIASTOLIC BLOOD PRESSURE: 72 MMHG

## 2022-11-10 DIAGNOSIS — M54.50 LOW BACK PAIN, UNSPECIFIED BACK PAIN LATERALITY, UNSPECIFIED CHRONICITY, UNSPECIFIED WHETHER SCIATICA PRESENT: ICD-10-CM

## 2022-11-10 DIAGNOSIS — R05.3 CHRONIC COUGH: ICD-10-CM

## 2022-11-10 PROCEDURE — 99214 OFFICE O/P EST MOD 30 MIN: CPT | Performed by: FAMILY MEDICINE

## 2022-11-10 RX ORDER — ALBUTEROL SULFATE 90 UG/1
2 AEROSOL, METERED RESPIRATORY (INHALATION) EVERY 4 HOURS PRN
Qty: 1 EACH | Refills: 1 | Status: SHIPPED | OUTPATIENT
Start: 2022-11-10 | End: 2023-05-12

## 2022-11-10 RX ORDER — TIZANIDINE 4 MG/1
4 TABLET ORAL EVERY 6 HOURS PRN
Qty: 30 TABLET | Refills: 1 | Status: SHIPPED | OUTPATIENT
Start: 2022-11-10

## 2022-11-10 RX ORDER — METHYLPREDNISOLONE 4 MG/1
TABLET ORAL
Qty: 21 TABLET | Refills: 0 | Status: SHIPPED | OUTPATIENT
Start: 2022-11-10 | End: 2022-11-23

## 2022-11-10 ASSESSMENT — FIBROSIS 4 INDEX: FIB4 SCORE: .2622950819672131148

## 2022-11-10 NOTE — LETTER
November 10, 2022        Rosita Bowles      To whom it may concern    Patient was seen and evaluated today for back pain, work-up was ordered and medication was provided for the patient.    Recommended to stay off work for 1 week starting from today November 10, 2022.      If any questions or concerns please contact my office                Thank you          Demarco Brian M.D.   Electronically signed

## 2022-11-11 NOTE — PROGRESS NOTES
"Chief Complaint:   Chief Complaint   Patient presents with    Back Pain    Bump     Right eye    Skin Discoloration     \"Spots\"  legs , abdomen, arms     Cough     Dry cough x5 months       HPI:Established patient   Rosita Bowles is a 31 y.o. female who presents for evaluation of the following    1. Low back pain,     Patient with history of chronic back pain, with acute exacerbation for the past 1 week  Reports inability to bend or do her job because of the severe back pain,  Reports a lot of spasming and discomfort in her back  Has been trying NSAIDs over-the-counter but its not helping.  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    2. Chronic cough  Chronic cough continues for the past 6 months, no fever or changes in weight.  No shortness of breath or palpitations.  Denies other upper respite tract symptoms.  Denies heartburn or abdominal pain.          Past medical history, family history, social history and medications reviewed and updated in the record.  Today  Current medications, problem list and allergies reviewed in [facility] today  Health maintenance topics are reviewed and updated.    Patient Active Problem List    Diagnosis Date Noted    Rash 08/22/2022    JOSE (generalized anxiety disorder) 06/17/2021    Establishing care with new doctor, encounter for 03/18/2021    Health care maintenance 03/18/2021    Other viral warts 04/02/2019    Plantar wart 01/09/2019    Cervical disc disorder at C6-C7 level with radiculopathy 07/10/2018    Primary insomnia 10/08/2017    HSV-1 (herpes simplex virus 1) infection 06/23/2017    Sinus pressure 05/03/2017    Multiple sclerosis (HCC) 11/02/2016    Seasonal allergies 05/31/2013    Recurrent major depressive disorder, in partial remission (HCC) " 08/07/2012    ADHD 08/07/2012     Family History   Problem Relation Age of Onset    Thyroid Mother     Arthritis Mother         Rheumatoid arthritis     Psychiatric Illness Mother     Diabetes Father         prediabetes     Heart Disease Father     Thyroid Brother     Psychiatric Illness Maternal Aunt         ADHD    Cancer Maternal Aunt         breast ca      Social History     Socioeconomic History    Marital status:      Spouse name: Not on file    Number of children: Not on file    Years of education: Not on file    Highest education level: Not on file   Occupational History     Comment: [employer] ,    Tobacco Use    Smoking status: Never    Smokeless tobacco: Never   Vaping Use    Vaping Use: Never used   Substance and Sexual Activity    Alcohol use: Yes     Alcohol/week: 0.0 oz     Comment: very occasional    Drug use: No    Sexual activity: Yes     Partners: Male     Birth control/protection: I.U.D.     Comment: OCP   Other Topics Concern     Service No    Blood Transfusions No    Caffeine Concern No    Occupational Exposure No    Hobby Hazards No    Sleep Concern Yes    Stress Concern No    Weight Concern No    Special Diet No    Back Care No    Exercise Yes    Bike Helmet Yes    Seat Belt Yes    Self-Exams Yes   Social History Narrative    2013: BF in [place]. Attends [employer].    2014: was living in [place]. Now back in [place].     2014: working in [employer]. BF broke up with her Sept. No friends in [place].     2015: working 2 jobs. Has BF.      Social Determinants of Health     Financial Resource Strain: Not on file   Food Insecurity: Not on file   Transportation Needs: Not on file   Physical Activity: Not on file   Stress: Not on file   Social Connections: Not on file   Intimate Partner Violence: Not on file   Housing Stability: Not on file       Current Outpatient Medications   Medication Sig Dispense Refill    methylPREDNISolone (MEDROL DOSEPAK) 4 MG Tablet Therapy Pack As directed on the  "packaging label. 21 Tablet 0    albuterol 108 (90 Base) MCG/ACT Aero Soln inhalation aerosol Inhale 2 Puffs every four hours as needed for Shortness of Breath. 1 Each 1    tizanidine (ZANAFLEX) 4 MG Tab Take 1 Tablet by mouth every 6 hours as needed (back pain and muscle spasm). 30 Tablet 1    fluconazole (DIFLUCAN) 150 MG tablet Take 1 Tablet by mouth every day. 2 Tablet 0    doxepin (SINEQUAN) 10 MG Cap TAKE 1-2 TABS AT BEDTIME AS NEEDED FOR SLEEP 180 Capsule 1    buPROPion (WELLBUTRIN XL) 300 MG XL tablet Take 1 Tablet by mouth every morning. 90 Tablet 1    albuterol 108 (90 Base) MCG/ACT Aero Soln inhalation aerosol INHALE 2 PUFFS EVERY FOUR HOURS AS NEEDED FOR SHORTNESS OF BREATH. 6.7 Each 3    ferrous sulfate 325 (65 Fe) MG tablet Take 325 mg by mouth every day.      levonorgestrel (MIRENA, 52 MG,) 52 mg (20 mcg/24 hr) IUD 1 Each by Intrauterine route one time.      ocrelizumab (OCREVUS) 300 MG/10ML Solution injection Infuse  into a venous catheter.      Clindamycin Phos-Benzoyl Perox (ONEXTON) 1.2-3.75 % Gel 1 Application by Apply externally route every morning. To entire face 30 g 3    valacyclovir (VALTREX) 1 GM Tab Take 1 Tab by mouth 2 times a day as needed (cold sores). 180 Tab 3    Biotin 5000 MCG Cap Take 5,000 mcg by mouth every day at 6 PM. supplement      melatonin 3 MG Tab Take 3 mg by mouth 1 time daily as needed (insomnia).      Cyanocobalamin (VITAMIN B-12) 1000 MCG Tab Take 1,000 mcg by mouth every day. supplement      Cholecalciferol (VITAMIN D3) 5000 units Tab Take 5,000 Units by mouth every day. supplement       No current facility-administered medications for this visit.         Review Of Systems  As documented in HPI above  PHYSICAL EXAMINATION:    /72 (BP Location: Left arm, Patient Position: Sitting, BP Cuff Size: Adult)   Pulse 93   Temp (!) 35.6 °C (96 °F) (Temporal)   Resp 16   Ht 1.778 m (5' 10\")   Wt 69.9 kg (154 lb)   SpO2 96%   BMI 22.10 kg/m²   Gen.: Well-developed, " well-nourished, no apparent distress, pleasant and cooperative with the examination  HEENT: Normocephalic/atraumatic,    Neck: No JVD or bruits, no adenopathy  Cor: Regular rate and rhythm without murmur gallop or rub  Lungs: Clear to auscultation with equal breath sounds bilaterally. No wheezes, rhonchi.  Abdomen: Soft nontender without hepatosplenomegaly or masses appreciated, normoactive bowel sounds  Extremities: No cyanosis, clubbing or edema         ASSESSMENT/Plan:  1. Low back pain, unspecified back pain laterality, unspecified chronicity, unspecified whether sciatica present    New concern, acute exacerbation of chronic pain, reviewed MRIs advised steroids and muscle relaxant and follow-up with a back specialist, consider physical therapy.  Note to rest from her job submitted   Is hiding today  No red flags  methylPREDNI has had Solone (MEDROL DOSEPAK) 4 MG Tablet Therapy Pack    tizanidine (ZANAFLEX) 4 MG Tab    Referral to Neurosurgery      2. Chronic cough  Chronic, benign clinical exam, advised to do a chest x-ray for further evaluation.  Use albuterol as directed, differential diagnosis includes allergies.  Unlikely GERD.  DX-CHEST-2 VIEWS    methylPREDNISolone (MEDROL DOSEPAK) 4 MG Tablet Therapy Pack    albuterol 108 (90 Base) MCG/ACT Aero Soln inhalation aerosol    tizanidine (ZANAFLEX) 4 MG Tab         Please note that this dictation was created using voice recognition software. I have worked with consultants from the vendor as well as technical experts from [facility] to optimize the interface. I have made every reasonable attempt to correct obvious errors, but I expect that there are errors of grammar and possibly content that I did not discover before finalizing the note.

## 2022-11-23 ENCOUNTER — HOSPITAL ENCOUNTER (OUTPATIENT)
Dept: RADIOLOGY | Facility: MEDICAL CENTER | Age: 32
End: 2022-11-23
Attending: FAMILY MEDICINE
Payer: COMMERCIAL

## 2022-11-23 ENCOUNTER — OFFICE VISIT (OUTPATIENT)
Dept: NEUROLOGY | Facility: MEDICAL CENTER | Age: 32
End: 2022-11-23
Attending: PSYCHIATRY & NEUROLOGY
Payer: COMMERCIAL

## 2022-11-23 VITALS
BODY MASS INDEX: 21.87 KG/M2 | HEIGHT: 70 IN | TEMPERATURE: 97.3 F | OXYGEN SATURATION: 98 % | WEIGHT: 152.78 LBS | RESPIRATION RATE: 18 BRPM | DIASTOLIC BLOOD PRESSURE: 82 MMHG | SYSTOLIC BLOOD PRESSURE: 118 MMHG | HEART RATE: 98 BPM

## 2022-11-23 DIAGNOSIS — G35 MULTIPLE SCLEROSIS (HCC): ICD-10-CM

## 2022-11-23 DIAGNOSIS — R05.3 CHRONIC COUGH: ICD-10-CM

## 2022-11-23 PROCEDURE — 99215 OFFICE O/P EST HI 40 MIN: CPT | Performed by: PSYCHIATRY & NEUROLOGY

## 2022-11-23 PROCEDURE — 71046 X-RAY EXAM CHEST 2 VIEWS: CPT

## 2022-11-23 PROCEDURE — 99212 OFFICE O/P EST SF 10 MIN: CPT | Performed by: PSYCHIATRY & NEUROLOGY

## 2022-11-23 RX ORDER — METHYLPHENIDATE HYDROCHLORIDE 54 MG/1
54 TABLET ORAL EVERY MORNING
COMMUNITY
Start: 2022-10-21 | End: 2023-02-01 | Stop reason: SDUPTHER

## 2022-11-23 ASSESSMENT — ENCOUNTER SYMPTOMS
MEMORY LOSS: 0
NECK PAIN: 0
FOCAL WEAKNESS: 1

## 2022-11-23 ASSESSMENT — FIBROSIS 4 INDEX: FIB4 SCORE: .2622950819672131148

## 2022-11-24 NOTE — PROGRESS NOTES
Subjective     Rosita Bowles is a 31 y.o. female who presents for follow-up, with a history of MS.     HPI    Seen on October 4, Rosita had been suffering from a change in visual acuity beginning in September.  She describes a nonspecific obscuration without double vision occurring at a time when life has become more stressful, in anticipation of her wedding on October 14, etc.  We had opted out of IV steroids, urgent MRI of the brain and orbits were both done and proved unremarkable without change in burden of disease or evidence of active disease, and normal, respectively.  The visual symptoms themselves return to baseline, she has some obscuration of the inferior altitudinal field OS only.    Cognition has been intact, malaise and fatigue are not an issue for her, she denies slurred speech, new focal motor or sensory distortions elsewhere, balance difficulties, etc.  She denies Lhermitte's phenomena, there are no constrictive sensations across the chest.  She did throw her back out after the wedding on October 14, oral steroids were used with no effect on her symptoms as they had by that time already improved.  Unfortunately it did nothing for her back.    She did receive her Ocrevus infusion on October 24 with little issue.  Immunoglobulin status revealed only a slight reduction in IgG at 739, normal IgA and IgM subtypes.  Remaining portions of the CBC as well as liver profiles both proved unremarkable.    Medical, surgical and family histories are reviewed, there are no new drug allergies.  She has had no recurrence of her neck pain and right upper extremity radicular symptoms.  Her wedding went well, no unusual occurrences, though her father-in-law tore his medial meniscus while dancing with her!  Other than Ocrevus, she is still on her Sinequan, Valtrex, Wellbutrin XL, Diflucan, Zanaflex 4 mg every 6 hours as needed, Concerta, Mirena, ferrous sulfate, vitamin B12, biotin, and vitamin D.    Review of  "Systems   Musculoskeletal:  Negative for neck pain.   Neurological:  Positive for focal weakness.   Psychiatric/Behavioral:  Negative for memory loss.    All other systems reviewed and are negative.    Objective     /82 (BP Location: Right arm, Patient Position: Sitting, BP Cuff Size: Adult)   Pulse 98   Temp 36.3 °C (97.3 °F) (Temporal)   Resp 18   Ht 1.778 m (5' 10\")   Wt 69.3 kg (152 lb 12.5 oz)   SpO2 98%   BMI 21.92 kg/m²      Physical Exam    She appears in no acute distress.  Vital signs are stable.  There is no malar rash or jaw claudication.  The neck is supple, range of motion is constrained because of her fusion.  Cardiac evaluation is unremarkable.     Neurological Exam    Fully oriented, there is no aphasia or inattention.    PERRLA/EOMI, the left eye, inferior altitudinal visual loss to finger counting is unchanged.  Visual fields are full OD.  Facial movements are symmetric, there is no sensory loss to temperature.  The tongue and uvula are midline, there is no bulbar dysfunction.  Shoulder shrug and head rotation are normal.    Musculoskeletal exam reveals normal tone throughout, there is no tremor or drift.  Strength is intact in all 4 extremities.    Reflexes are brisk throughout, the right triceps relatively diminished compared to the left, reflexes are much brisker in the left leg when compared to the right.  Both toes are downgoing.    She stands easily, though she moves slowly and keeps her back straight because of pain.  Heel, toe, and tandem walking are all normal.  Repetitive movements in all 4 extremities are normal and symmetric in amplitude and frequency.  There is no appendicular dystaxia throughout.    Sensory exam is intact to vibration and temperature.  Romberg is absent.    Assessment & Plan     1. Multiple sclerosis (HCC)  She is doing well, but this recent event in September was may have been a stress induced symptom relapse without actual disease activity being " discerned on imaging, though given the duration of symptoms, have to assume it was real.  Imaging was done after her symptoms had already plateaued and improved nearly back to baseline.  Fortunately there was no radiographic evidence of chronic, worsening disease in the brain itself.    She will continue the Ocrevus.  We talked at length about pregnancy, the CD20 suppressor treatments are usually fairly safe in this regard.  We will cross that bridge when we get there.  For now she will continue her usual activities, etc.  She is enjoying the  life.  We can follow-up in 1 year.  I will repeat imaging of the brain prior to that office visit.    Time: 40 minutes in total spent on patient care including per charting, record review, discussion with healthcare staff and documentation.  This includes face-to-face time for exam, review, discussion, as well as counseling and coordinating care.

## 2022-11-30 ENCOUNTER — OCCUPATIONAL MEDICINE (OUTPATIENT)
Dept: OCCUPATIONAL MEDICINE | Facility: CLINIC | Age: 32
End: 2022-11-30
Payer: COMMERCIAL

## 2022-11-30 DIAGNOSIS — M54.50 BILATERAL LOW BACK PAIN, UNSPECIFIED CHRONICITY, UNSPECIFIED WHETHER SCIATICA PRESENT: ICD-10-CM

## 2022-11-30 DIAGNOSIS — M77.8 LEFT ELBOW TENDONITIS: ICD-10-CM

## 2022-11-30 PROCEDURE — 99213 OFFICE O/P EST LOW 20 MIN: CPT | Performed by: NURSE PRACTITIONER

## 2022-11-30 ASSESSMENT — ENCOUNTER SYMPTOMS
CARDIOVASCULAR NEGATIVE: 1
SENSORY CHANGE: 0
BACK PAIN: 1
WEAKNESS: 1
MYALGIAS: 1
PSYCHIATRIC NEGATIVE: 1
TINGLING: 0
RESPIRATORY NEGATIVE: 1
CONSTITUTIONAL NEGATIVE: 1

## 2022-11-30 NOTE — LETTER
[address],   [address] [place] [address]  Phone:  [phone] - Fax:  [phone]   Occupational Health [facility] Progress Report and Disability Certification  Date of Service: 11/30/2022   No Show:  No  Date / Time of Next Visit:  Discharged/Care transfer to Physiatry 12/1/22   Claim Information   Patient Name: Rosita Bowles  Claim Number:     Employer: [employer]  Date of Injury: 8/29/2022     Insurer / TPA: Workers Choice  ID / SSN:     Occupation: [employer]  Diagnosis: Diagnoses of Left elbow tendonitis and Bilateral low back pain, unspecified chronicity, unspecified whether sciatica present were pertinent to this visit.    Medical Information   Related to Industrial Injury? 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  There was no specific trauma or injury, but she just developed sudden left elbow pain.  Today feels like symptoms are not improving with elbow pain and had a flare up of low back pain after last PT session. She was only able to get relief from he low back pain by laying on the floor. She felt like the back locked up.  She feels like occupational therapy and physical therapy are helping, recommend cortisone injection. She states that the TENS unit is very helpful especially for the low back.   Continued use of elbow brace and KT Tape. Continue with light duty restrictions. She is scheduled with physiatry 12/1/22.  Plan of care discussed with patient.      Objective Findings: Left elbow: No gross deformity or discoloration noted.  Mild TTP noted to the lateral epicondyle. Soft tissue swelling noted.  Distal strength and sensation intact.  Pulses 2+ and intact.     Low back: Intermittent tenderness to the lower paraspinal musculature. Mild TTP.  No bony tenderness. No bony step-off, erythema, swelling.  Patient  ambulatory with steady gait.  Good strength to lower extremities.    Pre-Existing Condition(s):     Assessment:   Condition Same    Status: Discharged / Care Transfer  Permanent Disability:No    Plan: Medication (NOT at Work)Transfer CareOT    Diagnostics:      Comments:  Follow-up in 3 weeks, unless seen by Physiatry  Restricted duty, per Physiatry   Physiatry referral placed, transfer care  12/1/22  Physical therapy completed   Occupational therapy appointments as scheduled  Recommend continue with OTC Tylenol/ibuprofen, ice/heat application, OTC topical ointments i.e. Tiger balm, Voltaren gel, or Epson salt soaks  Recommend gentle range of motion and strengthening exercises as tolerated  Use of elbow sleeve and compression brace as tolerated    Disability Information   Status: Released to Restricted Duty    From:  11/30/2022  Through:   Restrictions are: Temporary   Physical Restrictions   Sitting:    Standing:    Stooping:    Bending:  < or = to 2 hrs/day   Squatting:    Walking:    Climbing:    Pushing:      Pulling:    Other:    Reaching Above Shoulder (L):   Reaching Above Shoulder (R):       Reaching Below Shoulder (L):    Reaching Below Shoulder (R):      Not to exceed Weight Limits   Carrying(hrs):   Weight Limit(lb): < or = to 10 pounds Lifting(hrs):   Weight  Limit(lb): < or = to 10 pounds   Comments: Recommend taking breaks as needed no pushing, pulling, or lifting more than 10 pounds left upper extremity only.    Repetitive Actions   Hands: i.e. Fine Manipulations from Grasping:     Feet: i.e. Operating Foot Controls:     Driving / Operate Machinery:     Health Care Provider’s Original or Electronic Signature  BRANDEN Guillaume Health Care Provider’s Original or Electronic Signature    Kevin Broussard DO MPH     Clinic Name / Location: [address],   [address]  [place] [address] Clinic Phone Number: Dept: [phone]   Appointment Time: 4:30 Pm Visit Start  Time: 4:35 PM   Check-In Time:  4:32 Pm Visit Discharge Time:  4:56PM   Original-Treating Physician or Chiropractor    Page 2-Insurer/TPA    Page 3-Employer    Page 4-Employee

## 2022-12-01 NOTE — PROGRESS NOTES
Subjective:     Rosita Bowles is a 31 y.o. 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  There was no specific trauma or injury, but she just developed sudden left elbow pain.  Today feels like symptoms are not improving with elbow pain and had a flare up of low back pain after last PT session. She was only able to get relief from he low back pain by laying on the floor. She felt like the back locked up.  She feels like occupational therapy and physical therapy are helping, recommend cortisone injection. She states that the TENS unit is very helpful especially for the low back.   Continued use of elbow brace and KT Tape. Continue with light duty restrictions. She is scheduled with physiatry 12/1/22.  Plan of care discussed with patient.       Review of Systems   Constitutional: Negative.    Respiratory: Negative.     Cardiovascular: Negative.    Musculoskeletal:  Positive for back pain, joint pain and myalgias.   Skin: Negative.    Neurological:  Positive for weakness. Negative for tingling and sensory change.   Psychiatric/Behavioral: Negative.       SOCHX: Works as a twtrland  at [employer]  FH: No pertinent family history to this problem.       Objective:     There were no vitals taken for this visit.    Constitutional: Patient is in no acute distress. Appears well-developed and well-nourished.   Cardiovascular: Normal rate.    Pulmonary/Chest: Effort normal. No respiratory distress.   Neurological: Patient is alert and oriented to person, place, and time.   Skin: Skin is warm and dry.   Psychiatric: Normal mood and affect. Behavior is normal.     Left elbow: No gross deformity or discoloration noted.  Mild TTP noted to the lateral epicondyle. Soft tissue swelling noted.  Distal strength and sensation intact.  Pulses 2+ and intact.      Low back: Intermittent tenderness to the lower paraspinal musculature. Mild TTP.  No bony tenderness. No bony step-off, erythema, swelling.  Patient ambulatory with steady gait.  Good strength to lower extremities.     Assessment/Plan:       1. Left elbow tendonitis    2. Bilateral low back pain, unspecified chronicity, unspecified whether sciatica present    Released to Restricted Duty FROM 11/30/2022 TO    Recommend taking breaks as needed no pushing, pulling, or lifting more than 10 pounds left upper extremity only.    Follow-up in 3 weeks, unless seen by Physiatry  Restricted duty, per Physiatry   Physiatry referral placed, transfer care  12/1/22  Physical therapy completed   Occupational therapy appointments as scheduled  Recommend continue with OTC Tylenol/ibuprofen, ice/heat application, OTC topical ointments i.e. Tiger balm, Voltaren gel, or Epson salt soaks  Recommend gentle range of motion and strengthening exercises as tolerated  Use of elbow sleeve and compression brace as tolerated    Differential diagnosis, natural history, supportive care, and indications for immediate follow-up discussed.    Approximately 25 minutes was spent in preparing for visit, obtaining history, exam and evaluation, patient counseling/education and post visit documentation/orders.

## 2022-12-08 DIAGNOSIS — B37.31 YEAST VAGINITIS: ICD-10-CM

## 2022-12-08 RX ORDER — FLUCONAZOLE 150 MG/1
150 TABLET ORAL DAILY
Qty: 1 TABLET | Refills: 0 | Status: SHIPPED | OUTPATIENT
Start: 2022-12-08 | End: 2023-05-22 | Stop reason: SDUPTHER

## 2023-01-24 ENCOUNTER — HOSPITAL ENCOUNTER (OUTPATIENT)
Dept: RADIOLOGY | Facility: MEDICAL CENTER | Age: 33
End: 2023-01-24
Attending: PHYSICIAN ASSISTANT
Payer: COMMERCIAL

## 2023-01-24 DIAGNOSIS — M51.36 DDD (DEGENERATIVE DISC DISEASE), LUMBAR: ICD-10-CM

## 2023-01-24 DIAGNOSIS — G89.29 CHRONIC BILATERAL LOW BACK PAIN WITHOUT SCIATICA: ICD-10-CM

## 2023-01-24 DIAGNOSIS — M54.50 CHRONIC BILATERAL LOW BACK PAIN WITHOUT SCIATICA: ICD-10-CM

## 2023-01-24 DIAGNOSIS — M48.061 SPINAL STENOSIS OF LUMBAR REGION WITHOUT NEUROGENIC CLAUDICATION: ICD-10-CM

## 2023-01-24 PROCEDURE — 72148 MRI LUMBAR SPINE W/O DYE: CPT

## 2023-02-01 DIAGNOSIS — F33.41 RECURRENT MAJOR DEPRESSIVE DISORDER, IN PARTIAL REMISSION (HCC): ICD-10-CM

## 2023-02-01 DIAGNOSIS — F90.8 ATTENTION DEFICIT HYPERACTIVITY DISORDER (ADHD), OTHER TYPE: ICD-10-CM

## 2023-02-01 DIAGNOSIS — F41.1 GAD (GENERALIZED ANXIETY DISORDER): ICD-10-CM

## 2023-02-01 RX ORDER — METHYLPHENIDATE HYDROCHLORIDE 54 MG/1
54 TABLET ORAL EVERY MORNING
Qty: 30 TABLET | Refills: 0 | Status: SHIPPED | OUTPATIENT
Start: 2023-02-26 | End: 2023-03-28

## 2023-02-01 RX ORDER — BUPROPION HYDROCHLORIDE 300 MG/1
300 TABLET ORAL EVERY MORNING
Qty: 90 TABLET | Refills: 1 | Status: SHIPPED | OUTPATIENT
Start: 2023-02-01 | End: 2023-02-24 | Stop reason: SDUPTHER

## 2023-02-02 NOTE — TELEPHONE ENCOUNTER
Pt has upcoming appt 2/24/23.      Received request via: Patient    Was the patient seen in the last year in this department? No    Does the patient have an active prescription (recently filled or refills available) for medication(s) requested? No    Does the patient have [facility] and need 100 day supply (blood pressure, diabetes and cholesterol meds only)? Medication is not for cholesterol, blood pressure or diabetes and Patient does not have SCP

## 2023-02-17 NOTE — PROGRESS NOTES
Patient arrived to clinic for monthly Tysabri.  Denies any current s/s of infection.  TOUCH pre infusion checklist completed.  PIV established with good blood return noted.  Tysabri infused over 1 hour, pt tolerated well.  Pt monitored for 1 hour post infusion, no s/s reaction noted.  PIV flushed, removed, and gauze/coban placed.  Confirmed next appointment and pt ambulated out of clinic in no apparent distress.   Initial (On Arrival)

## 2023-02-24 ENCOUNTER — TELEMEDICINE (OUTPATIENT)
Dept: BEHAVIORAL HEALTH | Facility: CLINIC | Age: 33
End: 2023-02-24
Payer: COMMERCIAL

## 2023-02-24 DIAGNOSIS — F33.41 RECURRENT MAJOR DEPRESSIVE DISORDER, IN PARTIAL REMISSION (HCC): ICD-10-CM

## 2023-02-24 DIAGNOSIS — F90.8 ATTENTION DEFICIT HYPERACTIVITY DISORDER (ADHD), OTHER TYPE: ICD-10-CM

## 2023-02-24 DIAGNOSIS — F41.1 GAD (GENERALIZED ANXIETY DISORDER): ICD-10-CM

## 2023-02-24 PROCEDURE — 90833 PSYTX W PT W E/M 30 MIN: CPT | Mod: 95 | Performed by: PSYCHIATRY & NEUROLOGY

## 2023-02-24 PROCEDURE — 99214 OFFICE O/P EST MOD 30 MIN: CPT | Mod: 95 | Performed by: PSYCHIATRY & NEUROLOGY

## 2023-02-24 RX ORDER — BUPROPION HYDROCHLORIDE 300 MG/1
300 TABLET ORAL EVERY MORNING
Qty: 90 TABLET | Refills: 1 | Status: SHIPPED | OUTPATIENT
Start: 2023-02-24 | End: 2023-04-11 | Stop reason: SDUPTHER

## 2023-02-24 RX ORDER — ESCITALOPRAM OXALATE 10 MG/1
TABLET ORAL
Qty: 34 TABLET | Refills: 0 | Status: SHIPPED | OUTPATIENT
Start: 2023-02-24 | End: 2023-03-21

## 2023-02-24 RX ORDER — METHYLPHENIDATE HYDROCHLORIDE 54 MG/1
54 TABLET ORAL EVERY MORNING
Qty: 30 TABLET | Refills: 0 | Status: SHIPPED | OUTPATIENT
Start: 2023-02-26 | End: 2023-03-28
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
## 2023-04-11 ENCOUNTER — TELEMEDICINE (OUTPATIENT)
Dept: BEHAVIORAL HEALTH | Facility: CLINIC | Age: 33
End: 2023-04-11
Payer: COMMERCIAL

## 2023-04-11 DIAGNOSIS — F90.8 ATTENTION DEFICIT HYPERACTIVITY DISORDER (ADHD), OTHER TYPE: ICD-10-CM

## 2023-04-11 DIAGNOSIS — F41.1 GAD (GENERALIZED ANXIETY DISORDER): ICD-10-CM

## 2023-04-11 DIAGNOSIS — F33.41 RECURRENT MAJOR DEPRESSIVE DISORDER, IN PARTIAL REMISSION (HCC): ICD-10-CM

## 2023-04-11 PROCEDURE — 99214 OFFICE O/P EST MOD 30 MIN: CPT | Mod: 95 | Performed by: PSYCHIATRY & NEUROLOGY

## 2023-04-11 RX ORDER — BUPROPION HYDROCHLORIDE 300 MG/1
300 TABLET ORAL EVERY MORNING
Qty: 90 TABLET | Refills: 1 | Status: SHIPPED | OUTPATIENT
Start: 2023-04-11 | End: 2023-09-14 | Stop reason: SDUPTHER

## 2023-04-11 RX ORDER — METHYLPHENIDATE HYDROCHLORIDE 54 MG/1
54 TABLET ORAL EVERY MORNING
Qty: 30 TABLET | Refills: 0 | Status: SHIPPED | OUTPATIENT
Start: 2023-04-24 | End: 2023-06-01 | Stop reason: SDUPTHER

## 2023-04-11 RX ORDER — ESCITALOPRAM OXALATE 10 MG/1
10 TABLET ORAL
Qty: 30 TABLET | Refills: 2 | Status: SHIPPED | OUTPATIENT
Start: 2023-04-11 | End: 2023-09-06 | Stop reason: SDUPTHER

## 2023-04-11 RX ORDER — TRAZODONE HYDROCHLORIDE 50 MG/1
50 TABLET ORAL NIGHTLY PRN
Qty: 15 TABLET | Refills: 1 | Status: SHIPPED | OUTPATIENT
Start: 2023-04-11 | End: 2023-04-18
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
## 2023-04-18 RX ORDER — TRAZODONE HYDROCHLORIDE 50 MG/1
TABLET ORAL
Qty: 90 TABLET | Refills: 1 | Status: SHIPPED | OUTPATIENT
Start: 2023-04-18 | End: 2023-09-06

## 2023-04-25 ENCOUNTER — OUTPATIENT INFUSION SERVICES (OUTPATIENT)
Dept: ONCOLOGY | Facility: MEDICAL CENTER | Age: 33
End: 2023-04-25
Attending: PSYCHIATRY & NEUROLOGY
Payer: COMMERCIAL

## 2023-04-25 VITALS
SYSTOLIC BLOOD PRESSURE: 125 MMHG | RESPIRATION RATE: 18 BRPM | DIASTOLIC BLOOD PRESSURE: 65 MMHG | HEIGHT: 70 IN | WEIGHT: 142.2 LBS | HEART RATE: 77 BPM | TEMPERATURE: 98.3 F | OXYGEN SATURATION: 95 % | BODY MASS INDEX: 20.36 KG/M2

## 2023-04-25 DIAGNOSIS — G35 MULTIPLE SCLEROSIS (HCC): ICD-10-CM

## 2023-04-25 PROCEDURE — 700111 HCHG RX REV CODE 636 W/ 250 OVERRIDE (IP): Performed by: PSYCHIATRY & NEUROLOGY

## 2023-04-25 PROCEDURE — 700102 HCHG RX REV CODE 250 W/ 637 OVERRIDE(OP): Performed by: PSYCHIATRY & NEUROLOGY

## 2023-04-25 PROCEDURE — 96375 TX/PRO/DX INJ NEW DRUG ADDON: CPT

## 2023-04-25 PROCEDURE — 700105 HCHG RX REV CODE 258: Performed by: PSYCHIATRY & NEUROLOGY

## 2023-04-25 PROCEDURE — A9270 NON-COVERED ITEM OR SERVICE: HCPCS | Performed by: PSYCHIATRY & NEUROLOGY

## 2023-04-25 PROCEDURE — 96413 CHEMO IV INFUSION 1 HR: CPT

## 2023-04-25 PROCEDURE — 96415 CHEMO IV INFUSION ADDL HR: CPT

## 2023-04-25 RX ORDER — SODIUM CHLORIDE 9 MG/ML
INJECTION, SOLUTION INTRAVENOUS CONTINUOUS
Status: DISCONTINUED | OUTPATIENT
Start: 2023-04-25 | End: 2023-04-25 | Stop reason: HOSPADM

## 2023-04-25 RX ORDER — METHYLPREDNISOLONE SODIUM SUCCINATE 125 MG/2ML
100 INJECTION, POWDER, LYOPHILIZED, FOR SOLUTION INTRAMUSCULAR; INTRAVENOUS ONCE
Status: COMPLETED | OUTPATIENT
Start: 2023-04-25 | End: 2023-04-25

## 2023-04-25 RX ORDER — ACETAMINOPHEN 325 MG/1
650 TABLET ORAL ONCE
Status: CANCELLED | OUTPATIENT
Start: 2023-10-20

## 2023-04-25 RX ORDER — ACETAMINOPHEN 325 MG/1
650 TABLET ORAL ONCE
Status: COMPLETED | OUTPATIENT
Start: 2023-04-25 | End: 2023-04-25

## 2023-04-25 RX ORDER — METHYLPREDNISOLONE SODIUM SUCCINATE 125 MG/2ML
100 INJECTION, POWDER, LYOPHILIZED, FOR SOLUTION INTRAMUSCULAR; INTRAVENOUS ONCE
Status: CANCELLED | OUTPATIENT
Start: 2023-10-20

## 2023-04-25 RX ORDER — DIPHENHYDRAMINE HYDROCHLORIDE 50 MG/ML
25 INJECTION INTRAMUSCULAR; INTRAVENOUS PRN
Status: CANCELLED | OUTPATIENT
Start: 2023-10-20

## 2023-04-25 RX ORDER — SODIUM CHLORIDE 9 MG/ML
INJECTION, SOLUTION INTRAVENOUS CONTINUOUS
Status: CANCELLED | OUTPATIENT
Start: 2023-10-20

## 2023-04-25 RX ADMIN — SODIUM CHLORIDE: 9 INJECTION, SOLUTION INTRAVENOUS at 07:34

## 2023-04-25 RX ADMIN — OCRELIZUMAB 600 MG: 300 INJECTION INTRAVENOUS at 08:00

## 2023-04-25 RX ADMIN — METHYLPREDNISOLONE SODIUM SUCCINATE 100 MG: 125 INJECTION, POWDER, FOR SOLUTION INTRAMUSCULAR; INTRAVENOUS at 07:37

## 2023-04-25 RX ADMIN — ACETAMINOPHEN 650 MG: 325 TABLET ORAL at 07:38

## 2023-04-25 RX ADMIN — DIPHENHYDRAMINE HYDROCHLORIDE 25 MG: 50 INJECTION, SOLUTION INTRAMUSCULAR; INTRAVENOUS at 07:43

## 2023-04-25 ASSESSMENT — FIBROSIS 4 INDEX: FIB4 SCORE: 0.27

## 2023-04-25 ASSESSMENT — PAIN DESCRIPTION - PAIN TYPE: TYPE: ACUTE PAIN

## 2023-04-25 NOTE — PROGRESS NOTES
Pt presented to infusion center for Ocrevus. POC discussed, including estimated treatment time and hour of observation, pt agreeable. Pt denies any current s/s of infection or open wounds/sores. PIV started to left forearm, brisk blood return observed. Pre-meds given as ordered. Ocrevus infused and titrated per pharmacy instructions to max rate of 300 ml/hr. Pt tolerated well with no s/s of adverse reaction. 1 hour obs completed with no s/s of adverse reaction. PIV flushed and removed, gauze and coban dressing placed. Pt has next appt, left on foot in good spirits.

## 2023-04-26 ENCOUNTER — OFFICE VISIT (OUTPATIENT)
Dept: MEDICAL GROUP | Facility: LAB | Age: 33
End: 2023-04-26
Payer: COMMERCIAL

## 2023-04-26 ENCOUNTER — HOSPITAL ENCOUNTER (OUTPATIENT)
Facility: MEDICAL CENTER | Age: 33
End: 2023-04-26
Attending: NURSE PRACTITIONER
Payer: COMMERCIAL

## 2023-04-26 VITALS
HEIGHT: 70 IN | WEIGHT: 143 LBS | TEMPERATURE: 97.2 F | RESPIRATION RATE: 12 BRPM | OXYGEN SATURATION: 95 % | DIASTOLIC BLOOD PRESSURE: 40 MMHG | HEART RATE: 70 BPM | SYSTOLIC BLOOD PRESSURE: 92 MMHG | BODY MASS INDEX: 20.47 KG/M2

## 2023-04-26 DIAGNOSIS — R35.0 URINARY FREQUENCY: ICD-10-CM

## 2023-04-26 LAB
APPEARANCE UR: CLEAR
BILIRUB UR STRIP-MCNC: NORMAL MG/DL
COLOR UR AUTO: YELLOW
GLUCOSE UR STRIP.AUTO-MCNC: NORMAL MG/DL
KETONES UR STRIP.AUTO-MCNC: NORMAL MG/DL
LEUKOCYTE ESTERASE UR QL STRIP.AUTO: NORMAL
NITRITE UR QL STRIP.AUTO: NORMAL
PH UR STRIP.AUTO: 6 [PH] (ref 5–8)
PROT UR QL STRIP: NORMAL MG/DL
RBC UR QL AUTO: NORMAL
SP GR UR STRIP.AUTO: 1.01
UROBILINOGEN UR STRIP-MCNC: 0.2 MG/DL

## 2023-04-26 PROCEDURE — 99213 OFFICE O/P EST LOW 20 MIN: CPT | Performed by: NURSE PRACTITIONER

## 2023-04-26 PROCEDURE — 87086 URINE CULTURE/COLONY COUNT: CPT

## 2023-04-26 PROCEDURE — 81002 URINALYSIS NONAUTO W/O SCOPE: CPT | Performed by: NURSE PRACTITIONER

## 2023-04-26 RX ORDER — PHENAZOPYRIDINE HYDROCHLORIDE 200 MG/1
200 TABLET, FILM COATED ORAL 3 TIMES DAILY PRN
Qty: 6 TABLET | Refills: 0 | Status: SHIPPED | OUTPATIENT
Start: 2023-04-26 | End: 2023-07-04 | Stop reason: SDUPTHER

## 2023-04-26 ASSESSMENT — PATIENT HEALTH QUESTIONNAIRE - PHQ9
2. FEELING DOWN, DEPRESSED, IRRITABLE, OR HOPELESS: NOT AT ALL
8. MOVING OR SPEAKING SO SLOWLY THAT OTHER PEOPLE COULD HAVE NOTICED. OR THE OPPOSITE, BEING SO FIGETY OR RESTLESS THAT YOU HAVE BEEN MOVING AROUND A LOT MORE THAN USUAL: NOT AT ALL
7. TROUBLE CONCENTRATING ON THINGS, SUCH AS READING THE NEWSPAPER OR WATCHING TELEVISION: NOT AT ALL
SUM OF ALL RESPONSES TO PHQ9 QUESTIONS 1 AND 2: 0
3. TROUBLE FALLING OR STAYING ASLEEP OR SLEEPING TOO MUCH: NOT AT ALL
1. LITTLE INTEREST OR PLEASURE IN DOING THINGS: NOT AT ALL
SUM OF ALL RESPONSES TO PHQ QUESTIONS 1-9: 0
4. FEELING TIRED OR HAVING LITTLE ENERGY: NOT AT ALL
6. FEELING BAD ABOUT YOURSELF - OR THAT YOU ARE A FAILURE OR HAVE LET YOURSELF OR YOUR FAMILY DOWN: NOT AL ALL
9. THOUGHTS THAT YOU WOULD BE BETTER OFF DEAD, OR OF HURTING YOURSELF: NOT AT ALL
5. POOR APPETITE OR OVEREATING: NOT AT ALL

## 2023-04-26 ASSESSMENT — FIBROSIS 4 INDEX: FIB4 SCORE: 0.27

## 2023-04-26 NOTE — PROGRESS NOTES
"CC  Possible UTI      HPI:  Rosita is a 32-year-old female, established patient of Dr. Brian, here with concern that she may have a urinary tract infection.  She complains of significant urinary frequency x 1 d with slight hesitancy.  Urinary frequency started yesterday morning.  She did receive an infusion of Ocrevus for her MS along with IV fluids, Benadryl and ibuprofen yesterday as well.  Denies dysuria, fevers, chills  Monthly menses - LMP 4/1/2023.  IUD in place - mirena.    Rarely drinks caffeine.  Took an nsaid yesterday and had caffeine in the morning.     Exam:  BP 92/40 (BP Location: Left arm, Patient Position: Sitting, BP Cuff Size: Adult)   Pulse 70   Temp 36.2 °C (97.2 °F)   Resp 12   Ht 1.778 m (5' 10\")   Wt 64.9 kg (143 lb)   SpO2 95%   Gen: NAD  Resp: nonlabored.  Able to speak in full sentences  Psy: pleasant / cooperative.   Neuro:  Alert and oriented x 3    A/P:  1. Urinary frequency  POCT Urinalysis    URINE CULTURE(NEW)         Negative urinalysis today.  Likely cystitis / bladder irritation.  Encouraged avoidance of caffeine, alcohol, nsaids and spicy / fried foods for several days with high water intake.  Trial of pyridium up to tid x 2 d.  Culture sent due to vague symptoms and immunocompromise / chronic disease states.  ER precautions for temp greater than 104.    Will f/u with pt after culture results but encouraged her to contact us with any new symptoms.   "

## 2023-04-28 LAB
BACTERIA UR CULT: NORMAL
SIGNIFICANT IND 70042: NORMAL
SITE SITE: NORMAL
SOURCE SOURCE: NORMAL

## 2023-05-12 DIAGNOSIS — R05.3 CHRONIC COUGH: ICD-10-CM

## 2023-05-12 PROCEDURE — 1125F AMNT PAIN NOTED PAIN PRSNT: CPT | Performed by: FAMILY MEDICINE

## 2023-05-12 RX ORDER — ALBUTEROL SULFATE 90 UG/1
AEROSOL, METERED RESPIRATORY (INHALATION)
Qty: 6.7 EACH | Refills: 1 | Status: SHIPPED | OUTPATIENT
Start: 2023-05-12

## 2023-05-12 NOTE — TELEPHONE ENCOUNTER
Received request via: Pharmacy    Was the patient seen in the last year in this department? Yes  4/26/23  Does the patient have an active prescription (recently filled or refills available) for medication(s) requested? No    Does the patient have [facility] Plus and need 100 day supply (blood pressure, diabetes and cholesterol meds only)? Medication is not for cholesterol, blood pressure or diabetes

## 2023-05-22 DIAGNOSIS — B37.31 YEAST VAGINITIS: ICD-10-CM

## 2023-05-22 RX ORDER — FLUCONAZOLE 150 MG/1
150 TABLET ORAL DAILY
Qty: 1 TABLET | Refills: 0 | Status: SHIPPED | OUTPATIENT
Start: 2023-05-22 | End: 2023-09-03 | Stop reason: SDUPTHER

## 2023-05-22 NOTE — TELEPHONE ENCOUNTER
Patient comment: I believe I have another yeast infection and would like to see if I can  get a few refills because one normally does not stop the infection     Received request via: Pharmacy    Was the patient seen in the last year in this department? Yes  4/26/2023  Does the patient have an active prescription (recently filled or refills available) for medication(s) requested? No    Does the patient have [facility] Plus and need 100 day supply (blood pressure, diabetes and cholesterol meds only)? Patient does not have SCP

## 2023-05-24 NOTE — ASSESSMENT & PLAN NOTE
Patient states that she's had sinus pressure both in the frontal and maxillary region since Sunday, April 30, 2017. Patient states that she's also had nasal congestion, sore throat, coughing with green/yellow sputum production. Denies fever or chills. Patient tried over-the-counter cough/sore throat medication as well as decongestant.  
abdomen

## 2023-06-01 ENCOUNTER — PATIENT MESSAGE (OUTPATIENT)
Dept: BEHAVIORAL HEALTH | Facility: CLINIC | Age: 33
End: 2023-06-01
Payer: COMMERCIAL

## 2023-06-01 DIAGNOSIS — F90.8 ATTENTION DEFICIT HYPERACTIVITY DISORDER (ADHD), OTHER TYPE: ICD-10-CM

## 2023-06-01 RX ORDER — METHYLPHENIDATE HYDROCHLORIDE 54 MG/1
54 TABLET ORAL EVERY MORNING
Qty: 30 TABLET | Refills: 0 | Status: SHIPPED | OUTPATIENT
Start: 2023-06-01 | End: 2023-07-03 | Stop reason: SDUPTHER

## 2023-06-01 NOTE — PATIENT COMMUNICATION
Received request via: Patient    Was the patient seen in the last year in this department? Yes    Does the patient have an active prescription (recently filled or refills available) for medication(s) requested? No    Does the patient have [facility] Plus and need 100 day supply (blood pressure, diabetes and cholesterol meds only)? Medication is not for cholesterol, blood pressure or diabetes and Patient does not have SCP

## 2023-07-02 ENCOUNTER — PATIENT MESSAGE (OUTPATIENT)
Dept: BEHAVIORAL HEALTH | Facility: CLINIC | Age: 33
End: 2023-07-02
Payer: COMMERCIAL

## 2023-07-02 DIAGNOSIS — F90.8 ATTENTION DEFICIT HYPERACTIVITY DISORDER (ADHD), OTHER TYPE: ICD-10-CM

## 2023-07-03 RX ORDER — METHYLPHENIDATE HYDROCHLORIDE 54 MG/1
54 TABLET ORAL EVERY MORNING
Qty: 30 TABLET | Refills: 0 | Status: SHIPPED | OUTPATIENT
Start: 2023-07-03 | End: 2023-07-28 | Stop reason: SDUPTHER

## 2023-07-03 NOTE — PATIENT COMMUNICATION
Melchor pt.     Received request via: Patient    Was the patient seen in the last year in this department? Yes    Does the patient have an active prescription (recently filled or refills available) for medication(s) requested? No    Does the patient have [facility] Plus and need 100 day supply (blood pressure, diabetes and cholesterol meds only)? Medication is not for cholesterol, blood pressure or diabetes and Patient does not have SCP

## 2023-07-05 RX ORDER — PHENAZOPYRIDINE HYDROCHLORIDE 200 MG/1
200 TABLET, FILM COATED ORAL 3 TIMES DAILY PRN
Qty: 6 TABLET | Refills: 0 | Status: SHIPPED | OUTPATIENT
Start: 2023-07-05 | End: 2023-10-28 | Stop reason: SDUPTHER

## 2023-07-05 NOTE — TELEPHONE ENCOUNTER
Received request via: Patient    Was the patient seen in the last year in this department? Yes  LOV 04/26/2023  Does the patient have an active prescription (recently filled or refills available) for medication(s) requested? No    Does the patient have [facility] Plus and need 100 day supply (blood pressure, diabetes and cholesterol meds only)? Medication is not for cholesterol, blood pressure or diabetes and Patient does not have SCP

## 2023-07-11 ENCOUNTER — OCCUPATIONAL MEDICINE (OUTPATIENT)
Dept: OCCUPATIONAL MEDICINE | Facility: CLINIC | Age: 33
End: 2023-07-11
Payer: COMMERCIAL

## 2023-07-11 VITALS
OXYGEN SATURATION: 98 % | DIASTOLIC BLOOD PRESSURE: 68 MMHG | HEART RATE: 90 BPM | BODY MASS INDEX: 20.59 KG/M2 | SYSTOLIC BLOOD PRESSURE: 104 MMHG | HEIGHT: 70 IN | WEIGHT: 143.8 LBS | RESPIRATION RATE: 16 BRPM

## 2023-07-11 DIAGNOSIS — M77.8 LEFT ELBOW TENDONITIS: ICD-10-CM

## 2023-07-11 PROCEDURE — 3078F DIAST BP <80 MM HG: CPT | Performed by: NURSE PRACTITIONER

## 2023-07-11 PROCEDURE — 3074F SYST BP LT 130 MM HG: CPT | Performed by: NURSE PRACTITIONER

## 2023-07-11 PROCEDURE — 99213 OFFICE O/P EST LOW 20 MIN: CPT | Performed by: NURSE PRACTITIONER

## 2023-07-11 RX ORDER — LIDOCAINE 50 MG/G
1 PATCH TOPICAL EVERY 24 HOURS
Qty: 10 PATCH | Refills: 1 | Status: SHIPPED | OUTPATIENT
Start: 2023-07-11 | End: 2023-08-10 | Stop reason: SDUPTHER

## 2023-07-11 ASSESSMENT — FIBROSIS 4 INDEX: FIB4 SCORE: 0.27

## 2023-07-11 NOTE — PROGRESS NOTES
"Subjective:     Rosita Bowles is a 32 y.o. 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  There was no specific trauma or injury, but she just developed sudden left elbow pain.     Today patient presents with continued left elbow pain.  She states she was seen by physiatry and given a lidocaine and Kenalog shot.  Since this the elbow pain has continued and more concerning she developed a rash and bruising approximately a week and a half after the injection.  She also noted that there was burning after the injection wore off.  She continues to have significant skin discoloration to the area.  She also notes that the symptoms have not resolved they continue to be constant, triggered with certain movements, and significantly sensitive to touch.  Previously she completed the occupational therapy and physical therapy with improvement.  She is currently working full duty.  She would like to be seen for a second opinion regarding the continued elbow pain.  MRI and physiatry referral placed at this visit.  Plan of care discussed with patient.    ROS: All systems were reviewed on intake form, form was reviewed and signed. See scanned documents in media. Pertinent positives and negatives included in HPI.    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          Objective:     /68   Pulse 90   Resp 16   Ht 1.778 m (5' 10\")   Wt 65.2 kg (143 lb 12.8 oz)   SpO2 98%   BMI 20.63 kg/m²     [unfilled]    Left elbow: No gross deformity.  There is skin discoloration diffusely to the left elbow.  Mild TTP noted to the lateral epicondyle. Soft tissue swelling noted.  Distal strength and " sensation intact.  Pulses 2+ and intact.    Assessment/Plan:       1. Left elbow tendonitis  - MR-ELBOW-W/O LEFT; Future  - Referral to Radiology  - Referral to Pain Clinic  - lidocaine (LIDODERM) 5 % Patch; Place 1 Patch on the skin every 24 hours.  Dispense: 10 Patch; Refill: 1    Released to Full Duty FROM 7/11/2023 TO 8/11/2023     Follow-up in 4 weeks, unless seen by physiatry  Full duty, per physiatry  Physiatry referral placed, transfer care  Lidoderm patches as prescribed  MRI ordered  Recommend OTC Tylenol/ibuprofen if needed, ice, elevation, and compression sleeve while at work  Return to clinic or ED for new or worsening symptoms    Differential diagnosis, natural history, supportive care, and indications for immediate follow-up discussed.    Approximately 25 minutes were spent in reviewing notes, preparing for visit, obtaining history, exam and evaluation, patient counseling/education and post visit documentation/orders.

## 2023-07-11 NOTE — LETTER
[address],   [address] [place] [address]  Phone:  [phone] - Fax:  [phone]   [facility] Progress Report and Disability Certification  Date of Service: 7/11/2023   No Show:  No  Date / Time of Next Visit: 8/10/23@8:30 AM   Claim Information   Patient Name: Rosita Bowles  Claim Number:     Employer: [employer]  Date of Injury: 8/29/2022     Insurer / TPA: [employer]  ID / SSN:     Occupation: [employer]  Diagnosis: The encounter diagnosis was Left elbow tendonitis.    Medical Information   Related to Industrial Injury? 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  There was no specific trauma or injury, but she just developed sudden left elbow pain.     Today patient presents with continued left elbow pain.  She states she was seen by physiatry and given a lidocaine and Kenalog shot.  Since this the elbow pain has continued and more concerning she developed a rash and bruising approximately a week and a half after the injection.  She also noted that there was burning after the injection wore off.  She continues to have significant skin discoloration to the area.  She also notes that the symptoms have not resolved they continue to be constant, triggered with certain movements, and significantly sensitive to touch.  Previously she completed the occupational therapy and physical therapy with improvement.  She is currently working full duty.  She would like to be seen for a second opinion regarding the continued elbow pain.  MRI and physiatry referral placed at this visit.  Plan of care discussed with patient.   Objective Findings: Left elbow: No gross deformity.  There is skin discoloration diffusely to the left elbow.  Mild TTP noted to the lateral epicondyle. Soft tissue swelling noted.  Distal strength  and sensation intact.  Pulses 2+ and intact.   Pre-Existing Condition(s):     Assessment:   Condition Same    Status: Discharged / Care Transfer  Permanent Disability:No    Plan: Transfer CareDiagnostics    Diagnostics: MRI    Comments:  Follow-up in 4 weeks, unless seen by physiatry  Full duty, per physiatry  Physiatry referral placed, transfer care  MRI ordered  Recommend OTC Tylenol/ibuprofen if needed, ice, elevation, and compression sleeve while at work  Return to clinic or ED for new or worsening symptoms    Disability Information   Status: Released to Full Duty    From:  7/11/2023  Through: 8/11/2023 Restrictions are:     Physical Restrictions   Sitting:    Standing:    Stooping:    Bending:      Squatting:    Walking:    Climbing:    Pushing:      Pulling:    Other:    Reaching Above Shoulder (L):   Reaching Above Shoulder (R):       Reaching Below Shoulder (L):    Reaching Below Shoulder (R):      Not to exceed Weight Limits   Carrying(hrs):   Weight Limit(lb):   Lifting(hrs):   Weight  Limit(lb):     Comments:      Repetitive Actions   Hands: i.e. Fine Manipulations from Grasping:     Feet: i.e. Operating Foot Controls:     Driving / Operate Machinery:     Health Care Provider’s Original or Electronic Signature  BRANDEN Guillaume Health Care Provider’s Original or Electronic Signature    Kevin Broussard DO MPH     Clinic Name / Location: [place] [address]  [place] [address] Clinic Phone Number: Dept: [phone]   Appointment Time: 8:30 Am Visit Start Time: 8:35 AM   Check-In Time:  8:27 Am Visit Discharge Time:  9:25 AM   Original-Treating Physician or Chiropractor    Page 2-Insurer/TPA    Page 3-Employer    Page 4-Employee

## 2023-07-16 ENCOUNTER — PATIENT MESSAGE (OUTPATIENT)
Dept: MEDICAL GROUP | Facility: LAB | Age: 33
End: 2023-07-16
Payer: COMMERCIAL

## 2023-07-16 DIAGNOSIS — B00.9 HSV-1 (HERPES SIMPLEX VIRUS 1) INFECTION: ICD-10-CM

## 2023-07-18 RX ORDER — VALACYCLOVIR HYDROCHLORIDE 1 G/1
1000 TABLET, FILM COATED ORAL 2 TIMES DAILY PRN
Qty: 180 TABLET | Refills: 3 | Status: SHIPPED | OUTPATIENT
Start: 2023-07-18

## 2023-07-18 NOTE — PATIENT COMMUNICATION
Received request via: Patient    Was the patient seen in the last year in this department? Yes 4/26/2023    Does the patient have an active prescription (recently filled or refills available) for medication(s) requested? No    Does the patient have [facility] Plus and need 100 day supply (blood pressure, diabetes and cholesterol meds only)? Patient does not have SCP

## 2023-07-27 ENCOUNTER — HOSPITAL ENCOUNTER (OUTPATIENT)
Dept: RADIOLOGY | Facility: MEDICAL CENTER | Age: 33
End: 2023-07-27
Attending: NURSE PRACTITIONER
Payer: COMMERCIAL

## 2023-07-27 DIAGNOSIS — M77.8 LEFT ELBOW TENDONITIS: ICD-10-CM

## 2023-07-27 PROCEDURE — 73221 MRI JOINT UPR EXTREM W/O DYE: CPT | Mod: LT

## 2023-07-28 DIAGNOSIS — F90.8 ATTENTION DEFICIT HYPERACTIVITY DISORDER (ADHD), OTHER TYPE: ICD-10-CM

## 2023-07-28 RX ORDER — METHYLPHENIDATE HYDROCHLORIDE 54 MG/1
54 TABLET ORAL EVERY MORNING
Qty: 30 TABLET | Refills: 0 | Status: SHIPPED | OUTPATIENT
Start: 2023-07-28 | End: 2023-08-23 | Stop reason: SDUPTHER

## 2023-08-10 ENCOUNTER — OCCUPATIONAL MEDICINE (OUTPATIENT)
Dept: OCCUPATIONAL MEDICINE | Facility: CLINIC | Age: 33
End: 2023-08-10
Payer: COMMERCIAL

## 2023-08-10 DIAGNOSIS — M77.8 LEFT ELBOW TENDONITIS: ICD-10-CM

## 2023-08-10 DIAGNOSIS — S56.512A PARTIAL TEAR OF COMMON EXTENSOR TENDON OF LEFT ELBOW: ICD-10-CM

## 2023-08-10 PROCEDURE — 99213 OFFICE O/P EST LOW 20 MIN: CPT | Performed by: NURSE PRACTITIONER

## 2023-08-10 RX ORDER — LIDOCAINE 50 MG/G
1 PATCH TOPICAL EVERY 24 HOURS
Qty: 10 PATCH | Refills: 1 | Status: SHIPPED | OUTPATIENT
Start: 2023-08-10 | End: 2023-09-06 | Stop reason: SDUPTHER

## 2023-08-10 ASSESSMENT — ENCOUNTER SYMPTOMS
SENSORY CHANGE: 0
TINGLING: 0
CARDIOVASCULAR NEGATIVE: 1
CONSTITUTIONAL NEGATIVE: 1
MYALGIAS: 1
WEAKNESS: 1
PSYCHIATRIC NEGATIVE: 1
RESPIRATORY NEGATIVE: 1
ROS SKIN COMMENTS: SWELLING AND BRUISING

## 2023-08-10 NOTE — PROGRESS NOTES
Subjective:     Rosita Bowles is a 32 y.o. 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  There was no specific trauma or injury, but she just developed sudden left elbow pain.      Today increased to significantly worsening pain.  She continues to have the bruise in the area where she received the lidocaine and Kenalog shot.  She had difficulty straightening her arm this morning was unable to get her shirt on by herself.  She is currently wearing a sleeve which is helping with some of the discomfort continued skin discoloration to the area as well.  Pain continues to be constant, triggered with certain movements, and significantly sensitive to touch.  Previously she completed the occupational therapy and physical therapy with improvement.  She is currently working full duty.  MRI results reviewed with patient.  Orthopedic referral placed for further evaluation and management of symptoms.   Plan of care discussed with patient.    Review of Systems   Constitutional: Negative.    Respiratory: Negative.     Cardiovascular: Negative.    Musculoskeletal:  Positive for joint pain and myalgias.   Skin:         Swelling and bruising   Neurological:  Positive for weakness. Negative for tingling and sensory change.   Psychiatric/Behavioral: Negative.         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       Objective:     There were no vitals taken for this visit.    Constitutional: Patient is in no acute distress. Appears well-developed and well-nourished.   Cardiovascular: Normal rate.    Pulmonary/Chest: Effort normal. No respiratory distress.   Neurological: Patient is alert and oriented to person, place, and time.   Skin: Skin is warm and dry.   Psychiatric: Normal mood and affect. Behavior is normal.      Left elbow: No gross deformity.  There is skin discoloration diffusely to the left elbow.  Mild TTP noted to the lateral epicondyle. Soft tissue swelling noted.  Distal strength and sensation intact.  Pulses 2+ and intact.    MRI left Elbow 7/27/23:    IMPRESSION:        1. High-grade partial-thickness tear of the common extensor tendon with surrounding stranding. There is bone marrow edema in the lateral humeral condyle, secondary to tendon tear.    Assessment/Plan:       1. Left elbow tendonitis  - Referral to Orthopedics  - lidocaine (LIDODERM) 5 % Patch; Place 1 Patch on the skin every 24 hours.  Dispense: 10 Patch; Refill: 1    2. Partial tear of common extensor tendon of left elbow  - Referral to Orthopedics    Released to Restricted Duty FROM 8/10/2023 TO 9/8/2023  No pushing, pulling, or lifting more than 10 pounds with the left upper extremity only until cleared by Orthopedic surgery    Follow-up in 4 weeks, unless seen by Orthopedics  Restricted duty, per Orthopedics  Orthopedics referral placed, transfer care  Lidoderm patches as prescribed  Recommend OTC Tylenol/ibuprofen if needed, ice, elevation, and compression sleeve while at work  Return to clinic or ED for new or worsening symptoms    Differential diagnosis, natural history, supportive care, and indications for immediate follow-up discussed.    Approximately 25 minutes was spent in preparing for visit, obtaining history, exam and evaluation, patient counseling/education and post visit documentation/orders.

## 2023-08-10 NOTE — LETTER
[address],   [address] [place] [address]  Phone:  [phone] - Fax:  [phone]   [facility] Progress Report and Disability Certification  Date of Service: 8/10/2023   No Show:  No  Date / Time of Next Visit: 9/8/2023@ 7:45 AM   Claim Information   Patient Name: Rosita Bowles  Claim Number:     Employer: [employer]  Date of Injury: 8/29/2022     Insurer / TPA: [employer]  ID / SSN:     Occupation: [employer]  Diagnosis: Diagnoses of Left elbow tendonitis and Partial tear of common extensor tendon of left elbow were pertinent to this visit.    Medical Information   Related to Industrial Injury? 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  There was no specific trauma or injury, but she just developed sudden left elbow pain.      Today increased to significantly worsening pain.  She continues to have the bruise in the area where she received the lidocaine and Kenalog shot.  She had difficulty straightening her arm this morning was unable to get her shirt on by herself.  She is currently wearing a sleeve which is helping with some of the discomfort continued skin discoloration to the area as well.  Pain continues to be constant, triggered with certain movements, and significantly sensitive to touch.  Previously she completed the occupational therapy and physical therapy with improvement.  She is currently working full duty.  MRI results reviewed with patient.  Orthopedic referral placed for further evaluation and management of symptoms.   Plan of care discussed with patient.   Objective Findings: Left elbow: No gross deformity.  There is skin discoloration diffusely to the left elbow.  Mild TTP noted to the lateral epicondyle. Soft tissue swelling noted.  Distal strength and sensation intact.  Pulses 2+ and  intact.    MRI left Elbow 23:    IMPRESSION:        1. High-grade partial-thickness tear of the common extensor tendon with surrounding stranding. There is bone marrow edema in the lateral humeral condyle, secondary to tendon tear.   Pre-Existing Condition(s):     Assessment:   Condition Worsened    Status: Discharged / Care Transfer  Permanent Disability:No    Plan: Transfer CareMedication    Diagnostics:      Comments:  Follow-up in 4 weeks, unless seen by Orthopedics  Restricted duty, per Orthopedics  Orthopedics referral placed, transfer care  Lidoderm patches as prescribed  Recommend OTC Tylenol/ibuprofen if needed, ice, elevation, and compression sleeve while at work  Return to clinic or ED for new or worsening symptoms    Disability Information   Status: Released to Restricted Duty    From:  8/10/2023  Through: 2023 Restrictions are: Temporary   Physical Restrictions   Sitting:    Standing:    Stooping:    Bending:      Squatting:    Walking:    Climbing:    Pushin hrs/day   Pullin hrs/day Other:    Reaching Above Shoulder (L):   Reaching Above Shoulder (R):       Reaching Below Shoulder (L):    Reaching Below Shoulder (R):      Not to exceed Weight Limits   Carrying(hrs):   Weight Limit(lb): < or = to 10 pounds Lifting(hrs):   Weight  Limit(lb): < or = to 10 pounds   Comments: No pushing, pulling, or lifting more than 10 pounds with the left upper extremity only until cleared by Orthopedic surgery    Repetitive Actions   Hands: i.e. Fine Manipulations from Grasping:     Feet: i.e. Operating Foot Controls:     Driving / Operate Machinery:     Health Care Provider’s Original or Electronic Signature  BRANDEN Guillaume Health Care Provider’s Original or Electronic Signature    eKvin Broussard DO MPH     Clinic Name / Location: [address],   [address]  [place] [address] Clinic Phone Number: Dept: [phone]   Appointment Time: 8:30 Am Visit Start  Time: 8:55 AM   Check-In Time:  8:15 Am Visit Discharge Time:  9:06 AM   Original-Treating Physician or Chiropractor    Page 2-Insurer/TPA    Page 3-Employer    Page 4-Employee

## 2023-09-03 DIAGNOSIS — B37.31 YEAST VAGINITIS: ICD-10-CM

## 2023-09-05 ENCOUNTER — PATIENT MESSAGE (OUTPATIENT)
Dept: BEHAVIORAL HEALTH | Facility: CLINIC | Age: 33
End: 2023-09-05
Payer: COMMERCIAL

## 2023-09-05 DIAGNOSIS — F41.1 GAD (GENERALIZED ANXIETY DISORDER): ICD-10-CM

## 2023-09-05 DIAGNOSIS — F33.41 RECURRENT MAJOR DEPRESSIVE DISORDER, IN PARTIAL REMISSION (HCC): ICD-10-CM

## 2023-09-05 RX ORDER — FLUCONAZOLE 150 MG/1
150 TABLET ORAL DAILY
Qty: 1 TABLET | Refills: 0 | Status: SHIPPED | OUTPATIENT
Start: 2023-09-05 | End: 2023-10-27 | Stop reason: SDUPTHER

## 2023-09-05 NOTE — TELEPHONE ENCOUNTER
Received request via: Pharmacy    Was the patient seen in the last year in this department? Yes  4/26/23  Does the patient have an active prescription (recently filled or refills available) for medication(s) requested? No    Does the patient have [facility] Plus and need 100 day supply (blood pressure, diabetes and cholesterol meds only)? Medication is not for cholesterol, blood pressure or diabetes

## 2023-09-06 DIAGNOSIS — M77.8 LEFT ELBOW TENDONITIS: ICD-10-CM

## 2023-09-06 RX ORDER — TRAZODONE HYDROCHLORIDE 50 MG/1
TABLET ORAL
Qty: 90 TABLET | Refills: 1 | Status: SHIPPED | OUTPATIENT
Start: 2023-09-06 | End: 2023-09-14

## 2023-09-06 RX ORDER — LIDOCAINE 50 MG/G
1 PATCH TOPICAL EVERY 24 HOURS
Qty: 10 PATCH | Refills: 1 | Status: SHIPPED | OUTPATIENT
Start: 2023-09-06 | End: 2024-03-04

## 2023-09-06 RX ORDER — ESCITALOPRAM OXALATE 10 MG/1
10 TABLET ORAL
Qty: 30 TABLET | Refills: 2 | Status: SHIPPED | OUTPATIENT
Start: 2023-09-06 | End: 2023-09-14

## 2023-09-06 NOTE — PATIENT COMMUNICATION
Received request via: Patient    Was the patient seen in the last year in this department? Yes    Does the patient have an active prescription (recently filled or refills available) for medication(s) requested? No    Does the patient have [facility] and need 100 day supply (blood pressure, diabetes and cholesterol meds only)? Medication is not for cholesterol, blood pressure or diabetes

## 2023-09-08 ENCOUNTER — OFFICE VISIT (OUTPATIENT)
Dept: MEDICAL GROUP | Facility: LAB | Age: 33
End: 2023-09-08
Payer: COMMERCIAL

## 2023-09-08 ENCOUNTER — HOSPITAL ENCOUNTER (OUTPATIENT)
Dept: LAB | Facility: MEDICAL CENTER | Age: 33
End: 2023-09-08
Attending: FAMILY MEDICINE
Payer: COMMERCIAL

## 2023-09-08 VITALS
HEIGHT: 70 IN | OXYGEN SATURATION: 99 % | SYSTOLIC BLOOD PRESSURE: 110 MMHG | BODY MASS INDEX: 21.76 KG/M2 | RESPIRATION RATE: 18 BRPM | WEIGHT: 152 LBS | DIASTOLIC BLOOD PRESSURE: 56 MMHG | TEMPERATURE: 99.1 F | HEART RATE: 86 BPM

## 2023-09-08 DIAGNOSIS — R53.83 OTHER FATIGUE: ICD-10-CM

## 2023-09-08 DIAGNOSIS — R19.5 ABNORMAL STOOLS: ICD-10-CM

## 2023-09-08 DIAGNOSIS — R10.9 ABDOMINAL DISCOMFORT: ICD-10-CM

## 2023-09-08 LAB
ALBUMIN SERPL BCP-MCNC: 4.5 G/DL (ref 3.2–4.9)
ALBUMIN/GLOB SERPL: 2.1 G/DL
ALP SERPL-CCNC: 69 U/L (ref 30–99)
ALT SERPL-CCNC: 13 U/L (ref 2–50)
ANION GAP SERPL CALC-SCNC: 10 MMOL/L (ref 7–16)
AST SERPL-CCNC: 18 U/L (ref 12–45)
BASOPHILS # BLD AUTO: 0.9 % (ref 0–1.8)
BASOPHILS # BLD: 0.05 K/UL (ref 0–0.12)
BILIRUB SERPL-MCNC: 0.2 MG/DL (ref 0.1–1.5)
BUN SERPL-MCNC: 14 MG/DL (ref 8–22)
CALCIUM ALBUM COR SERPL-MCNC: 9.1 MG/DL (ref 8.5–10.5)
CALCIUM SERPL-MCNC: 9.5 MG/DL (ref 8.5–10.5)
CHLORIDE SERPL-SCNC: 106 MMOL/L (ref 96–112)
CO2 SERPL-SCNC: 24 MMOL/L (ref 20–33)
CREAT SERPL-MCNC: 0.95 MG/DL (ref 0.5–1.4)
EOSINOPHIL # BLD AUTO: 0.09 K/UL (ref 0–0.51)
EOSINOPHIL NFR BLD: 1.6 % (ref 0–6.9)
ERYTHROCYTE [DISTWIDTH] IN BLOOD BY AUTOMATED COUNT: 45.2 FL (ref 35.9–50)
FASTING STATUS PATIENT QL REPORTED: NORMAL
GFR SERPLBLD CREATININE-BSD FMLA CKD-EPI: 82 ML/MIN/1.73 M 2
GLOBULIN SER CALC-MCNC: 2.1 G/DL (ref 1.9–3.5)
GLUCOSE SERPL-MCNC: 95 MG/DL (ref 65–99)
HCT VFR BLD AUTO: 42.3 % (ref 37–47)
HGB BLD-MCNC: 13.7 G/DL (ref 12–16)
IMM GRANULOCYTES # BLD AUTO: 0.01 K/UL (ref 0–0.11)
IMM GRANULOCYTES NFR BLD AUTO: 0.2 % (ref 0–0.9)
LIPASE SERPL-CCNC: 27 U/L (ref 11–82)
LYMPHOCYTES # BLD AUTO: 2.2 K/UL (ref 1–4.8)
LYMPHOCYTES NFR BLD: 38.1 % (ref 22–41)
MCH RBC QN AUTO: 28.9 PG (ref 27–33)
MCHC RBC AUTO-ENTMCNC: 32.4 G/DL (ref 32.2–35.5)
MCV RBC AUTO: 89.2 FL (ref 81.4–97.8)
MONOCYTES # BLD AUTO: 0.54 K/UL (ref 0–0.85)
MONOCYTES NFR BLD AUTO: 9.3 % (ref 0–13.4)
NEUTROPHILS # BLD AUTO: 2.89 K/UL (ref 1.82–7.42)
NEUTROPHILS NFR BLD: 49.9 % (ref 44–72)
NRBC # BLD AUTO: 0 K/UL
NRBC BLD-RTO: 0 /100 WBC (ref 0–0.2)
PLATELET # BLD AUTO: 310 K/UL (ref 164–446)
PMV BLD AUTO: 10.6 FL (ref 9–12.9)
POTASSIUM SERPL-SCNC: 4.3 MMOL/L (ref 3.6–5.5)
PROT SERPL-MCNC: 6.6 G/DL (ref 6–8.2)
RBC # BLD AUTO: 4.74 M/UL (ref 4.2–5.4)
SODIUM SERPL-SCNC: 140 MMOL/L (ref 135–145)
TSH SERPL DL<=0.005 MIU/L-ACNC: 3.12 UIU/ML (ref 0.38–5.33)
WBC # BLD AUTO: 5.8 K/UL (ref 4.8–10.8)

## 2023-09-08 PROCEDURE — 3078F DIAST BP <80 MM HG: CPT | Performed by: FAMILY MEDICINE

## 2023-09-08 PROCEDURE — 3074F SYST BP LT 130 MM HG: CPT | Performed by: FAMILY MEDICINE

## 2023-09-08 PROCEDURE — 36415 COLL VENOUS BLD VENIPUNCTURE: CPT

## 2023-09-08 PROCEDURE — 85025 COMPLETE CBC W/AUTO DIFF WBC: CPT

## 2023-09-08 PROCEDURE — 99214 OFFICE O/P EST MOD 30 MIN: CPT | Performed by: FAMILY MEDICINE

## 2023-09-08 PROCEDURE — 80053 COMPREHEN METABOLIC PANEL: CPT

## 2023-09-08 PROCEDURE — 84443 ASSAY THYROID STIM HORMONE: CPT

## 2023-09-08 PROCEDURE — 83690 ASSAY OF LIPASE: CPT

## 2023-09-08 ASSESSMENT — FIBROSIS 4 INDEX: FIB4 SCORE: 0.27

## 2023-09-08 NOTE — PROGRESS NOTES
Chief Complaint:   Chief Complaint   Patient presents with    Bowel Problem     Change in stool x1 week - foul odor       HPI:Established patient   Rosita Bowles is a 32 y.o. female who presents for     1. Abnormal stools  New concern  For the past 10 days has been noticing changes in her stool color and consistency, patient said the stool is floating more in the toilet, and noticed some whitish membrane-like structures around her stool whenever she passes a bowel movement.  Reports mild abdominal discomfort, denies blood in stool, denies history of traveling outside [place].  No changes in her diet or medications    2. Abdominal discomfort  Reports vague abdominal discomfort and pain in the upper abdominal area, noticed recently changes in her stool as described above this has been going on for the past 10 days    3. Other fatigue  Overall she feels tired and fatigued more than usual, she related to her job and changing her night shift recently.            Past medical history, family history, social history and medications reviewed and updated in the record.  Today  Current medications, problem list and allergies reviewed in [facility] today  Health maintenance topics are reviewed and updated.    Patient Active Problem List    Diagnosis Date Noted    Rash 08/22/2022    JOSE (generalized anxiety disorder) 06/17/2021    Establishing care with new doctor, encounter for 03/18/2021    Health care maintenance 03/18/2021    Other viral warts 04/02/2019    Plantar wart 01/09/2019    Cervical disc disorder at C6-C7 level with radiculopathy 07/10/2018    Primary insomnia 10/08/2017    HSV-1 (herpes simplex virus 1) infection 06/23/2017    Sinus pressure 05/03/2017    Multiple sclerosis (HCC) 11/02/2016    Seasonal allergies 05/31/2013    Recurrent major depressive disorder, in partial remission (HCC) 08/07/2012    ADHD 08/07/2012     Family History   Problem Relation Age of Onset    Thyroid Mother     Arthritis  Mother         Rheumatoid arthritis     Psychiatric Illness Mother     Diabetes Father         prediabetes     Heart Disease Father     Thyroid Brother     Psychiatric Illness Maternal Aunt         ADHD    Cancer Maternal Aunt         breast ca      Social History     Socioeconomic History    Marital status:      Spouse name: Not on file    Number of children: Not on file    Years of education: Not on file    Highest education level: Not on file   Occupational History     Comment: [employer] ,    Tobacco Use    Smoking status: Never    Smokeless tobacco: Never   Vaping Use    Vaping Use: Never used   Substance and Sexual Activity    Alcohol use: Yes     Alcohol/week: 0.0 oz     Comment: very occasional    Drug use: No    Sexual activity: Yes     Partners: Male     Birth control/protection: I.U.D.     Comment: OCP   Other Topics Concern     Service No    Blood Transfusions No    Caffeine Concern No    Occupational Exposure No    Hobby Hazards No    Sleep Concern Yes    Stress Concern No    Weight Concern No    Special Diet No    Back Care No    Exercise Yes    Bike Helmet Yes    Seat Belt Yes    Self-Exams Yes   Social History Narrative    2013: BF in [place]. Attends [employer].    2014: was living in [place]. Now back in [place].     2014: working in [employer]. BF broke up with her Sept. No friends in [place].     2015: working 2 jobs. Has BF.      Social Determinants of Health     Financial Resource Strain: Not on file   Food Insecurity: Not on file   Transportation Needs: Not on file   Physical Activity: Not on file   Stress: Not on file   Social Connections: Not on file   Intimate Partner Violence: Not on file   Housing Stability: Not on file     Current Outpatient Medications   Medication Sig Dispense Refill    fluconazole (DIFLUCAN) 150 MG tablet Take 1 Tablet by mouth every day. 1 Tablet 0    lidocaine (LIDODERM) 5 % Patch Place 1 Patch on the skin every 24 hours. 10 Patch 1    escitalopram (LEXAPRO)  10 MG Tab Take 1 Tablet by mouth 1/2 hour after dinner. 30 Tablet 2    traZODone (DESYREL) 50 MG Tab TAKE 1/2 TO 1 TABLET BY MOUTH AT BEDTIME AS NEEDED FOR SLEEP 90 Tablet 1    methylphenidate (CONCERTA) 54 MG CR tablet Take 1 Tablet by mouth every morning for 30 days. 30 Tablet 0    valacyclovir (VALTREX) 1 GM Tab Take 1 Tablet by mouth 2 times a day as needed (cold sores). 180 Tablet 3    phenazopyridine (PYRIDIUM) 200 MG Tab Take 1 Tablet by mouth 3 times a day as needed (urinary frequency). 6 Tablet 0    albuterol 108 (90 Base) MCG/ACT Aero Soln inhalation aerosol INHALE 2 PUFFS EVERY 4 HOURS AS NEEDED FOR SHORTNESS OF BREATH 6.7 Each 1    buPROPion (WELLBUTRIN XL) 300 MG XL tablet Take 1 Tablet by mouth every morning. 90 Tablet 1    celecoxib (CELEBREX) 100 MG Cap Take 1-2 Capsules by mouth 2 times a day. 60 Capsule 1    tizanidine (ZANAFLEX) 4 MG Tab Take 1 Tablet by mouth every 6 hours as needed (back pain and muscle spasm). 30 Tablet 1    fluconazole (DIFLUCAN) 150 MG tablet Take 1 Tablet by mouth every day. 2 Tablet 0    albuterol 108 (90 Base) MCG/ACT Aero Soln inhalation aerosol INHALE 2 PUFFS EVERY FOUR HOURS AS NEEDED FOR SHORTNESS OF BREATH. 6.7 Each 3    ferrous sulfate 325 (65 Fe) MG tablet Take 325 mg by mouth every day.      levonorgestrel (MIRENA, 52 MG,) 52 mg (20 mcg/24 hr) IUD 1 Each by Intrauterine route one time.      ocrelizumab (OCREVUS) 300 MG/10ML Solution injection Infuse  into a venous catheter.      Clindamycin Phos-Benzoyl Perox (ONEXTON) 1.2-3.75 % Gel 1 Application by Apply externally route every morning. To entire face 30 g 3    Biotin 5000 MCG Cap Take 5,000 mcg by mouth every day at 6 PM. supplement      melatonin 3 MG Tab Take 3 mg by mouth 1 time daily as needed (insomnia).      Cyanocobalamin (VITAMIN B-12) 1000 MCG Tab Take 1,000 mcg by mouth every day. supplement      Cholecalciferol (VITAMIN D3) 5000 units Tab Take 5,000 Units by mouth every day. supplement       No current  "facility-administered medications for this visit.             Review Of Systems  As documented in HPI above  PHYSICAL EXAMINATION:    /56 (BP Location: Right arm, Patient Position: Sitting, BP Cuff Size: Adult)   Pulse 86   Temp 37.3 °C (99.1 °F) (Temporal)   Resp 18   Ht 1.778 m (5' 10\")   Wt 68.9 kg (152 lb)   SpO2 99%   BMI 21.81 kg/m²   Gen.: Well-developed, well-nourished, no apparent distress, pleasant and cooperative with the examination  HEENT: Normocephalic/atraumatic, sNeck: No JVD or bruits, no adenopathy  Cor: Regular rate and rhythm without murmur gallop or rub  Lungs: Clear to auscultation with equal breath sounds bilaterally. No wheezes, rhonchi.  Abdomen: Soft nontender without hepatosplenomegaly or masses appreciated, normoactive bowel sounds  Extremities: No cyanosis, clubbing or edema       ASSESSMENT/Plan:  1. Abnormal stools  New concern, unclear etiology patient showed me photos of her stools, looks like there is membranous tissue like structure around the stool, discussed with the patient possibly inflammatory process.  We will do full work-up and advised to follow-up with gastroenterology to rule out inflammatory bowel disease and for possible lower and upper endoscopy.  Follow-up as needed and directed if any worsening of symptoms patient report back  Referral to Gastroenterology    CULTURE STOOL    Complete O&P    OCCULT BLOOD FECES IMMUNOASSAY      2. Abdominal discomfort  New concern, unclear etiology patient to do full work-up and follow-up as directed, continue with hydration and healthy diet  CBC WITH DIFFERENTIAL    Comp Metabolic Panel    LIPASE    Referral to Gastroenterology      3. Other fatigue  New concern, rule out causes of fatigue.  CBC WITH DIFFERENTIAL    Comp Metabolic Panel    LIPASE    TSH WITH REFLEX TO FT4         Please note that this dictation was created using voice recognition software. I have made every reasonable attempt to correct obvious errors but " there may be errors of grammar and content that I may have overlooked prior to finalization of this note.

## 2023-09-11 ENCOUNTER — HOSPITAL ENCOUNTER (OUTPATIENT)
Facility: MEDICAL CENTER | Age: 33
End: 2023-09-11
Attending: FAMILY MEDICINE
Payer: COMMERCIAL

## 2023-09-11 ENCOUNTER — EH NON-PROVIDER (OUTPATIENT)
Dept: OCCUPATIONAL MEDICINE | Facility: CLINIC | Age: 33
End: 2023-09-11

## 2023-09-11 DIAGNOSIS — R19.5 ABNORMAL STOOLS: ICD-10-CM

## 2023-09-11 DIAGNOSIS — Z02.89 ENCOUNTER FOR OCCUPATIONAL HEALTH ASSESSMENT: ICD-10-CM

## 2023-09-11 PROCEDURE — 87177 OVA AND PARASITES SMEARS: CPT

## 2023-09-11 PROCEDURE — 82274 ASSAY TEST FOR BLOOD FECAL: CPT

## 2023-09-11 PROCEDURE — 94375 RESPIRATORY FLOW VOLUME LOOP: CPT | Performed by: PREVENTIVE MEDICINE

## 2023-09-11 PROCEDURE — 87077 CULTURE AEROBIC IDENTIFY: CPT

## 2023-09-11 PROCEDURE — 87899 AGENT NOS ASSAY W/OPTIC: CPT

## 2023-09-11 PROCEDURE — 87045 FECES CULTURE AEROBIC BACT: CPT

## 2023-09-11 PROCEDURE — 87209 SMEAR COMPLEX STAIN: CPT

## 2023-09-11 PROCEDURE — 87046 STOOL CULTR AEROBIC BACT EA: CPT

## 2023-09-12 ENCOUNTER — OCCUPATIONAL MEDICINE (OUTPATIENT)
Dept: OCCUPATIONAL MEDICINE | Facility: CLINIC | Age: 33
End: 2023-09-12
Payer: COMMERCIAL

## 2023-09-12 VITALS
DIASTOLIC BLOOD PRESSURE: 60 MMHG | SYSTOLIC BLOOD PRESSURE: 110 MMHG | TEMPERATURE: 97.3 F | RESPIRATION RATE: 16 BRPM | HEART RATE: 86 BPM | OXYGEN SATURATION: 96 %

## 2023-09-12 DIAGNOSIS — S56.512A PARTIAL TEAR OF COMMON EXTENSOR TENDON OF LEFT ELBOW: ICD-10-CM

## 2023-09-12 DIAGNOSIS — R19.5 ABNORMAL STOOLS: ICD-10-CM

## 2023-09-12 DIAGNOSIS — M77.8 LEFT ELBOW TENDONITIS: ICD-10-CM

## 2023-09-12 LAB
E COLI SXT1+2 STL IA: NORMAL
SIGNIFICANT IND 70042: NORMAL
SITE SITE: NORMAL
SOURCE SOURCE: NORMAL

## 2023-09-12 PROCEDURE — 3074F SYST BP LT 130 MM HG: CPT | Performed by: NURSE PRACTITIONER

## 2023-09-12 PROCEDURE — 3078F DIAST BP <80 MM HG: CPT | Performed by: NURSE PRACTITIONER

## 2023-09-12 PROCEDURE — 99213 OFFICE O/P EST LOW 20 MIN: CPT | Performed by: NURSE PRACTITIONER

## 2023-09-12 ASSESSMENT — ENCOUNTER SYMPTOMS
TINGLING: 1
WEAKNESS: 1
SENSORY CHANGE: 0
CARDIOVASCULAR NEGATIVE: 1
RESPIRATORY NEGATIVE: 1
PSYCHIATRIC NEGATIVE: 1
MYALGIAS: 1

## 2023-09-12 NOTE — PROGRESS NOTES
Subjective:     Rosita Bowles is a 32 y.o. female who presents for Follow-Up (FU WC DOI 8/27/23 Lt elbow feeling worse rm 16)      upper extremities significant amount at work, moving heavy equipment and patients.  There was no specific trauma or injury, but she just developed sudden left elbow pain.      Today increased to significantly worsening pain.  She continues to have the bruise in the area where she received the lidocaine and Kenalog shot.  She had difficulty straightening her arm this morning was unable to get her shirt on by herself.  She is currently wearing a sleeve and Lidoderm patches which are helping with the symptoms.  Pain continues to be constant, triggered with certain movements, and significantly sensitive to touch.  She is currently working restricted duty.  MRI results reviewed with patient.  Orthopedic appointment for surgery scheduled for October 9.  Plan of care discussed with patient.    Review of Systems   Respiratory: Negative.     Cardiovascular: Negative.    Musculoskeletal:  Positive for joint pain and myalgias.   Skin:         Swelling, bruising, weakness   Neurological:  Positive for tingling and weakness. Negative for sensory change.   Psychiatric/Behavioral: Negative.         [employer]: Works as a [employer] at [employer].  FH: No pertinent family history to this problem.       Objective:     /60 (BP Location: Right arm, Patient Position: Sitting, BP Cuff Size: Adult)   Pulse 86   Temp 36.3 °C (97.3 °F) (Temporal)   Resp 16   SpO2 96%     Constitutional: Patient is in no acute distress. Appears well-developed and well-nourished.   Cardiovascular: Normal rate.    Pulmonary/Chest: Effort normal. No respiratory distress.   Neurological: Patient is alert and oriented to person, place, and time.   Skin: Skin is warm and dry.   Psychiatric: Normal mood and affect. Behavior is normal.     Left elbow: No gross deformity.  There is skin discoloration diffusely to the left elbow.   Mild TTP noted to the lateral epicondyle. Soft tissue swelling noted.  Distal strength and sensation intact.  Pulses 2+ and intact.     MRI left Elbow 7/27/23:     IMPRESSION:        1. High-grade partial-thickness tear of the common extensor tendon with surrounding stranding. There is bone marrow edema in the lateral humeral condyle, secondary to tendon tear.       Assessment/Plan:       1. Left elbow tendonitis    2. Partial tear of common extensor tendon of left elbow    Released to Restricted Duty FROM 9/12/2023 TO    No pushing, pulling, or lifting more than 2 pounds with the left upper extremity only until cleared by Orthopedic surgery    Follow-up in 4 weeks, unless seen by Orthopedics  Restricted duty, per Orthopedics  Orthopedics referral placed, transfer care October 9, 2023  Lidoderm patches as prescribed  Recommend OTC Tylenol/ibuprofen if needed, ice, elevation, and compression sleeve while at work  Return to clinic or ED for new or worsening symptoms    Differential diagnosis, natural history, supportive care, and indications for immediate follow-up discussed.    Approximately 25 minutes was spent in preparing for visit, obtaining history, exam and evaluation, patient counseling/education and post visit documentation/orders.

## 2023-09-14 ENCOUNTER — TELEMEDICINE (OUTPATIENT)
Dept: BEHAVIORAL HEALTH | Facility: CLINIC | Age: 33
End: 2023-09-14
Payer: COMMERCIAL

## 2023-09-14 DIAGNOSIS — F33.41 RECURRENT MAJOR DEPRESSIVE DISORDER, IN PARTIAL REMISSION (HCC): ICD-10-CM

## 2023-09-14 DIAGNOSIS — F90.8 ATTENTION DEFICIT HYPERACTIVITY DISORDER (ADHD), OTHER TYPE: ICD-10-CM

## 2023-09-14 DIAGNOSIS — F41.1 GAD (GENERALIZED ANXIETY DISORDER): ICD-10-CM

## 2023-09-14 LAB
BACTERIA STL CULT: NORMAL
E COLI SXT1+2 STL IA: NORMAL
IMM ASSAY OCC BLD FITOB: NEGATIVE
SIGNIFICANT IND 70042: NORMAL
SITE SITE: NORMAL
SOURCE SOURCE: NORMAL

## 2023-09-14 PROCEDURE — 99214 OFFICE O/P EST MOD 30 MIN: CPT | Mod: 95 | Performed by: PSYCHIATRY & NEUROLOGY

## 2023-09-14 RX ORDER — BUPROPION HYDROCHLORIDE 300 MG/1
300 TABLET ORAL EVERY MORNING
Qty: 90 TABLET | Refills: 1 | Status: SHIPPED | OUTPATIENT
Start: 2023-09-14 | End: 2023-11-02 | Stop reason: SDUPTHER

## 2023-09-14 RX ORDER — METHYLPHENIDATE HYDROCHLORIDE 54 MG/1
54 TABLET ORAL EVERY MORNING
Qty: 30 TABLET | Refills: 0 | Status: SHIPPED | OUTPATIENT
Start: 2023-10-04 | End: 2023-11-03

## 2023-09-14 RX ORDER — METHYLPHENIDATE HYDROCHLORIDE 54 MG/1
54 TABLET ORAL EVERY MORNING
Qty: 30 TABLET | Refills: 0 | Status: SHIPPED | OUTPATIENT
Start: 2023-11-04 | End: 2023-12-04

## 2023-09-14 NOTE — PROGRESS NOTES
This evaluation was conducted via Zoom using secure and encrypted videoconferencing technology. The patient was in their home in the [facility].    The patient's identity was confirmed and verbal consent was obtained for this virtual visit.      PSYCHIATRY FOLLOW-UP NOTE      Name: Rosita Bowles  MRN: [id number]  : 1990  Age: 32 y.o.  Date of assessment: 2023  PCP: Demarco Brian M.D.  Persons in attendance: Patient      REASON FOR VISIT/CHIEF COMPLAINT (as stated by Patient):  Rosita Bowles is a 32 y.o., White female, attending follow-up appointment for mood and anxiety management.      HISTORY OF PRESENT ILLNESS:  Rosita Bowles is a 32 y.o. old female with MDD, JOSE and ADHD comes in today for follow up. Patient was last seen 4 months ago, and following treatment planning recommendations were done:  Continue Lexapro 10 mg daily for mood and anxiety  Continue Wellbutrin  mg daily for depression and anxiety management.   Continue Concerta 54 mg daily for ADHD management.   Add Trazodone 25-50 mg HS PRN for sleep.  Control medication treatment agreement signed in 2021.  Initiate referral for psychotherapy for mood and anxiety management.    Compliant with medications with no side effects. Recent increase in anxiety response due to increased stress from medical condition and work related stress. Upcoming surgery next month. Agreed with increasing lexapro to 15 mg to help with heightened emotional reactivity. Wll reduce lexapro down to 10 mg after stressors have resolved.  Not using Trazodone for sleep.    Patient understand that both Wellbutrin and Concerta can cause anxiety and obsessiveness but patient is on this combination for more than 10 years and prefers to continue them at the same dosage.      CURRENT MEDICATIONS:  Current Outpatient Medications   Medication Sig Dispense Refill    fluconazole (DIFLUCAN) 150 MG tablet Take 1 Tablet by mouth every day.  1 Tablet 0    lidocaine (LIDODERM) 5 % Patch Place 1 Patch on the skin every 24 hours. 10 Patch 1    escitalopram (LEXAPRO) 10 MG Tab Take 1 Tablet by mouth 1/2 hour after dinner. 30 Tablet 2    traZODone (DESYREL) 50 MG Tab TAKE 1/2 TO 1 TABLET BY MOUTH AT BEDTIME AS NEEDED FOR SLEEP 90 Tablet 1    methylphenidate (CONCERTA) 54 MG CR tablet Take 1 Tablet by mouth every morning for 30 days. 30 Tablet 0    valacyclovir (VALTREX) 1 GM Tab Take 1 Tablet by mouth 2 times a day as needed (cold sores). 180 Tablet 3    phenazopyridine (PYRIDIUM) 200 MG Tab Take 1 Tablet by mouth 3 times a day as needed (urinary frequency). 6 Tablet 0    albuterol 108 (90 Base) MCG/ACT Aero Soln inhalation aerosol INHALE 2 PUFFS EVERY 4 HOURS AS NEEDED FOR SHORTNESS OF BREATH 6.7 Each 1    buPROPion (WELLBUTRIN XL) 300 MG XL tablet Take 1 Tablet by mouth every morning. 90 Tablet 1    celecoxib (CELEBREX) 100 MG Cap Take 1-2 Capsules by mouth 2 times a day. 60 Capsule 1    tizanidine (ZANAFLEX) 4 MG Tab Take 1 Tablet by mouth every 6 hours as needed (back pain and muscle spasm). 30 Tablet 1    fluconazole (DIFLUCAN) 150 MG tablet Take 1 Tablet by mouth every day. 2 Tablet 0    albuterol 108 (90 Base) MCG/ACT Aero Soln inhalation aerosol INHALE 2 PUFFS EVERY FOUR HOURS AS NEEDED FOR SHORTNESS OF BREATH. 6.7 Each 3    ferrous sulfate 325 (65 Fe) MG tablet Take 325 mg by mouth every day.      levonorgestrel (MIRENA, 52 MG,) 52 mg (20 mcg/24 hr) IUD 1 Each by Intrauterine route one time.      ocrelizumab (OCREVUS) 300 MG/10ML Solution injection Infuse  into a venous catheter.      Clindamycin Phos-Benzoyl Perox (ONEXTON) 1.2-3.75 % Gel 1 Application by Apply externally route every morning. To entire face 30 g 3    Biotin 5000 MCG Cap Take 5,000 mcg by mouth every day at 6 PM. supplement      melatonin 3 MG Tab Take 3 mg by mouth 1 time daily as needed (insomnia).      Cyanocobalamin (VITAMIN B-12) 1000 MCG Tab Take 1,000 mcg by mouth every day.  supplement      Cholecalciferol (VITAMIN D3) 5000 units Tab Take 5,000 Units by mouth every day. supplement       No current facility-administered medications for this visit.       MEDICAL HISTORY  Past Medical History:   Diagnosis Date    Acne 2012    Acne vulgaris 2017    ADHD 2012    Depression 2012    Multiple sclerosis (HCC) 2016    Plantar wart 2019     Past Surgical History:   Procedure Laterality Date    CERVICAL DISK AND FUSION ANTERIOR  2018    DENTAL EXTRACTION(S)      ORIF, ANKLE      OTHER ORTHOPEDIC SURGERY         PAST PSYCHIATRIC MEDICATIONS  Wellbutrin  Concerta  Temazepam       REVIEW OF SYSTEMS:        Constitutional negative   Eyes negative   Ears/Nose/Mouth/Throat negative   Cardiovascular negative   Respiratory negative   Gastrointestinal negative   Genitourinary negative   Muscular negative   Integumentary negative   Neurological   Multiple sclerosis (under treatment and stable)   Endocrine negative   Hematologic/Lymphatic negative     PHYSICAL EXAMINAION:  Vital signs: There were no vitals taken for this visit.  Musculoskeletal: Normal gait.   Abnormal movements: none      MENTAL STATUS EXAMINATION      General:   - Grooming and hygiene: Casual,   - Apparent distress: tense,   - Behavior: Tense  - Eye Contact:  Good,   - no psychomotor agitation or retardation    - Participation: Active verbal participation  Orientation: Alert and Fully Oriented to person, place and time  Mood: Anxious  Affect: Flexible,  Thought Process: Logical and Goal-directed  Thought Content: Denies suicidal or homicidal ideations, intent or plan   Perception: Denies auditory or visual hallucinations. No delusions noted   Attention span and concentration: Intact   Speech:Rate within normal limits and Volume within normal limits  Language: Appropriate   Insight: Good  Judgment: Good  Recent and remote memory: No gross evidence of memory deficits        DEPRESSION SCREENIN/17/2021     10:30 AM 1/20/2022    10:20 AM 4/26/2023     8:29 AM   Depression Screen (PHQ-2/PHQ-9)   PHQ-2 Total Score   0   PHQ-2 Total Score 1 0    PHQ-9 Total Score   0   PHQ-9 Total Score 10         Interpretation of PHQ-9 Total Score   Score Severity   1-4 No Depression   5-9 Mild Depression   10-14 Moderate Depression   15-19 Moderately Severe Depression   20-27 Severe Depression    CURRENT RISK:       Suicidal: Low       Homicidal: Low       Self-Harm: Low       Relapse: Low       Crisis Safety Plan Reviewed Not Indicated       If evidence of imminent risk is present, intervention/plan:      MEDICAL RECORDS/LABS/DIAGNOSTIC TESTS REVIEWED:  No new lab since last visit     [place]  records -   Reviewed     PLAN:  (1) Major depressive disorder; (2) Generalized anxiety disorder; (3) ADHD  Increase in anxiety  Increase Lexapro to 15 mg daily for mood and anxiety  Continue Wellbutrin  mg daily for depression and anxiety management.   Continue Concerta 54 mg daily for ADHD management.   Control medication treatment agreement signed in June 2021.  Initiate referral for psychotherapy for mood and anxiety management.  Medication options, alternatives (including no medications) and medication risks/benefits/side effects were discussed in detail.  Explained importance of contraceptive measures while on psychotropic medications, educated to let provider know if ever pregnant or wanting to become pregnant. Verbalized understanding.  The patient was advised to call, message provider on [employer], or come in to the clinic if symptoms worsen or if any future questions/issues regarding their medications arise; the patient verbalized understanding and agreement.    The patient was educated to call 911, call the suicide hotline, or go to local ER if having thoughts of suicide or homicide; verbalized understanding.    Billing Coding based on:  [address] based on MDM    Return to clinic in 6-8 weeks or sooner if symptoms worsen.  Next  Appointment: instruction provided on how to make the next appointment.     The proposed treatment plan was discussed with the patient who was provided the opportunity to ask questions and make suggestions regarding alternative treatment. Patient verbalized understanding and expressed agreement with the plan.       Dante Roche M.D.  09/14/23    This note was created using voice recognition software (Dragon). The accuracy of the dictation is limited by the abilities of the software. I have reviewed the note prior to signing, however some errors in grammar and context are still possible. If you have any questions related to this note please do not hesitate to contact our office.

## 2023-09-17 LAB — OVA AND PARASITE, FECAL INTERPRETATION Q0595: NEGATIVE

## 2023-09-19 ENCOUNTER — GYNECOLOGY VISIT (OUTPATIENT)
Dept: OBGYN | Facility: CLINIC | Age: 33
End: 2023-09-19
Payer: COMMERCIAL

## 2023-09-19 ENCOUNTER — HOSPITAL ENCOUNTER (OUTPATIENT)
Facility: MEDICAL CENTER | Age: 33
End: 2023-09-19
Attending: OBSTETRICS & GYNECOLOGY
Payer: COMMERCIAL

## 2023-09-19 ENCOUNTER — APPOINTMENT (OUTPATIENT)
Dept: MEDICAL GROUP | Facility: LAB | Age: 33
End: 2023-09-19
Payer: COMMERCIAL

## 2023-09-19 VITALS
HEIGHT: 70 IN | DIASTOLIC BLOOD PRESSURE: 82 MMHG | SYSTOLIC BLOOD PRESSURE: 103 MMHG | BODY MASS INDEX: 22.19 KG/M2 | WEIGHT: 155 LBS

## 2023-09-19 DIAGNOSIS — Z12.4 SCREENING FOR MALIGNANT NEOPLASM OF CERVIX: ICD-10-CM

## 2023-09-19 DIAGNOSIS — N94.10 DYSPAREUNIA IN FEMALE: ICD-10-CM

## 2023-09-19 DIAGNOSIS — Z11.51 SPECIAL SCREENING EXAMINATION FOR HUMAN PAPILLOMAVIRUS (HPV): ICD-10-CM

## 2023-09-19 PROCEDURE — 3074F SYST BP LT 130 MM HG: CPT | Performed by: OBSTETRICS & GYNECOLOGY

## 2023-09-19 PROCEDURE — 88175 CYTOPATH C/V AUTO FLUID REDO: CPT

## 2023-09-19 PROCEDURE — 99213 OFFICE O/P EST LOW 20 MIN: CPT | Performed by: OBSTETRICS & GYNECOLOGY

## 2023-09-19 PROCEDURE — 3079F DIAST BP 80-89 MM HG: CPT | Performed by: OBSTETRICS & GYNECOLOGY

## 2023-09-19 PROCEDURE — 87624 HPV HI-RISK TYP POOLED RSLT: CPT

## 2023-09-19 RX ORDER — CONJUGATED ESTROGENS 0.62 MG/G
0.5 CREAM VAGINAL DAILY
Qty: 30 G | Refills: 1 | Status: SHIPPED | OUTPATIENT
Start: 2023-09-19 | End: 2023-10-25

## 2023-09-19 ASSESSMENT — FIBROSIS 4 INDEX: FIB4 SCORE: .4992301766027062098

## 2023-09-19 NOTE — PROGRESS NOTES
Rosita Bowles is a 32 y.o.  female who presents for her Annual Gynecologic Exam      HPI Comments: Pt presents for her annual well woman exam.   Patient reports that she has been having some issues after sex.  She reports that for the majority of the time she feels as if her posterior fourchette is tearing with sexual activity.  She does notice blood following sexual intercourse.  Reports been going on for a while now.  No pain during sex but this is bothering her.    Has a Olinda IUD in place.  Not due to be removed until 2025      Patient's last menstrual period was 2023 (exact date).    Review of Systems:   Pertinent positives documented in HPI and all other systems reviewed & are negative    PGYN Hx: As above    All PMH, PSH, allergies, social history and FH reviewed and updated today:  Past Medical History:   Diagnosis Date    Acne 2012    Acne vulgaris 2017    ADHD 2012    Depression 2012    Multiple sclerosis (HCC) 2016    Plantar wart 2019       Past Surgical History:   Procedure Laterality Date    CERVICAL DISK AND FUSION ANTERIOR  2018    DENTAL EXTRACTION(S)      ORIF, ANKLE      OTHER ORTHOPEDIC SURGERY         Medications:   Current Outpatient Medications Ordered in Epic   Medication Sig Dispense Refill    estrogens, conjugated (PREMARIN) 0.625 MG/GM Cream Insert 0.5 g into the vagina every day. 30 g 1    buPROPion (WELLBUTRIN XL) 300 MG XL tablet Take 1 Tablet by mouth every morning. 90 Tablet 1    [START ON 10/4/2023] methylphenidate (CONCERTA) 54 MG CR tablet Take 1 Tablet by mouth every morning for 30 days. 30 Tablet 0    [START ON 2023] methylphenidate (CONCERTA) 54 MG CR tablet Take 1 Tablet by mouth every morning for 30 days. 30 Tablet 0    lidocaine (LIDODERM) 5 % Patch Place 1 Patch on the skin every 24 hours. 10 Patch 1    fluconazole (DIFLUCAN) 150 MG tablet Take 1 Tablet by mouth every day. 1 Tablet 0    methylphenidate  (CONCERTA) 54 MG CR tablet Take 1 Tablet by mouth every morning for 30 days. 30 Tablet 0    valacyclovir (VALTREX) 1 GM Tab Take 1 Tablet by mouth 2 times a day as needed (cold sores). 180 Tablet 3    phenazopyridine (PYRIDIUM) 200 MG Tab Take 1 Tablet by mouth 3 times a day as needed (urinary frequency). 6 Tablet 0    albuterol 108 (90 Base) MCG/ACT Aero Soln inhalation aerosol INHALE 2 PUFFS EVERY 4 HOURS AS NEEDED FOR SHORTNESS OF BREATH 6.7 Each 1    celecoxib (CELEBREX) 100 MG Cap Take 1-2 Capsules by mouth 2 times a day. 60 Capsule 1    tizanidine (ZANAFLEX) 4 MG Tab Take 1 Tablet by mouth every 6 hours as needed (back pain and muscle spasm). 30 Tablet 1    fluconazole (DIFLUCAN) 150 MG tablet Take 1 Tablet by mouth every day. 2 Tablet 0    albuterol 108 (90 Base) MCG/ACT Aero Soln inhalation aerosol INHALE 2 PUFFS EVERY FOUR HOURS AS NEEDED FOR SHORTNESS OF BREATH. 6.7 Each 3    ferrous sulfate 325 (65 Fe) MG tablet Take 325 mg by mouth every day.      levonorgestrel (MIRENA, 52 MG,) 52 mg (20 mcg/24 hr) IUD 1 Each by Intrauterine route one time.      ocrelizumab (OCREVUS) 300 MG/10ML Solution injection Infuse  into a venous catheter.      Clindamycin Phos-Benzoyl Perox (ONEXTON) 1.2-3.75 % Gel 1 Application by Apply externally route every morning. To entire face 30 g 3    Biotin 5000 MCG Cap Take 5,000 mcg by mouth every day at 6 PM. supplement      melatonin 3 MG Tab Take 3 mg by mouth 1 time daily as needed (insomnia).      Cyanocobalamin (VITAMIN B-12) 1000 MCG Tab Take 1,000 mcg by mouth every day. supplement      Cholecalciferol (VITAMIN D3) 5000 units Tab Take 5,000 Units by mouth every day. supplement       No current Epic-ordered facility-administered medications on file.       Patient has no known allergies.    Social History     Socioeconomic History    Marital status:    Occupational History     Comment: [employer] ,    Tobacco Use    Smoking status: Never    Smokeless tobacco: Never  "  Vaping Use    Vaping Use: Never used   Substance and Sexual Activity    Alcohol use: Yes     Alcohol/week: 0.0 oz     Comment: very occasional    Drug use: No    Sexual activity: Not Currently     Partners: Male     Birth control/protection: I.U.D.   Other Topics Concern     Service No    Blood Transfusions No    Caffeine Concern No    Occupational Exposure No    Hobby Hazards No    Sleep Concern Yes    Stress Concern No    Weight Concern No    Special Diet No    Back Care No    Exercise Yes    Bike Helmet Yes    Seat Belt Yes    Self-Exams Yes   Social History Narrative    2013: BF in [place] [employer]. Attends [employer].    2014: was living in [place]. Now back in [place].     2014: working in [employer]. BF broke up with her Sept. No friends in [employer]     2015: working 2 jobs. Has BF.        Family History   Problem Relation Age of Onset    Thyroid Mother     Arthritis Mother         Rheumatoid arthritis     Psychiatric Illness Mother     Diabetes Father         prediabetes     Heart Disease Father     Thyroid Brother     Psychiatric Illness Maternal Aunt         ADHD    Cancer Maternal Aunt         breast ca           Objective:   Vital measurements:  /82 (BP Location: Right arm, Patient Position: Sitting, BP Cuff Size: Adult)   Ht 5' 10\"   Wt 155 lb   Body mass index is 22.24 kg/m². (Goal BM I>18 <25)    Physical Exam   Nursing note and vitals reviewed.  Constitutional: She is oriented to person, place, and time. She appears well-developed and well-nourished. No distress.     Abdominal: Soft. Bowel sounds are normal. She exhibits no distension and no mass. No tenderness. She has no rebound and no guarding.     Genitourinary:  Pelvic exam was performed with patient supine.  External genitalia with no abnormal pigmentation, labial fusion, rashes, tenderness, lesions or injury to the labia bilaterally.  BUS normal  Vagina is pink and moist with no lesions, foul discharge, erythema, tenderness or bleeding. No " foreign body around the vagina or signs of injury.  The posterior fourchette area seems to have healed well, no signs of trauma or bleeding.  Cervix exhibits no motion tenderness, no discharge and no friability, no lesions.   Uterus is 7 cm and not deviated, not enlarged, not fixed and not tender.  Right adnexa displays no mass, no tenderness and no fullness.  Left adnexa displays no mass, no tenderness and no fullness.     Musculoskeletal: Normal range of motion. Non tender. She exhibits no edema and no tenderness.     Lymphadenopathy: She has no cervical or supraclavicular adenopathy.     Neurological: She is alert and oriented to person, place, and time. She exhibits normal muscle tone.     Skin: Skin is warm and dry. No rash noted. She is not diaphoretic. No erythema. No pallor.     Psychiatric: She has a normal mood and affect. Her behavior is normal. Judgment and thought content normal.        Assessment:     1. Dyspareunia in female        2. Screening for malignant neoplasm of cervix  THINPREP PAP WITH HPV      3. Special screening examination for human papillomavirus (HPV)  THINPREP PAP WITH HPV            Plan:   Pap and physical exam performed.  Will call patient with results  Self breast awareness discussed.  Encouraged exercise and proper diet.  Mammograms starting @ age 40 annually.  See medications and orders placed in encounter report.  Follow up in 1 year for annual exam or sooner as needed     As far as the bleeding following sex, discussed the patient about the possibility of using vaginal estrogen to help with elasticity and lubrication on the vaginal area.  We will apply a small amount daily for the next few weeks.  She is to let me know if this improves her symptoms or not.    All questions answered

## 2023-09-19 NOTE — PROGRESS NOTES
Pt here for PAP and IUD check  Pt states she tears really easily  Good# [phone]  Pharmacy verified  LMP 8/21/23

## 2023-09-20 DIAGNOSIS — Z11.51 SPECIAL SCREENING EXAMINATION FOR HUMAN PAPILLOMAVIRUS (HPV): ICD-10-CM

## 2023-09-20 DIAGNOSIS — Z12.4 SCREENING FOR MALIGNANT NEOPLASM OF CERVIX: ICD-10-CM

## 2023-09-22 ENCOUNTER — HOSPITAL ENCOUNTER (OUTPATIENT)
Dept: LAB | Facility: MEDICAL CENTER | Age: 33
End: 2023-09-22
Attending: NURSE PRACTITIONER
Payer: COMMERCIAL

## 2023-09-22 LAB
CYTOLOGIST CVX/VAG CYTO: NORMAL
CYTOLOGY CVX/VAG DOC CYTO: NORMAL
CYTOLOGY CVX/VAG DOC THIN PREP: NORMAL
HPV I/H RISK 4 DNA CVX QL PROBE+SIG AMP: NEGATIVE
NOTE NL11727A: NORMAL
OTHER STN SPEC: NORMAL
STAT OF ADQ CVX/VAG CYTO-IMP: NORMAL

## 2023-09-22 PROCEDURE — 83516 IMMUNOASSAY NONANTIBODY: CPT

## 2023-09-22 PROCEDURE — 36415 COLL VENOUS BLD VENIPUNCTURE: CPT

## 2023-09-22 PROCEDURE — 82784 ASSAY IGA/IGD/IGG/IGM EACH: CPT

## 2023-09-24 LAB — IGA SERPL-MCNC: 81 MG/DL (ref 68–408)

## 2023-09-27 LAB — GLIADIN PEPTIDE+TTG IGA+IGG SER QL IA: 5 UNITS (ref 0–19)

## 2023-10-10 ENCOUNTER — TELEMEDICINE (OUTPATIENT)
Dept: MEDICAL GROUP | Facility: LAB | Age: 33
End: 2023-10-10
Payer: COMMERCIAL

## 2023-10-10 VITALS — HEIGHT: 70 IN | BODY MASS INDEX: 22.19 KG/M2 | WEIGHT: 155 LBS

## 2023-10-10 DIAGNOSIS — Z98.890 H/O ELBOW SURGERY: ICD-10-CM

## 2023-10-10 DIAGNOSIS — K59.00 CONSTIPATION, UNSPECIFIED CONSTIPATION TYPE: ICD-10-CM

## 2023-10-10 PROCEDURE — 99213 OFFICE O/P EST LOW 20 MIN: CPT | Mod: 95 | Performed by: FAMILY MEDICINE

## 2023-10-10 RX ORDER — DOCUSATE SODIUM 100 MG/1
100 CAPSULE, LIQUID FILLED ORAL 2 TIMES DAILY
Qty: 60 CAPSULE | Refills: 1 | Status: SHIPPED | OUTPATIENT
Start: 2023-10-10

## 2023-10-10 ASSESSMENT — FIBROSIS 4 INDEX: FIB4 SCORE: .4992301766027062098

## 2023-10-16 ENCOUNTER — OFFICE VISIT (OUTPATIENT)
Dept: DERMATOLOGY | Facility: IMAGING CENTER | Age: 33
End: 2023-10-16
Payer: COMMERCIAL

## 2023-10-16 DIAGNOSIS — Z12.83 SKIN CANCER SCREENING: ICD-10-CM

## 2023-10-16 DIAGNOSIS — D18.01 CHERRY ANGIOMA: ICD-10-CM

## 2023-10-16 DIAGNOSIS — L30.9 DERMATITIS: ICD-10-CM

## 2023-10-16 DIAGNOSIS — D22.9 MULTIPLE NEVI: ICD-10-CM

## 2023-10-16 DIAGNOSIS — L81.4 LENTIGINES: ICD-10-CM

## 2023-10-16 PROCEDURE — 99213 OFFICE O/P EST LOW 20 MIN: CPT | Performed by: NURSE PRACTITIONER

## 2023-10-16 RX ORDER — TRIAMCINOLONE ACETONIDE 1 MG/G
OINTMENT TOPICAL
Qty: 30 G | Refills: 1 | Status: SHIPPED | OUTPATIENT
Start: 2023-10-16

## 2023-10-16 NOTE — PROGRESS NOTES
DERMATOLOGY NOTE  FOLLOW UP VISIT       Chief complaint: Follow-Up (VALERIA)  Concerns of Dry flaky lips on rt lip approx 2mo. Hx of cold sores        Pt hx of eczema  History of skin cancer: No  History of precancers/actinic keratoses: No  History of biopsies:No  History of blistering/severe sunburns: yes severe once  Family history of skin cancer:No  Family history of atypical moles:No      No Known Allergies     MEDICATIONS:  Medications relevant to specialty reviewed.     REVIEW OF SYSTEMS:   Positive for skin (see HPI)  Negative for fevers and chills       EXAM:  LMP 08/21/2023 (Exact Date)   Constitutional: Well-developed, well-nourished, and in no distress.     A total body skin exam was performed excluding the genitals per patient preference and including the following areas: head (including face), neck, chest, abdomen, groin/buttocks, back, bilateral upper extremities, and bilateral lower extremities with the following pertinent findings listed below and/or in assessment/plan.   of note: L arm not assessed, s/p surgery, ace wrap and brace in place  -Very very few exposed skin of trunk and b/l upper, lower extremities and face with scattered clinically benign light brown reticulated macules all of which were morphologically similar and none of which were suspicious for skin cancer today on exam  -Very few scattered 1-3mm bright red macules and thin papules on the trunk and extremities  -Multiple tan medium brown macules papules scattered over the trunk, face and extremities--All with benign-appearing pigment network patterns on dermoscopy  -Dry cracked lips    IMPRESSION / PLAN:    1. Lentigines  - Benign-appearing nature of lesions discussed during exam.   - Advised to continue to monitor for any return to clinic for new or concerning changes.      2. Multiple nevi  - Benign-appearing nature of lesions discussed during exam.   - Advised to continue to monitor for any return to clinic for new or concerning  changes.  - ABCDE's of melanoma discussed/handout given      3. Cherry angioma  - Benign-appearing nature of lesions discussed during exam.   - Advised to continue to monitor for any return to clinic for new or concerning changes.      4. Dermatitis  Lip licker's dermatitis vs cheilitis of atopic derm  Rx below  Follow up for no improvement  - triamcinolone acetonide (KENALOG) 0.1 % Ointment; AAA twice a day for 1-2 weeks, then can use as needed for no longer than 2 weeks at a time  Dispense: 30 g; Refill: 1    5. Skin cancer screening  Skin cancer education  discussed importance of sun protective clothing, eyewear in addition to the use of broad spectrum sunscreen with SPF 30 or greater, as well as need for reapplication ~every 2 hours when exposed to UVR/handout given  discussed importance following up for any new or changing lesions as noted in handout given, but every 12 months exams in clinic in the setting of dermatologic history  ABCDE's of melanoma discussed/handout given            Please note that this dictation was created using voice recognition software. I have made every reasonable attempt to correct obvious errors, but I expect that there are errors of grammar and possibly content that I did not discover before finalizing the note.      Return to clinic in: Return in about 1 year (around 10/16/2024) for VALERIA. and as needed for any new or changing skin lesions.

## 2023-10-25 ENCOUNTER — OUTPATIENT INFUSION SERVICES (OUTPATIENT)
Dept: ONCOLOGY | Facility: MEDICAL CENTER | Age: 33
End: 2023-10-25
Attending: PSYCHIATRY & NEUROLOGY
Payer: COMMERCIAL

## 2023-10-25 VITALS
HEIGHT: 70 IN | DIASTOLIC BLOOD PRESSURE: 67 MMHG | HEART RATE: 94 BPM | BODY MASS INDEX: 21.97 KG/M2 | RESPIRATION RATE: 18 BRPM | OXYGEN SATURATION: 99 % | WEIGHT: 153.44 LBS | TEMPERATURE: 98.7 F | SYSTOLIC BLOOD PRESSURE: 112 MMHG

## 2023-10-25 DIAGNOSIS — G35 MULTIPLE SCLEROSIS (HCC): ICD-10-CM

## 2023-10-25 PROCEDURE — 96375 TX/PRO/DX INJ NEW DRUG ADDON: CPT

## 2023-10-25 PROCEDURE — 96415 CHEMO IV INFUSION ADDL HR: CPT

## 2023-10-25 PROCEDURE — 700111 HCHG RX REV CODE 636 W/ 250 OVERRIDE (IP): Mod: JZ | Performed by: PSYCHIATRY & NEUROLOGY

## 2023-10-25 PROCEDURE — 96413 CHEMO IV INFUSION 1 HR: CPT

## 2023-10-25 PROCEDURE — 700105 HCHG RX REV CODE 258: Performed by: PSYCHIATRY & NEUROLOGY

## 2023-10-25 RX ORDER — DIPHENHYDRAMINE HYDROCHLORIDE 50 MG/ML
25 INJECTION INTRAMUSCULAR; INTRAVENOUS PRN
OUTPATIENT
Start: 2024-04-19

## 2023-10-25 RX ORDER — ACETAMINOPHEN 325 MG/1
650 TABLET ORAL ONCE
OUTPATIENT
Start: 2024-04-19

## 2023-10-25 RX ORDER — ACETAMINOPHEN 325 MG/1
650 TABLET ORAL ONCE
Status: DISCONTINUED | OUTPATIENT
Start: 2023-10-25 | End: 2023-10-25 | Stop reason: HOSPADM

## 2023-10-25 RX ORDER — SODIUM CHLORIDE 9 MG/ML
INJECTION, SOLUTION INTRAVENOUS CONTINUOUS
OUTPATIENT
Start: 2024-04-19

## 2023-10-25 RX ORDER — METHYLPREDNISOLONE SODIUM SUCCINATE 125 MG/2ML
100 INJECTION, POWDER, LYOPHILIZED, FOR SOLUTION INTRAMUSCULAR; INTRAVENOUS ONCE
Status: COMPLETED | OUTPATIENT
Start: 2023-10-25 | End: 2023-10-25

## 2023-10-25 RX ORDER — METHYLPREDNISOLONE SODIUM SUCCINATE 125 MG/2ML
100 INJECTION, POWDER, LYOPHILIZED, FOR SOLUTION INTRAMUSCULAR; INTRAVENOUS ONCE
OUTPATIENT
Start: 2024-04-19

## 2023-10-25 RX ORDER — CONJUGATED ESTROGENS 0.62 MG/G
CREAM VAGINAL
Qty: 30 G | Refills: 1 | Status: SHIPPED | OUTPATIENT
Start: 2023-10-25

## 2023-10-25 RX ADMIN — DIPHENHYDRAMINE HYDROCHLORIDE 25 MG: 50 INJECTION, SOLUTION INTRAMUSCULAR; INTRAVENOUS at 07:45

## 2023-10-25 RX ADMIN — OCRELIZUMAB 600 MG: 300 INJECTION INTRAVENOUS at 08:20

## 2023-10-25 RX ADMIN — METHYLPREDNISOLONE SODIUM SUCCINATE 100 MG: 125 INJECTION, POWDER, FOR SOLUTION INTRAMUSCULAR; INTRAVENOUS at 07:44

## 2023-10-25 ASSESSMENT — FIBROSIS 4 INDEX: FIB4 SCORE: .4992301766027062098

## 2023-10-25 NOTE — PROGRESS NOTES
Pt arrived ambulatory for Q6 month Ocrevus, denies c/o related to MS, pt has upcoming MRI and neurologist f/u, appts confirmed.  Pt wearing left elbow brace due to recent surgery, starting PT tomorrow.  PIV started with good blood return, premeds given and Ocrevus infused per titration sheet, pt tolerated well.  Observed for one hour after infusion, no reaction noted.  Pt Dc'd home with dad driving, will return in 6 months for next infusion, emailed scheduling to add future appt.

## 2023-10-27 DIAGNOSIS — B37.31 YEAST VAGINITIS: ICD-10-CM

## 2023-10-30 RX ORDER — PHENAZOPYRIDINE HYDROCHLORIDE 200 MG/1
200 TABLET, FILM COATED ORAL 3 TIMES DAILY PRN
Qty: 6 TABLET | Refills: 0 | Status: SHIPPED | OUTPATIENT
Start: 2023-10-30 | End: 2024-03-04

## 2023-10-30 RX ORDER — FLUCONAZOLE 150 MG/1
150 TABLET ORAL DAILY
Qty: 1 TABLET | Refills: 0 | Status: SHIPPED | OUTPATIENT
Start: 2023-10-30 | End: 2023-11-06 | Stop reason: SDUPTHER

## 2023-11-02 ENCOUNTER — TELEMEDICINE (OUTPATIENT)
Dept: BEHAVIORAL HEALTH | Facility: CLINIC | Age: 33
End: 2023-11-02
Payer: COMMERCIAL

## 2023-11-02 DIAGNOSIS — F90.8 ATTENTION DEFICIT HYPERACTIVITY DISORDER (ADHD), OTHER TYPE: ICD-10-CM

## 2023-11-02 DIAGNOSIS — F33.41 RECURRENT MAJOR DEPRESSIVE DISORDER, IN PARTIAL REMISSION (HCC): ICD-10-CM

## 2023-11-02 DIAGNOSIS — F41.1 GAD (GENERALIZED ANXIETY DISORDER): ICD-10-CM

## 2023-11-02 PROCEDURE — 99214 OFFICE O/P EST MOD 30 MIN: CPT | Mod: 95 | Performed by: PSYCHIATRY & NEUROLOGY

## 2023-11-02 PROCEDURE — 90833 PSYTX W PT W E/M 30 MIN: CPT | Mod: 95 | Performed by: PSYCHIATRY & NEUROLOGY

## 2023-11-02 RX ORDER — METHYLPHENIDATE HYDROCHLORIDE 54 MG/1
54 TABLET ORAL EVERY MORNING
Qty: 30 TABLET | Refills: 0 | Status: SHIPPED | OUTPATIENT
Start: 2024-01-07 | End: 2023-12-16

## 2023-11-02 RX ORDER — BUPROPION HYDROCHLORIDE 300 MG/1
300 TABLET ORAL EVERY MORNING
Qty: 90 TABLET | Refills: 1 | Status: SHIPPED | OUTPATIENT
Start: 2023-11-02 | End: 2024-03-12 | Stop reason: SDUPTHER

## 2023-11-02 RX ORDER — ESCITALOPRAM OXALATE 10 MG/1
15 TABLET ORAL EVERY MORNING
Qty: 135 TABLET | Refills: 1 | Status: SHIPPED | OUTPATIENT
Start: 2023-11-02 | End: 2024-01-31

## 2023-11-02 RX ORDER — METHYLPHENIDATE HYDROCHLORIDE 54 MG/1
54 TABLET ORAL EVERY MORNING
Qty: 30 TABLET | Refills: 0 | Status: SHIPPED | OUTPATIENT
Start: 2023-11-06 | End: 2023-12-06

## 2023-11-02 RX ORDER — METHYLPHENIDATE HYDROCHLORIDE 54 MG/1
54 TABLET ORAL EVERY MORNING
Qty: 30 TABLET | Refills: 0 | Status: SHIPPED | OUTPATIENT
Start: 2023-12-07 | End: 2024-01-12 | Stop reason: SDUPTHER

## 2023-11-02 NOTE — PROGRESS NOTES
This evaluation was conducted via Zoom using secure and encrypted videoconferencing technology. The patient was in their home in the [facility].    The patient's identity was confirmed and verbal consent was obtained for this virtual visit.      PSYCHIATRY FOLLOW-UP NOTE      Name: Rosita Bowles  MRN: [id number]  : 1990  Age: 32 y.o.  Date of assessment: 2023  PCP: Demarco Brian M.D.  Persons in attendance: Patient      REASON FOR VISIT/CHIEF COMPLAINT (as stated by Patient):  Rosita Bowles is a 32 y.o., White female, attending follow-up appointment for mood and anxiety management.      HISTORY OF PRESENT ILLNESS:  Rosita Bowles is a 32 y.o. old female with MDD, JOSE and ADHD comes in today for follow up. Patient was last seen 6 weeks ago, and following treatment planning recommendations were done:  Increase Lexapro to 15 mg daily for mood and anxiety  Continue Wellbutrin  mg daily for depression and anxiety management.   Continue Concerta 54 mg daily for ADHD management.   Control medication treatment agreement signed in 2021.  Initiate referral for psychotherapy for mood and anxiety management.      Compliant with recent increase in Lexapro  Reports feeling depressed and anxious in relation to relationship stressors, physical inactivity after arm surgery and winter.  She is able to deal with stressors and interested in not titrating medications further.    Patient understand that both Wellbutrin and Concerta can cause anxiety and obsessiveness but patient is on this combination for more than 10 years and prefers to continue them at the same dosage.    PSYCHOTHERAPY ASPECT OF SESSION (16 MIN):  Session dedicated to letting patient express her feelings related to stressors and validation was provided for appropriate emotional responses.  Emphasis given on focusing on what she have realistic control over.  Importance of doing lifestyle changes including  waking up early morning to do physical activity and getting sunlight exposure was emphasized.  Most part of the later session was dedicated to active listening and implementing supportive psychotherapy.      CURRENT MEDICATIONS:  Current Outpatient Medications   Medication Sig Dispense Refill    fluconazole (DIFLUCAN) 150 MG tablet Take 1 Tablet by mouth every day. 1 Tablet 0    phenazopyridine (PYRIDIUM) 200 MG Tab Take 1 Tablet by mouth 3 times a day as needed (urinary frequency). 6 Tablet 0    PREMARIN 0.625 MG/GM Cream INSERT 0.5 GRAMS VAGINALLY DAILY 30 g 1    triamcinolone acetonide (KENALOG) 0.1 % Ointment AAA twice a day for 1-2 weeks, then can use as needed for no longer than 2 weeks at a time 30 g 1    docusate sodium (COLACE) 100 MG Cap Take 1 Capsule by mouth 2 times a day. 60 Capsule 1    buPROPion (WELLBUTRIN XL) 300 MG XL tablet Take 1 Tablet by mouth every morning. 90 Tablet 1    methylphenidate (CONCERTA) 54 MG CR tablet Take 1 Tablet by mouth every morning for 30 days. 30 Tablet 0    [START ON 11/4/2023] methylphenidate (CONCERTA) 54 MG CR tablet Take 1 Tablet by mouth every morning for 30 days. 30 Tablet 0    lidocaine (LIDODERM) 5 % Patch Place 1 Patch on the skin every 24 hours. 10 Patch 1    valacyclovir (VALTREX) 1 GM Tab Take 1 Tablet by mouth 2 times a day as needed (cold sores). (Patient not taking: Reported on 10/16/2023) 180 Tablet 3    albuterol 108 (90 Base) MCG/ACT Aero Soln inhalation aerosol INHALE 2 PUFFS EVERY 4 HOURS AS NEEDED FOR SHORTNESS OF BREATH 6.7 Each 1    celecoxib (CELEBREX) 100 MG Cap Take 1-2 Capsules by mouth 2 times a day. 60 Capsule 1    tizanidine (ZANAFLEX) 4 MG Tab Take 1 Tablet by mouth every 6 hours as needed (back pain and muscle spasm). 30 Tablet 1    fluconazole (DIFLUCAN) 150 MG tablet Take 1 Tablet by mouth every day. 2 Tablet 0    albuterol 108 (90 Base) MCG/ACT Aero Soln inhalation aerosol INHALE 2 PUFFS EVERY FOUR HOURS AS NEEDED FOR SHORTNESS OF  BREATH. 6.7 Each 3    ferrous sulfate 325 (65 Fe) MG tablet Take 325 mg by mouth every day.      levonorgestrel (MIRENA, 52 MG,) 52 mg (20 mcg/24 hr) IUD 1 Each by Intrauterine route one time.      ocrelizumab (OCREVUS) 300 MG/10ML Solution injection Infuse  into a venous catheter.      Clindamycin Phos-Benzoyl Perox (ONEXTON) 1.2-3.75 % Gel 1 Application by Apply externally route every morning. To entire face 30 g 3    Biotin 5000 MCG Cap Take 5,000 mcg by mouth every day at 6 PM. supplement      melatonin 3 MG Tab Take 3 mg by mouth 1 time daily as needed (insomnia).      Cyanocobalamin (VITAMIN B-12) 1000 MCG Tab Take 1,000 mcg by mouth every day. supplement      Cholecalciferol (VITAMIN D3) 5000 units Tab Take 5,000 Units by mouth every day. supplement       No current facility-administered medications for this visit.       MEDICAL HISTORY  Past Medical History:   Diagnosis Date    Acne 8/7/2012    Acne vulgaris 6/23/2017    ADHD 8/7/2012    Depression 8/7/2012    Multiple sclerosis (HCC) 11/2/2016    Plantar wart 1/9/2019     Past Surgical History:   Procedure Laterality Date    CERVICAL DISK AND FUSION ANTERIOR  08/07/2018    DENTAL EXTRACTION(S)      ORIF, ANKLE      OTHER ORTHOPEDIC SURGERY         PAST PSYCHIATRIC MEDICATIONS  Wellbutrin  Concerta  Temazepam        REVIEW OF SYSTEMS:        Constitutional negative   Eyes negative   Ears/Nose/Mouth/Throat negative   Cardiovascular negative   Respiratory negative   Gastrointestinal negative   Genitourinary negative   Muscular negative   Integumentary negative   Neurological   Multiple sclerosis (under treatment and stable)   Endocrine negative   Hematologic/Lymphatic negative     PHYSICAL EXAMINAION:  Vital signs: There were no vitals taken for this visit.  Musculoskeletal: Normal gait.   Abnormal movements: none      MENTAL STATUS EXAMINATION      General:   - Grooming and hygiene: Casual,   - Apparent distress: none,   - Behavior: Calm  - Eye Contact:   Good,   - no psychomotor agitation or retardation    - Participation: Active verbal participation  Orientation: Alert to person, place and time  Mood: Anxious  Affect: Flexible,  Thought Process: Logical and Goal-directed  Thought Content: Denies suicidal or homicidal ideations, intent or plan   Perception: Denies auditory or visual hallucinations. No delusions noted   Attention span and concentration: Intact   Speech:Rate within normal limits and Volume within normal limits  Language: Appropriate   Insight: Good  Judgment: Good  Recent and remote memory: No gross evidence of memory deficits        DEPRESSION SCREENIN/17/2021    10:30 AM 2022    10:20 AM 2023     8:29 AM   Depression Screen (PHQ-2/PHQ-9)   PHQ-2 Total Score   0   PHQ-2 Total Score 1 0    PHQ-9 Total Score   0   PHQ-9 Total Score 10         Interpretation of PHQ-9 Total Score   Score Severity   1-4 No Depression   5-9 Mild Depression   10-14 Moderate Depression   15-19 Moderately Severe Depression   20-27 Severe Depression    CURRENT RISK:       Suicidal: Low       Homicidal: Low       Self-Harm: Low       Relapse: Low       Crisis Safety Plan Reviewed Not Indicated       If evidence of imminent risk is present, intervention/plan:      MEDICAL RECORDS/LABS/DIAGNOSTIC TESTS REVIEWED:  No new lab since last visit     [place]  records -   Reviewed     PLAN:  (1) Major depressive disorder; (2) Generalized anxiety disorder; (3) ADHD  Slow improvement  Continue Lexapro to 15 mg daily for mood and anxiety  Continue Wellbutrin  mg daily for depression and anxiety management.   Continue Concerta 54 mg daily for ADHD management.   Control medication treatment agreement signed in 2021.  Initiate referral for psychotherapy for mood and anxiety management.  Medication options, alternatives (including no medications) and medication risks/benefits/side effects were discussed in detail.  Explained importance of contraceptive measures  while on psychotropic medications, educated to let provider know if ever pregnant or wanting to become pregnant. Verbalized understanding.  The patient was advised to call, message provider on MyChart, or come in to the clinic if symptoms worsen or if any future questions/issues regarding their medications arise; the patient verbalized understanding and agreement.    The patient was educated to call 911, call the suicide hotline, or go to local ER if having thoughts of suicide or homicide; verbalized understanding.    Billing Coding based on:  02796 based on [facility]  [id number]: based on psychotherapy timing    Return to clinic in 3 months or sooner if symptoms worsen.  Next Appointment: instruction provided on how to make the next appointment.     The proposed treatment plan was discussed with the patient who was provided the opportunity to ask questions and make suggestions regarding alternative treatment. Patient verbalized understanding and expressed agreement with the plan.       Dante Roche M.D.  11/02/23    This note was created using voice recognition software (Dragon). The accuracy of the dictation is limited by the abilities of the software. I have reviewed the note prior to signing, however some errors in grammar and context are still possible. If you have any questions related to this note please do not hesitate to contact our office.

## 2023-11-06 ENCOUNTER — HOSPITAL ENCOUNTER (OUTPATIENT)
Dept: RADIOLOGY | Facility: MEDICAL CENTER | Age: 33
End: 2023-11-06
Attending: PSYCHIATRY & NEUROLOGY
Payer: COMMERCIAL

## 2023-11-06 DIAGNOSIS — G35 MULTIPLE SCLEROSIS (HCC): ICD-10-CM

## 2023-11-06 DIAGNOSIS — B37.31 YEAST VAGINITIS: ICD-10-CM

## 2023-11-06 PROCEDURE — 700117 HCHG RX CONTRAST REV CODE 255: Performed by: PSYCHIATRY & NEUROLOGY

## 2023-11-06 PROCEDURE — A9579 GAD-BASE MR CONTRAST NOS,1ML: HCPCS | Performed by: PSYCHIATRY & NEUROLOGY

## 2023-11-06 PROCEDURE — 70553 MRI BRAIN STEM W/O & W/DYE: CPT

## 2023-11-06 RX ADMIN — GADOTERIDOL 15 ML: 279.3 INJECTION, SOLUTION INTRAVENOUS at 07:41

## 2023-11-07 RX ORDER — FLUCONAZOLE 150 MG/1
150 TABLET ORAL DAILY
Qty: 1 TABLET | Refills: 0 | Status: SHIPPED | OUTPATIENT
Start: 2023-11-07 | End: 2023-12-01 | Stop reason: SDUPTHER

## 2023-11-13 ENCOUNTER — APPOINTMENT (OUTPATIENT)
Dept: NEUROLOGY | Facility: MEDICAL CENTER | Age: 33
End: 2023-11-13
Attending: PSYCHIATRY & NEUROLOGY
Payer: COMMERCIAL

## 2023-12-01 DIAGNOSIS — B37.31 YEAST VAGINITIS: ICD-10-CM

## 2023-12-01 RX ORDER — FLUCONAZOLE 150 MG/1
150 TABLET ORAL DAILY
Qty: 1 TABLET | Refills: 0 | Status: SHIPPED | OUTPATIENT
Start: 2023-12-01 | End: 2024-03-04

## 2023-12-16 ENCOUNTER — OFFICE VISIT (OUTPATIENT)
Dept: URGENT CARE | Facility: PHYSICIAN GROUP | Age: 33
End: 2023-12-16
Payer: COMMERCIAL

## 2023-12-16 VITALS
WEIGHT: 157 LBS | OXYGEN SATURATION: 97 % | TEMPERATURE: 97.8 F | DIASTOLIC BLOOD PRESSURE: 66 MMHG | HEART RATE: 96 BPM | RESPIRATION RATE: 18 BRPM | SYSTOLIC BLOOD PRESSURE: 108 MMHG | BODY MASS INDEX: 22.48 KG/M2 | HEIGHT: 70 IN

## 2023-12-16 DIAGNOSIS — R68.89 FLU-LIKE SYMPTOMS: ICD-10-CM

## 2023-12-16 DIAGNOSIS — B37.31 VAGINAL YEAST INFECTION: ICD-10-CM

## 2023-12-16 DIAGNOSIS — J06.9 VIRAL URI WITH COUGH: ICD-10-CM

## 2023-12-16 LAB
FLUAV RNA SPEC QL NAA+PROBE: NEGATIVE
FLUBV RNA SPEC QL NAA+PROBE: NEGATIVE
RSV RNA SPEC QL NAA+PROBE: NEGATIVE
SARS-COV-2 RNA RESP QL NAA+PROBE: NEGATIVE

## 2023-12-16 PROCEDURE — 99213 OFFICE O/P EST LOW 20 MIN: CPT | Performed by: NURSE PRACTITIONER

## 2023-12-16 PROCEDURE — 3074F SYST BP LT 130 MM HG: CPT | Performed by: NURSE PRACTITIONER

## 2023-12-16 PROCEDURE — 3078F DIAST BP <80 MM HG: CPT | Performed by: NURSE PRACTITIONER

## 2023-12-16 PROCEDURE — 0241U POCT CEPHEID COV-2, FLU A/B, RSV - PCR: CPT | Performed by: NURSE PRACTITIONER

## 2023-12-16 RX ORDER — FLUCONAZOLE 150 MG/1
150 TABLET ORAL DAILY
Qty: 2 TABLET | Refills: 1 | Status: SHIPPED | OUTPATIENT
Start: 2023-12-16 | End: 2024-03-04

## 2023-12-16 ASSESSMENT — ENCOUNTER SYMPTOMS
NAUSEA: 0
HEADACHES: 0
COUGH: 1
FEVER: 0
WHEEZING: 0
CHILLS: 0
EYE DISCHARGE: 0
MYALGIAS: 0
DIARRHEA: 0
SHORTNESS OF BREATH: 0
ORTHOPNEA: 0
SPUTUM PRODUCTION: 1
SORE THROAT: 1

## 2023-12-16 ASSESSMENT — FIBROSIS 4 INDEX: FIB4 SCORE: 0.52

## 2023-12-16 NOTE — PROGRESS NOTES
Subjective     Rosita Bowles is a 33 y.o. female who presents with Cough (Sore throat x10d)            HPI  New problem.  Patient is a 33-year-old female who presents with cough, and nasal congestion since yesterday as well as a sore throat for the past 10 days.  She denies fever, chills, myalgia, nausea, or diarrhea.  She states she was with a group of people last week who have all come down with respiratory symptoms.  She reports that she is immune compromised as she does have MS and is on a biologic medication for this.  She has not really taking any medications for the symptoms.    Patient has no known allergies.  Current Outpatient Medications on File Prior to Visit   Medication Sig Dispense Refill    buPROPion (WELLBUTRIN XL) 300 MG XL tablet Take 1 Tablet by mouth every morning. 90 Tablet 1    escitalopram (LEXAPRO) 10 MG Tab Take 1.5 Tablets by mouth every morning for 90 days. 135 Tablet 1    methylphenidate (CONCERTA) 54 MG CR tablet Take 1 Tablet by mouth every morning for 30 days. 30 Tablet 0    PREMARIN 0.625 MG/GM Cream INSERT 0.5 GRAMS VAGINALLY DAILY 30 g 1    triamcinolone acetonide (KENALOG) 0.1 % Ointment AAA twice a day for 1-2 weeks, then can use as needed for no longer than 2 weeks at a time 30 g 1    docusate sodium (COLACE) 100 MG Cap Take 1 Capsule by mouth 2 times a day. 60 Capsule 1    lidocaine (LIDODERM) 5 % Patch Place 1 Patch on the skin every 24 hours. 10 Patch 1    valacyclovir (VALTREX) 1 GM Tab Take 1 Tablet by mouth 2 times a day as needed (cold sores). 180 Tablet 3    albuterol 108 (90 Base) MCG/ACT Aero Soln inhalation aerosol INHALE 2 PUFFS EVERY 4 HOURS AS NEEDED FOR SHORTNESS OF BREATH 6.7 Each 1    celecoxib (CELEBREX) 100 MG Cap Take 1-2 Capsules by mouth 2 times a day. 60 Capsule 1    tizanidine (ZANAFLEX) 4 MG Tab Take 1 Tablet by mouth every 6 hours as needed (back pain and muscle spasm). 30 Tablet 1    levonorgestrel (MIRENA, 52 MG,) 52 mg (20 mcg/24 hr) IUD 1  Each by Intrauterine route one time.      ocrelizumab (OCREVUS) 300 MG/10ML Solution injection Infuse  into a venous catheter.      Clindamycin Phos-Benzoyl Perox (ONEXTON) 1.2-3.75 % Gel 1 Application by Apply externally route every morning. To entire face 30 g 3    Biotin 5000 MCG Cap Take 5,000 mcg by mouth every day at 6 PM. supplement      melatonin 3 MG Tab Take 3 mg by mouth 1 time daily as needed (insomnia).      Cyanocobalamin (VITAMIN B-12) 1000 MCG Tab Take 1,000 mcg by mouth every day. supplement      Cholecalciferol (VITAMIN D3) 5000 units Tab Take 5,000 Units by mouth every day. supplement      fluconazole (DIFLUCAN) 150 MG tablet Take 1 Tablet by mouth every day. 1 Tablet 0    phenazopyridine (PYRIDIUM) 200 MG Tab Take 1 Tablet by mouth 3 times a day as needed (urinary frequency). 6 Tablet 0    fluconazole (DIFLUCAN) 150 MG tablet Take 1 Tablet by mouth every day. 2 Tablet 0    albuterol 108 (90 Base) MCG/ACT Aero Soln inhalation aerosol INHALE 2 PUFFS EVERY FOUR HOURS AS NEEDED FOR SHORTNESS OF BREATH. 6.7 Each 3     No current facility-administered medications on file prior to visit.     Social History     Socioeconomic History    Marital status:      Spouse name: Not on file    Number of children: Not on file    Years of education: Not on file    Highest education level: Not on file   Occupational History     Comment: [employer] ,    Tobacco Use    Smoking status: Never    Smokeless tobacco: Never   Vaping Use    Vaping Use: Never used   Substance and Sexual Activity    Alcohol use: Yes     Alcohol/week: 0.0 oz     Comment: very occasional    Drug use: No    Sexual activity: Not Currently     Partners: Male     Birth control/protection: I.U.D.   Other Topics Concern     Service No    Blood Transfusions No    Caffeine Concern No    Occupational Exposure No    Hobby Hazards No    Sleep Concern Yes    Stress Concern No    Weight Concern No    Special Diet No    Back Care No     "Exercise Yes    Bike Helmet Yes    Seat Belt Yes    Self-Exams Yes   Social History Narrative    2013: BF in [place]. Attends [email or web address].    2014: was living in [place]. Now back in [place].     2014: working in [employer]. BF broke up with her Sept. No friends in [place].     2015: working 2 jobs. Has BF.      Social Determinants of Health     Financial Resource Strain: Not on file   Food Insecurity: Not on file   Transportation Needs: Not on file   Physical Activity: Not on file   Stress: Not on file   Social Connections: Not on file   Intimate Partner Violence: Not on file   Housing Stability: Not on file     Breast Cancer-related family history is not on file.      Review of Systems   Constitutional:  Positive for malaise/fatigue. Negative for chills and fever.   HENT:  Positive for congestion and sore throat.    Eyes:  Negative for discharge.   Respiratory:  Positive for cough and sputum production. Negative for shortness of breath and wheezing.    Cardiovascular:  Negative for chest pain and orthopnea.   Gastrointestinal:  Negative for diarrhea and nausea.   Musculoskeletal:  Negative for myalgias.   Neurological:  Negative for headaches.   Endo/Heme/Allergies:  Negative for environmental allergies.   All other systems reviewed and are negative.             Objective     /66   Pulse 96   Temp 36.6 °C (97.8 °F) (Temporal)   Resp 18   Ht 1.778 m (5' 10\")   Wt 71.2 kg (157 lb)   SpO2 97%   BMI 22.53 kg/m²      Physical Exam  Constitutional:       General: She is not in acute distress.     Appearance: Normal appearance. She is well-developed. She is not ill-appearing.   HENT:      Head: Normocephalic.      Right Ear: Tympanic membrane and external ear normal.      Left Ear: Tympanic membrane and external ear normal.      Nose: Mucosal edema, congestion and rhinorrhea present.      Mouth/Throat:      Pharynx: No posterior oropharyngeal erythema.   Eyes:      General:         Right eye: No discharge.         Left " eye: No discharge.      Conjunctiva/sclera: Conjunctivae normal.   Cardiovascular:      Rate and Rhythm: Normal rate and regular rhythm.      Heart sounds: Normal heart sounds.   Pulmonary:      Effort: Pulmonary effort is normal.      Breath sounds: Normal breath sounds.   Genitourinary:     Comments: Patient requesting refill on diflucan.  Musculoskeletal:         General: Normal range of motion.      Cervical back: Normal range of motion and neck supple.   Lymphadenopathy:      Cervical: No cervical adenopathy.   Skin:     General: Skin is warm and dry.   Neurological:      Mental Status: She is alert and oriented to person, place, and time.   Psychiatric:         Behavior: Behavior normal.         Thought Content: Thought content normal.                             Assessment & Plan        1. Viral URI with cough        2. Flu-like symptoms  POCT CEPHEID COV-2, FLU A/B, RSV - PCR      3. Vaginal yeast infection  fluconazole (DIFLUCAN) 150 MG tablet        Viral testing negative.   Viral illness at this time with no indication for antibiotics. Reviewed with patient expected course of illness and also reviewed OTC medications that may be used for symptom relief. Follow up 7-10 days if not improving.  Patient given refill on diflucan for her yeast infection.

## 2024-01-12 ENCOUNTER — PATIENT MESSAGE (OUTPATIENT)
Dept: BEHAVIORAL HEALTH | Facility: CLINIC | Age: 34
End: 2024-01-12
Payer: COMMERCIAL

## 2024-01-12 DIAGNOSIS — F90.8 ATTENTION DEFICIT HYPERACTIVITY DISORDER (ADHD), OTHER TYPE: ICD-10-CM

## 2024-01-12 RX ORDER — METHYLPHENIDATE HYDROCHLORIDE 54 MG/1
54 TABLET ORAL EVERY MORNING
Qty: 30 TABLET | Refills: 0 | Status: SHIPPED | OUTPATIENT
Start: 2024-01-16 | End: 2024-02-15

## 2024-01-15 PROCEDURE — RXMED WILLOW AMBULATORY MEDICATION CHARGE: Performed by: PSYCHIATRY & NEUROLOGY

## 2024-01-16 ENCOUNTER — PHARMACY VISIT (OUTPATIENT)
Dept: PHARMACY | Facility: MEDICAL CENTER | Age: 34
End: 2024-01-16
Payer: COMMERCIAL

## 2024-02-16 ENCOUNTER — OFFICE VISIT (OUTPATIENT)
Dept: MEDICAL GROUP | Facility: LAB | Age: 34
End: 2024-02-16
Payer: COMMERCIAL

## 2024-02-16 VITALS
DIASTOLIC BLOOD PRESSURE: 76 MMHG | SYSTOLIC BLOOD PRESSURE: 104 MMHG | BODY MASS INDEX: 23.19 KG/M2 | WEIGHT: 162 LBS | HEART RATE: 71 BPM | TEMPERATURE: 98.9 F | OXYGEN SATURATION: 97 % | RESPIRATION RATE: 12 BRPM | HEIGHT: 70 IN

## 2024-02-16 DIAGNOSIS — R22.33 LUMP IN ARMPIT, BILATERAL: ICD-10-CM

## 2024-02-16 PROCEDURE — 3078F DIAST BP <80 MM HG: CPT | Performed by: FAMILY MEDICINE

## 2024-02-16 PROCEDURE — 3074F SYST BP LT 130 MM HG: CPT | Performed by: FAMILY MEDICINE

## 2024-02-16 PROCEDURE — 99213 OFFICE O/P EST LOW 20 MIN: CPT | Performed by: FAMILY MEDICINE

## 2024-02-16 ASSESSMENT — FIBROSIS 4 INDEX: FIB4 SCORE: 0.52

## 2024-02-16 NOTE — PROGRESS NOTES
Chief Complaint:   Chief Complaint   Patient presents with    Bump     Bilat lumps on each side by breasts       HPI: Established patient Rosita Bowles is a 33 y.o. female who presents for evaluation of the following today:    Lump in armpit, bilateral  New concern  Patient said for the past few weeks has been noticing as if she is having lumps on her pectoral muscles near the breast and the axillary region, would like them to be checked.  Denies pain or discomfort denies lumps in the breast.  Denies nipple discharge    Patient is concerned because she has strong family history of breast cancer.  Denies history of trauma, no history of recent vaccination          Past medical history, family history, social history and medications reviewed and updated in the record.  Today  Current medications, problem list and allergies reviewed in [facility] today  Health maintenance topics are reviewed and updated.    Patient Active Problem List    Diagnosis Date Noted    Dyspareunia in female 09/19/2023    Rash 08/22/2022    JOSE (generalized anxiety disorder) 06/17/2021    Establishing care with new doctor, encounter for 03/18/2021    Health care maintenance 03/18/2021    Other viral warts 04/02/2019    Plantar wart 01/09/2019    Cervical disc disorder at C6-C7 level with radiculopathy 07/10/2018    Primary insomnia 10/08/2017    HSV-1 (herpes simplex virus 1) infection 06/23/2017    Sinus pressure 05/03/2017    Multiple sclerosis (HCC) 11/02/2016    Seasonal allergies 05/31/2013    Recurrent major depressive disorder, in partial remission (HCC) 08/07/2012    ADHD 08/07/2012     Family History   Problem Relation Age of Onset    Thyroid Mother     Arthritis Mother         Rheumatoid arthritis     Psychiatric Illness Mother     Diabetes Father         prediabetes     Heart Disease Father     Thyroid Brother     Psychiatric Illness Maternal Aunt         ADHD    Cancer Maternal Aunt         breast ca      Social History      Socioeconomic History    Marital status:      Spouse name: Not on file    Number of children: Not on file    Years of education: Not on file    Highest education level: Not on file   Occupational History     Comment: [employer] ,    Tobacco Use    Smoking status: Never    Smokeless tobacco: Never   Vaping Use    Vaping Use: Never used   Substance and Sexual Activity    Alcohol use: Yes     Alcohol/week: 0.0 oz     Comment: very occasional    Drug use: No    Sexual activity: Not Currently     Partners: Male     Birth control/protection: I.U.D.   Other Topics Concern     Service No    Blood Transfusions No    Caffeine Concern No    Occupational Exposure No    Hobby Hazards No    Sleep Concern Yes    Stress Concern No    Weight Concern No    Special Diet No    Back Care No    Exercise Yes    Bike Helmet Yes    Seat Belt Yes    Self-Exams Yes   Social History Narrative    2013: BF in [place]. Attends [employer].    2014: was living in [place]. Now back in [place].     2014: working in [employer]. BF broke up with her Sept. No friends in [place].     2015: working 2 jobs. Has BF.      Social Determinants of Health     Financial Resource Strain: Not on file   Food Insecurity: Not on file   Transportation Needs: Not on file   Physical Activity: Not on file   Stress: Not on file   Social Connections: Not on file   Intimate Partner Violence: Not on file   Housing Stability: Not on file     Current Outpatient Medications   Medication Sig Dispense Refill    fluconazole (DIFLUCAN) 150 MG tablet Take 1 Tablet by mouth every day. 2 Tablet 1    fluconazole (DIFLUCAN) 150 MG tablet Take 1 Tablet by mouth every day. 1 Tablet 0    buPROPion (WELLBUTRIN XL) 300 MG XL tablet Take 1 Tablet by mouth every morning. 90 Tablet 1    phenazopyridine (PYRIDIUM) 200 MG Tab Take 1 Tablet by mouth 3 times a day as needed (urinary frequency). 6 Tablet 0    PREMARIN 0.625 MG/GM Cream INSERT 0.5 GRAMS VAGINALLY DAILY 30 g 1     triamcinolone acetonide (KENALOG) 0.1 % Ointment AAA twice a day for 1-2 weeks, then can use as needed for no longer than 2 weeks at a time 30 g 1    docusate sodium (COLACE) 100 MG Cap Take 1 Capsule by mouth 2 times a day. 60 Capsule 1    lidocaine (LIDODERM) 5 % Patch Place 1 Patch on the skin every 24 hours. 10 Patch 1    valacyclovir (VALTREX) 1 GM Tab Take 1 Tablet by mouth 2 times a day as needed (cold sores). 180 Tablet 3    albuterol 108 (90 Base) MCG/ACT Aero Soln inhalation aerosol INHALE 2 PUFFS EVERY 4 HOURS AS NEEDED FOR SHORTNESS OF BREATH 6.7 Each 1    celecoxib (CELEBREX) 100 MG Cap Take 1-2 Capsules by mouth 2 times a day. 60 Capsule 1    tizanidine (ZANAFLEX) 4 MG Tab Take 1 Tablet by mouth every 6 hours as needed (back pain and muscle spasm). 30 Tablet 1    fluconazole (DIFLUCAN) 150 MG tablet Take 1 Tablet by mouth every day. 2 Tablet 0    albuterol 108 (90 Base) MCG/ACT Aero Soln inhalation aerosol INHALE 2 PUFFS EVERY FOUR HOURS AS NEEDED FOR SHORTNESS OF BREATH. 6.7 Each 3    levonorgestrel (MIRENA, 52 MG,) 52 mg (20 mcg/24 hr) IUD 1 Each by Intrauterine route one time.      ocrelizumab (OCREVUS) 300 MG/10ML Solution injection Infuse  into a venous catheter.      Clindamycin Phos-Benzoyl Perox (ONEXTON) 1.2-3.75 % Gel 1 Application by Apply externally route every morning. To entire face 30 g 3    Biotin 5000 MCG Cap Take 5,000 mcg by mouth every day at 6 PM. supplement      melatonin 3 MG Tab Take 3 mg by mouth 1 time daily as needed (insomnia).      Cyanocobalamin (VITAMIN B-12) 1000 MCG Tab Take 1,000 mcg by mouth every day. supplement      Cholecalciferol (VITAMIN D3) 5000 units Tab Take 5,000 Units by mouth every day. supplement       No current facility-administered medications for this visit.           Review Of Systems  As documented in HPI above  PHYSICAL EXAMINATION:    /76 (BP Location: Right arm, Patient Position: Sitting, BP Cuff Size: Adult)   Pulse 71   Temp 37.2 °C  "(98.9 °F)   Resp 12   Ht 1.778 m (5' 10\")   Wt 73.5 kg (162 lb)   SpO2 97%   BMI 23.24 kg/m²   Gen.: Well-developed, well-nourished, no apparent distress, pleasant and cooperative with the examination  HEENT: Normocephalic/atraumatic,     Bilateral breast exam/axillary exam: No evidence of lumps or adenopathy no cervical or supraclavicular or axillary adenopathy.  No masses in the breast no nipple discharge.    ASSESSMENT/Plan:  1. Lump in armpit, bilateral  New concern, benign clinical exam, patient was reassured, but because of her strong family history of breast cancer and the patient concern will order an ultrasound for further evaluation.  Follow-up as directed  US-BREAST BILAT-COMPLETE           "

## 2024-03-01 DIAGNOSIS — G35 MULTIPLE SCLEROSIS (HCC): ICD-10-CM

## 2024-03-04 ENCOUNTER — OFFICE VISIT (OUTPATIENT)
Dept: NEUROLOGY | Facility: MEDICAL CENTER | Age: 34
End: 2024-03-04
Attending: PSYCHIATRY & NEUROLOGY
Payer: COMMERCIAL

## 2024-03-04 ENCOUNTER — HOSPITAL ENCOUNTER (OUTPATIENT)
Dept: RADIOLOGY | Facility: MEDICAL CENTER | Age: 34
End: 2024-03-04
Attending: FAMILY MEDICINE
Payer: COMMERCIAL

## 2024-03-04 ENCOUNTER — HOSPITAL ENCOUNTER (OUTPATIENT)
Dept: LAB | Facility: MEDICAL CENTER | Age: 34
End: 2024-03-04
Attending: PSYCHIATRY & NEUROLOGY
Payer: COMMERCIAL

## 2024-03-04 VITALS
TEMPERATURE: 97.7 F | HEART RATE: 107 BPM | SYSTOLIC BLOOD PRESSURE: 116 MMHG | HEIGHT: 70 IN | DIASTOLIC BLOOD PRESSURE: 50 MMHG | WEIGHT: 160.27 LBS | OXYGEN SATURATION: 95 % | BODY MASS INDEX: 22.95 KG/M2

## 2024-03-04 DIAGNOSIS — G35 MULTIPLE SCLEROSIS (HCC): ICD-10-CM

## 2024-03-04 DIAGNOSIS — M50.123 CERVICAL DISC DISORDER AT C6-C7 LEVEL WITH RADICULOPATHY: ICD-10-CM

## 2024-03-04 DIAGNOSIS — R22.33 LUMP IN ARMPIT, BILATERAL: ICD-10-CM

## 2024-03-04 LAB
ALBUMIN SERPL BCP-MCNC: 4.3 G/DL (ref 3.2–4.9)
ALBUMIN/GLOB SERPL: 2 G/DL
ALP SERPL-CCNC: 65 U/L (ref 30–99)
ALT SERPL-CCNC: 14 U/L (ref 2–50)
ANION GAP SERPL CALC-SCNC: 11 MMOL/L (ref 7–16)
AST SERPL-CCNC: 17 U/L (ref 12–45)
BASOPHILS # BLD AUTO: 0.6 % (ref 0–1.8)
BASOPHILS # BLD: 0.04 K/UL (ref 0–0.12)
BILIRUB SERPL-MCNC: 0.4 MG/DL (ref 0.1–1.5)
BUN SERPL-MCNC: 18 MG/DL (ref 8–22)
CALCIUM ALBUM COR SERPL-MCNC: 8.7 MG/DL (ref 8.5–10.5)
CALCIUM SERPL-MCNC: 8.9 MG/DL (ref 8.5–10.5)
CHLORIDE SERPL-SCNC: 105 MMOL/L (ref 96–112)
CO2 SERPL-SCNC: 24 MMOL/L (ref 20–33)
CREAT SERPL-MCNC: 0.98 MG/DL (ref 0.5–1.4)
EOSINOPHIL # BLD AUTO: 0.17 K/UL (ref 0–0.51)
EOSINOPHIL NFR BLD: 2.4 % (ref 0–6.9)
ERYTHROCYTE [DISTWIDTH] IN BLOOD BY AUTOMATED COUNT: 45.1 FL (ref 35.9–50)
GFR SERPLBLD CREATININE-BSD FMLA CKD-EPI: 79 ML/MIN/1.73 M 2
GLOBULIN SER CALC-MCNC: 2.1 G/DL (ref 1.9–3.5)
GLUCOSE SERPL-MCNC: 88 MG/DL (ref 65–99)
HCT VFR BLD AUTO: 42.1 % (ref 37–47)
HGB BLD-MCNC: 14.2 G/DL (ref 12–16)
IMM GRANULOCYTES # BLD AUTO: 0.01 K/UL (ref 0–0.11)
IMM GRANULOCYTES NFR BLD AUTO: 0.1 % (ref 0–0.9)
LYMPHOCYTES # BLD AUTO: 3.54 K/UL (ref 1–4.8)
LYMPHOCYTES NFR BLD: 50.1 % (ref 22–41)
MCH RBC QN AUTO: 29.6 PG (ref 27–33)
MCHC RBC AUTO-ENTMCNC: 33.7 G/DL (ref 32.2–35.5)
MCV RBC AUTO: 87.7 FL (ref 81.4–97.8)
MONOCYTES # BLD AUTO: 0.68 K/UL (ref 0–0.85)
MONOCYTES NFR BLD AUTO: 9.6 % (ref 0–13.4)
NEUTROPHILS # BLD AUTO: 2.63 K/UL (ref 1.82–7.42)
NEUTROPHILS NFR BLD: 37.2 % (ref 44–72)
NRBC # BLD AUTO: 0 K/UL
NRBC BLD-RTO: 0 /100 WBC (ref 0–0.2)
PLATELET # BLD AUTO: 354 K/UL (ref 164–446)
PMV BLD AUTO: 9.8 FL (ref 9–12.9)
POTASSIUM SERPL-SCNC: 4 MMOL/L (ref 3.6–5.5)
PROT SERPL-MCNC: 6.4 G/DL (ref 6–8.2)
RBC # BLD AUTO: 4.8 M/UL (ref 4.2–5.4)
SODIUM SERPL-SCNC: 140 MMOL/L (ref 135–145)
WBC # BLD AUTO: 7.1 K/UL (ref 4.8–10.8)

## 2024-03-04 PROCEDURE — 3074F SYST BP LT 130 MM HG: CPT | Performed by: PSYCHIATRY & NEUROLOGY

## 2024-03-04 PROCEDURE — 86360 T CELL ABSOLUTE COUNT/RATIO: CPT

## 2024-03-04 PROCEDURE — 85025 COMPLETE CBC W/AUTO DIFF WBC: CPT

## 2024-03-04 PROCEDURE — 80053 COMPREHEN METABOLIC PANEL: CPT

## 2024-03-04 PROCEDURE — 86357 NK CELLS TOTAL COUNT: CPT

## 2024-03-04 PROCEDURE — G0279 TOMOSYNTHESIS, MAMMO: HCPCS

## 2024-03-04 PROCEDURE — 36415 COLL VENOUS BLD VENIPUNCTURE: CPT

## 2024-03-04 PROCEDURE — 86359 T CELLS TOTAL COUNT: CPT

## 2024-03-04 PROCEDURE — 82784 ASSAY IGA/IGD/IGG/IGM EACH: CPT

## 2024-03-04 PROCEDURE — 99212 OFFICE O/P EST SF 10 MIN: CPT | Performed by: PSYCHIATRY & NEUROLOGY

## 2024-03-04 PROCEDURE — 99215 OFFICE O/P EST HI 40 MIN: CPT | Performed by: PSYCHIATRY & NEUROLOGY

## 2024-03-04 PROCEDURE — 86355 B CELLS TOTAL COUNT: CPT

## 2024-03-04 PROCEDURE — 76642 ULTRASOUND BREAST LIMITED: CPT | Mod: LT,RT

## 2024-03-04 PROCEDURE — 3078F DIAST BP <80 MM HG: CPT | Performed by: PSYCHIATRY & NEUROLOGY

## 2024-03-04 RX ORDER — ESCITALOPRAM OXALATE 10 MG/1
1 TABLET ORAL
COMMUNITY
End: 2024-03-12 | Stop reason: SDUPTHER

## 2024-03-04 RX ORDER — DICYCLOMINE HYDROCHLORIDE 10 MG/1
CAPSULE ORAL
COMMUNITY
Start: 2023-09-19

## 2024-03-04 RX ORDER — ACETAMINOPHEN 500 MG
TABLET ORAL
COMMUNITY
Start: 2023-10-03

## 2024-03-04 RX ORDER — METHYLPHENIDATE HYDROCHLORIDE 54 MG/1
TABLET ORAL
COMMUNITY
Start: 2024-02-15 | End: 2024-03-12 | Stop reason: SDUPTHER

## 2024-03-04 ASSESSMENT — ENCOUNTER SYMPTOMS
TREMORS: 0
FALLS: 0
FOCAL WEAKNESS: 0
MEMORY LOSS: 0
WEIGHT LOSS: 0
CONSTIPATION: 0
SENSORY CHANGE: 0

## 2024-03-04 ASSESSMENT — FIBROSIS 4 INDEX: FIB4 SCORE: 0.52

## 2024-03-04 ASSESSMENT — PATIENT HEALTH QUESTIONNAIRE - PHQ9: CLINICAL INTERPRETATION OF PHQ2 SCORE: 0

## 2024-03-04 NOTE — PROGRESS NOTES
Subjective     Rosita Bowles is a 33 y.o. 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     HPI    Last seen in November, 2022, at that time Rosita had been having a sustained bout of visual distortion, she had undergone MRI of the brain and orbits with and without contrast, there was no enhancement of the optic nerves and the former, the latter showed a stable burden of disease.  The visual episodes improved.  Since then she has had fluctuating visual distortions in the left eye where she had suffered from optic neuritis years prior, these always resolved within a matter of hours.  They seem to be associated with increased fatigue, elevated stress levels, etc.    In general, from a disease standpoint, she has had no new complaints develop.  There was a brief interval of increasing urinary urgency and frequency, she had been placed on Pyridium for this late last year, urinary habits improved.  She has had no major changes in bowel habit though there was a change in bowel consistency, she underwent colonoscopy, she is pending follow-up.    She denies Lhermitte phenomena, she does have some tingling over the cervical spine surgical site, there is no radiation into the upper extremities.  There is no compressive feeling around the upper thorax, bulbar function has been maintained.  She denies focal motor or sensory distortions on the right side, no malaise or fatigue of note.  Balance has been stable throughout.    She remains on Ocrevus 600 mg infusions every 6 months, her next in April.  Blood work for immunoglobulin and CD20 subset is pending.  Historically they have been stable.  She has not been on any other symptomatic relief from my standpoint.  MRI of the brain with and without contrast this last November, 2023, revealed stable burdens of disease, still with a left hemispheric predominance and a slight  "asymmetry of left hemispheric atrophy.  MRI of the cervical spine with and without contrast from January, 2022, was also stable with resolution of the previously identified spinal cord signal abnormality, and the postoperative changes at the C6/7 level with ACDF.    Medical, surgical and family histories are reviewed, there are no new drug allergies.  Socially, she and Manjeet are having a very difficult time right now, she has had to move out and in with her parents which presents its own set of challenges.  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  She is on Zanaflex 4 mg as needed but has not had to use this in quite some time.  She also has Colace, is on Wellbutrin  mg daily, Diflucan 150 mg daily, Celebrex 100 mg, 1-2 tablets daily, vitamin D with calcium, vitamin B12 supplement, then till 10 mg daily, Lexapro now 10 milligram daily, Concerta 54 mg daily, Mirena IUD and Premarin cream.    Review of Systems   Constitutional:  Negative for malaise/fatigue and weight loss.   Gastrointestinal:  Negative for constipation.   Genitourinary:  Positive for frequency.   Musculoskeletal:  Negative for falls.   Neurological:  Negative for tremors, sensory change and focal weakness.   Psychiatric/Behavioral:  Negative for memory loss.    All other systems reviewed and are negative.    Objective     /50 (BP Location: Right arm, Patient Position: Sitting, BP Cuff Size: Adult)   Pulse (!) 107   Temp 36.5 °C (97.7 °F) (Temporal)   Ht 1.778 m (5' 10\")   Wt 72.7 kg (160 lb 4.4 oz)   SpO2 95%   BMI 23.00 kg/m²      Physical Exam    She appears in no acute distress.  As always, very pleasant in spirit and demeanor, very cooperative.  Vital signs are stable.  There is no malar rash or jaw claudication.  The neck is supple, range of motion is full, Lhermitte's phenomena is absent.  Cardiac evaluation " reveals a regular rhythm.  Straight leg raising is negative bilaterally.     Neurological Exam    Fully oriented, there is no aphasia, apraxia, or inattention.    PERRLA/EOMI, visual field testing still reveals the inferior, altitudinal deficit on finger counting OS, visual fields are full OD.  Facial movements are symmetric, sensory exam is intact to light touch, temperature and pinprick bilaterally.  The tongue and uvula are midline, there is no bulbar dysfunction.  Jaw movements are intact.  Shoulder shrug and head rotation are normal.    Musculoskeletal exam reveals normal tone throughout, there is no tremor, asterixis, or drift.  Strength is intact in all 4 extremities, attention paid to the right upper extremity distally and both lower extremities in their entirety.    Reflexes are very brisk throughout, the left lower extremity brisker than the right at all points.  There was spread in the lower extremities.  There is 1 beat of ankle clonus bilaterally.  The toes are equivocal bilaterally.    She stands easily, gait is normal and station and stride length, heel, toe, and tandem walking are normal.  There is no appendicular dystaxia with any of the extremities.  Repetitive movements and fine motor control are also normal and symmetric throughout.    Sensory exam is intact to vibration, temperature and pinprick in all 4 extremities except for mild subjective decrement of pin in the left lower extremity when compared to the right.  There is no spinal cord sensory level to temperature vibration.    Assessment & Plan     1. Multiple sclerosis (HCC)  Clinically and radiographically stable, imaging studies will be reordered later this year following, or around the time of the, or October Ocrevus infusion.  The fluctuating visual symptoms are just that related to stress or fatigue, simply a symptom exacerbation only, not related to disease activity.  She will be following up with her gastroenterologist regarding  change in bowel pattern.  She and I will see each other prior to that.  We will follow-up with the lab results recently ordered including immunoglobulin and CD20 subsets.  We will see each other in 6 months.    2. Cervical disc disorder at C6-C7 level with radiculopathy  Stable, though imaging was done 2 years ago, there is no reason to repeat at this time with resolution of the cervical spine lesion and stable postoperative changes along with a stable clinical exam.    Time: 40 minutes in total spent in patient care including pre-charting, record review, discussion with healthcare staff and documentation.  This includes face-to-face time for exam, review, discussion, as well as counseling and coordinating care.

## 2024-03-06 LAB
IGA SERPL-MCNC: 75 MG/DL (ref 68–408)
IGG SERPL-MCNC: 757 MG/DL (ref 768–1632)
IGM SERPL-MCNC: 52 MG/DL (ref 35–263)

## 2024-03-07 LAB
ANNOTATION COMMENT IMP: ABNORMAL
CD19 CELLS NFR SPEC: 0 % (ref 6–23)
CD3 CELLS # BLD: 3432 CELLS/UL (ref 570–2400)
CD3 CELLS NFR SPEC: 94 % (ref 62–87)
CD3+CD4+ CELLS # BLD: 2482 CELLS/UL (ref 430–1800)
CD3+CD4+ CELLS NFR BLD: 68 % (ref 32–64)
CD3+CD4+ CELLS/CD3+CD8+ CLL BLD: 2.83 RATIO (ref 0.8–3.9)
CD3+CD8+ CELLS # BLD: 880 CELLS/UL (ref 210–1200)
CD3+CD8+ CELLS NFR SPEC: 24 % (ref 15–46)
CD3-CD16+CD56+ CELLS # SPEC: 202 CELLS/UL (ref 78–470)
CD3-CD16+CD56+ CELLS NFR SPEC: 6 % (ref 4–26)
CELLS.CD3-CD19+ [#/VOLUME] IN BLOOD: 2 CELLS/UL (ref 91–610)

## 2024-03-11 ENCOUNTER — PATIENT MESSAGE (OUTPATIENT)
Dept: BEHAVIORAL HEALTH | Facility: CLINIC | Age: 34
End: 2024-03-11
Payer: COMMERCIAL

## 2024-03-11 DIAGNOSIS — F90.8 ATTENTION DEFICIT HYPERACTIVITY DISORDER (ADHD), OTHER TYPE: ICD-10-CM

## 2024-03-11 DIAGNOSIS — F33.41 RECURRENT MAJOR DEPRESSIVE DISORDER, IN PARTIAL REMISSION (HCC): ICD-10-CM

## 2024-03-11 DIAGNOSIS — F41.1 GAD (GENERALIZED ANXIETY DISORDER): ICD-10-CM

## 2024-03-12 RX ORDER — ESCITALOPRAM OXALATE 10 MG/1
15 TABLET ORAL
Qty: 135 TABLET | Refills: 1 | Status: SHIPPED | OUTPATIENT
Start: 2024-03-12 | End: 2024-03-19 | Stop reason: SDUPTHER

## 2024-03-12 RX ORDER — BUPROPION HYDROCHLORIDE 300 MG/1
300 TABLET ORAL EVERY MORNING
Qty: 90 TABLET | Refills: 1 | Status: SHIPPED | OUTPATIENT
Start: 2024-03-12 | End: 2024-03-15 | Stop reason: SDUPTHER

## 2024-03-12 RX ORDER — METHYLPHENIDATE HYDROCHLORIDE 54 MG/1
54 TABLET ORAL DAILY
Qty: 30 TABLET | Refills: 0 | Status: SHIPPED | OUTPATIENT
Start: 2024-03-17 | End: 2024-03-15 | Stop reason: SDUPTHER

## 2024-03-12 NOTE — PATIENT COMMUNICATION
Received request via: Patient    Was the patient seen in the last year in this department? Yes    Does the patient have an active prescription (recently filled or refills available) for medication(s) requested? No    Pharmacy Name: [facility]     Does the patient have long term Plus and need 100 day supply (blood pressure, diabetes and cholesterol meds only)? Medication is not for cholesterol, blood pressure or diabetes

## 2024-03-15 ENCOUNTER — PATIENT MESSAGE (OUTPATIENT)
Dept: BEHAVIORAL HEALTH | Facility: CLINIC | Age: 34
End: 2024-03-15
Payer: COMMERCIAL

## 2024-03-15 DIAGNOSIS — F41.1 GAD (GENERALIZED ANXIETY DISORDER): ICD-10-CM

## 2024-03-15 DIAGNOSIS — F33.41 RECURRENT MAJOR DEPRESSIVE DISORDER, IN PARTIAL REMISSION (HCC): ICD-10-CM

## 2024-03-15 DIAGNOSIS — F90.8 ATTENTION DEFICIT HYPERACTIVITY DISORDER (ADHD), OTHER TYPE: ICD-10-CM

## 2024-03-15 RX ORDER — BUPROPION HYDROCHLORIDE 300 MG/1
300 TABLET ORAL EVERY MORNING
Qty: 90 TABLET | Refills: 1 | Status: SHIPPED | OUTPATIENT
Start: 2024-03-15 | End: 2024-03-19 | Stop reason: SDUPTHER

## 2024-03-15 RX ORDER — METHYLPHENIDATE HYDROCHLORIDE 54 MG/1
54 TABLET ORAL DAILY
Qty: 30 TABLET | Refills: 0 | Status: SHIPPED | OUTPATIENT
Start: 2024-03-17 | End: 2024-04-16

## 2024-03-15 NOTE — PATIENT COMMUNICATION
Received request via: Patient    Was the patient seen in the last year in this department? Yes    Does the patient have an active prescription (recently filled or refills available) for medication(s) requested? No    Pharmacy Name: [facility]     Does the patient have custodial Plus and need 100 day supply (blood pressure, diabetes and cholesterol meds only)? Medication is not for cholesterol, blood pressure or diabetes and Patient does not have SCP

## 2024-03-19 ENCOUNTER — TELEMEDICINE (OUTPATIENT)
Dept: BEHAVIORAL HEALTH | Facility: CLINIC | Age: 34
End: 2024-03-19
Payer: COMMERCIAL

## 2024-03-19 DIAGNOSIS — F41.1 GAD (GENERALIZED ANXIETY DISORDER): ICD-10-CM

## 2024-03-19 DIAGNOSIS — F90.8 ATTENTION DEFICIT HYPERACTIVITY DISORDER (ADHD), OTHER TYPE: ICD-10-CM

## 2024-03-19 DIAGNOSIS — F33.41 RECURRENT MAJOR DEPRESSIVE DISORDER, IN PARTIAL REMISSION (HCC): ICD-10-CM

## 2024-03-19 PROCEDURE — 99214 OFFICE O/P EST MOD 30 MIN: CPT | Mod: 95 | Performed by: PSYCHIATRY & NEUROLOGY

## 2024-03-19 RX ORDER — METHYLPHENIDATE HYDROCHLORIDE 54 MG/1
54 TABLET ORAL EVERY MORNING
Qty: 30 TABLET | Refills: 0 | Status: SHIPPED | OUTPATIENT
Start: 2024-05-20 | End: 2024-06-19

## 2024-03-19 RX ORDER — BUPROPION HYDROCHLORIDE 300 MG/1
300 TABLET ORAL EVERY MORNING
Qty: 90 TABLET | Refills: 3 | Status: SHIPPED | OUTPATIENT
Start: 2024-03-19

## 2024-03-19 RX ORDER — METHYLPHENIDATE HYDROCHLORIDE 54 MG/1
54 TABLET ORAL EVERY MORNING
Qty: 30 TABLET | Refills: 0 | Status: SHIPPED | OUTPATIENT
Start: 2024-04-19 | End: 2024-05-19

## 2024-03-19 RX ORDER — METHYLPHENIDATE HYDROCHLORIDE 54 MG/1
54 TABLET ORAL EVERY MORNING
Qty: 30 TABLET | Refills: 0 | Status: SHIPPED | OUTPATIENT
Start: 2024-03-19 | End: 2024-04-18

## 2024-03-19 RX ORDER — ESCITALOPRAM OXALATE 10 MG/1
10 TABLET ORAL
Qty: 90 TABLET | Refills: 3 | Status: SHIPPED | OUTPATIENT
Start: 2024-03-19 | End: 2024-06-17

## 2024-03-19 NOTE — PROGRESS NOTES
This evaluation was conducted via Zoom using secure and encrypted videoconferencing technology. The patient was in their home in the [facility].    The patient's identity was confirmed and verbal consent was obtained for this virtual visit.      PSYCHIATRY FOLLOW-UP NOTE      Name: Rosita Bowles  MRN: [id number]  : 1990  Age: 33 y.o.  Date of assessment: 3/19/2024  PCP: Demarco Brian M.D.  Persons in attendance: Patient      REASON FOR VISIT/CHIEF COMPLAINT (as stated by Patient):  Rosita Bowles is a 33 y.o., White female, attending follow-up appointment for mood and anxiety management.      HISTORY OF PRESENT ILLNESS:  Rosita Bowles is a 33 y.o. old female with MDD, JOSE & ADHD comes in today for follow up. Patient was last seen 4 months ago, and following treatment planning recommendations were done:  Continue Lexapro to 15 mg daily for mood and anxiety  Continue Wellbutrin  mg daily for depression and anxiety management.   Continue Concerta 54 mg daily for ADHD management.   Control medication treatment agreement signed in 2021.  Initiate referral for psychotherapy for mood and anxiety management.      Compliant with medications.  She is not on medications for last 1 week: resulted in worsening depression, energy, motivation.   Reports while she is on medications: she does well with mood and anxiety.   Without medications: she has seen destabilization of mood and anxiety.   She is on lower Lexapro 10 mg with good response.  Agreed with restarting medications at same dose.  Engaged in weekly therapy at [facility]'s [facility].    Patient understand that both Wellbutrin and Concerta can cause anxiety and obsessiveness but patient is on this combination for more than 10 years and prefers to continue them at the same dosage.      CURRENT MEDICATIONS:  Current Outpatient Medications   Medication Sig Dispense Refill    methylphenidate (CONCERTA) 54 MG CR tablet Take 1  Tablet by mouth every day for 30 days. 30 Tablet 0    buPROPion (WELLBUTRIN XL) 300 MG XL tablet Take 1 Tablet by mouth every morning. 90 Tablet 1    escitalopram (LEXAPRO) 10 MG Tab Take 1.5 Tablets by mouth 1/2 hour after dinner for 90 days. 135 Tablet 1    acetaminophen (TYLENOL) 500 MG Tab Take 2 tablets every 8 hours by oral route for 14 days.      dicyclomine (BENTYL) 10 MG Cap TAKE 1 CAPSULE BY MOUTH EVERY 6 HOURS AS NEEDED FOR ABDOMINAL CRAMPS AND/OR DISCOMFORT      PREMARIN 0.625 MG/GM Cream INSERT 0.5 GRAMS VAGINALLY DAILY 30 g 1    triamcinolone acetonide (KENALOG) 0.1 % Ointment AAA twice a day for 1-2 weeks, then can use as needed for no longer than 2 weeks at a time 30 g 1    docusate sodium (COLACE) 100 MG Cap Take 1 Capsule by mouth 2 times a day. 60 Capsule 1    valacyclovir (VALTREX) 1 GM Tab Take 1 Tablet by mouth 2 times a day as needed (cold sores). 180 Tablet 3    albuterol 108 (90 Base) MCG/ACT Aero Soln inhalation aerosol INHALE 2 PUFFS EVERY 4 HOURS AS NEEDED FOR SHORTNESS OF BREATH 6.7 Each 1    celecoxib (CELEBREX) 100 MG Cap Take 1-2 Capsules by mouth 2 times a day. 60 Capsule 1    tizanidine (ZANAFLEX) 4 MG Tab Take 1 Tablet by mouth every 6 hours as needed (back pain and muscle spasm). 30 Tablet 1    fluconazole (DIFLUCAN) 150 MG tablet Take 1 Tablet by mouth every day. 2 Tablet 0    albuterol 108 (90 Base) MCG/ACT Aero Soln inhalation aerosol INHALE 2 PUFFS EVERY FOUR HOURS AS NEEDED FOR SHORTNESS OF BREATH. 6.7 Each 3    levonorgestrel (MIRENA, 52 MG,) 52 mg (20 mcg/24 hr) IUD 1 Each by Intrauterine route one time.      ocrelizumab (OCREVUS) 300 MG/10ML Solution injection Infuse  into a venous catheter.      Clindamycin Phos-Benzoyl Perox (ONEXTON) 1.2-3.75 % Gel 1 Application by Apply externally route every morning. To entire face 30 g 3    Biotin 5000 MCG Cap Take 5,000 mcg by mouth every day at 6 PM. supplement      melatonin 3 MG Tab Take 3 mg by mouth 1 time daily as needed  (insomnia).      Cyanocobalamin (VITAMIN B-12) 1000 MCG Tab Take 1,000 mcg by mouth every day. supplement      Cholecalciferol (VITAMIN D3) 5000 units Tab Take 5,000 Units by mouth every day. supplement       No current facility-administered medications for this visit.       MEDICAL HISTORY  Past Medical History:   Diagnosis Date    Acne 8/7/2012    Acne vulgaris 6/23/2017    ADHD 8/7/2012    Depression 8/7/2012    Multiple sclerosis (HCC) 11/2/2016    Plantar wart 1/9/2019     Past Surgical History:   Procedure Laterality Date    CERVICAL DISK AND FUSION ANTERIOR  08/07/2018    DENTAL EXTRACTION(S)      ORIF, ANKLE      OTHER ORTHOPEDIC SURGERY         PAST PSYCHIATRIC MEDICATIONS  Wellbutrin  Concerta  Temazepam         REVIEW OF SYSTEMS:        Constitutional negative   Eyes negative   Ears/Nose/Mouth/Throat negative   Cardiovascular negative   Respiratory negative   Gastrointestinal negative   Genitourinary negative   Muscular negative   Integumentary negative   Neurological  Multiple sclerosis (under treatment and stable)   Endocrine negative   Hematologic/Lymphatic negative     PHYSICAL EXAMINAION:  Vital signs: There were no vitals taken for this visit.  Musculoskeletal: Normal gait.   Abnormal movements: none      MENTAL STATUS EXAMINATION      General:   - Grooming and hygiene: Casual,   - Apparent distress: tense,   - Behavior: Tense  - Eye Contact:  Good,   - no psychomotor agitation or retardation    - Participation: Active verbal participation  Orientation: Alert and Fully Oriented to person, place and time  Mood: Anxious  Affect: Flexible and Full range,  Thought Process: Logical and Goal-directed  Thought Content: Denies suicidal or homicidal ideations, intent or plan   Perception: Denies auditory or visual hallucinations. No delusions noted   Attention span and concentration: Intact   Speech:Rate within normal limits and Volume within normal limits  Language: Appropriate   Insight: Good  Judgment:  Good  Recent and remote memory: No gross evidence of memory deficits        DEPRESSION SCREENIN/20/2022    10:20 AM 2023     8:29 AM 3/4/2024     8:00 AM   Depression Screen (PHQ-2/PHQ-9)   PHQ-2 Total Score  0    PHQ-2 Total Score 0  0   PHQ-9 Total Score  0        Interpretation of PHQ-9 Total Score   Score Severity   1-4 No Depression   5-9 Mild Depression   10-14 Moderate Depression   15-19 Moderately Severe Depression   20-27 Severe Depression    CURRENT RISK:       Suicidal: Low       Homicidal: Low       Self-Harm: Low       Relapse: Low       Crisis Safety Plan Reviewed Not Indicated       If evidence of imminent risk is present, intervention/plan:      MEDICAL RECORDS/LABS/DIAGNOSTIC TESTS REVIEWED:  No new lab since last visit     [place]  records -   Reviewed     PLAN:  (1) Major depressive disorder; (2) Generalized anxiety disorder; (3) ADHD  Slow improvement  Continue Lexapro 10 mg daily for mood and anxiety  Continue Wellbutrin  mg daily for depression and anxiety management.   Continue Concerta 54 mg daily for ADHD management.   Control medication treatment agreement signed in 2021.  Continue psychotherapy for mood and anxiety management.  Medication options, alternatives (including no medications) and medication risks/benefits/side effects were discussed in detail.  Explained importance of contraceptive measures while on psychotropic medications, educated to let provider know if ever pregnant or wanting to become pregnant. Verbalized understanding.  The patient was advised to call, message provider on [employer], or come in to the clinic if symptoms worsen or if any future questions/issues regarding their medications arise; the patient verbalized understanding and agreement.    The patient was educated to call 911, call the suicide hotline, or go to local ER if having thoughts of suicide or homicide; verbalized understanding.    Billing Coding based on:  40458 based on  MDM    Return to clinic in 3 months or sooner if symptoms worsen.  Next Appointment: instruction provided on how to make the next appointment.     The proposed treatment plan was discussed with the patient who was provided the opportunity to ask questions and make suggestions regarding alternative treatment. Patient verbalized understanding and expressed agreement with the plan.       Dante Roche M.D.  03/19/24    This note was created using voice recognition software (Dragon). The accuracy of the dictation is limited by the abilities of the software. I have reviewed the note prior to signing, however some errors in grammar and context are still possible. If you have any questions related to this note please do not hesitate to contact our office.

## 2024-04-24 ENCOUNTER — OUTPATIENT INFUSION SERVICES (OUTPATIENT)
Dept: ONCOLOGY | Facility: MEDICAL CENTER | Age: 34
End: 2024-04-24
Attending: PSYCHIATRY & NEUROLOGY
Payer: COMMERCIAL

## 2024-04-24 VITALS
RESPIRATION RATE: 18 BRPM | TEMPERATURE: 97.4 F | HEIGHT: 70 IN | SYSTOLIC BLOOD PRESSURE: 111 MMHG | BODY MASS INDEX: 22.54 KG/M2 | HEART RATE: 82 BPM | DIASTOLIC BLOOD PRESSURE: 64 MMHG | OXYGEN SATURATION: 97 % | WEIGHT: 157.41 LBS

## 2024-04-24 DIAGNOSIS — G35 MULTIPLE SCLEROSIS (HCC): ICD-10-CM

## 2024-04-24 LAB
HBV CORE AB SERPL QL IA: NONREACTIVE
HBV SURFACE AG SER QL: NORMAL

## 2024-04-24 PROCEDURE — 96375 TX/PRO/DX INJ NEW DRUG ADDON: CPT

## 2024-04-24 PROCEDURE — 87340 HEPATITIS B SURFACE AG IA: CPT

## 2024-04-24 PROCEDURE — 86704 HEP B CORE ANTIBODY TOTAL: CPT

## 2024-04-24 PROCEDURE — 96413 CHEMO IV INFUSION 1 HR: CPT

## 2024-04-24 PROCEDURE — A9270 NON-COVERED ITEM OR SERVICE: HCPCS | Performed by: PSYCHIATRY & NEUROLOGY

## 2024-04-24 PROCEDURE — 96415 CHEMO IV INFUSION ADDL HR: CPT

## 2024-04-24 PROCEDURE — 700102 HCHG RX REV CODE 250 W/ 637 OVERRIDE(OP): Performed by: PSYCHIATRY & NEUROLOGY

## 2024-04-24 PROCEDURE — 700105 HCHG RX REV CODE 258: Performed by: PSYCHIATRY & NEUROLOGY

## 2024-04-24 PROCEDURE — 700111 HCHG RX REV CODE 636 W/ 250 OVERRIDE (IP): Mod: JZ | Performed by: PSYCHIATRY & NEUROLOGY

## 2024-04-24 RX ORDER — SODIUM CHLORIDE 9 MG/ML
INJECTION, SOLUTION INTRAVENOUS CONTINUOUS
Status: DISCONTINUED | OUTPATIENT
Start: 2024-04-24 | End: 2024-04-24 | Stop reason: HOSPADM

## 2024-04-24 RX ORDER — ACETAMINOPHEN 325 MG/1
650 TABLET ORAL ONCE
Status: COMPLETED | OUTPATIENT
Start: 2024-04-24 | End: 2024-04-24

## 2024-04-24 RX ORDER — METHYLPREDNISOLONE SODIUM SUCCINATE 125 MG/2ML
100 INJECTION, POWDER, LYOPHILIZED, FOR SOLUTION INTRAMUSCULAR; INTRAVENOUS ONCE
OUTPATIENT
Start: 2024-10-19

## 2024-04-24 RX ORDER — DIPHENHYDRAMINE HYDROCHLORIDE 50 MG/ML
25 INJECTION INTRAMUSCULAR; INTRAVENOUS PRN
OUTPATIENT
Start: 2024-10-19

## 2024-04-24 RX ORDER — ACETAMINOPHEN 325 MG/1
650 TABLET ORAL ONCE
OUTPATIENT
Start: 2024-10-19

## 2024-04-24 RX ORDER — METHYLPREDNISOLONE SODIUM SUCCINATE 125 MG/2ML
100 INJECTION, POWDER, LYOPHILIZED, FOR SOLUTION INTRAMUSCULAR; INTRAVENOUS ONCE
Status: COMPLETED | OUTPATIENT
Start: 2024-04-24 | End: 2024-04-24

## 2024-04-24 RX ORDER — SODIUM CHLORIDE 9 MG/ML
INJECTION, SOLUTION INTRAVENOUS CONTINUOUS
OUTPATIENT
Start: 2024-10-19

## 2024-04-24 RX ADMIN — METHYLPREDNISOLONE SODIUM SUCCINATE 100 MG: 125 INJECTION, POWDER, FOR SOLUTION INTRAMUSCULAR; INTRAVENOUS at 07:37

## 2024-04-24 RX ADMIN — ACETAMINOPHEN 650 MG: 325 TABLET ORAL at 07:36

## 2024-04-24 RX ADMIN — OCRELIZUMAB 600 MG: 300 INJECTION INTRAVENOUS at 08:04

## 2024-04-24 RX ADMIN — DIPHENHYDRAMINE HYDROCHLORIDE 25 MG: 50 INJECTION INTRAMUSCULAR; INTRAVENOUS at 07:37

## 2024-04-24 ASSESSMENT — FIBROSIS 4 INDEX: FIB4 SCORE: 0.41

## 2024-04-24 NOTE — PROGRESS NOTES
Pt arrived ambulatory to [facility] for Ocrevus that she receives every 6 months. Pt denied fever, signs or symptoms of infection or acute illness today. She denies having any live vaccines. Her last Hep B lab was drawn 04/07/2022; new labs drawn today. POC discussed and pt verbalized understanding.     PIV established and labs drawn at this time; pt tolerated well. Premedications administered per MAR. Ocrevus titrated per orders up to the max rate of 300 mL/hr ; pt tolerated well. Pt monitored for 1 hour post-infusion and pt tolerated well. No signs or symptoms of reaction or complications noted. PIV flushed and removed with tip intact; pt tolerated well. Gauze and coban applied to site.     Follow-up care discussed and  was emailed to set up next appointment; pt verbalized understanding. Pt discharged home to self care in no apparent distress at this time.

## 2024-06-11 ENCOUNTER — TELEMEDICINE (OUTPATIENT)
Dept: BEHAVIORAL HEALTH | Facility: CLINIC | Age: 34
End: 2024-06-11
Payer: COMMERCIAL

## 2024-06-11 DIAGNOSIS — F90.8 ATTENTION DEFICIT HYPERACTIVITY DISORDER (ADHD), OTHER TYPE: ICD-10-CM

## 2024-06-11 DIAGNOSIS — F41.1 GAD (GENERALIZED ANXIETY DISORDER): ICD-10-CM

## 2024-06-11 DIAGNOSIS — F33.41 RECURRENT MAJOR DEPRESSIVE DISORDER, IN PARTIAL REMISSION (HCC): ICD-10-CM

## 2024-06-11 PROCEDURE — 99214 OFFICE O/P EST MOD 30 MIN: CPT | Mod: 95 | Performed by: PSYCHIATRY & NEUROLOGY

## 2024-06-11 RX ORDER — METHYLPHENIDATE HYDROCHLORIDE 54 MG/1
54 TABLET ORAL EVERY MORNING
Qty: 30 TABLET | Refills: 0 | Status: SHIPPED | OUTPATIENT
Start: 2024-07-19 | End: 2024-08-18

## 2024-06-11 RX ORDER — METHYLPHENIDATE HYDROCHLORIDE 54 MG/1
54 TABLET ORAL EVERY MORNING
Qty: 30 TABLET | Refills: 0 | Status: SHIPPED | OUTPATIENT
Start: 2024-08-19 | End: 2024-09-18

## 2024-06-11 RX ORDER — METHYLPHENIDATE HYDROCHLORIDE 54 MG/1
54 TABLET ORAL EVERY MORNING
Qty: 30 TABLET | Refills: 0 | Status: SHIPPED | OUTPATIENT
Start: 2024-06-18 | End: 2024-07-18

## 2024-06-11 RX ORDER — BUPROPION HYDROCHLORIDE 300 MG/1
300 TABLET ORAL EVERY MORNING
Qty: 90 TABLET | Refills: 3 | Status: SHIPPED | OUTPATIENT
Start: 2024-06-11

## 2024-06-11 RX ORDER — ESCITALOPRAM OXALATE 10 MG/1
10 TABLET ORAL
Qty: 90 TABLET | Refills: 3 | Status: SHIPPED | OUTPATIENT
Start: 2024-06-11 | End: 2024-09-09

## 2024-06-11 NOTE — PROGRESS NOTES
This evaluation was conducted via Zoom using secure and encrypted videoconferencing technology. The patient was in their home in the [facility].    The patient's identity was confirmed and verbal consent was obtained for this virtual visit.      PSYCHIATRY FOLLOW-UP NOTE      Name: Rosita Bowles  MRN: [id number]  : 1990  Age: 33 y.o.  Date of assessment: 2024  PCP: Demarco Brian M.D.  Persons in attendance: Patient      REASON FOR VISIT/CHIEF COMPLAINT (as stated by Patient):  Rosita Bowles is a 33 y.o., White female, attending follow-up appointment for mood and anxiety management.      HISTORY OF PRESENT ILLNESS:  Rosita Bowles is a 33 y.o. old female with MDD, JOSE and ADHD comes in today for follow up. Patient was last seen 3 months ago, and following treatment planning recommendations were done:  Continue Lexapro 10 mg daily for mood and anxiety  Continue Wellbutrin  mg daily for depression and anxiety management.   Continue Concerta 54 mg daily for ADHD management.   Control medication treatment agreement signed in 2021.  Continue psychotherapy for mood and anxiety management.    Compliant with medications: reports good response with no side effects.   She has remained consistent with medications.   Sexual side effects noted: reduced orgasm noted. Discussed the option of reducing the dose to 5 mg (agreed with getting 10 mg prescription and breaking this in half).  2 month ago: changed job due to harassment at the last work. She likes the new job.   MS infusions every 6 monthly.   Currently doing psychotherapy with good response.    Patient understand that both Wellbutrin and Concerta can cause anxiety and obsessiveness but patient is on this combination for more than 10 years and prefers to continue them at the same dosage.      CURRENT MEDICATIONS:  Current Outpatient Medications   Medication Sig Dispense Refill    buPROPion (WELLBUTRIN XL) 300 MG XL  tablet Take 1 Tablet by mouth every morning. 90 Tablet 3    escitalopram (LEXAPRO) 10 MG Tab Take 1 Tablet by mouth 1/2 hour after dinner for 90 days. 90 Tablet 3    methylphenidate (CONCERTA) 54 MG CR tablet Take 1 Tablet by mouth every morning for 30 days. 30 Tablet 0    acetaminophen (TYLENOL) 500 MG Tab Take 2 tablets every 8 hours by oral route for 14 days.      dicyclomine (BENTYL) 10 MG Cap TAKE 1 CAPSULE BY MOUTH EVERY 6 HOURS AS NEEDED FOR ABDOMINAL CRAMPS AND/OR DISCOMFORT      PREMARIN 0.625 MG/GM Cream INSERT 0.5 GRAMS VAGINALLY DAILY 30 g 1    triamcinolone acetonide (KENALOG) 0.1 % Ointment AAA twice a day for 1-2 weeks, then can use as needed for no longer than 2 weeks at a time 30 g 1    docusate sodium (COLACE) 100 MG Cap Take 1 Capsule by mouth 2 times a day. 60 Capsule 1    valacyclovir (VALTREX) 1 GM Tab Take 1 Tablet by mouth 2 times a day as needed (cold sores). 180 Tablet 3    albuterol 108 (90 Base) MCG/ACT Aero Soln inhalation aerosol INHALE 2 PUFFS EVERY 4 HOURS AS NEEDED FOR SHORTNESS OF BREATH 6.7 Each 1    celecoxib (CELEBREX) 100 MG Cap Take 1-2 Capsules by mouth 2 times a day. 60 Capsule 1    tizanidine (ZANAFLEX) 4 MG Tab Take 1 Tablet by mouth every 6 hours as needed (back pain and muscle spasm). 30 Tablet 1    fluconazole (DIFLUCAN) 150 MG tablet Take 1 Tablet by mouth every day. 2 Tablet 0    albuterol 108 (90 Base) MCG/ACT Aero Soln inhalation aerosol INHALE 2 PUFFS EVERY FOUR HOURS AS NEEDED FOR SHORTNESS OF BREATH. 6.7 Each 3    levonorgestrel (MIRENA, 52 MG,) 52 mg (20 mcg/24 hr) IUD 1 Each by Intrauterine route one time.      ocrelizumab (OCREVUS) 300 MG/10ML Solution injection Infuse  into a venous catheter.      Clindamycin Phos-Benzoyl Perox (ONEXTON) 1.2-3.75 % Gel 1 Application by Apply externally route every morning. To entire face 30 g 3    Biotin 5000 MCG Cap Take 5,000 mcg by mouth every day at 6 PM. supplement      melatonin 3 MG Tab Take 3 mg by mouth 1 time daily  as needed (insomnia).      Cyanocobalamin (VITAMIN B-12) 1000 MCG Tab Take 1,000 mcg by mouth every day. supplement      Cholecalciferol (VITAMIN D3) 5000 units Tab Take 5,000 Units by mouth every day. supplement       No current facility-administered medications for this visit.       MEDICAL HISTORY  Past Medical History:   Diagnosis Date    Acne 8/7/2012    Acne vulgaris 6/23/2017    ADHD 8/7/2012    Depression 8/7/2012    Multiple sclerosis (HCC) 11/2/2016    Plantar wart 1/9/2019     Past Surgical History:   Procedure Laterality Date    CERVICAL DISK AND FUSION ANTERIOR  08/07/2018    DENTAL EXTRACTION(S)      ORIF, ANKLE      OTHER ORTHOPEDIC SURGERY         PAST PSYCHIATRIC MEDICATIONS  Wellbutrin  Concerta  Temazepam         REVIEW OF SYSTEMS:        Constitutional negative   Eyes negative   Ears/Nose/Mouth/Throat negative   Cardiovascular negative   Respiratory negative   Gastrointestinal negative   Genitourinary negative   Muscular negative   Integumentary negative   Neurological  Multiple sclerosis (under treatment and stable)   Endocrine negative   Hematologic/Lymphatic negative     PHYSICAL EXAMINAION:  Vital signs: There were no vitals taken for this visit.  Musculoskeletal: Normal gait.   Abnormal movements: none      MENTAL STATUS EXAMINATION      General:   - Grooming and hygiene: Casual,   - Apparent distress: none,   - Behavior: Tense  - Eye Contact:  Good,   - no psychomotor agitation or retardation    - Participation: Active verbal participation  Orientation: Alert and Fully Oriented to person, place and time  Mood: Anxious  Affect: Flexible and Full range,  Thought Process: Logical and Goal-directed  Thought Content: Denies suicidal or homicidal ideations, intent or plan   Perception: Denies auditory or visual hallucinations. No delusions noted   Attention span and concentration: Intact   Speech:Rate within normal limits and Volume within normal limits  Language: Appropriate   Insight:  Good  Judgment: Good  Recent and remote memory: No gross evidence of memory deficits        DEPRESSION SCREENIN/20/2022    10:20 AM 2023     8:29 AM 3/4/2024     8:00 AM   Depression Screen (PHQ-2/PHQ-9)   PHQ-2 Total Score  0    PHQ-2 Total Score 0  0   PHQ-9 Total Score  0        Interpretation of PHQ-9 Total Score   Score Severity   1-4 No Depression   5-9 Mild Depression   10-14 Moderate Depression   15-19 Moderately Severe Depression   20-27 Severe Depression    CURRENT RISK:       Suicidal: Low       Homicidal: Low       Self-Harm: Low       Relapse: Low       Crisis Safety Plan Reviewed Not Indicated       If evidence of imminent risk is present, intervention/plan:      MEDICAL RECORDS/LABS/DIAGNOSTIC TESTS REVIEWED:  No new lab since last visit     [place]  records -   Reviewed     PLAN:  (1) Major depressive disorder; (2) Generalized anxiety disorder; (3) ADHD  Slow improvement; sexual side effects noted  Reduce Lexapro 5 mg daily for mood and anxiety  Continue Wellbutrin  mg daily for depression and anxiety management.   Continue Concerta 54 mg daily for ADHD management.   Control medication treatment agreement signed in 2021.  Continue psychotherapy for mood and anxiety management.  Medication options, alternatives (including no medications) and medication risks/benefits/side effects were discussed in detail.  Explained importance of contraceptive measures while on psychotropic medications, educated to let provider know if ever pregnant or wanting to become pregnant. Verbalized understanding.  The patient was advised to call, message provider on [employer], or come in to the clinic if symptoms worsen or if any future questions/issues regarding their medications arise; the patient verbalized understanding and agreement.    The patient was educated to call 911, call the suicide hotline, or go to local ER if having thoughts of suicide or homicide; verbalized understanding.    Billing  Coding based on:  50504 based on MDM    Return to clinic in 3-6 months (as her  or sooner if symptoms worsen.  Next Appointment: instruction provided on how to make the next appointment.     Patient instructed to find a new psychiatrist and to contact me for refills till she has found one    The proposed treatment plan was discussed with the patient who was provided the opportunity to ask questions and make suggestions regarding alternative treatment. Patient verbalized understanding and expressed agreement with the plan.       Dante Roche M.D.  06/11/24    This note was created using voice recognition software (Dragon). The accuracy of the dictation is limited by the abilities of the software. I have reviewed the note prior to signing, however some errors in grammar and context are still possible. If you have any questions related to this note please do not hesitate to contact our office.

## 2024-06-23 DIAGNOSIS — B00.9 HSV-1 (HERPES SIMPLEX VIRUS 1) INFECTION: ICD-10-CM

## 2024-06-23 DIAGNOSIS — B37.31 YEAST VAGINITIS: ICD-10-CM

## 2024-06-24 RX ORDER — VALACYCLOVIR HYDROCHLORIDE 1 G/1
1000 TABLET, FILM COATED ORAL 2 TIMES DAILY PRN
Qty: 180 TABLET | Refills: 3 | Status: SHIPPED | OUTPATIENT
Start: 2024-06-24

## 2024-06-24 RX ORDER — FLUCONAZOLE 150 MG/1
150 TABLET ORAL DAILY
Qty: 2 TABLET | Refills: 0 | Status: SHIPPED | OUTPATIENT
Start: 2024-06-24

## 2024-06-24 NOTE — TELEPHONE ENCOUNTER
Received request via: Patient    Was the patient seen in the last year in this department? Yes  LOV : 2/16/2024   Does the patient have an active prescription (recently filled or refills available) for medication(s) requested? No    Pharmacy Name: [facility]    Does the patient have care home Plus and need 100 day supply (blood pressure, diabetes and cholesterol meds only)? Patient does not have SCP

## 2024-08-13 ENCOUNTER — TELEPHONE (OUTPATIENT)
Dept: HEALTH INFORMATION MANAGEMENT | Facility: OTHER | Age: 34
End: 2024-08-13
Payer: COMMERCIAL

## 2024-09-09 ENCOUNTER — APPOINTMENT (OUTPATIENT)
Dept: NEUROLOGY | Facility: MEDICAL CENTER | Age: 34
End: 2024-09-09
Attending: PSYCHIATRY & NEUROLOGY
Payer: COMMERCIAL

## 2024-09-11 ENCOUNTER — OFFICE VISIT (OUTPATIENT)
Dept: NEUROLOGY | Facility: MEDICAL CENTER | Age: 34
End: 2024-09-11
Attending: PSYCHIATRY & NEUROLOGY
Payer: COMMERCIAL

## 2024-09-11 VITALS
HEIGHT: 70 IN | HEART RATE: 89 BPM | BODY MASS INDEX: 22.57 KG/M2 | WEIGHT: 157.63 LBS | OXYGEN SATURATION: 98 % | RESPIRATION RATE: 16 BRPM | DIASTOLIC BLOOD PRESSURE: 58 MMHG | TEMPERATURE: 97.1 F | SYSTOLIC BLOOD PRESSURE: 100 MMHG

## 2024-09-11 DIAGNOSIS — G35 MULTIPLE SCLEROSIS (HCC): ICD-10-CM

## 2024-09-11 PROCEDURE — 3074F SYST BP LT 130 MM HG: CPT | Performed by: PSYCHIATRY & NEUROLOGY

## 2024-09-11 PROCEDURE — 99215 OFFICE O/P EST HI 40 MIN: CPT | Performed by: PSYCHIATRY & NEUROLOGY

## 2024-09-11 PROCEDURE — 99212 OFFICE O/P EST SF 10 MIN: CPT | Performed by: PSYCHIATRY & NEUROLOGY

## 2024-09-11 PROCEDURE — 3078F DIAST BP <80 MM HG: CPT | Performed by: PSYCHIATRY & NEUROLOGY

## 2024-09-11 ASSESSMENT — ENCOUNTER SYMPTOMS
FALLS: 0
FOCAL WEAKNESS: 0
SENSORY CHANGE: 0
HEADACHES: 0
CONSTIPATION: 1

## 2024-09-11 ASSESSMENT — FIBROSIS 4 INDEX: FIB4 SCORE: 0.41

## 2024-09-11 ASSESSMENT — PATIENT HEALTH QUESTIONNAIRE - PHQ9
SUM OF ALL RESPONSES TO PHQ QUESTIONS 1-9: 9
CLINICAL INTERPRETATION OF PHQ2 SCORE: 2
5. POOR APPETITE OR OVEREATING: 2 - MORE THAN HALF THE DAYS

## 2024-09-11 NOTE — PROGRESS NOTES
Subjective     Rosita Bowles is a 33 y.o. female who presents for follow-up, with a history of MS.     DYESI Becker has been doing well since last seen 6 months ago.  She denies any episodes or symptoms flare suggestive of disease relapse.  She still has the visual obscuration involving the left eye inferiorly, this has not changed.  Fatigue can fluctuate slightly but this is more to do with stress than anything else.  Balance is stable, she has not been stumbling.  She denies cramps or spasms of the legs or hands.    There is no sensory distortion in the feet or hands, occasionally she will get some radiation into the left upper extremity mimicking what had happened prior to her decompressive surgery years ago.  She denies Lhermitte phenomena or compressive sensations across the chest.  Bladder problems have resolved, she has been off Pyridium for some time.  Constipation has been improved upon.    Last seen in March, 2024, her CBC and CD cell count subtypes were appropriate, ALC of 3.54, CD19 only 2, immunoglobulin showing only reduction in IgG of 757, IgG a and IgM normal.    She is on Ocrevus 600 mg infusions every 6 months, her next infusion coming up in mid October.    Medical, surgical and family histories are reviewed, there are no new drug allergies.  She and her former , Manjeet, did finally get a divorce, she is now looking for her own apartment, still living with her mother temporarily.  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  Other than her Ocrevus, she is still on the Concerta 54 mg daily, Wellbutrin  mg daily, Valtrex as needed, Colace, Celebrex, Premarin, Mirena, Bentyl, melatonin, vitamin D and B12 supplements.    Review of Systems   Constitutional:  Positive for malaise/fatigue.   Gastrointestinal:  Positive for constipation.   Musculoskeletal:  Negative for falls.   Neurological:  Negative for sensory change, focal weakness  "and headaches.   All other systems reviewed and are negative.    Objective     /58 (BP Location: Right arm, Patient Position: Sitting, BP Cuff Size: Adult)   Pulse 89   Temp 36.2 °C (97.1 °F) (Temporal)   Resp 16   Ht 1.778 m (5' 10\")   Wt 71.5 kg (157 lb 10.1 oz)   SpO2 98%   BMI 22.62 kg/m²      Physical Exam    She appears in no acute distress.  Vital signs are stable.  There is no malar rash or jaw claudication.  The neck is supple, range of motion is full.  Compression maneuvers are negative, Lhermitte phenomena is absent.  There is no spasm of the cervical paraspinal muscle bodies bilaterally.  Cardiac evaluation reveals a regular rhythm.     Neurological Exam    Fully oriented, there is no aphasia, apraxia, or inattention.    PERRLA/EOMI, visual fields are full on the right, on the left there is still the inferior hemianopic deficit with some extension superiorly in the temporal hemifield.  Facial movements are symmetric, sensory exam is intact to pinprick and temperature bilaterally.  There is no extinction.  The tongue and uvula are midline, jaw jerk is present, shoulder shrug and head rotation are symmetric.  Jaw movements are intact.    Musculoskeletal exam reveals normal tone, there is no tremor, asterixis, or drift.  Strength is intact throughout.  Reflexes are still brisker in the legs, right more than left, the toes are downgoing bilaterally.    She stands easily, gait is normal and station and stride length, armswing is symmetric.  Heel, toe, and tandem walking are all normal.  There is no appendicular dystaxia.  Repetitive movements are still symmetric in the lower extremities, they have always been symmetric in the upper extremities without change.    Sensory exam is intact to temperature, pinprick and vibration, there is no spinal cord sensory level posteriorly to pinprick up the spinal axis.    Assessment & Plan     Assessment & Plan  Multiple sclerosis (HCC)  She is doing well, we " will continue her present regimen unchanged.  She does not require symptomatic relief at this time.  Will continue the Ocrevus infusions every 6 months, she will follow-up with repeat MRI imaging of the brain and neck in October.  She has not had a flare in quite some time, radiographically her disease has been quiescent, she has symptom fluctuations depending on circumstance, but nothing new here.  Both of us are quite pleased in this regard.  We will follow-up in 6 months.    Orders:    MR-BRAIN-WITH & W/O; Future    MR-CERVICAL SPINE-WITH & W/O; Future    Time: 40 minutes in total spent on patient care including pre-charting, record review, discussion with healthcare staff and documentation.  This includes face-to-face time for exam, review, discussion, as well as counseling and coordinating care.

## 2024-09-11 NOTE — ASSESSMENT & PLAN NOTE
She is doing well, we will continue her present regimen unchanged.  She does not require symptomatic relief at this time.  Will continue the Ocrevus infusions every 6 months, she will follow-up with repeat MRI imaging of the brain and neck in October.  She has not had a flare in quite some time, radiographically her disease has been quiescent, she has symptom fluctuations depending on circumstance, but nothing new here.  Both of us are quite pleased in this regard.  We will follow-up in 6 months.    Orders:    MR-BRAIN-WITH & W/O; Future    MR-CERVICAL SPINE-WITH & W/O; Future

## 2024-10-15 DIAGNOSIS — F90.8 OTHER SPECIFIED ATTENTION DEFICIT HYPERACTIVITY DISORDER (ADHD): ICD-10-CM

## 2024-10-15 RX ORDER — METHYLPHENIDATE HYDROCHLORIDE 54 MG/1
54 TABLET ORAL EVERY MORNING
Qty: 30 TABLET | Refills: 0 | Status: SHIPPED | OUTPATIENT
Start: 2024-10-20 | End: 2024-11-19

## 2024-10-17 ENCOUNTER — TELEMEDICINE (OUTPATIENT)
Dept: BEHAVIORAL HEALTH | Facility: CLINIC | Age: 34
End: 2024-10-17
Payer: COMMERCIAL

## 2024-10-17 DIAGNOSIS — F90.8 OTHER SPECIFIED ATTENTION DEFICIT HYPERACTIVITY DISORDER (ADHD): ICD-10-CM

## 2024-10-17 DIAGNOSIS — F33.41 RECURRENT MAJOR DEPRESSIVE DISORDER, IN PARTIAL REMISSION (HCC): ICD-10-CM

## 2024-10-17 PROCEDURE — 99214 OFFICE O/P EST MOD 30 MIN: CPT | Mod: 95 | Performed by: PSYCHIATRY & NEUROLOGY

## 2024-10-17 RX ORDER — METHYLPHENIDATE HYDROCHLORIDE 54 MG/1
54 TABLET ORAL EVERY MORNING
Qty: 30 TABLET | Refills: 0 | Status: SHIPPED | OUTPATIENT
Start: 2024-12-20 | End: 2025-01-19

## 2024-10-17 RX ORDER — METHYLPHENIDATE HYDROCHLORIDE 54 MG/1
54 TABLET ORAL EVERY MORNING
Qty: 30 TABLET | Refills: 0 | Status: SHIPPED | OUTPATIENT
Start: 2024-11-19 | End: 2024-12-19

## 2024-10-17 RX ORDER — METHYLPHENIDATE HYDROCHLORIDE 54 MG/1
54 TABLET ORAL EVERY MORNING
Qty: 30 TABLET | Refills: 0 | Status: SHIPPED | OUTPATIENT
Start: 2024-10-19 | End: 2024-11-18

## 2024-10-17 RX ORDER — BUPROPION HYDROCHLORIDE 300 MG/1
300 TABLET ORAL EVERY MORNING
Qty: 90 TABLET | Refills: 3 | Status: SHIPPED | OUTPATIENT
Start: 2024-10-17

## 2024-10-24 ENCOUNTER — OUTPATIENT INFUSION SERVICES (OUTPATIENT)
Dept: ONCOLOGY | Facility: MEDICAL CENTER | Age: 34
End: 2024-10-24
Attending: PSYCHIATRY & NEUROLOGY
Payer: COMMERCIAL

## 2024-10-24 VITALS
WEIGHT: 151.46 LBS | BODY MASS INDEX: 21.68 KG/M2 | SYSTOLIC BLOOD PRESSURE: 109 MMHG | TEMPERATURE: 97 F | RESPIRATION RATE: 18 BRPM | HEART RATE: 65 BPM | DIASTOLIC BLOOD PRESSURE: 68 MMHG | OXYGEN SATURATION: 98 % | HEIGHT: 70 IN

## 2024-10-24 DIAGNOSIS — G35 MULTIPLE SCLEROSIS (HCC): ICD-10-CM

## 2024-10-24 PROCEDURE — A9270 NON-COVERED ITEM OR SERVICE: HCPCS | Performed by: PSYCHIATRY & NEUROLOGY

## 2024-10-24 PROCEDURE — 700105 HCHG RX REV CODE 258: Performed by: PSYCHIATRY & NEUROLOGY

## 2024-10-24 PROCEDURE — 96413 CHEMO IV INFUSION 1 HR: CPT

## 2024-10-24 PROCEDURE — 700111 HCHG RX REV CODE 636 W/ 250 OVERRIDE (IP): Mod: JZ | Performed by: PSYCHIATRY & NEUROLOGY

## 2024-10-24 PROCEDURE — 96415 CHEMO IV INFUSION ADDL HR: CPT

## 2024-10-24 PROCEDURE — 96375 TX/PRO/DX INJ NEW DRUG ADDON: CPT

## 2024-10-24 PROCEDURE — 700102 HCHG RX REV CODE 250 W/ 637 OVERRIDE(OP): Performed by: PSYCHIATRY & NEUROLOGY

## 2024-10-24 RX ORDER — METHYLPREDNISOLONE SODIUM SUCCINATE 125 MG/2ML
100 INJECTION, POWDER, LYOPHILIZED, FOR SOLUTION INTRAMUSCULAR; INTRAVENOUS ONCE
OUTPATIENT
Start: 2025-04-19

## 2024-10-24 RX ORDER — METHYLPREDNISOLONE SODIUM SUCCINATE 125 MG/2ML
100 INJECTION, POWDER, LYOPHILIZED, FOR SOLUTION INTRAMUSCULAR; INTRAVENOUS ONCE
Status: COMPLETED | OUTPATIENT
Start: 2024-10-24 | End: 2024-10-24

## 2024-10-24 RX ORDER — DIPHENHYDRAMINE HYDROCHLORIDE 50 MG/ML
25 INJECTION INTRAMUSCULAR; INTRAVENOUS PRN
OUTPATIENT
Start: 2025-04-19

## 2024-10-24 RX ORDER — SODIUM CHLORIDE 9 MG/ML
INJECTION, SOLUTION INTRAVENOUS CONTINUOUS
OUTPATIENT
Start: 2025-04-19

## 2024-10-24 RX ORDER — ACETAMINOPHEN 325 MG/1
650 TABLET ORAL ONCE
Status: COMPLETED | OUTPATIENT
Start: 2024-10-24 | End: 2024-10-24

## 2024-10-24 RX ORDER — DIPHENHYDRAMINE HYDROCHLORIDE 50 MG/ML
25 INJECTION INTRAMUSCULAR; INTRAVENOUS ONCE
Status: COMPLETED | OUTPATIENT
Start: 2024-10-24 | End: 2024-10-24

## 2024-10-24 RX ORDER — ACETAMINOPHEN 325 MG/1
650 TABLET ORAL ONCE
OUTPATIENT
Start: 2025-04-19

## 2024-10-24 RX ADMIN — ACETAMINOPHEN 650 MG: 325 TABLET ORAL at 07:29

## 2024-10-24 RX ADMIN — DIPHENHYDRAMINE HYDROCHLORIDE 25 MG: 50 INJECTION, SOLUTION INTRAMUSCULAR; INTRAVENOUS at 07:29

## 2024-10-24 RX ADMIN — METHYLPREDNISOLONE SODIUM SUCCINATE 100 MG: 125 INJECTION, POWDER, FOR SOLUTION INTRAMUSCULAR; INTRAVENOUS at 07:29

## 2024-10-24 RX ADMIN — OCRELIZUMAB 600 MG: 300 INJECTION INTRAVENOUS at 07:43

## 2024-10-24 ASSESSMENT — FIBROSIS 4 INDEX: FIB4 SCORE: 0.41

## 2024-11-21 ENCOUNTER — PATIENT MESSAGE (OUTPATIENT)
Dept: BEHAVIORAL HEALTH | Facility: CLINIC | Age: 34
End: 2024-11-21
Payer: COMMERCIAL

## 2024-11-21 DIAGNOSIS — F90.8 OTHER SPECIFIED ATTENTION DEFICIT HYPERACTIVITY DISORDER (ADHD): ICD-10-CM

## 2024-11-21 RX ORDER — METHYLPHENIDATE HYDROCHLORIDE 54 MG/1
54 TABLET ORAL EVERY MORNING
Qty: 30 TABLET | Refills: 0 | Status: SHIPPED | OUTPATIENT
Start: 2024-11-21 | End: 2024-12-21

## 2024-11-21 NOTE — PATIENT COMMUNICATION
Pt req we resend to [facility] on [facility] last pharmacy did not have it in-stock      Received request via: Patient    Was the patient seen in the last year in this department? Yes    Does the patient have an active prescription (recently filled or refills available) for medication(s) requested? No    Pharmacy Name: [facility]#[id number]    Does the patient have snf Plus and need 100-day supply? (This applies to ALL medications) Patient does not have SCP

## 2025-03-24 ENCOUNTER — TELEPHONE (OUTPATIENT)
Dept: ONCOLOGY | Facility: MEDICAL CENTER | Age: 35
End: 2025-03-24
Payer: COMMERCIAL

## 2025-03-24 NOTE — TELEPHONE ENCOUNTER
Called Dr. Kendrick office and left a vm on the MA's phone asking for updated chart notes, labs and anything else that they could send for us to submit authorization to patients new insurance for her Ocrevus. I let them know that we would like to get this done asap and asked that they please send this soon for the patient.

## 2025-03-27 ENCOUNTER — TELEPHONE (OUTPATIENT)
Dept: ONCOLOGY | Facility: MEDICAL CENTER | Age: 35
End: 2025-03-27
Payer: COMMERCIAL

## 2025-03-27 NOTE — TELEPHONE ENCOUNTER
Called and left message with Dr. Kendrick office letting them know that patient's insurance wants her to go to a specialty pharmacy for her Ocrevus.     I have also reached out to the patient to let her know this information.

## 2025-04-23 ENCOUNTER — HOSPITAL ENCOUNTER (OUTPATIENT)
Facility: MEDICAL CENTER | Age: 35
End: 2025-04-23
Attending: OTOLARYNGOLOGY | Admitting: OTOLARYNGOLOGY
Payer: COMMERCIAL

## 2025-04-23 ENCOUNTER — APPOINTMENT (OUTPATIENT)
Dept: ADMISSIONS | Facility: MEDICAL CENTER | Age: 35
End: 2025-04-23
Attending: OTOLARYNGOLOGY
Payer: COMMERCIAL

## 2025-04-30 ENCOUNTER — PRE-ADMISSION TESTING (OUTPATIENT)
Dept: ADMISSIONS | Facility: MEDICAL CENTER | Age: 35
End: 2025-04-30
Attending: OTOLARYNGOLOGY
Payer: COMMERCIAL

## 2025-04-30 RX ORDER — FLUTICASONE PROPIONATE AND SALMETEROL 250; 50 UG/1; UG/1
1 POWDER RESPIRATORY (INHALATION) 2 TIMES DAILY
COMMUNITY
Start: 2025-04-02

## 2025-04-30 RX ORDER — MELOXICAM 7.5 MG/1
TABLET ORAL
COMMUNITY
Start: 2025-04-22

## 2025-04-30 NOTE — PREADMIT AVS NOTE
Current Medications   Medication Instructions    WIXELA INHUB 250-50 MCG/ACT AEROSOL POWDER, BREATH ACTIVATED Continue taking as prescribed.    meloxicam (MOBIC) 7.5 MG Tab Stop 5 days before surgery    buPROPion (WELLBUTRIN XL) 300 MG XL tablet Continue taking as prescribed.         valacyclovir (VALTREX) 1 GM Tab Follow instructions from surgeon or specialist.    acetaminophen (TYLENOL) 500 MG Tab As needed medication, may take if needed, including morning of procedure                    albuterol 108 (90 Base) MCG/ACT Aero Soln inhalation aerosol As needed medication, may take if needed, including morning of procedure               ocrelizumab (OCREVUS) 300 MG/10ML Solution injection Follow instructions from surgeon or specialist.    Biotin 5000 MCG Cap Stop 7 days before surgery    melatonin 3 MG Tab Continue taking as prescribed.    Cyanocobalamin (VITAMIN B-12) 1000 MCG Tab Stop 7 days before surgery    Cholecalciferol (VITAMIN D3) 5000 units Tab Stop 7 days before surgery

## 2025-07-09 ENCOUNTER — APPOINTMENT (OUTPATIENT)
Dept: ADMISSIONS | Facility: MEDICAL CENTER | Age: 35
End: 2025-07-09
Attending: OTOLARYNGOLOGY
Payer: COMMERCIAL

## 2025-07-11 ENCOUNTER — PRE-ADMISSION TESTING (OUTPATIENT)
Dept: ADMISSIONS | Facility: MEDICAL CENTER | Age: 35
End: 2025-07-11
Attending: OTOLARYNGOLOGY
Payer: COMMERCIAL

## 2025-07-11 RX ORDER — METHYLPHENIDATE HYDROCHLORIDE 54 MG/1
54 TABLET ORAL EVERY MORNING
COMMUNITY
Start: 2025-06-19

## 2025-07-11 RX ORDER — NITROFURANTOIN 25; 75 MG/1; MG/1
100 CAPSULE ORAL 2 TIMES DAILY
Status: ON HOLD | COMMUNITY
Start: 2025-07-07 | End: 2025-07-15

## 2025-07-11 RX ORDER — HYDROCORTISONE 25 MG/G
1 CREAM TOPICAL 2 TIMES DAILY
COMMUNITY
Start: 2025-07-07

## 2025-07-11 NOTE — PREADMIT AVS NOTE
Current Medications   Medication Instructions    nitrofurantoin (MACROBID) 100 MG Cap Continue taking as prescribed.    hydrocortisone 2.5 % Cream topical cream Hold medication day of procedure    FLUoxetine (PROZAC) 20 MG Cap Continue taking as prescribed.    methylphenidate (CONCERTA) 54 MG CR tablet Continue taking as prescribed.    WIXELA INHUB 250-50 MCG/ACT AEROSOL POWDER, BREATH ACTIVATED Continue taking as prescribed.    meloxicam (MOBIC) 7.5 MG Tab Stop 5 days before surgery    buPROPion (WELLBUTRIN XL) 300 MG XL tablet Continue taking as prescribed.    valacyclovir (VALTREX) 1 GM Tab As needed medication, may take if needed, including morning of procedure     acetaminophen (TYLENOL) 500 MG Tab As needed medication, may take if needed, including morning of procedure     albuterol 108 (90 Base) MCG/ACT Aero Soln inhalation aerosol As needed medication, may take if needed, including morning of procedure     celecoxib (CELEBREX) 100 MG Cap Stop 5 days before surgery    ocrelizumab (OCREVUS) 300 MG/10ML Solution injection Continue taking as prescribed.    Biotin 5000 MCG Cap Stop 7 days before surgery    melatonin 3 MG Tab Continue taking as prescribed.    Cyanocobalamin (VITAMIN B-12) 1000 MCG Tab Stop 7 days before surgery    Cholecalciferol (VITAMIN D3) 5000 units Tab Stop 7 days before surgery

## 2025-07-15 ENCOUNTER — HOSPITAL ENCOUNTER (OUTPATIENT)
Facility: MEDICAL CENTER | Age: 35
End: 2025-07-15
Attending: OTOLARYNGOLOGY | Admitting: OTOLARYNGOLOGY
Payer: COMMERCIAL

## 2025-07-15 ENCOUNTER — ANESTHESIA (OUTPATIENT)
Dept: SURGERY | Facility: MEDICAL CENTER | Age: 35
End: 2025-07-15
Payer: COMMERCIAL

## 2025-07-15 ENCOUNTER — ANESTHESIA EVENT (OUTPATIENT)
Dept: SURGERY | Facility: MEDICAL CENTER | Age: 35
End: 2025-07-15
Payer: COMMERCIAL

## 2025-07-15 VITALS
RESPIRATION RATE: 19 BRPM | HEIGHT: 70 IN | WEIGHT: 159.83 LBS | BODY MASS INDEX: 22.88 KG/M2 | OXYGEN SATURATION: 98 % | HEART RATE: 74 BPM | DIASTOLIC BLOOD PRESSURE: 61 MMHG | SYSTOLIC BLOOD PRESSURE: 114 MMHG | TEMPERATURE: 98 F

## 2025-07-15 DIAGNOSIS — J32.2 CHRONIC ETHMOIDAL SINUSITIS: Primary | ICD-10-CM

## 2025-07-15 LAB
GRAM STN SPEC: NORMAL
HCG SERPL QL: NEGATIVE
PATHOLOGY CONSULT NOTE: NORMAL
SIGNIFICANT IND 70042: NORMAL
SITE SITE: NORMAL
SOURCE SOURCE: NORMAL

## 2025-07-15 PROCEDURE — 160025 RECOVERY II MINUTES (STATS): Performed by: OTOLARYNGOLOGY

## 2025-07-15 PROCEDURE — 160191 HCHG ANESTHESIA STANDARD: Performed by: OTOLARYNGOLOGY

## 2025-07-15 PROCEDURE — A9270 NON-COVERED ITEM OR SERVICE: HCPCS | Performed by: ANESTHESIOLOGY

## 2025-07-15 PROCEDURE — 160046 HCHG PACU - 1ST 60 MINS PHASE II: Performed by: OTOLARYNGOLOGY

## 2025-07-15 PROCEDURE — 87077 CULTURE AEROBIC IDENTIFY: CPT

## 2025-07-15 PROCEDURE — 160195 HCHG PACU COMPLEX - 1ST 60 MINS: Performed by: OTOLARYNGOLOGY

## 2025-07-15 PROCEDURE — 700111 HCHG RX REV CODE 636 W/ 250 OVERRIDE (IP): Mod: JZ | Performed by: ANESTHESIOLOGY

## 2025-07-15 PROCEDURE — 84703 CHORIONIC GONADOTROPIN ASSAY: CPT

## 2025-07-15 PROCEDURE — 87186 SC STD MICRODIL/AGAR DIL: CPT

## 2025-07-15 PROCEDURE — 88305 TISSUE EXAM BY PATHOLOGIST: CPT | Performed by: PATHOLOGY

## 2025-07-15 PROCEDURE — 160028 HCHG SURGERY MINUTES - 1ST 30 MINS LEVEL 3: Performed by: OTOLARYNGOLOGY

## 2025-07-15 PROCEDURE — 700102 HCHG RX REV CODE 250 W/ 637 OVERRIDE(OP): Performed by: ANESTHESIOLOGY

## 2025-07-15 PROCEDURE — 87070 CULTURE OTHR SPECIMN AEROBIC: CPT

## 2025-07-15 PROCEDURE — 160015 HCHG STAT PREOP MINUTES: Performed by: OTOLARYNGOLOGY

## 2025-07-15 PROCEDURE — 87075 CULTR BACTERIA EXCEPT BLOOD: CPT

## 2025-07-15 PROCEDURE — 88311 DECALCIFY TISSUE: CPT | Performed by: PATHOLOGY

## 2025-07-15 PROCEDURE — 88305 TISSUE EXAM BY PATHOLOGIST: CPT | Mod: 26 | Performed by: PATHOLOGY

## 2025-07-15 PROCEDURE — 88311 DECALCIFY TISSUE: CPT | Mod: 26 | Performed by: PATHOLOGY

## 2025-07-15 PROCEDURE — A9270 NON-COVERED ITEM OR SERVICE: HCPCS | Performed by: OTOLARYNGOLOGY

## 2025-07-15 PROCEDURE — 700101 HCHG RX REV CODE 250: Performed by: ANESTHESIOLOGY

## 2025-07-15 PROCEDURE — 36415 COLL VENOUS BLD VENIPUNCTURE: CPT

## 2025-07-15 PROCEDURE — 700105 HCHG RX REV CODE 258: Performed by: OTOLARYNGOLOGY

## 2025-07-15 PROCEDURE — 160002 HCHG RECOVERY MINUTES (STAT): Performed by: OTOLARYNGOLOGY

## 2025-07-15 PROCEDURE — 700101 HCHG RX REV CODE 250: Performed by: OTOLARYNGOLOGY

## 2025-07-15 PROCEDURE — 700111 HCHG RX REV CODE 636 W/ 250 OVERRIDE (IP): Performed by: OTOLARYNGOLOGY

## 2025-07-15 PROCEDURE — 160048 HCHG OR STATISTICAL LEVEL 1-5: Performed by: OTOLARYNGOLOGY

## 2025-07-15 PROCEDURE — 87205 SMEAR GRAM STAIN: CPT

## 2025-07-15 PROCEDURE — 700102 HCHG RX REV CODE 250 W/ 637 OVERRIDE(OP): Performed by: OTOLARYNGOLOGY

## 2025-07-15 PROCEDURE — 160039 HCHG SURGERY MINUTES - EA ADDL 1 MIN LEVEL 3: Performed by: OTOLARYNGOLOGY

## 2025-07-15 DEVICE — SPLINT INTRANASAL STERILE POSISEP X 0.6IN X 2.0IN (5EA/PK): Type: IMPLANTABLE DEVICE | Site: NOSE | Status: FUNCTIONAL

## 2025-07-15 RX ORDER — CEFAZOLIN SODIUM 1 G/3ML
INJECTION, POWDER, FOR SOLUTION INTRAMUSCULAR; INTRAVENOUS PRN
Status: DISCONTINUED | OUTPATIENT
Start: 2025-07-15 | End: 2025-07-15 | Stop reason: SURG

## 2025-07-15 RX ORDER — SULFAMETHOXAZOLE AND TRIMETHOPRIM 800; 160 MG/1; MG/1
1 TABLET ORAL 2 TIMES DAILY
Qty: 20 TABLET | Refills: 0 | Status: SHIPPED | OUTPATIENT
Start: 2025-07-15 | End: 2025-07-25

## 2025-07-15 RX ORDER — DIPHENHYDRAMINE HYDROCHLORIDE 50 MG/ML
12.5 INJECTION, SOLUTION INTRAMUSCULAR; INTRAVENOUS
Status: DISCONTINUED | OUTPATIENT
Start: 2025-07-15 | End: 2025-07-15 | Stop reason: HOSPADM

## 2025-07-15 RX ORDER — LIDOCAINE HYDROCHLORIDE 10 MG/ML
INJECTION, SOLUTION EPIDURAL; INFILTRATION; INTRACAUDAL; PERINEURAL
Status: DISCONTINUED
Start: 2025-07-15 | End: 2025-07-15 | Stop reason: HOSPADM

## 2025-07-15 RX ORDER — LIDOCAINE HYDROCHLORIDE 40 MG/ML
SOLUTION TOPICAL PRN
Status: DISCONTINUED | OUTPATIENT
Start: 2025-07-15 | End: 2025-07-15 | Stop reason: SURG

## 2025-07-15 RX ORDER — MIDAZOLAM HYDROCHLORIDE 1 MG/ML
1 INJECTION INTRAMUSCULAR; INTRAVENOUS
Status: DISCONTINUED | OUTPATIENT
Start: 2025-07-15 | End: 2025-07-15 | Stop reason: HOSPADM

## 2025-07-15 RX ORDER — TRANEXAMIC ACID 100 MG/ML
INJECTION, SOLUTION INTRAVENOUS PRN
Status: DISCONTINUED | OUTPATIENT
Start: 2025-07-15 | End: 2025-07-15 | Stop reason: SURG

## 2025-07-15 RX ORDER — ONDANSETRON 2 MG/ML
4 INJECTION INTRAMUSCULAR; INTRAVENOUS
Status: DISCONTINUED | OUTPATIENT
Start: 2025-07-15 | End: 2025-07-15 | Stop reason: HOSPADM

## 2025-07-15 RX ORDER — FLUORESCEIN SODIUM 1 MG/MG
STRIP OPHTHALMIC
Status: DISCONTINUED | OUTPATIENT
Start: 2025-07-15 | End: 2025-07-15 | Stop reason: HOSPADM

## 2025-07-15 RX ORDER — EPINEPHRINE 1 MG/ML
INJECTION INTRAMUSCULAR; INTRAVENOUS; SUBCUTANEOUS
Status: DISCONTINUED
Start: 2025-07-15 | End: 2025-07-15 | Stop reason: HOSPADM

## 2025-07-15 RX ORDER — BACITRACIN ZINC 500 [USP'U]/G
OINTMENT TOPICAL
Status: DISCONTINUED | OUTPATIENT
Start: 2025-07-15 | End: 2025-07-15 | Stop reason: HOSPADM

## 2025-07-15 RX ORDER — LIDOCAINE HYDROCHLORIDE 20 MG/ML
INJECTION, SOLUTION EPIDURAL; INFILTRATION; INTRACAUDAL; PERINEURAL PRN
Status: DISCONTINUED | OUTPATIENT
Start: 2025-07-15 | End: 2025-07-15 | Stop reason: SURG

## 2025-07-15 RX ORDER — ALBUTEROL SULFATE 5 MG/ML
2.5 SOLUTION RESPIRATORY (INHALATION)
Status: DISCONTINUED | OUTPATIENT
Start: 2025-07-15 | End: 2025-07-15 | Stop reason: HOSPADM

## 2025-07-15 RX ORDER — FLUORESCEIN SODIUM 1 MG/MG
STRIP OPHTHALMIC
Status: DISCONTINUED
Start: 2025-07-15 | End: 2025-07-15 | Stop reason: HOSPADM

## 2025-07-15 RX ORDER — ACETAMINOPHEN 500 MG
1000 TABLET ORAL ONCE
Status: COMPLETED | OUTPATIENT
Start: 2025-07-15 | End: 2025-07-15

## 2025-07-15 RX ORDER — HALOPERIDOL 5 MG/ML
1 INJECTION INTRAMUSCULAR
Status: DISCONTINUED | OUTPATIENT
Start: 2025-07-15 | End: 2025-07-15 | Stop reason: HOSPADM

## 2025-07-15 RX ORDER — EPINEPHRINE 1 MG/ML
INJECTION INTRAMUSCULAR; INTRAVENOUS; SUBCUTANEOUS
Status: DISCONTINUED | OUTPATIENT
Start: 2025-07-15 | End: 2025-07-15 | Stop reason: HOSPADM

## 2025-07-15 RX ORDER — ONDANSETRON 2 MG/ML
INJECTION INTRAMUSCULAR; INTRAVENOUS PRN
Status: DISCONTINUED | OUTPATIENT
Start: 2025-07-15 | End: 2025-07-15 | Stop reason: SURG

## 2025-07-15 RX ORDER — OXYCODONE HCL 5 MG/5 ML
5 SOLUTION, ORAL ORAL
Status: DISCONTINUED | OUTPATIENT
Start: 2025-07-15 | End: 2025-07-15 | Stop reason: HOSPADM

## 2025-07-15 RX ORDER — DEXAMETHASONE SODIUM PHOSPHATE 4 MG/ML
INJECTION, SOLUTION INTRA-ARTICULAR; INTRALESIONAL; INTRAMUSCULAR; INTRAVENOUS; SOFT TISSUE PRN
Status: DISCONTINUED | OUTPATIENT
Start: 2025-07-15 | End: 2025-07-15 | Stop reason: SURG

## 2025-07-15 RX ORDER — LIDOCAINE HYDROCHLORIDE AND EPINEPHRINE 10; 10 MG/ML; UG/ML
INJECTION, SOLUTION INFILTRATION; PERINEURAL
Status: DISCONTINUED | OUTPATIENT
Start: 2025-07-15 | End: 2025-07-15 | Stop reason: HOSPADM

## 2025-07-15 RX ORDER — EPINEPHRINE 1 MG/ML(1)
AMPUL (ML) INJECTION
Status: DISCONTINUED
Start: 2025-07-15 | End: 2025-07-15 | Stop reason: HOSPADM

## 2025-07-15 RX ORDER — SODIUM CHLORIDE, SODIUM LACTATE, POTASSIUM CHLORIDE, CALCIUM CHLORIDE 600; 310; 30; 20 MG/100ML; MG/100ML; MG/100ML; MG/100ML
INJECTION, SOLUTION INTRAVENOUS CONTINUOUS
Status: DISCONTINUED | OUTPATIENT
Start: 2025-07-15 | End: 2025-07-15 | Stop reason: HOSPADM

## 2025-07-15 RX ORDER — OXYCODONE HCL 5 MG/5 ML
10 SOLUTION, ORAL ORAL
Status: DISCONTINUED | OUTPATIENT
Start: 2025-07-15 | End: 2025-07-15 | Stop reason: HOSPADM

## 2025-07-15 RX ORDER — MIDAZOLAM HYDROCHLORIDE 1 MG/ML
INJECTION INTRAMUSCULAR; INTRAVENOUS PRN
Status: DISCONTINUED | OUTPATIENT
Start: 2025-07-15 | End: 2025-07-15 | Stop reason: SURG

## 2025-07-15 RX ORDER — BACITRACIN ZINC 500 [USP'U]/G
OINTMENT TOPICAL
Status: DISCONTINUED
Start: 2025-07-15 | End: 2025-07-15 | Stop reason: HOSPADM

## 2025-07-15 RX ADMIN — MIDAZOLAM HYDROCHLORIDE 2 MG: 1 INJECTION, SOLUTION INTRAMUSCULAR; INTRAVENOUS at 11:21

## 2025-07-15 RX ADMIN — SODIUM CHLORIDE, POTASSIUM CHLORIDE, SODIUM LACTATE AND CALCIUM CHLORIDE: 600; 310; 30; 20 INJECTION, SOLUTION INTRAVENOUS at 11:17

## 2025-07-15 RX ADMIN — FENTANYL CITRATE 50 MCG: 50 INJECTION, SOLUTION INTRAMUSCULAR; INTRAVENOUS at 11:21

## 2025-07-15 RX ADMIN — TRANEXAMIC ACID 1000 MG: 100 INJECTION, SOLUTION INTRAVENOUS at 11:29

## 2025-07-15 RX ADMIN — DEXAMETHASONE SODIUM PHOSPHATE 8 MG: 4 INJECTION INTRA-ARTICULAR; INTRALESIONAL; INTRAMUSCULAR; INTRAVENOUS; SOFT TISSUE at 11:31

## 2025-07-15 RX ADMIN — LIDOCAINE HYDROCHLORIDE 60 MG: 20 INJECTION, SOLUTION EPIDURAL; INFILTRATION; INTRACAUDAL; PERINEURAL at 11:21

## 2025-07-15 RX ADMIN — ONDANSETRON 4 MG: 2 INJECTION INTRAMUSCULAR; INTRAVENOUS at 11:31

## 2025-07-15 RX ADMIN — FENTANYL CITRATE 50 MCG: 50 INJECTION, SOLUTION INTRAMUSCULAR; INTRAVENOUS at 11:41

## 2025-07-15 RX ADMIN — PROPOFOL 200 MCG/KG/MIN: 10 INJECTION, EMULSION INTRAVENOUS at 11:25

## 2025-07-15 RX ADMIN — ACETAMINOPHEN 1000 MG: 500 TABLET ORAL at 09:25

## 2025-07-15 RX ADMIN — CEFAZOLIN 2 G: 1 INJECTION, POWDER, FOR SOLUTION INTRAMUSCULAR; INTRAVENOUS at 11:21

## 2025-07-15 RX ADMIN — LIDOCAINE HYDROCHLORIDE 4 ML: 40 SOLUTION TOPICAL at 11:25

## 2025-07-15 RX ADMIN — PROPOFOL 200 MG: 10 INJECTION, EMULSION INTRAVENOUS at 11:21

## 2025-07-15 ASSESSMENT — PAIN DESCRIPTION - PAIN TYPE
TYPE: SURGICAL PAIN

## 2025-07-15 ASSESSMENT — FIBROSIS 4 INDEX: FIB4 SCORE: 0.42

## 2025-07-15 ASSESSMENT — PAIN SCALES - GENERAL: PAIN_LEVEL: 2

## 2025-07-15 NOTE — ANESTHESIA POSTPROCEDURE EVALUATION
Patient: Rosita Bowles    Procedure Summary       Date: 07/15/25 Room / Location: [facility] ROOM 22 / SURGERY SAME DAY [facility]    Anesthesia Start: 1117 Anesthesia Stop: 1231    Procedures:       SEPTOPLASTY, STEREOTACTIC COMPUTER-ASSISTED (NAVIGATIONAL) PROCEDURE; CRANIAL, EXTRADURAL, LEFT ENDOSCOPIC MAXILLARY ANTROSTOMY, WITH MAXILLARY TISSUE REMOVAL, (Left: Nose)      SEPTOPLASTY, NOSE (Nose) Diagnosis: (chronic recurrent sinusitis, deviated nasal septum)    Surgeons: Aren Graham M.D. Responsible Provider: Neha Rees M.D.    Anesthesia Type: general ASA Status: 2            Final Anesthesia Type: general  Last vitals  BP   Blood Pressure: 114/61    Temp   36.7 °C (98 °F)    Pulse   74   Resp   19    SpO2   98 %      Anesthesia Post Evaluation    Patient location during evaluation: PACU  Patient participation: complete - patient participated  Level of consciousness: awake and alert  Pain score: 2    Airway patency: patent  Anesthetic complications: no  Cardiovascular status: hemodynamically stable  Respiratory status: acceptable  Hydration status: euvolemic    PONV: none        No notable events documented.     Nurse Pain Score: 1 (NPRS)

## 2025-07-15 NOTE — ANESTHESIA TIME REPORT
Anesthesia Start and Stop Event Times       Date Time Event    7/15/2025 1056 Ready for Procedure     1117 Anesthesia Start     1231 Anesthesia Stop          Responsible Staff  07/15/25      Name Role Begin End    Neha Rees M.D. Anesth 1117 1231          Overtime Reason:  no overtime (within assigned shift)    Comments:

## 2025-07-15 NOTE — OP REPORT
DATE OF SERVICE:  07/15/2025     PREOPERATIVE DIAGNOSES:  1.  Septal deviation.  2.  Chronic sinusitis.     POSTOPERATIVE DIAGNOSES:  1.  Septal deviation.  2.  Chronic sinusitis.     PROCEDURES:  1.  Septoplasty.  2.  Left maxillary antrostomy  with tissue removal.  3.  With image guidance.     SURGEON:  Aren Graham MD     ASSISTANT:  None.     ANESTHESIA:  General endotracheal.     ANESTHESIOLOGIST:  Neha Rees MD     ESTIMATED BLOOD LOSS:  25 mL.     COMPLICATIONS:  None.     SPECIMENS:  1.  Left sinus contents.  2.  Left ethmoid culture.     INDICATIONS:  This is a 34-year-old female who has had longstanding sinus   issues.  CT scan revealed left anterior ethmoid opacification, left maxillary   moderate thickening.  She has failed multiple courses of antibiotics as well   as budesonide rinses.  Her insurance company would not authorize   ethmoidectomy.  I discussed with her the risks, benefits, and alternatives   including the potential this may fail without definitive ethmoidectomy.  She   elected to undergo the above procedure.     FINDINGS:  1.  Septum was deviated to the left.  There was purulence draining from the   left anterior ethmoid that was cultured and suctioned clear indicating there   was an infection in the ethmoid; however, without authorization from her   insurance, we could not proceed with ethmoidectomy.  2.  There was polypoid mucosal thickening in the left maxillary, which was   partially resected.  3. There was edema deep to the uncinate overlying the orbit in the anterior ethmoid      DESCRIPTION OF PROCEDURE:  The patient was brought to the operating room,   intubated by anesthesia, turned 90 degrees, draped in a sterile fashion.  A   timeout was performed, 1:1000 epinephrine soaked pledgets stained with   fluorescein placed in the nasal cavities.  Once allowed to take effect, I   injected the septum with 1% lidocaine and 1:100,000 epinephrine.  After the   septoplasty, I  injected the left maxillary line, sphenopalatine region with 1%   lidocaine and 1:100,000 epinephrine.  I registered the image guidance.  This   was found to be good fidelity in all 3 planes.  I began with a septoplasty.  I   made an incision a centimeter and a half back from the caudal septum on the   left side.  I elevated subperichondrial and subperiosteal planes.  I went   through the cartilage just posterior to my incision, elevating the   subperichondrial and subperiosteal planes, I resected the deviated bone and   cartilage with the [place].  There was a small tear on the right flap and   larger tear on the left flap, which were not overlapping.  I placed a small   piece of cartilage over the tear in the right flap and closed the septum with   a quilting 4-0 plain gut suture at the end of the case.     I began sinus surgery on the left side.  When I medialized the middle   turbinate, I could see purulent draining from the ethmoid, which I cultured   and suctioned clear.  I then using a 30-degree endoscope, resected the   uncinate pediatric backbiter, microdebrider and Kerrison.  I identified the   natural ostium of maxillary.  I widened it posteriorly using a curved   scissors, side biter and the microdebrider.  Polypoid mucosa was resected from   the posterior wall of the maxillary.  I winded the antrostomy anteriorly and   posteriorly.  She had a large Uriel cell, which I resected using the   Hosemann.  I did resect some more of the uncinate superiorly.  I did not see   any more purulence.  I irrigated out the ethmoid as well as the maxillary and   I did not see any more purulence.  Hemostasis was good.  I closed the septum   as described.  I placed a trim piece of PosiSep in the middle meatus and I   then placed Schmitt splint in each nostril and secured it transeptally with 2-0   Prolene.  The patient tolerated the procedure well.  Counts were correct.  She   was awakened from anesthesia and taken to the  recovery room in stable   condition.  Of note, she is on nitrofurantoin right now for UTI.  She does not   feel like it is improving, so I will treat her with Bactrim, which hopefully   should cover her UTI symptoms as well as her sinusitis depending on her   culture.        ______________________________  MD ALYSSA Garibay/TENA    DD:  07/15/2025 12:26  DT:  07/15/2025 13:54    Job#:  [id number]

## 2025-07-15 NOTE — OR NURSING
1228 Pt arrived from OR to PACU bay 9, report received. Connected to monitor, VSS. On 6L mask. Oral airway in place  No dressings     1259 Airway DC'd    1305 handoff given to Arline CORNELIUS.       1340 handoff received from Arline CORNELIUS    1355: Discharge instructions reviewed with patient and family member. All questions answered, verbalizes understanding.     1357: Pt assisted into clothing.     1359: IV and ID bands removed. Pt then escorted to car via wheelchair, accompanied by CNA. All personal belongings & discharge instructions with patient/family.

## 2025-07-15 NOTE — ANESTHESIA PROCEDURE NOTES
Airway    Date/Time: 7/15/2025 11:25 AM    Performed by: Neha Rees M.D.  Authorized by: Neha Rees M.D.    Location:  OR  Urgency:  Elective  Indications for Airway Management:  Anesthesia      Spontaneous Ventilation: absent    Sedation Level:  Deep  Preoxygenated: Yes    Patient Position:  Sniffing  Mask Difficulty Assessment:  1 - vent by mask  Final Airway Type:  Endotracheal airway  Final Endotracheal Airway:  ETT  Cuffed: Yes    Technique Used for Successful ETT Placement:  Direct laryngoscopy    Insertion Site:  Oral  Blade Type:  Delvalle  Laryngoscope Blade/Videolaryngoscope Blade Size:  2  ETT Size (mm):  6.5  Measured from:  Teeth  ETT to Teeth (cm):  20  Placement Verified by: auscultation and capnometry    Cormack-Lehane Classification:  Grade I - full view of glottis  Number of Attempts at Approach:  1

## 2025-07-15 NOTE — DISCHARGE INSTRUCTIONS
Special Instructions:     neilmed rinses 3x/day start tomorrow   Follow up Friday for splint removal       If any questions arise, call your provider.  If your provider is not available, please feel free to call the Surgical Center at [phone].    MEDICATIONS: Resume taking daily medication.  Take prescribed pain medication with food.  If no medication is prescribed, you may take non-aspirin pain medication if needed.  PAIN MEDICATION CAN BE VERY CONSTIPATING.  Take a stool softener or laxative such as senokot, pericolace, or milk of magnesia if needed.    Last pain medication given :    Tylenol at 9:25 AM. If needed the next time you may take Tylenol is after 3:25 PM.   If needed Ibuprofen you may take it at any time.     What to Expect Post Anesthesia    Rest and take it easy for the first 24 hours.  A responsible adult is recommended to remain with you during that time.  It is normal to feel sleepy.  We encourage you to not do anything that requires balance, judgment or coordination.    FOR 24 HOURS DO NOT:  Drive, operate machinery or run household appliances.  Drink beer or alcoholic beverages.  Make important decisions or sign legal documents.    To avoid nausea, slowly advance diet as tolerated, avoiding spicy or greasy foods for the first day.  Add more substantial food to your diet according to your provider's instructions.  Babies can be fed formula or breast milk as soon as they are hungry.  INCREASE FLUIDS AND FIBER TO AVOID CONSTIPATION.    MILD FLU-LIKE SYMPTOMS ARE NORMAL.  YOU MAY EXPERIENCE GENERALIZED MUSCLE ACHES, THROAT IRRITATION, HEADACHE AND/OR SOME NAUSEA.

## 2025-07-15 NOTE — ANESTHESIA PREPROCEDURE EVALUATION
Case: [id number] Date/Time: 07/15/25 1015    Procedures:       SEPTOPLASTY, STEREOTACTIC COMPUTER-ASSISTED (NAVIGATIONAL) PROCEDURE; CRANIAL, EXTRADURAL, LEFT ENDOSCOPIC MAXILLARY ANTROSTOMY, WITH MAXILLARY TISSUE REMOVAL, LEFT ENDOSCOPIC FRONTAL SINUS EXPLORATION (Left: Nose)      SEPTOPLASTY, NOSE (Nose)    Anesthesia type: General    Pre-op diagnosis: Chronic ethmoidal sinusitis, Chronic maxillary sinusitis, Deviated nasal septum    Location: [facility] ROOM 22 / SURGERY SAME DAY [facility]    Surgeons: Aren Graham M.D.            Relevant Problems   No relevant active problems       Physical Exam    Airway   Mallampati: II  TM distance: >3 FB       Cardiovascular   Rhythm: regular     Dental    Pulmonary Breath sounds clear to auscultation     Abdominal - normal exam   Neurological - normal exam               Anesthesia Plan    ASA 2       Plan - general       Airway plan will be ETT          Induction: intravenous      Pertinent diagnostic labs and testing reviewed    Informed Consent:    Anesthetic plan and risks discussed with patient.    Use of blood products discussed with: whom consented to blood products.

## 2025-07-15 NOTE — OR SURGEON
Immediate Post OP Note    PreOp Diagnosis: crs, septal deviation       PostOp Diagnosis: same       Procedure(s):  SEPTOPLASTY, STEREOTACTIC COMPUTER-ASSISTED (NAVIGATIONAL) PROCEDURE; CRANIAL, EXTRADURAL, LEFT ENDOSCOPIC MAXILLARY ANTROSTOMY, WITH MAXILLARY TISSUE REMOVAL, - Wound Class: Dirty or Infected  SEPTOPLASTY, NOSE - Wound Class: Dirty or Infected    Surgeon(s):  Aren Graham M.D.    Anesthesiologist/Type of Anesthesia:  Anesthesiologist: Neha Rees M.D./General    Surgical Staff:  Circulator: Usha Womack R.N.  Scrub Person: Chicho Urias    Specimens removed if any:  ID Type Source Tests Collected by Time Destination   1 : left ethmoid Tissue Sinus AEROBIC/ANAEROBIC CULTURE (SURGERY) Aren Graham M.D. 7/15/2025 11:50 AM    A : left sinus contents Tissue Sinus PATHOLOGY SPECIMEN Aren Graham M.D. 7/15/2025 11:41 AM        Estimated Blood Loss: 15cc    Findings: septum to L , purulence from anterior ethmoid, L max edema    Complications: none         7/15/2025 12:18 PM Aren Graham M.D.

## 2025-07-15 NOTE — OR NURSING
1305- Assumed care of pt. Pt resting in [facility], [facility] on RA, denies pain or nausea. Tolerating PO.     1340- Pt's mother at bedside. Handoff to ASHLI Aldrich.

## 2025-07-18 LAB
BACTERIA SPEC ANAEROBE CULT: ABNORMAL
BACTERIA SPEC ANAEROBE CULT: ABNORMAL
BACTERIA WND AEROBE CULT: ABNORMAL
GRAM STN SPEC: ABNORMAL
SIGNIFICANT IND 70042: ABNORMAL
SIGNIFICANT IND 70042: ABNORMAL
SITE SITE: ABNORMAL
SITE SITE: ABNORMAL
SOURCE SOURCE: ABNORMAL
SOURCE SOURCE: ABNORMAL

## (undated) DEVICE — SUTURE 2-0 PROLENE SH D/A (36PK/BX)

## (undated) DEVICE — TUBING CLEARLINK DUO-VENT - C-FLO (48EA/CA)

## (undated) DEVICE — SET LEADWIRE 5 LEAD BEDSIDE DISPOSABLE ECG (1SET OF 5/EA)

## (undated) DEVICE — TUBE CONNECT SUCTION CLEAR 120 X 1/4" (50EA/CA)"

## (undated) DEVICE — WATER IRRIGATION STERILE 1000ML (12EA/CA)

## (undated) DEVICE — SENSOR OXIMETER ADULT SPO2 RD SET (20EA/BX)

## (undated) DEVICE — SPONGE GAUZESTER. 2X2 4-PL - (2/PK 50PK/BX 30BX/CS)

## (undated) DEVICE — TUBING SET IRRIG XOMED (NEW) - (4/PK)

## (undated) DEVICE — BLADE ROTATABLE STRAIGHT 13CM

## (undated) DEVICE — CANNULA O2 COMFORT SOFT EAR ADULT 7 FT TUBING (50/CA)

## (undated) DEVICE — SUTURE 4-0 PLAIN GUT SC-1 ETHICON (36PK/BX)

## (undated) DEVICE — GLOVE SZ 7.5 BIOGEL PI MICRO - PF LF (50PR/BX)

## (undated) DEVICE — ELECTRODE DUAL RETURN W/ CORD - (50/PK)

## (undated) DEVICE — SUCTION INSTRUMENT YANKAUER BULBOUS TIP W/O VENT (50EA/CA)

## (undated) DEVICE — SUTURE GENERAL

## (undated) DEVICE — TUBE CONNECTING SUCTION - CLEAR PLASTIC STERILE 72 IN (50EA/CA)

## (undated) DEVICE — PACK ENT OR - (6EA/CA)

## (undated) DEVICE — KIT  I.V. START (100EA/CA)

## (undated) DEVICE — NEEDLE SPINAL NON-SAFETY 25GA X 3 (25EA/BX)"

## (undated) DEVICE — SPLINT NASAL DOYLE AIRWAY (2PC/EA)

## (undated) DEVICE — TRACKER ENT INSTRUMENT

## (undated) DEVICE — ANTI-FOG SOLUTION - 60BTL/CA

## (undated) DEVICE — MASK OXYGEN VNYL ADLT MED CONC WITH 7 FOOT TUBING - (50EA/CA)

## (undated) DEVICE — BOVIE FOOT CONTROL SUCTION - 6IN 10FR (25EA/CA)

## (undated) DEVICE — SOD. CHL., INJ., 0.9%, 100 ML

## (undated) DEVICE — SODIUM CHL IRRIGATION 0.9% 1000ML (12EA/CA)

## (undated) DEVICE — BLADE INFERIOR TURBINATE 2.9MM (5EA/PK)

## (undated) DEVICE — SLEEVE VASO DVT COMPRESSION CALF MED - (10PR/CA)

## (undated) DEVICE — SPECIMEN PLASTIC CONVERTOR - (10/CA)

## (undated) DEVICE — SYRINGE 50 ML LL (40EA/BX 4BX/CA)

## (undated) DEVICE — CANISTER SUCTION 3000ML MECHANICAL FILTER AUTO SHUTOFF MEDI-VAC NONSTERILE LF DISP (40EA/CA)

## (undated) DEVICE — TUBING ENDOSCRUB II (5EA/PK)

## (undated) DEVICE — TOWEL STOP TIMEOUT SAFETY FLAG (40EA/CA)

## (undated) DEVICE — CANISTER SUCTION RIGID RED 1500CC (40EA/CA)

## (undated) DEVICE — SHEATH SHARP SITE 30 DEGREE ENDOSCRUB II (5EA/PK)

## (undated) DEVICE — PATTIES SURG X-RAYCOTTONOID - 1/2 X 3 IN (200/CA)

## (undated) DEVICE — TUBE E-T HI-LO CUFF 6.5MM (10EA/BX)

## (undated) DEVICE — GLOVE BIOGEL PI INDICATOR SZ 7.5 SURGICAL PF LF -(50/BX 4BX/CA)

## (undated) DEVICE — GOWN WARMING STANDARD FLEX - (30/CA)

## (undated) DEVICE — LACTATED RINGERS INJ 1000 ML - (14EA/CA 60CA/PF)